# Patient Record
Sex: MALE | Race: WHITE | NOT HISPANIC OR LATINO | Employment: OTHER | URBAN - METROPOLITAN AREA
[De-identification: names, ages, dates, MRNs, and addresses within clinical notes are randomized per-mention and may not be internally consistent; named-entity substitution may affect disease eponyms.]

---

## 2017-01-03 ENCOUNTER — ALLSCRIPTS OFFICE VISIT (OUTPATIENT)
Dept: OTHER | Facility: OTHER | Age: 67
End: 2017-01-03

## 2017-01-03 LAB — OCCULT BLD, FECAL IMMUNOLOGICAL (HISTORICAL): NEGATIVE

## 2017-01-26 ENCOUNTER — GENERIC CONVERSION - ENCOUNTER (OUTPATIENT)
Dept: OTHER | Facility: OTHER | Age: 67
End: 2017-01-26

## 2017-01-27 ENCOUNTER — HOSPITAL ENCOUNTER (OUTPATIENT)
Dept: RADIOLOGY | Facility: HOSPITAL | Age: 67
Discharge: HOME | End: 2017-01-27
Attending: UROLOGY
Payer: COMMERCIAL

## 2017-01-27 ENCOUNTER — TRANSCRIBE ORDERS (OUTPATIENT)
Dept: ADMINISTRATIVE | Facility: HOSPITAL | Age: 67
End: 2017-01-27

## 2017-01-27 DIAGNOSIS — Z87.442 PERSONAL HISTORY OF URINARY CALCULI: Primary | ICD-10-CM

## 2017-01-27 DIAGNOSIS — Z87.442 PERSONAL HISTORY OF URINARY CALCULI: ICD-10-CM

## 2017-01-27 PROCEDURE — 74000 HB X-RAY EXAM OF ABDOMEN (SINGLE ANTEROPOSTERIOR VIEW): CPT

## 2017-06-23 ENCOUNTER — GENERIC CONVERSION - ENCOUNTER (OUTPATIENT)
Dept: OTHER | Facility: OTHER | Age: 67
End: 2017-06-23

## 2017-06-23 LAB
CHOLEST SERPL-MCNC: 177 MG/DL (ref 100–199)
CHOLEST/HDLC SERPL: 3.8 RATIO UNITS (ref 0–5)
HDLC SERPL-MCNC: 47 MG/DL
INTERPRETATION (HISTORICAL): NORMAL
LDLC SERPL CALC-MCNC: 119 MG/DL (ref 0–99)
TRIGL SERPL-MCNC: 55 MG/DL (ref 0–149)
VLDLC SERPL CALC-MCNC: 11 MG/DL (ref 5–40)

## 2017-06-26 ENCOUNTER — GENERIC CONVERSION - ENCOUNTER (OUTPATIENT)
Dept: OTHER | Facility: OTHER | Age: 67
End: 2017-06-26

## 2017-06-30 ENCOUNTER — ALLSCRIPTS OFFICE VISIT (OUTPATIENT)
Dept: OTHER | Facility: OTHER | Age: 67
End: 2017-06-30

## 2017-10-02 ENCOUNTER — GENERIC CONVERSION - ENCOUNTER (OUTPATIENT)
Dept: OTHER | Facility: OTHER | Age: 67
End: 2017-10-02

## 2017-10-02 LAB
A/G RATIO (HISTORICAL): 1.5 (ref 1.2–2.2)
ALBUMIN SERPL BCP-MCNC: 3.8 G/DL (ref 3.6–4.8)
ALP SERPL-CCNC: 78 IU/L (ref 39–117)
ALT SERPL W P-5'-P-CCNC: 10 IU/L (ref 0–44)
AST SERPL W P-5'-P-CCNC: 18 IU/L (ref 0–40)
BILIRUB SERPL-MCNC: 0.5 MG/DL (ref 0–1.2)
BUN SERPL-MCNC: 22 MG/DL (ref 8–27)
BUN/CREA RATIO (HISTORICAL): 20 (ref 10–24)
CALCIUM SERPL-MCNC: 9.6 MG/DL (ref 8.6–10.2)
CHLORIDE SERPL-SCNC: 105 MMOL/L (ref 96–106)
CHOLEST SERPL-MCNC: 127 MG/DL (ref 100–199)
CHOLEST/HDLC SERPL: 2.9 RATIO UNITS (ref 0–5)
CO2 SERPL-SCNC: 25 MMOL/L (ref 18–29)
CREAT SERPL-MCNC: 1.08 MG/DL (ref 0.76–1.27)
EGFR AFRICAN AMERICAN (HISTORICAL): 82 ML/MIN/1.73
EGFR-AMERICAN CALC (HISTORICAL): 71 ML/MIN/1.73
GLUCOSE SERPL-MCNC: 93 MG/DL (ref 65–99)
HDLC SERPL-MCNC: 44 MG/DL
INTERPRETATION (HISTORICAL): NORMAL
LDLC SERPL CALC-MCNC: 70 MG/DL (ref 0–99)
POTASSIUM SERPL-SCNC: 4.3 MMOL/L (ref 3.5–5.2)
SODIUM SERPL-SCNC: 143 MMOL/L (ref 134–144)
TOT. GLOBULIN, SERUM (HISTORICAL): 2.5 G/DL (ref 1.5–4.5)
TOTAL PROTEIN (HISTORICAL): 6.3 G/DL (ref 6–8.5)
TRIGL SERPL-MCNC: 65 MG/DL (ref 0–149)
VLDLC SERPL CALC-MCNC: 13 MG/DL (ref 5–40)

## 2017-10-03 ENCOUNTER — GENERIC CONVERSION - ENCOUNTER (OUTPATIENT)
Dept: OTHER | Facility: OTHER | Age: 67
End: 2017-10-03

## 2017-10-16 ENCOUNTER — GENERIC CONVERSION - ENCOUNTER (OUTPATIENT)
Dept: OTHER | Facility: OTHER | Age: 67
End: 2017-10-16

## 2017-12-06 ENCOUNTER — GENERIC CONVERSION - ENCOUNTER (OUTPATIENT)
Dept: OTHER | Facility: OTHER | Age: 67
End: 2017-12-06

## 2018-01-02 ENCOUNTER — GENERIC CONVERSION - ENCOUNTER (OUTPATIENT)
Dept: OTHER | Facility: OTHER | Age: 68
End: 2018-01-02

## 2018-01-02 LAB
A/G RATIO (HISTORICAL): 1.3 (ref 1.2–2.2)
ALBUMIN SERPL BCP-MCNC: 3.9 G/DL (ref 3.6–4.8)
ALP SERPL-CCNC: 82 IU/L (ref 39–117)
ALT SERPL W P-5'-P-CCNC: 13 IU/L (ref 0–44)
AST SERPL W P-5'-P-CCNC: 16 IU/L (ref 0–40)
BASOPHILS # BLD AUTO: 0 %
BASOPHILS # BLD AUTO: 0 X10E3/UL (ref 0–0.2)
BILIRUB SERPL-MCNC: 0.4 MG/DL (ref 0–1.2)
BUN SERPL-MCNC: 23 MG/DL (ref 8–27)
BUN/CREA RATIO (HISTORICAL): 21 (ref 10–24)
CALCIUM SERPL-MCNC: 9.7 MG/DL (ref 8.6–10.2)
CHLORIDE SERPL-SCNC: 106 MMOL/L (ref 96–106)
CHOLEST SERPL-MCNC: 156 MG/DL (ref 100–199)
CHOLEST/HDLC SERPL: 3.2 RATIO UNITS (ref 0–5)
CO2 SERPL-SCNC: 26 MMOL/L (ref 18–29)
CREAT SERPL-MCNC: 1.09 MG/DL (ref 0.76–1.27)
DEPRECATED RDW RBC AUTO: 14.8 % (ref 12.3–15.4)
EGFR AFRICAN AMERICAN (HISTORICAL): 81 ML/MIN/1.73
EGFR-AMERICAN CALC (HISTORICAL): 70 ML/MIN/1.73
EOSINOPHIL # BLD AUTO: 0.2 X10E3/UL (ref 0–0.4)
EOSINOPHIL # BLD AUTO: 3 %
GLUCOSE SERPL-MCNC: 105 MG/DL (ref 65–99)
HCT VFR BLD AUTO: 40.5 % (ref 37.5–51)
HDLC SERPL-MCNC: 49 MG/DL
HGB BLD-MCNC: 13.2 G/DL (ref 13–17.7)
IMM.GRANULOCYTES (CD4/8) (HISTORICAL): 0 %
IMM.GRANULOCYTES (CD4/8) (HISTORICAL): 0 X10E3/UL (ref 0–0.1)
INTERPRETATION (HISTORICAL): NORMAL
LDLC SERPL CALC-MCNC: 89 MG/DL (ref 0–99)
LYMPHOCYTES # BLD AUTO: 1.5 X10E3/UL (ref 0.7–3.1)
LYMPHOCYTES # BLD AUTO: 25 %
MCH RBC QN AUTO: 29.1 PG (ref 26.6–33)
MCHC RBC AUTO-ENTMCNC: 32.6 G/DL (ref 31.5–35.7)
MCV RBC AUTO: 89 FL (ref 79–97)
MONOCYTES # BLD AUTO: 0.5 X10E3/UL (ref 0.1–0.9)
MONOCYTES (HISTORICAL): 8 %
NEUTROPHILS # BLD AUTO: 3.7 X10E3/UL (ref 1.4–7)
NEUTROPHILS # BLD AUTO: 64 %
PLATELET # BLD AUTO: 215 X10E3/UL (ref 150–379)
POTASSIUM SERPL-SCNC: 4.7 MMOL/L (ref 3.5–5.2)
PROSTATE SPECIFIC ANTIGEN (HISTORICAL): 3.8 NG/ML (ref 0–4)
RBC (HISTORICAL): 4.54 X10E6/UL (ref 4.14–5.8)
SODIUM SERPL-SCNC: 147 MMOL/L (ref 134–144)
TOT. GLOBULIN, SERUM (HISTORICAL): 2.9 G/DL (ref 1.5–4.5)
TOTAL PROTEIN (HISTORICAL): 6.8 G/DL (ref 6–8.5)
TRIGL SERPL-MCNC: 91 MG/DL (ref 0–149)
VLDLC SERPL CALC-MCNC: 18 MG/DL (ref 5–40)
WBC # BLD AUTO: 5.9 X10E3/UL (ref 3.4–10.8)

## 2018-01-03 ENCOUNTER — GENERIC CONVERSION - ENCOUNTER (OUTPATIENT)
Dept: OTHER | Facility: OTHER | Age: 68
End: 2018-01-03

## 2018-01-08 ENCOUNTER — ALLSCRIPTS OFFICE VISIT (OUTPATIENT)
Dept: OTHER | Facility: OTHER | Age: 68
End: 2018-01-08

## 2018-01-09 NOTE — PROGRESS NOTES
Assessment    1  Encounter for preventive health examination (V70 0) (Z00 00)   2  Status post bariatric surgery (V45 86) (Z98 84)   3  Mixed hyperlipidemia (272 2) (E78 2)   4  BMI 35 0-35 9,adult (V85 35) (Z68 35)   5  Obesity, Class II, BMI 35-39 9, with comorbidity (278 00) (E66 9)   6  Benign essential hypertension (401 1) (I10)   7  Need for vaccination (V05 9) (Z23)   8  Benign prostatic hypertrophy without urinary obstruction (600 00) (N40 0)    Plan  Benign essential hypertension    · Lisinopril 10 MG Oral Tablet; 1 every day  Benign prostatic hypertrophy without urinary obstruction    · 2 - Phong Juarez MD , Ignacio Rizvi  (Urology) Physician Referral  Consult Only: the expectation is  that the referring provider will communicate back to the patient on treatment options  Evaluation and Treatment: the expectation is that the referred to provider will  communicate back to the patient on treatment options  Status: Hold For - Scheduling   Requested for: [id number]  Care Summary provided  : Yes  Health Maintenance    · DIGITAL RECTAL EXAM; Status:Complete;   Done: 89LUT3543 11:38AM   · Hemoccult Screening - POC; Status:Complete;   Done: 64JGN4363 11:37AM  Mixed hyperlipidemia    · Call [phone] if: You have muscle cramps ; Status:Complete;   Done: 45XLD9632  11:29AM   · Call [phone] if: You have pain in the stomach area ; Status:Complete;   Done:  67TPF1112 11:29AM   · Call [phone] if: You start vomiting ; Status:Complete;   Done: 44KKO1507 11:29AM   · Call 911 if: You have any symptoms of a stroke ; Status:Complete;   Done: 67ZKC2756  11:29AM   · Continue with our present treatment plan ; Status:Complete;   Done: 52UYC5294 11:29AM   · Eat no more than 30 grams of fat per day ; Status:Complete;   Done: 13TLM7727 11:29AM   · You need to quit smoking ; Status:Complete;   Done: 45KBT6419 11:29AM   · (1) LIPID PANEL, FASTING; Status:Active;  Requested OSK:58TPP7775;   Need for vaccination    · Follow-up visit in 6 months Evaluation and Treatment  Follow-up  Status: Hold For -  Scheduling  Requested for: 98TCC1465   · Fluzone High-Dose 0 5 ML Intramuscular Suspension Prefilled Syringe;  INJECT 0 5  ML Intramuscular; To Be Done: 96LBM0078   · Pneumo (Pneumovax); INJECT 0 5  ML Intramuscular; To Be Done:  47ZCB6441  Obesity, Class II, BMI 35-39 9, with comorbidity    · Avoid alcoholic beverages ; Status:Complete;   Done: 24JHX5731 11:16AM   · Begin a limited exercise program ; Status:Complete;   Done: 64NWQ2774 11:16AM   · Begin or continue regular aerobic exercise  Gradually work up to at least 3 sessions of 30  minutes of exercise a week ; Status:Complete;   Done: 31DUH2392 11:16AM   · Eat a low fat and low cholesterol diet ; Status:Complete;   Done: 61EJM2904 11:16AM   · Keep a diary of when and what you eat ; Status:Complete;   Done: 60ANA8954 11:16AM   · Shared Decision Making Aid given; Status:Complete;   Done: 07UGB9934 11:16AM   · Some eating tips that can help you lose weight ; Status:Complete;   Done: 92AFO5232  11:16AM   · Stretch and warm up your muscles during the first 10 minutes , then cool down your  muscles for the last 10 minutes of exercise ; Status:Complete;   Done: 40TPJ8945  11:16AM   · There are many exercise options for seniors ; Status:Complete;   Done: 65EQA0638  11:16AM   · We encourage all of our patients to exercise regularly  30 minutes of exercise or physical  activity five or more days a week is recommended for children and adults ;  Status:Complete;   Done: 95ZOC4542 11:16AM   · We recommend that you bring your body mass index down to 26 ; Status:Complete;    Done: 21SVT6374 11:16AM   · We recommend that you change your eating habits slowly ; Status:Complete;   Done:  49TMD4719 11:16AM   · We recommend you modify your diet to achieve and maintain a healthy weight    Being  overweight may increase your risk for developing health problems such as diabetes,  heart disease, and cancer  Avoid high fat foods and eat a balanced diet rich  in fruits and vegetables  The combination of a reduced-calorie diet and increased  physical activity is recommended  Please let us know if you would like to  learn more about your nutrition and calorie needs, and additional options including  weight loss programs that can help you achieve your goals ; Status:Complete;   Done:  45QWQ0613 11:16AM   · We want you to follow the Therapeutic Lifestyle Changes (TLC) diet ; Status:Complete;    Done: 74VIQ6444 11:16AM   · Call [phone] if: You are considering suicide ; Status:Complete;   Done:  90INA0580 11:16AM   · Call [phone] if: You are having difficulty sleeping (insomnia) ; Status:Complete;    Done: 74MKQ8146 11:16AM   · Call [phone] if: You are urinating too frequently ; Status:Complete;   Done:  35TEC9465 11:16AM   · Call [phone] if: You feel thirsty most of the time ; Status:Complete;   Done:  61HSN2978 11:16AM   · Call [phone] if: You feel your heart is beating very fast or skipping beats ;  Status:Complete;   Done: 95HGF4561 11:16AM   · Call [phone] if: You have feelings of extreme sadness and feelings of  hopelessness ; Status:Complete;   Done: 69ASM6437 11:16AM   · Call [phone] if: You have pain in your abdomen ; Status:Complete;   Done:  88ZBG9955 11:16AM   · Call [phone] if: You have symptoms of sleep apnea ; Status:Complete;   Done:  77PFO1769 11:16AM   · Call [phone] if: You have sudden or severe chest pain with shortness of breath, rapid  breathing, or cough ; Status:Complete;   Done: 47ZQE7381 11:16AM   · Seek Immediate Medical Attention if: You experience a new kind of chest pain (angina)  or pressure ; Status:Complete;   Done: 07ZLD1987 11:16AM    Discussion/Summary    Pt did not get to see urology last year - has no urinary symptoms, his prostate is 2 pluss which is what i found last year and his PSA was stable from last year to this year   I would like him to see urology and see if this is an issue or he just has a large prostate    will engage in low fat diet pt will have labs in 6 months if still elevated will start statin  Chief Complaint  cpe      History of Present Illness  HM, Adult Male: The patient is being seen for a health maintenance evaluation  General Health:   Screening:   HPI: pt is here for a full physical  had my labs and bariatrics labs which he would like to go over    no freq, no urinary stream abnormalities, no nocturia         Review of Systems    Constitutional: No fever or chills, feels well, no tiredness, no recent weight gain or weight loss  Eyes: No complaints of eye pain, no red eyes, no discharge from eyes, no itchy eyes  ENT: no complaints of earache, no hearing loss, no nosebleeds, no nasal discharge, no sore throat, no hoarseness  Cardiovascular: No complaints of slow heart rate, no fast heart rate, no chest pain, no palpitations, no leg claudication, no lower extremity  Respiratory: No complaints of shortness of breath, no wheezing, no cough, no SOB on exertion, no orthopnea or PND  Gastrointestinal: No complaints of abdominal pain, no constipation, no nausea or vomiting, no diarrhea or bloody stools  Genitourinary: No complaints of dysuria, no incontinence, no hesitancy, no nocturia, no genital lesion, no testicular pain  Musculoskeletal: No complaints of arthralgia, no myalgias, no joint swelling or stiffness, no limb pain or swelling  Integumentary: No complaints of skin rash or skin lesions, no itching, no skin wound, no dry skin  Neurological: No compliants of headache, no confusion, no convulsions, no numbness or tingling, no dizziness or fainting, no limb weakness, no difficulty walking  Psychiatric: Is not suicidal, no sleep disturbances, no anxiety or depression, no change in personality, no emotional problems     Endocrine: No complaints of proptosis, no hot flashes, no muscle weakness, no erectile dysfunction, no deepening of the voice, no feelings of weakness  Hematologic/Lymphatic: No complaints of swollen glands, no swollen glands in the neck, does not bleed easily, no easy bruising  Active Problems    1  Abnormal blood sugar (790 29) (R73 09)   2  AC (acromioclavicular) joint bone spurs (726 91) (M75 80)   3  Achilles tendinitis, unspecified laterality (726 71) (M76 60)   4  Administrative Evaluation Services (V70 3)   5  Aftercare following surgery of the musculoskeletal system (V58 78) (Z47 89)   6  Benign essential hypertension (401 1) (I10)   7  Benign prostatic hypertrophy without urinary obstruction (600 00) (N40 0)   8  Colon cancer screening (V76 51) (Z12 11)   9  Deep venous thrombosis of distal lower extremity (453 42) (I82 4Z9)   10  Encounter for screening for malignant neoplasm of prostate (V76 44) (Z12 5)   11  GERD without esophagitis (530 81) (K21 9)   12  Heart burn (787 1) (R12)   13  Hernia (553 9) (K46 9)   14  Hip pain, unspecified laterality   15  History of colonic polyps (V12 72) (Z86 010)   16  Internal Hemorrhoids (455 0)   17  Mixed hyperlipidemia (272 2) (E78 2)   18  Need for vaccination (V05 9) (Z23)   19  Obstructive sleep apnea (327 23) (G47 33)   20  Osteoarthritis (715 90) (M19 90)   21  Popliteal fullness (729 89) (R29 898)   22  Postgastrectomy malabsorption (579 3) (K91 2,Z90 3)   23  Screening for cardiovascular condition (V81 2) (Z13 6)   24  Screening for diabetes mellitus (DM) (V77 1) (Z13 1)   25  Screening for hypothyroidism (V77 0) (Z13 29)   26  Status post bariatric surgery (V45 86) (Z98 84)   27  Status post gastric surgery (V45 89) (Z98 890)   28  Tear of rotator cuff, left   29  Visit for pre-operative examination (V72 84) (Z01 818)   30   Well adult on routine health check (V70 0) (Z00 00)    Past Medical History    · Aftercare following surgery of the musculoskeletal system (V58 78) (Z47 89)   · History of Benign prostatic hypertrophy without urinary obstruction (600 00) (N40 0)   · History of Blood in urine (599 70) (R31 9)   · History of Deep venous thrombosis of distal lower extremity (453 42) (I82 4Z9)   · History of anemia (V12 3) (Z86 2)   · History of hemorrhoids (V13 89) (Z87 19)   · History of hypertension (V12 59) (Z86 79)   · History of mixed hyperlipidemia (V12 29) (Z86 39)   · History of stomach ulcers (V12 79) (Z87 19)   · History of Kidney stones (592 0) (N20 0)   · History of Nephrolithiasis (V13 01)   · History of Right inguinal hernia (550 90) (K40 90)   · Screening for genitourinary condition (V81 6) (Z13 89)   · History of Status post bariatric surgery (V45 86) (Z98 84)    Surgical History    · Administrative Evaluation Services (V70 3)   · History of Foot Surgery   · History of Gastric Surgery For Morbid Obesity Laparoscopic Longitudinal Gastrectomy   · History of Hip Surgery   · History of Inguinal Hernia Repair   · History of Knee Surgery   · History of Tonsillectomy    Family History  Mother    · Denied: Family history of Crohn's disease without complication, unspecified  gastrointestinal tract location   · Denied: Family history of liver disease   · Denied: Family history of Malignant neoplasm of colon, unspecified part of colon  Father    · Family history of Arthritis   · Denied: Family history of Crohn's disease without complication, unspecified  gastrointestinal tract location   · Family history of Diabetes Mellitus (V18 0)   · Family history of diabetes mellitus (V18 0) (Z83 3)   · Family history of hypertension (V17 49) (Z82 49)   · Denied: Family history of liver disease   · Family history of Hyperlipidemia   · Denied: Family history of Malignant neoplasm of colon, unspecified part of colon  Family History    · Family history of Diabetes Mellitus (V18 0)   · Family history of Emphysema   · Family history of Heart Disease (V17 49)   · Family history of Hypertension (V17 49)    Social History    · Denied: History of Alcohol Use (History)   · Denied: History of Drug Use   · Exercise: Walking   · Once a day   · Former smoker (J59 27) (L39 657)   ·    · Occasional alcohol use    Current Meds   1  Calcium 600+D 600-400 MG-UNIT Oral Tablet; Take as directed Recorded   2  Lisinopril 10 MG Oral Tablet; 1 every day; Therapy: 20AJC9067 to (Last Rx:10Oct2016)  Requested for: 75QFX9468 Ordered   3  Multivitamin CHEW;   Therapy: (Recorded:11Jan2016) to Recorded   4  Omeprazole 20 MG Oral Capsule Delayed Release; take 1 capsule daily; Therapy: 19VHD5065 to (Evaluate:41Vqy4232)  Requested for: 85HNL6644; Last   Rx:27Lwj0422 Ordered   5  Vitamin B-12 5000 MCG Sublingual Tablet Sublingual Recorded   6  Vitamin D3 2000 UNIT Oral Tablet Recorded    Allergies    1  Percocet TABS    Vitals   Recorded: 70TLY4252 11:06AM   Temperature 95 7 F   Heart Rate 84   Respiration 16   Systolic 347   Diastolic 70   Height 5 ft 7 5 in   Weight 232 lb 8 0 oz   BMI Calculated 35 88   BSA Calculated 2 17     Physical Exam    Constitutional   General appearance: Abnormal   obese  Head and Face   Head and face: Normal     Eyes   Conjunctiva and lids: No erythema, swelling or discharge  Pupils and irises: Equal, round, reactive to light  Ears, Nose, Mouth, and Throat   External inspection of ears and nose: Normal     Otoscopic examination: Tympanic membranes translucent with normal light reflex  Canals patent without erythema  Hearing: Normal     Nasal mucosa, septum, and turbinates: Normal without edema or erythema  Lips, teeth, and gums: Normal, good dentition  Oropharynx: Normal with no erythema, edema, exudate or lesions  Neck   Neck: Supple, symmetric, trachea midline, no masses  Thyroid: Normal, no thyromegaly  Pulmonary   Respiratory effort: No increased work of breathing or signs of respiratory distress  Auscultation of lungs: Clear to auscultation      Cardiovascular   Palpation of heart: Normal PMI, no thrills  Auscultation of heart: Normal rate and rhythm, normal S1 and S2, no murmurs  Carotid pulses: 2+ bilaterally  Abdominal aorta: Normal     Femoral pulses: 2+ bilaterally  Abdomen   Abdomen: Non-tender, no masses  Liver and spleen: No hepatomegaly or splenomegaly  Anus, perineum, and rectum: Normal sphincter tone, no masses, no prolapse  Stool sample for occult blood: Negative  Genitourinary   Scrotal contents: Normal testes, no masses  Penis: Normal, no lesions  Digital rectal exam of prostate: Abnormal   2 plus prostate, not tender no nodules  Lymphatic   Palpation of lymph nodes in neck: No lymphadenopathy  Palpation of lymph nodes in axillae: No lymphadenopathy      Musculoskeletal   Gait and station: Normal        Results/Data  (1) CBC/PLT/DIFF 65SVG5570 08:27AM [employer]     Test Name Result Flag Reference   WBC 5 5 x10E3/uL  3 4-10 8   RBC 4 79 x10E6/uL  4 14-5 80   Hemoglobin 13 7 g/dL  12 6-17 7   Hematocrit 40 8 %  37 5-51 0   MCV 85 fL  79-97   MCH 28 6 pg  26 6-33 0   MCHC 33 6 g/dL  31 5-35 7   RDW 14 8 %  12 3-15 4   Platelets 321 M95C5/KQ  150-379   Neutrophils 55 %     Lymphs 34 %     Monocytes 7 %     Eos 3 %     Basos 1 %     Neutrophils (Absolute) 3 1 x10E3/uL  1 4-7 0   Lymphs (Absolute) 1 9 x10E3/uL  0 7-3 1   Monocytes(Absolute) 0 4 x10E3/uL  0 1-0 9   Eos (Absolute) 0 2 x10E3/uL  0 0-0 4   Baso (Absolute) 0 0 x10E3/uL  0 0-0 2   Immature Granulocytes 0 %     Immature Grans (Abs) 0 0 x10E3/uL  0 0-0 1     (1) COMPREHENSIVE METABOLIC PANEL 45ZNB4063 38:22PG [employer]     Test Name Result Flag Reference   Glucose, Serum 94 mg/dL  65-99   BUN 20 mg/dL  8-27   Creatinine, Serum 1 01 mg/dL  0 76-1 27   eGFR If NonAfricn Am 77 mL/min/1 73  >59   eGFR If Africn Am 89 mL/min/1 73  >59   BUN/Creatinine Ratio 20  10-22   Sodium, Serum 144 mmol/L  134-144   Potassium, Serum 4 3 mmol/L  3 5-5 2   Chloride, Serum 105 mmol/L     Carbon Dioxide, Total 25 mmol/L  18-29   Calcium, Serum 9 4 mg/dL  8 6-10 2   Protein, Total, Serum 6 6 g/dL  6 0-8 5   Albumin, Serum 4 0 g/dL  3 6-4 8   Globulin, Total 2 6 g/dL  1 5-4 5   A/G Ratio 1 5  1 1-2 5   Bilirubin, Total 0 4 mg/dL  0 0-1 2   Alkaline Phosphatase, S 73 IU/L     AST (SGOT) 15 IU/L  0-40   ALT (SGPT) 10 IU/L  0-44     (1) LIPID PANEL, FASTING 91Prx1817 08:27AM [facility] [facility]     Test Name Result Flag Reference   Cholesterol, Total 203 mg/dL H 100-199   Triglycerides 87 mg/dL  0-149   HDL Cholesterol 42 mg/dL  >39   VLDL Cholesterol Jonas 17 mg/dL  5-40   LDL Cholesterol Calc 144 mg/dL H 0-99   T  Chol/HDL Ratio 4 8 ratio units  0 0-5 0   T  Chol/HDL Ratio                                                             Men  Women                                               1/2 Avg  Risk  3 4    3 3                                                   Avg Risk  5 0    4 4                                                2X Avg  Risk  9 6    7 1                                                3X Avg  Risk 23 4   11 0     (1) PSA (SCREEN) (Dx V76 44 Screen for Prostate Cancer) 32MYJ9079 08:27AM [employer] [facility]     Test Name Result Flag Reference   Prostate Specific Ag, Serum 3 6 ng/mL  0 0-4 0   Roche ECLIA methodology  According to the American Urological Association, Serum PSA should  decrease and remain at undetectable levels after radical  prostatectomy  The AUA defines biochemical recurrence as an initial  PSA value 0 2 ng/mL or greater followed by a subsequent confirmatory  PSA value 0 2 ng/mL or greater  Values obtained with different assay methods or kits cannot be used  interchangeably  Results cannot be interpreted as absolute evidence  of the presence or absence of malignant disease       (1) TSH 95BGF5151 08:27AM [employer] [facility]     Test Name Result Flag Reference   TSH 3 060 uIU/mL  0 450-4 500     [facility]) Cardiovascular Risk Assessment 65Mbl1867 08:27AM [employer] [facility]     Test Name Result Flag Reference Interpretation Note     Supplement report is available  PDF Image   (1) CBC/ PLT (NO DIFF) 87TYJ3453 08:20AM [facility]     Test Name Result Flag Reference   WBC 5 2 x10E3/uL  3 4-10 8   RBC 4 74 x10E6/uL  4 14-5 80   Hemoglobin 13 6 g/dL  12 6-17 7   Hematocrit 41 4 %  37 5-51 0   MCV 87 fL  79-97   MCH 28 7 pg  26 6-33 0   MCHC 32 9 g/dL  31 5-35 7   RDW 14 5 %  12 3-15 4   Platelets 589 M41C9/OK  150-379     (1) COMPREHENSIVE METABOLIC PANEL 84RJK1370 36:11VS [facility]     Test Name Result Flag Reference   Glucose, Serum 92 mg/dL  65-99   BUN 20 mg/dL  8-27   Creatinine, Serum 1 03 mg/dL  0 76-1 27   eGFR If NonAfricn Am 75 mL/min/1 73  >59   eGFR If Africn Am 87 mL/min/1 73  >59   BUN/Creatinine Ratio 19  10-22   Sodium, Serum 145 mmol/L H 134-144   Potassium, Serum 4 3 mmol/L  3 5-5 2   Chloride, Serum 106 mmol/L     Carbon Dioxide, Total 24 mmol/L  18-29   Calcium, Serum 9 2 mg/dL  8 6-10 2   Protein, Total, Serum 6 6 g/dL  6 0-8 5   Albumin, Serum 4 1 g/dL  3 6-4 8   Globulin, Total 2 5 g/dL  1 5-4 5   A/G Ratio 1 6  1 1-2 5   Bilirubin, Total 0 4 mg/dL  0 0-1 2   Alkaline Phosphatase, S 72 IU/L     AST (SGOT) 14 IU/L  0-40   ALT (SGPT) 9 IU/L  0-44     (1) FERRITIN 67Vbr8698 08:20AM [employer]     Test Name Result Flag Reference   Ferritin, Serum 307 ng/mL       (1) FOLATE 92Vgf3687 08:20AM [employer] [facility] Mince     Test Name Result Flag Reference   Folate (Folic Acid), Serum >08 8 ng/mL  >3 0   A serum folate concentration of less than 3 1 ng/mL is  considered to represent clinical deficiency       (1) VITAMIN B12 80Wkq0663 08:20AM [facility]     Test Name Result Flag Reference   Vitamin B12 1214 pg/mL H 211-946     (1) VITAMIN D 25-HYDROXY [id number] 08:20AM Isela Jackson     Test Name Result Flag Reference   Vitamin D, 25-Hydroxy 55 4 ng/mL  30 0-100 0   Vitamin D deficiency has been defined by the [place] of  Medicine and an [place] 12 practice guideline as a  level of serum 25-OH vitamin D less than 20 ng/mL (1,2)  The Endocrine Society went on to further define vitamin D  insufficiency as a level between 21 and 29 ng/mL (2)  1  IOM ([place] of [employer])  2010  Dietary reference     intakes for calcium and D  [facility]: The     [employer]  2  Steve MF, Tj LOPEZ, Herb OCHOA, et al      Evaluation, treatment, and prevention of vitamin D     deficiency: an Endocrine Society clinical practice     guideline  JCEM  2011 Jul; 96(7):1911-30  Procedure    Procedure: Indication: routine screening  Results: 20/20 in both eyes without corrective device, 20/20 in the right eye without corrective device, 20/20 in the left eye without corrective device   Color vision was and the results were normal       Health Management  History of colonic polyps   COLONOSCOPY; every 3 years; Last 70MKG3267; Next Due: 17WEH9591;  Active    Future Appointments    Date/Time Provider Specialty Site   12/06/2017 09:00 AM Consuelo Jackson [facility] [place] [facility] [place] [address] [facility]     Signatures   Electronically signed by : Marcel Roca DO; [place]  3 2017 11:38AM EST                       (Author)

## 2018-01-10 NOTE — RESULT NOTES
Discussion/Summary   will discuss labs at follow up appt     Verified Results  (1) LIPID PANEL, FASTING 23Jun2017 07:14AM Cherie Slider     Test Name Result Flag Reference   Cholesterol, Total 177 mg/dL  100-199   Triglycerides 55 mg/dL  0-149   HDL Cholesterol 47 mg/dL  >39   VLDL Cholesterol Jonas 11 mg/dL  5-40   LDL Cholesterol Calc 119 mg/dL H 0-99   T  Chol/HDL Ratio 3 8 ratio units  0 0-5 0   T  Chol/HDL Ratio                                                             Men  Women                                               1/2 Avg  Risk  3 4    3 3                                                   Avg Risk  5 0    4 4                                                2X Avg  Risk  9 6    7 1                                                3X Avg  Risk 23 4   11 0     [facility]) Cardiovascular Risk Assessment 23Jun2017 07:14AM Cherie Slider     Test Name Result Flag Reference   Interpretation Note     Supplement report is available  PDF Image

## 2018-01-10 NOTE — RESULT NOTES
Dear Natasha Saleem,   Your test results have returned and are listed below:      Discussion/Summary  Your results have some minor abnormalities, but nothing that is concerning  Your total cholesterol and LDL cholesterol are elevated  I have enclosed further information regarding your results  Our cholesterol levels are markers of cardiovascular health  I have included heart healthy diet information as well  Your vitamin B12 level was elevated  This is not concerning  This may mean that you are taking more than is needed  Please check all your supplements to see if they contain vitamin B12  The recommended dose is 350-500mcg/day  Please keep your regularly scheduled follow-up appointment  If you do not have a follow-up scheduled, please call the office to schedule a follow-up visit  If you have any questions, please don't hesitate to call the office  Sincerely,      Signatures   Electronically signed by : JAGDISH Longoria; Jan 26 2017  1:49PM EST                       (Author)    Electronically signed by :  PAM Zuleta ; Jan 30 2017 10:00AM EST

## 2018-01-10 NOTE — RESULT NOTES
Message      Rectal polyp removed came back as hyperplastic  1    Repeat colonoscopy in 3   years    PT AWARE OF RESULTS/REMINDER SET  THANKS CF 1         1 Amended By: Deric Marin; Jan 20 2016 12:04 PM EST    Verified Results  (1) TISSUE EXAM [id number] 12:38PM Scarlet Days     Test Name Result Flag Reference   LAB AP CASE REPORT (Report)     Surgical Pathology Report             Case: [id number]                   Authorizing Provider: Sia Friedman MD     Collected:      01/15/2016 1405        Ordering Location:   [facility]  Received:      01/18/2016 [facility]                                     Pathologist:      Easton York DO                               Specimen:  Polyp, Colorectal, bx rectal polyp   LAB AP FINAL DIAGNOSIS      A  Rectum, biopsy:  - Hyperplastic polyp  LAB AP SURGICAL ADDITIONAL INFORMATION (Report)     These tests were developed and their performance characteristics   determined by [address] [place] [address] [facility] or [address] [place] [address]  They may not be cleared or approved by the [place]  Food and   Drug Administration  The FDA has determined that such clearance or   approval is not necessary  These tests are used for clinical purposes  They should not be regarded as investigational or for research  This   laboratory has been approved by [place] [address], designated as a high-complexity   laboratory and is qualified to perform these tests  LAB AP GROSS DESCRIPTION (Report)     A  The specimen is received in formalin, labeled with the patient's name   and hospital number, and is designated biopsy rectal polyp  The   specimen consists of a single, rubbery, tan-brown tissue fragment   measuring up to 0 2 cm in greatest dimension  Entirely submitted  One   cassette  Note: The estimated total formalin fixation time based upon information   provided by the submitting clinician and the standard processing schedule   is over 72 hours      [facility]

## 2018-01-13 VITALS
WEIGHT: 228 LBS | TEMPERATURE: 98.1 F | HEIGHT: 68 IN | RESPIRATION RATE: 18 BRPM | DIASTOLIC BLOOD PRESSURE: 80 MMHG | SYSTOLIC BLOOD PRESSURE: 118 MMHG | HEART RATE: 82 BPM | BODY MASS INDEX: 34.56 KG/M2

## 2018-01-13 VITALS
WEIGHT: 232.5 LBS | DIASTOLIC BLOOD PRESSURE: 70 MMHG | HEIGHT: 68 IN | RESPIRATION RATE: 16 BRPM | TEMPERATURE: 95.7 F | SYSTOLIC BLOOD PRESSURE: 126 MMHG | BODY MASS INDEX: 35.24 KG/M2 | HEART RATE: 84 BPM

## 2018-01-15 NOTE — RESULT NOTES
Message   looks like pt has a physical sched with DR GEORGE on the third he usually we should call pt and see if he is aware of this or it was a mistake as he usually sees me     Verified Results  (1) CBC/PLT/DIFF [id number] 08:27AM [facility]     Test Name Result Flag Reference   WBC 5 5 x10E3/uL  3 4-10 8   RBC 4 79 x10E6/uL  4 14-5 80   Hemoglobin 13 7 g/dL  12 6-17 7   Hematocrit 40 8 %  37 5-51 0   MCV 85 fL  79-97   MCH 28 6 pg  26 6-33 0   MCHC 33 6 g/dL  31 5-35 7   RDW 14 8 %  12 3-15 4   Platelets 068 T07F3/NB  150-379   Neutrophils 55 %     Lymphs 34 %     Monocytes 7 %     Eos 3 %     Basos 1 %     Neutrophils (Absolute) 3 1 x10E3/uL  1 4-7 0   Lymphs (Absolute) 1 9 x10E3/uL  0 7-3 1   Monocytes(Absolute) 0 4 x10E3/uL  0 1-0 9   Eos (Absolute) 0 2 x10E3/uL  0 0-0 4   Baso (Absolute) 0 0 x10E3/uL  0 0-0 2   Immature Granulocytes 0 %     Immature Grans (Abs) 0 0 x10E3/uL  0 0-0 1     (1) COMPREHENSIVE METABOLIC PANEL [id number] 97:95UO [facility]     Test Name Result Flag Reference   Glucose, Serum 94 mg/dL  65-99   BUN 20 mg/dL  8-27   Creatinine, Serum 1 01 mg/dL  0 76-1 27   eGFR If NonAfricn Am 77 mL/min/1 73  >59   eGFR If Africn Am 89 mL/min/1 73  >59   BUN/Creatinine Ratio 20  10-22   Sodium, Serum 144 mmol/L  134-144   Potassium, Serum 4 3 mmol/L  3 5-5 2   Chloride, Serum 105 mmol/L     Carbon Dioxide, Total 25 mmol/L  18-29   Calcium, Serum 9 4 mg/dL  8 6-10 2   Protein, Total, Serum 6 6 g/dL  6 0-8 5   Albumin, Serum 4 0 g/dL  3 6-4 8   Globulin, Total 2 6 g/dL  1 5-4 5   A/G Ratio 1 5  1 1-2 5   Bilirubin, Total 0 4 mg/dL  0 0-1 2   Alkaline Phosphatase, S 73 IU/L     AST (SGOT) 15 IU/L  0-40   ALT (SGPT) 10 IU/L  0-44     (1) LIPID PANEL, FASTING [id number] 08:27AM [facility]     Test Name Result Flag Reference   Cholesterol, Total 203 mg/dL H 100-199   Triglycerides 87 mg/dL  0-149   HDL Cholesterol 42 mg/dL  >39   VLDL Cholesterol Jonas 17 mg/dL  5-40   LDL Cholesterol Calc 144 mg/dL H 0-99   T  Chol/HDL Ratio 4 8 ratio units  0 0-5 0   T  Chol/HDL Ratio                                                             Men  Women                                               1/2 Avg  Risk  3 4    3 3                                                   Avg Risk  5 0    4 4                                                2X Avg  Risk  9 6    7 1                                                3X Avg  Risk 23 4   11 0     (1) PSA (SCREEN) (Dx V76 44 Screen for Prostate Cancer) [id number] 08:27AM [facility]     Test Name Result Flag Reference   Prostate Specific Ag, Serum 3 6 ng/mL  0 0-4 0   Roche ECLIA methodology  According to the American Urological Association, Serum PSA should  decrease and remain at undetectable levels after radical  prostatectomy  The AUA defines biochemical recurrence as an initial  PSA value 0 2 ng/mL or greater followed by a subsequent confirmatory  PSA value 0 2 ng/mL or greater  Values obtained with different assay methods or kits cannot be used  interchangeably  Results cannot be interpreted as absolute evidence  of the presence or absence of malignant disease  (1) TSH [id number] 08:27AM [facility]     Test Name Result Flag Reference   TSH 3 060 uIU/mL  0 450-4 500     [facility]) Cardiovascular Risk Assessment [id number] 08:27AM Jose Marten     Test Name Result Flag Reference   Interpretation Note     Supplement report is available  PDF Image

## 2018-01-16 NOTE — RESULT NOTES
Discussion/Summary   will discuss labs at follow up appt     Verified Results  (1) LIPID PANEL, FASTING [id number] 07:59AM [facility]     Test Name Result Flag Reference   Cholesterol, Total 127 mg/dL  100-199   Triglycerides 65 mg/dL  0-149   HDL Cholesterol 44 mg/dL  >39   VLDL Cholesterol Jonas 13 mg/dL  5-40   LDL Cholesterol Calc 70 mg/dL  0-99   T  Chol/HDL Ratio 2 9 ratio units  0 0-5 0   T  Chol/HDL Ratio                                                             Men  Women                                               1/2 Avg  Risk  3 4    3 3                                                   Avg Risk  5 0    4 4                                                2X Avg  Risk  9 6    7 1                                                3X Avg  Risk 23 4   11 0     (1) COMPREHENSIVE METABOLIC PANEL [id number] 18:80EO [facility]jia     Test Name Result Flag Reference   Glucose, Serum 93 mg/dL  65-99   BUN 22 mg/dL  8-27   Creatinine, Serum 1 08 mg/dL  0 76-1 27   BUN/Creatinine Ratio 20  10-24   Sodium, Serum 143 mmol/L  134-144   Potassium, Serum 4 3 mmol/L  3 5-5 2   Chloride, Serum 105 mmol/L     Carbon Dioxide, Total 25 mmol/L  18-29   Calcium, Serum 9 6 mg/dL  8 6-10 2   Protein, Total, Serum 6 3 g/dL  6 0-8 5   Albumin, Serum 3 8 g/dL  3 6-4 8   Globulin, Total 2 5 g/dL  1 5-4 5   A/G Ratio 1 5  1 2-2 2   Bilirubin, Total 0 5 mg/dL  0 0-1 2   Alkaline Phosphatase, S 78 IU/L     AST (SGOT) 18 IU/L  0-40   ALT (SGPT) 10 IU/L  0-44   eGFR If NonAfricn Am 71 mL/min/1 73  >59   eGFR If Africn Am 82 mL/min/1 73  >59     [facility]) Cardiovascular Risk Assessment 02Oct2017 07:59AM [facility] Mejia     Test Name Result Flag Reference   Interpretation Note     Supplement report is available  PDF Image

## 2018-01-16 NOTE — RESULT NOTES
Verified Results  [facility]) CBC+Platelet+Hem Review [id number] 07:27AM Stefan Citizen     Test Name Result Flag Reference   WBC 4 7 x10E3/uL  3 4-10 8   RBC 4 59 x10E6/uL  4 14-5 80   Hemoglobin 12 8 g/dL  12 6-17 7   Hematocrit 40 1 %  37 5-51 0   MCV 87 fL  79-97   MCH 27 9 pg  26 6-33 0   MCHC 31 9 g/dL  31 5-35 7   RDW 15 7 % H 12 3-15 4   Platelets 716 F50P9/XE  150-379   Neutrophils 47 %     Lymphs 46 %     Monocytes 5 %     Eos 2 %     Basos 0 %     Neutrophils Absolute 2 2 X10E3/uL  1 4-7 0   Lymphs (Absolute) 2 2 X10E3/uL  0 7-3 1   Monocytes(Absolute) 0 2 X10E3/uL  0 1-0 9   Eos (Absolute Value) 0 1 X10E3/uL  0 0-0 4   Baso(Absolute) 0 0 X10E3/uL  0 0-0 2   RBC Comment RBC's appear normal   Normal   Platelet Comment Adequate  Adequate       Discussion/Summary   lab acceptable

## 2018-01-16 NOTE — PROCEDURES
Procedures by Rayne San MD at 1/15/2016   2:08 PM      Author:  Rayne San MD Service:  (none) Author Type:  Physician     Filed:  1/15/2016  2:10 PM Date of Service:  1/15/2016  2:08 PM Status:  Signed     :  Rayne San MD (Physician)            COLONOSCOPY    PROCEDURE: Colonoscopy    INDICATIONS: History Adenomatous Polyp    POST-OP DIAGNOSIS: See the impression below    SEDATION: Monitored anesthesia care, check anesthesia records    PHYSICAL EXAM:    Visit Vitals      BP (!) 136/75    Pulse (!) 56    Temp 97 9 °F (36 6 °C) (Tympanic)    Resp 18    Ht 5' 9 5 (1 765 m)    Wt 99 8 kg (220 lb)    SpO2 95%    BMI 32 02 kg/m2     Body mass index is 32 02 kg/(m^2)  General: NAD  Heart: S1 & S2 normal, RRR  Lungs: CTA, No rales or rhonchi  Abdomen: Soft, nontender, nondistended, good bowel sounds    CONSENT:  Informed consent was obtained for the procedure, including sedation after explaining the risks and benefits of the procedure  Risks including but not limited to bleeding, perforation, infection, aspiration were discussed in detail  Also explained about  less than 100%$ sensitivity with the exam and other alternatives  PREPARATION:   EKG tracing, pulse oximetry, blood pressure were monitored throughout the procedure  Patient was identified by myself both verbally and by visual inspection of ID band  DESCRIPTION:   Patient was placed in the left lateral decubitus position and was sedated with the above medication  Digital rectal examination was performed  The colonoscope was introduced in  to the anal canal and advanced up to cecum, which was identified by the appendiceal orifice and IC valve  A careful inspection was made as the colonoscope was withdrawn, including a retroflexed view of the rectum; findings and interventions are described  below  Appropriate photodocumentation was obtained  The quality of the colonic preparation was adequate  FINDINGS:    #1    Cecum and ileocecal valvenormal mucosa    #2  Diverticuli seen in the sigmoid colon    #3  In the distal rectum there is a diminutive polyp that was removed with the biopsy forceps    #4  Small internal hemorrhoids         IMPRESSIONS:      #1  Distal rectal polyp appears to be benign hyperplastic status post cold biopsy removal    #2  Sigmoid diverticulosis    #3  Small internal hemorrhoids    RECOMMENDATIONS:    #1  Check the pathology and decide about next colonoscopy    COMPLICATIONS:  None; patient tolerated the procedure well  DISPOSITION: PACU           CONDITION: Stable                 Received Brian OLEA    Ulises 15 2016  2:10PM [facility] Standard Time

## 2018-01-16 NOTE — RESULT NOTES
Verified Results  [facility]) CMP14+eGFR [id number] 07:27AM Cosmo Hunter     Test Name Result Flag Reference   Glucose, Serum 93 mg/dL  65-99   BUN 22 mg/dL  8-27   Creatinine, Serum 0 98 mg/dL  0 76-1 27   eGFR If NonAfricn Am 80 mL/min/1 73  >59   eGFR If Africn Am 92 mL/min/1 73  >59   BUN/Creatinine Ratio 22  10-22   Sodium, Serum 142 mmol/L  134-144   Potassium, Serum 4 2 mmol/L  3 5-5 2   Chloride, Serum 104 mmol/L     Carbon Dioxide, Total 24 mmol/L  18-29   Calcium, Serum 9 1 mg/dL  8 6-10 2   Protein, Total, Serum 6 5 g/dL  6 0-8 5   Albumin, Serum 3 8 g/dL  3 6-4 8   Globulin, Total 2 7 g/dL  1 5-4 5   A/G Ratio 1 4  1 1-2 5   Bilirubin, Total 0 4 mg/dL  0 0-1 2   Alkaline Phosphatase, S 75 IU/L     AST (SGOT) 18 IU/L  0-40   ALT (SGPT) 16 IU/L  0-44       Discussion/Summary   labs acceptable

## 2018-01-16 NOTE — MISCELLANEOUS
To Whom it May Concern: The above patient has a mobility-related disability which prohibits him from waiting in a standard queue line and necessitates use of a scooter for ambulation  Sincerely,          PAM Jeronimo  Electronically signed Oralia OLEA    Sep 22 2016  2:24PM EST Author

## 2018-01-18 NOTE — MISCELLANEOUS
Message   Recorded as Task   Date: 10/13/2017 10:07 AM, Created By: Wendy Christine   Task Name: Follow Up   Assigned To: Melinda Gardner   Regarding Patient: Della Risk, Status: In Progress   Comment:    Mena Grajeda - 13 Oct 2017 10:07 AM     TASK CREATED  Please call patient to schedule 2 year f/u appointment with office  Thank you  Chuckie Sood - 13 Oct 2017 1:22 PM     TASK IN PROGRESS   Chuckie Sood - 13 Oct 2017 1:22 PM     TASK EDITED  Pal Fonseca - 16 Oct 2017 3:32 PM     TASK EDITED  pts wife took practice phone number and stated that Keysha Humphreys will call back   Per assigned task for patient Jessica Juarez unable to reach patient  LM with Wife  Active Problems    1  Abnormal blood sugar (790 29) (R73 09)   2  AC (acromioclavicular) joint bone spurs (726 91) (M75 80)   3  Achilles tendinitis, unspecified laterality (726 71) (M76 60)   4  Administrative Evaluation Services (V70 3)   5  Aftercare following surgery of the musculoskeletal system (V58 78) (Z47 89)   6  Benign essential hypertension (401 1) (I10)   7  Benign prostatic hypertrophy without urinary obstruction (600 00) (N40 0)   8  BMI 35 0-35 9,adult (V85 35) (Z68 35)   9  Colon cancer screening (V76 51) (Z12 11)   10  Deep venous thrombosis of distal lower extremity (453 42) (I82 4Z9)   11  Encounter for screening for malignant neoplasm of prostate (V76 44) (Z12 5)   12  Encounter for special screening examination for genitourinary disorder (V81 6) (Z13 89)   13  GERD without esophagitis (530 81) (K21 9)   14  Heart burn (787 1) (R12)   15  Hernia (553 9) (K46 9)   16  Hip pain, unspecified laterality   17  History of colonic polyps (V12 72) (Z86 010)   18  Internal Hemorrhoids (455 0)   19  Mixed hyperlipidemia (272 2) (E78 2)   20  Need for vaccination (V05 9) (Z23)   21  Obesity, Class II, BMI 35-39 9, with comorbidity (278 00) (E66 9)   22  Obstructive sleep apnea (327 23) (G47 33)   23  Osteoarthritis (715 90) (M19 90)   24  Popliteal fullness (729 89) (R29 898)   25  Postgastrectomy malabsorption (579 3) (K91 2,Z90 3)   26  Screening for cardiovascular condition (V81 2) (Z13 6)   27  Screening for diabetes mellitus (DM) (V77 1) (Z13 1)   28  Screening for hypothyroidism (V77 0) (Z13 29)   29  Status post bariatric surgery (V45 86) (Z98 84)   30  Status post gastric surgery (V45 89) (Z98 890)   31  Tear of rotator cuff, left   32  Visit for pre-operative examination (V72 84) (Z01 818)   33  Well adult on routine health check (V70 0) (Z00 00)    Current Meds   1  Atorvastatin Calcium 10 MG Oral Tablet; 1 Every Day At Bedtime; Therapy: 41YNL1068 to (Last Rx:30Jun2017)  Requested for: 30Jun2017 Ordered   2  Calcium 600+D 600-400 MG-UNIT Oral Tablet; Take as directed Recorded   3  Lisinopril 10 MG Oral Tablet; 1 every day; Therapy: 86PUT3177 to (Last WO:48LAO4223)  Requested for: 30Jun2017 Ordered   4  Multivitamin CHEW;   Therapy: (Recorded:11Jan2016) to Recorded   5  Omeprazole 20 MG Oral Capsule Delayed Release; take 1 capsule daily; Therapy: 77PVI5163 to (Evaluate:65Fci5719)  Requested for: 12QKQ8318; Last   Rx:00Ajj8776 Ordered   6  Vitamin B-12 5000 MCG Sublingual Tablet Sublingual Recorded   7  Vitamin D3 2000 UNIT Oral Tablet Recorded    Allergies    1   Percocet TABS    Signatures   Electronically signed by : Dallas Flores, ; Oct 16 2017  3:33PM EST                       (Author)

## 2018-01-23 VITALS
BODY MASS INDEX: 36.28 KG/M2 | DIASTOLIC BLOOD PRESSURE: 68 MMHG | RESPIRATION RATE: 18 BRPM | HEART RATE: 76 BPM | WEIGHT: 239.38 LBS | TEMPERATURE: 97.6 F | HEIGHT: 68 IN | SYSTOLIC BLOOD PRESSURE: 112 MMHG

## 2018-01-23 NOTE — RESULT NOTES
Discussion/Summary   will discuss labs at follow up appt     Verified Results  (1) CBC/PLT/DIFF [id number] 08:42AM [facility]     Test Name Result Flag Reference   WBC 5 9 x10E3/uL  3 4-10 8   RBC 4 54 x10E6/uL  4 14-5 80   Hemoglobin 13 2 g/dL  13 0-17 7   Hematocrit 40 5 %  37 5-51 0   MCV 89 fL  79-97   MCH 29 1 pg  26 6-33 0   MCHC 32 6 g/dL  31 5-35 7   RDW 14 8 %  12 3-15 4   Platelets 695 A08J2/KK  150-379   Neutrophils 64 %  Not Estab  Lymphs 25 %  Not Estab  Monocytes 8 %  Not Estab  Eos 3 %  Not Estab  Basos 0 %  Not Estab  Neutrophils (Absolute) 3 7 x10E3/uL  1 4-7 0   Lymphs (Absolute) 1 5 x10E3/uL  0 7-3 1   Monocytes(Absolute) 0 5 x10E3/uL  0 1-0 9   Eos (Absolute) 0 2 x10E3/uL  0 0-0 4   Baso (Absolute) 0 0 x10E3/uL  0 0-0 2   Immature Granulocytes 0 %  Not Estab  Immature Grans (Abs) 0 0 x10E3/uL  0 0-0 1     (1) COMPREHENSIVE METABOLIC PANEL [id number] 75:80RL [facility]     Test Name Result Flag Reference   Glucose, Serum 105 mg/dL H 65-99   BUN 23 mg/dL  8-27   Creatinine, Serum 1 09 mg/dL  0 76-1 27   BUN/Creatinine Ratio 21  10-24   Sodium, Serum 147 mmol/L H 134-144   Potassium, Serum 4 7 mmol/L  3 5-5 2   Chloride, Serum 106 mmol/L     Carbon Dioxide, Total 26 mmol/L  18-29   Calcium, Serum 9 7 mg/dL  8 6-10 2   Protein, Total, Serum 6 8 g/dL  6 0-8 5   Albumin, Serum 3 9 g/dL  3 6-4 8   Globulin, Total 2 9 g/dL  1 5-4 5   A/G Ratio 1 3  1 2-2 2   Bilirubin, Total 0 4 mg/dL  0 0-1 2   Alkaline Phosphatase, S 82 IU/L     AST (SGOT) 16 IU/L  0-40   ALT (SGPT) 13 IU/L  0-44   eGFR If NonAfricn Am 70 mL/min/1 73  >59   eGFR If Africn Am 81 mL/min/1 73  >59     (1) LIPID PANEL, FASTING [id number] 08:42AM Carl Bernard     Test Name Result Flag Reference   Cholesterol, Total 156 mg/dL  100-199   Triglycerides 91 mg/dL  0-149   HDL Cholesterol 49 mg/dL  >39   VLDL Cholesterol Jonas 18 mg/dL  5-40   LDL Cholesterol Calc 89 mg/dL  0-99   T   Chol/HDL Ratio 3 2 ratio units  0 0-5 0   T  Chol/HDL Ratio                                                             Men  Women                                               1/2 Avg  Risk  3 4    3 3                                                   Avg Risk  5 0    4 4                                                2X Avg  Risk  9 6    7 1                                                3X Avg  Risk 23 4   11 0     [facility]) Cardiovascular Risk Assessment 02Jan2018 08:42AM Barbara Beckham     Test Name Result Flag Reference   Interpretation Note     Supplemental report is available  PDF Image   (1) PSA (SCREEN) (Dx V76 44 Screen for Prostate Cancer) [id number] 08:42AM Krunal Taylor courtesy copy of this report has been sent to  Margoth Agosto MD      Test Name Result Flag Reference   Prostate Specific Ag, Serum 3 8 ng/mL  0 0-4 0   Roche ECLIA methodology  According to the American Urological Association, Serum PSA should  decrease and remain at undetectable levels after radical  prostatectomy  The AUA defines biochemical recurrence as an initial  PSA value 0 2 ng/mL or greater followed by a subsequent confirmatory  PSA value 0 2 ng/mL or greater  Values obtained with different assay methods or kits cannot be used  interchangeably  Results cannot be interpreted as absolute evidence  of the presence or absence of malignant disease

## 2018-01-23 NOTE — PROGRESS NOTES
Assessment    1  Encounter for preventive health examination (V70 0) (Z00 00)   2  Benign essential hypertension (401 1) (I10)   3  GERD without esophagitis (530 81) (K21 9)   4  Mixed hyperlipidemia (272 2) (E78 2)   5  Need for vaccination (V05 9) (Z23)   6  Postgastrectomy malabsorption (579 3) (K91 2,Z90 3)   7  BMI 36 0-36 9,adult (V85 36) (Z68 36)   8  Obesity, Class II, BMI 35-39 9, with comorbidity (278 00) (E66 9)   9  Enlarged prostate without lower urinary tract symptoms (luts) (600 00) (N40 0)   10  Abnormal blood sugar (790 29) (R73 09)   11  Status post bariatric surgery (V45 86) (Z98 84)    Plan  Abnormal blood sugar    · Begin a limited exercise program ; Status:Complete;   Done: 45JOA4324   · Begin or continue regular aerobic exercise   Gradually work up to at least 3 sessions of 30  minutes of exercise a week ; Status:Complete;   Done: 00XXH2450   · Brush your teeth {freq1} and floss at least once a day ; Status:Complete;   Done:  71QBD6122   · Continue with our present treatment plan ; Status:Complete;   Done: 43UNC3094   · Cut your nails straight across ; Status:Complete;   Done: 64OCM2231   · Have your eyes examined by an eye doctor every year ; Status:Complete;   Done:  53ITK9820   · If you have symptoms of being hypoglycemic or your blood sugar is less than 60, you  need to eat or drink a source of sugar ; Status:Complete;   Done: 90ANQ6026   · Inspect your feet and legs daily if you have vascular disease ; Status:Complete;   Done:  76JPW4095   · Inspect your feet daily ; Status:Complete;   Done: 78TFB4474   · It is important to take good care of your feet if you have diabetes ; Status:Complete;    Done: 59BYU0417   · It is important to take good care of your feet ; Status:Complete;   Done: 88QCE8415   · Some eating tips that can help you lose weight ; Status:Complete;   Done: 83TBU9759   · There are many exercise options for seniors ; Status:Complete;   Done: 65PIG5341   · We recommend that you bring your body mass index down to 26 ; Status:Complete;    Done: 45QEE8432   · We want you to follow the Therapeutic Lifestyle Changes (TLC) diet ; Status:Complete;    Done: 31NBS1800   · Wear shoes that give your toes plenty of room ; Status:Complete;   Done: 55IGP3287   · Call [phone] if: Ketones are present in the urine ; Status:Complete;   Done:  08YRU3284   · Call [phone] if: The ketones in the urine persist despite treatment ;  Status:Complete;   Done: 34MER1288   · Call [phone] if: You start vomiting ; Status:Complete;   Done: 67TIU6632   · Call [phone] if: Your blood sugar is steadily becoming higher ; Status:Complete;    Done: 12HMS1717   · Call 911 if: There are symptoms of ketoacidosis  ; Status:Complete;   Done: 89VNJ5342   · Call 911 if: You have a seizure ; Status:Complete;   Done: 49ALL9768   · Call 911 if: You have any symptoms of a stroke ; Status:Complete;   Done: 71OCX5214   · Call 911 if: You have fainted or passed out ; Status:Complete;   Done: 34DFV7754   · Seek Immediate Medical Attention if: There are signs that the blood sugar is too high  (hyperglycemia) ; Status:Complete;   Done: 47HHT3199   · Seek Immediate Medical Attention if: There are signs that the blood sugar is too low  (hypoglycemia) ; Status:Complete;   Done: 49OOE8677   · Seek Immediate Medical Attention if: You become dehydrated ; Status:Complete;   Done:  52WQI5114   · Seek Immediate Medical Attention if: You experience a new kind of chest pain (angina)  or pressure ; Status:Complete;   Done: 28WQR0876   · Seek Immediate Medical Attention if: You notice that breathing is rapid, more than 40  times a minute ; Status:Complete;   Done: 70MZB6283   · Seek Immediate Medical Attention if: Your blood sugar is higher than 400 ;  Status:Complete;   Done: 33IUD9280   · Seek Immediate Medical Attention if: Your eyesight becomes blurry or you have difficulty  seeing ; Status:Complete;   Done: 15GPF3818  Benign essential hypertension    · Lisinopril 10 MG Oral Tablet; 1 every day  Benign essential hypertension, Mixed hyperlipidemia    · (1) COMPREHENSIVE METABOLIC PANEL; Status:Active; Requested for:96Itp3484;    · (1) LIPID PANEL, FASTING; Status:Active; Requested for:45Csj5755;   GERD without esophagitis    · Omeprazole 20 MG Oral Capsule Delayed Release; take 1 capsule daily  Mixed hyperlipidemia    · Atorvastatin Calcium 10 MG Oral Tablet; 1 Every Day At Bedtime  Need for vaccination    · Fluzone High-Dose 0 5 ML Intramuscular Suspension Prefilled Syringe;  INJECT 0 5  ML Intramuscular; To Be Done: 45YOQ9150    Chief Complaint  pt present for CPE, no forms      History of Present Illness  HM, Adult Male: The patient is being seen for a health maintenance evaluation  General Health:   Screening:   HPI: pt is here for a full physical  pt feels well    pt sees dr Adam Fritz for his prostate - states he is ok will be seeing him this year  Review of Systems    Constitutional: No fever or chills, feels well, no tiredness, no recent weight gain or weight loss  Eyes: No complaints of eye pain, no red eyes, no discharge from eyes, no itchy eyes  ENT: no complaints of earache, no hearing loss, no nosebleeds, no nasal discharge, no sore throat, no hoarseness  Cardiovascular: No complaints of slow heart rate, no fast heart rate, no chest pain, no palpitations, no leg claudication, no lower extremity  Respiratory: No complaints of shortness of breath, no wheezing, no cough, no SOB on exertion, no orthopnea or PND  Gastrointestinal: No complaints of abdominal pain, no constipation, no nausea or vomiting, no diarrhea or bloody stools  Genitourinary: No complaints of dysuria, no incontinence, no hesitancy, no nocturia, no genital lesion, no testicular pain  Musculoskeletal: No complaints of arthralgia, no myalgias, no joint swelling or stiffness, no limb pain or swelling     Integumentary: No complaints of skin rash or skin lesions, no itching, no skin wound, no dry skin  Neurological: No compliants of headache, no confusion, no convulsions, no numbness or tingling, no dizziness or fainting, no limb weakness, no difficulty walking  Psychiatric: Is not suicidal, no sleep disturbances, no anxiety or depression, no change in personality, no emotional problems  Endocrine: No complaints of proptosis, no hot flashes, no muscle weakness, no erectile dysfunction, no deepening of the voice, no feelings of weakness  Active Problems    1  Abnormal blood sugar (790 29) (R73 09)   2  History of Aftercare following surgery of the musculoskeletal system (V58 78) (Z47 89)   3  Benign essential hypertension (401 1) (I10)   4  Encounter for screening for malignant neoplasm of prostate (V76 44) (Z12 5)   5  Encounter for special screening examination for genitourinary disorder (V81 6) (Z13 89)   6  Enlarged prostate without lower urinary tract symptoms (luts) (600 00) (N40 0)   7  GERD without esophagitis (530 81) (K21 9)   8  History of colonic polyps (V12 72) (Z86 010)   9  Internal Hemorrhoids (455 0)   10  Mixed hyperlipidemia (272 2) (E78 2)   11  Need for vaccination (V05 9) (Z23)   12  Obesity, Class II, BMI 35-39 9, with comorbidity (278 00) (E66 9)   13  Obstructive sleep apnea (327 23) (G47 33)   14  Osteoarthritis (715 90) (M19 90)   15  Popliteal fullness (729 89) (R29 898)   16  Postgastrectomy malabsorption (579 3) (K91 2,Z90 3)   17  Screening for cardiovascular condition (V81 2) (Z13 6)   18  Screening for diabetes mellitus (DM) (V77 1) (Z13 1)   19  Screening for hypothyroidism (V77 0) (Z13 29)   20  Status post bariatric surgery (V45 86) (Z98 84)   21  Status post gastric surgery (V45 89) (Z98 890)   22  Visit for pre-operative examination (V72 84) (Z01 818)   23   Well adult on routine health check (V70 0) (Z00 00)    Past Medical History    · History of AC (acromioclavicular) joint bone spurs (726 91) (M75 80)   · History of Achilles tendinitis, unspecified laterality (726 71) (M76 60)   · History of Administrative Evaluation Services (V70 3)   · History of Aftercare following surgery of the musculoskeletal system (V58 78) (Z47 89)   · History of Benign prostatic hypertrophy without urinary obstruction (600 00) (N40 0)   · History of Blood in urine (599 70) (R31 9)   · History of BMI 35 0-35 9,adult (V85 35) (Z68 35)   · History of Colon cancer screening (V76 51) (Z12 11)   · History of Deep venous thrombosis of distal lower extremity (453 42) (I82 4Z9)   · History of Deep venous thrombosis of distal lower extremity (453 42) (I82 4Z9)   · History of Hernia (553 9) (K46 9)   · History of Hip pain, unspecified laterality   · History of anemia (V12 3) (Z86 2)   · History of heartburn (V12 79) (J10 496)   · History of hemorrhoids (V13 89) (Z87 19)   · History of hypertension (V12 59) (Z86 79)   · History of mixed hyperlipidemia (V12 29) (Z86 39)   · History of stomach ulcers (V12 79) (Z87 19)   · History of Kidney stones (592 0) (N20 0)   · History of Nephrolithiasis (V13 01)   · History of Right inguinal hernia (550 90) (K40 90)   · Screening for genitourinary condition (V81 6) (Z13 89)   · History of Status post bariatric surgery (V45 86) (Z98 84)   · History of Tear of rotator cuff, left    Surgical History    · History of Foot Surgery   · History of Gastric Surgery For Morbid Obesity Laparoscopic Longitudinal Gastrectomy   · History of Hip Surgery   · History of Inguinal Hernia Repair   · History of Knee Surgery   · History of Tonsillectomy    Family History  Mother    · Denied: Family history of Crohn's disease without complication, unspecified  gastrointestinal tract location   · Denied: Family history of liver disease   · Denied: Family history of Malignant neoplasm of colon, unspecified part of colon  Father    · Family history of Arthritis   · Denied: Family history of Crohn's disease without complication, unspecified  gastrointestinal tract location   · Family history of Diabetes Mellitus (V18 0)   · Family history of diabetes mellitus (V18 0) (Z83 3)   · Family history of hypertension (V17 49) (Z82 49)   · Denied: Family history of liver disease   · Family history of Hyperlipidemia   · Denied: Family history of Malignant neoplasm of colon, unspecified part of colon  Family History    · Family history of Diabetes Mellitus (V18 0)   · Family history of Emphysema   · Family history of Heart Disease (V17 49)   · Family history of Hypertension (V17 49)    Social History    · Denied: History of Alcohol Use (History)   · Denied: History of Drug Use   · Exercise: Walking   · Once a day   · Former smoker (V15 82) (P44 300)   ·    · Occasional alcohol use    Current Meds   1  Atorvastatin Calcium 10 MG Oral Tablet; 1 Every Day At Bedtime; Therapy: 72VUL1882 to (Last [employer])  Requested for: 56Nsu9261 Ordered   2  Calcium 600+D 600-400 MG-UNIT Oral Tablet; Take as directed Recorded   3  Lisinopril 10 MG Oral Tablet; 1 every day; Therapy: 79VOJ2084 to (Last [employer])  Requested for: 84Ifm7035 Ordered   4  Multivitamin CHEW;   Therapy: (Recorded:03Sij1476) to Recorded   5  Omeprazole 20 MG Oral Capsule Delayed Release; take 1 capsule daily; Therapy: 94UQV1234 to (Evaluate:80Vdh0254)  Requested for: 26XBM9289; Last   Rx:71Vtv5998 Ordered   6  Vitamin B-12 5000 MCG Sublingual Tablet Sublingual Recorded   7  Vitamin D3 2000 UNIT Oral Tablet Recorded    Allergies    1  Percocet TABS    Vitals   Recorded: 34FLH8967 09:11AM   Temperature 97 6 F   Heart Rate 76   Respiration 18   Systolic 427   Diastolic 68   Height 5 ft 7 5 in   Weight 239 lb 6 oz   BMI Calculated 36 94   BSA Calculated 2 19     Physical Exam    Constitutional   General appearance: No acute distress, well appearing and well nourished      Head and Face   Head and face: Normal     Palpation of the face and sinuses: No sinus tenderness  Eyes   Conjunctiva and lids: No erythema, swelling or discharge  Pupils and irises: Equal, round, reactive to light  Ears, Nose, Mouth, and Throat   External inspection of ears and nose: Normal     Otoscopic examination: Tympanic membranes translucent with normal light reflex  Canals patent without erythema  Hearing: Normal     Nasal mucosa, septum, and turbinates: Normal without edema or erythema  Lips, teeth, and gums: Normal, good dentition  Neck   Neck: Supple, symmetric, trachea midline, no masses  Thyroid: Normal, no thyromegaly  Pulmonary   Respiratory effort: No increased work of breathing or signs of respiratory distress  Auscultation of lungs: Clear to auscultation  Cardiovascular   Auscultation of heart: Normal rate and rhythm, normal S1 and S2, no murmurs  Peripheral vascular exam: Normal     Chest   Breasts: Normal, no dimpling or skin changes appreciated  Palpation of breasts and axillae: Normal, no masses palpated  Abdomen   Abdomen: Non-tender, no masses  Liver and spleen: No hepatomegaly or splenomegaly  Lymphatic   Palpation of lymph nodes in neck: No lymphadenopathy  Palpation of lymph nodes in axillae: No lymphadenopathy  Musculoskeletal   Gait and station: Normal     Inspection/palpation of digits and nails: Normal without clubbing or cyanosis  Skin   Skin and subcutaneous tissue: Normal without rashes or lesions  Palpation of skin and subcutaneous tissue: Normal turgor  Neurologic   Cranial nerves: Cranial nerves 2-12 intact  Results/Data  (1) CBC/PLT/DIFF 28WIP7285 08:42AM Brandin Corpus     Test Name Result Flag Reference   WBC 5 9 x10E3/uL  3 4-10 8   RBC 4 54 x10E6/uL  4 14-5 80   Hemoglobin 13 2 g/dL  13 0-17 7   Hematocrit 40 5 %  37 5-51 0   MCV 89 fL  79-97   MCH 29 1 pg  26 6-33 0   MCHC 32 6 g/dL  31 5-35 7   RDW 14 8 %  12 3-15 4   Platelets 225 D66S3/WE  150-379   Neutrophils 64 %  Not Estab     Lymphs 25 %  Not Estab  Monocytes 8 %  Not Estab  Eos 3 %  Not Estab  Basos 0 %  Not Estab  Neutrophils (Absolute) 3 7 x10E3/uL  1 4-7 0   Lymphs (Absolute) 1 5 x10E3/uL  0 7-3 1   Monocytes(Absolute) 0 5 x10E3/uL  0 1-0 9   Eos (Absolute) 0 2 x10E3/uL  0 0-0 4   Baso (Absolute) 0 0 x10E3/uL  0 0-0 2   Immature Granulocytes 0 %  Not Estab  Immature Grans (Abs) 0 0 x10E3/uL  0 0-0 1     (1) COMPREHENSIVE METABOLIC PANEL 89EDY0572 72:62XD [employer]     Test Name Result Flag Reference   Glucose, Serum 105 mg/dL H 65-99   BUN 23 mg/dL  8-27   Creatinine, Serum 1 09 mg/dL  0 76-1 27   BUN/Creatinine Ratio 21  10-24   Sodium, Serum 147 mmol/L H 134-144   Potassium, Serum 4 7 mmol/L  3 5-5 2   Chloride, Serum 106 mmol/L     Carbon Dioxide, Total 26 mmol/L  18-29   Calcium, Serum 9 7 mg/dL  8 6-10 2   Protein, Total, Serum 6 8 g/dL  6 0-8 5   Albumin, Serum 3 9 g/dL  3 6-4 8   Globulin, Total 2 9 g/dL  1 5-4 5   A/G Ratio 1 3  1 2-2 2   Bilirubin, Total 0 4 mg/dL  0 0-1 2   Alkaline Phosphatase, S 82 IU/L     AST (SGOT) 16 IU/L  0-40   ALT (SGPT) 13 IU/L  0-44   eGFR If NonAfricn Am 70 mL/min/1 73  >59   eGFR If Africn Am 81 mL/min/1 73  >59     (1) LIPID PANEL, FASTING 07RVT3018 08:42AM [employer]     Test Name Result Flag Reference   Cholesterol, Total 156 mg/dL  100-199   Triglycerides 91 mg/dL  0-149   HDL Cholesterol 49 mg/dL  >39   VLDL Cholesterol Jonas 18 mg/dL  5-40   LDL Cholesterol Calc 89 mg/dL  0-99   T  Chol/HDL Ratio 3 2 ratio units  0 0-5 0   T  Chol/HDL Ratio                                                             Men  Women                                               1/2 Avg  Risk  3 4    3 3                                                   Avg Risk  5 0    4 4                                                2X Avg  Risk  9 6    7 1                                                3X Avg  Risk 23 4   11 0     (1) PSA (SCREEN) (Dx V76 44 Screen for Prostate Cancer) 25APC0174 08:42AM Denise Fournier courtesy copy of this report has been sent to  Martita Wheeler MD      Test Name Result Flag Reference   Prostate Specific Ag, Serum 3 8 ng/mL  0 0-4 0   Roche ECLIA methodology  According to the American Urological Association, Serum PSA should  decrease and remain at undetectable levels after radical  prostatectomy  The AUA defines biochemical recurrence as an initial  PSA value 0 2 ng/mL or greater followed by a subsequent confirmatory  PSA value 0 2 ng/mL or greater  Values obtained with different assay methods or kits cannot be used  interchangeably  Results cannot be interpreted as absolute evidence  of the presence or absence of malignant disease  Procedure    Procedure: Visual Acuity Test    Inforrmation supplied by a Snellen chart  Results: 20/20 in both eyes without corrective device, 20/20 in the right eye without corrective device, 20/20 in the left eye without corrective device      Health Management  History of colonic polyps   COLONOSCOPY; every 3 years; Last 98WRV9618; Next Due: 51AGA4929;  Active    Signatures   Electronically signed by : Daniel Carmona DO; Jan 8 2018  9:34AM EST                       (Author)

## 2018-01-23 NOTE — MISCELLANEOUS
Provider Comments  Provider Comments:     Dear Rob France had a scheduled appointment at our office for 12/06/2017 that you called to cancel but did not reschedule for another day  It is very important that you follow up with us so that we can assess your physical and nutritional safety after your surgery  Please call our office at [phone] to reschedule your appointment       Sincerely,     [facility]            Signatures   Electronically signed by : Abi Moore, ; Dec  6 2017  9:34AM EST                       (Author)

## 2018-07-09 LAB
ALBUMIN SERPL-MCNC: 4.1 G/DL (ref 3.6–4.8)
ALBUMIN/GLOB SERPL: 1.5 {RATIO} (ref 1.2–2.2)
ALP SERPL-CCNC: 84 IU/L (ref 39–117)
ALT SERPL-CCNC: 15 IU/L (ref 0–44)
AST SERPL-CCNC: 18 IU/L (ref 0–40)
BILIRUB SERPL-MCNC: 0.5 MG/DL (ref 0–1.2)
BUN SERPL-MCNC: 20 MG/DL (ref 8–27)
BUN/CREAT SERPL: 17 (ref 10–24)
CALCIUM SERPL-MCNC: 9.6 MG/DL (ref 8.6–10.2)
CHLORIDE SERPL-SCNC: 104 MMOL/L (ref 96–106)
CHOLEST SERPL-MCNC: 156 MG/DL (ref 100–199)
CHOLEST/HDLC SERPL: 3.3 RATIO (ref 0–5)
CO2 SERPL-SCNC: 27 MMOL/L (ref 20–29)
CREAT SERPL-MCNC: 1.19 MG/DL (ref 0.76–1.27)
GLOBULIN SER-MCNC: 2.7 G/DL (ref 1.5–4.5)
GLUCOSE SERPL-MCNC: 103 MG/DL (ref 65–99)
HDLC SERPL-MCNC: 48 MG/DL
LDLC SERPL CALC-MCNC: 86 MG/DL (ref 0–99)
MICRODELETION SYND BLD/T FISH: NORMAL
POTASSIUM SERPL-SCNC: 4.1 MMOL/L (ref 3.5–5.2)
PROT SERPL-MCNC: 6.8 G/DL (ref 6–8.5)
SL AMB EGFR AFRICAN AMERICAN: 72 ML/MIN/1.73
SL AMB EGFR NON AFRICAN AMERICAN: 62 ML/MIN/1.73
SL AMB VLDL CHOLESTEROL CALC: 22 MG/DL (ref 5–40)
SODIUM SERPL-SCNC: 143 MMOL/L (ref 134–144)
TRIGL SERPL-MCNC: 111 MG/DL (ref 0–149)

## 2018-07-16 DIAGNOSIS — I10 BENIGN ESSENTIAL HYPERTENSION: Primary | ICD-10-CM

## 2018-07-16 DIAGNOSIS — E78.2 MIXED HYPERLIPIDEMIA: ICD-10-CM

## 2018-07-16 RX ORDER — LISINOPRIL 10 MG/1
10 TABLET ORAL DAILY
Qty: 90 TABLET | Refills: 1 | Status: SHIPPED | OUTPATIENT
Start: 2018-07-16 | End: 2018-07-17 | Stop reason: SDUPTHER

## 2018-07-16 RX ORDER — ATORVASTATIN CALCIUM 10 MG/1
10 TABLET, FILM COATED ORAL DAILY
Qty: 90 TABLET | Refills: 1 | Status: SHIPPED | OUTPATIENT
Start: 2018-07-16 | End: 2018-07-17 | Stop reason: SDUPTHER

## 2018-07-16 RX ORDER — ATORVASTATIN CALCIUM 10 MG/1
TABLET, FILM COATED ORAL
COMMUNITY
Start: 2017-06-30 | End: 2018-07-16 | Stop reason: SDUPTHER

## 2018-07-17 ENCOUNTER — OFFICE VISIT (OUTPATIENT)
Dept: FAMILY MEDICINE CLINIC | Facility: CLINIC | Age: 68
End: 2018-07-17
Payer: COMMERCIAL

## 2018-07-17 VITALS
BODY MASS INDEX: 36.22 KG/M2 | SYSTOLIC BLOOD PRESSURE: 142 MMHG | HEART RATE: 60 BPM | DIASTOLIC BLOOD PRESSURE: 90 MMHG | RESPIRATION RATE: 16 BRPM | HEIGHT: 68 IN | TEMPERATURE: 97.6 F | WEIGHT: 239 LBS

## 2018-07-17 DIAGNOSIS — E66.01 CLASS 2 SEVERE OBESITY DUE TO EXCESS CALORIES WITH SERIOUS COMORBIDITY AND BODY MASS INDEX (BMI) OF 36.0 TO 36.9 IN ADULT (HCC): ICD-10-CM

## 2018-07-17 DIAGNOSIS — Z11.59 NEED FOR HEPATITIS C SCREENING TEST: ICD-10-CM

## 2018-07-17 DIAGNOSIS — E78.2 MIXED HYPERLIPIDEMIA: ICD-10-CM

## 2018-07-17 DIAGNOSIS — Z12.5 SCREENING FOR PROSTATE CANCER: ICD-10-CM

## 2018-07-17 DIAGNOSIS — R73.09 ABNORMAL BLOOD SUGAR: ICD-10-CM

## 2018-07-17 DIAGNOSIS — Z90.3 POSTGASTRECTOMY MALABSORPTION: ICD-10-CM

## 2018-07-17 DIAGNOSIS — K21.9 GERD WITHOUT ESOPHAGITIS: ICD-10-CM

## 2018-07-17 DIAGNOSIS — I10 BENIGN ESSENTIAL HYPERTENSION: Primary | ICD-10-CM

## 2018-07-17 DIAGNOSIS — K91.2 POSTGASTRECTOMY MALABSORPTION: ICD-10-CM

## 2018-07-17 DIAGNOSIS — Z13.6 SCREENING FOR CARDIOVASCULAR CONDITION: ICD-10-CM

## 2018-07-17 PROCEDURE — 99214 OFFICE O/P EST MOD 30 MIN: CPT | Performed by: FAMILY MEDICINE

## 2018-07-17 PROCEDURE — 3008F BODY MASS INDEX DOCD: CPT | Performed by: FAMILY MEDICINE

## 2018-07-17 PROCEDURE — 3725F SCREEN DEPRESSION PERFORMED: CPT | Performed by: FAMILY MEDICINE

## 2018-07-17 PROCEDURE — 1101F PT FALLS ASSESS-DOCD LE1/YR: CPT | Performed by: FAMILY MEDICINE

## 2018-07-17 PROCEDURE — 1160F RVW MEDS BY RX/DR IN RCRD: CPT | Performed by: FAMILY MEDICINE

## 2018-07-17 PROCEDURE — 4040F PNEUMOC VAC/ADMIN/RCVD: CPT | Performed by: FAMILY MEDICINE

## 2018-07-17 PROCEDURE — 1036F TOBACCO NON-USER: CPT | Performed by: FAMILY MEDICINE

## 2018-07-17 RX ORDER — LISINOPRIL 10 MG/1
10 TABLET ORAL DAILY
Qty: 90 TABLET | Refills: 1 | Status: SHIPPED | OUTPATIENT
Start: 2018-07-17 | End: 2019-01-08 | Stop reason: SDUPTHER

## 2018-07-17 RX ORDER — CHOLECALCIFEROL (VITAMIN D3) 125 MCG
CAPSULE ORAL DAILY
COMMUNITY
End: 2021-07-07 | Stop reason: HOSPADM

## 2018-07-17 RX ORDER — ATORVASTATIN CALCIUM 10 MG/1
10 TABLET, FILM COATED ORAL DAILY
Qty: 90 TABLET | Refills: 1 | Status: SHIPPED | OUTPATIENT
Start: 2018-07-17 | End: 2019-01-08 | Stop reason: SDUPTHER

## 2018-07-17 RX ORDER — BIOTIN 10 MG
TABLET ORAL
COMMUNITY
End: 2021-07-07 | Stop reason: HOSPADM

## 2018-07-17 NOTE — PROGRESS NOTES
Assessment/Plan:    Problem List Items Addressed This Visit     Benign essential hypertension - Primary     BP med not on board this am         Mixed hyperlipidemia    Abnormal blood sugar    GERD without esophagitis     stable         Postgastrectomy malabsorption      Other Visit Diagnoses     Need for hepatitis C screening test        Class 2 severe obesity due to excess calories with serious comorbidity and body mass index (BMI) of 36 0 to 36 9 in adult [facility])        BMI 36 0-36 9,adult        Screening for prostate cancer        Screening for cardiovascular condition              There are no Patient Instructions on file for this visit  Return in about 6 months (around 1/17/2019) for Annual physical     Subjective:      Patient ID: Jarrett Ervin is a 76 y o  male  Chief Complaint   Patient presents with    Follow-up     6 month follow up alo       Pt is here for a 6 month follow up  Pt denies cp, no sob no polyuiria no polydipsia    Pt ran out iof his meds yesterday        The following portions of the patient's history were reviewed and updated as appropriate: allergies, current medications, past family history, past medical history, past social history, past surgical history and problem list     Review of Systems   Constitutional: Negative for activity change, appetite change, chills, diaphoresis, fatigue, fever and unexpected weight change  HENT: Negative for congestion, dental problem, ear pain, mouth sores, sinus pain, sinus pressure, sore throat and trouble swallowing  Eyes: Negative for photophobia, discharge and itching  Respiratory: Negative for apnea, chest tightness and shortness of breath  Cardiovascular: Negative for chest pain, palpitations and leg swelling  Gastrointestinal: Negative for abdominal distention, abdominal pain, blood in stool, nausea and vomiting  Endocrine: Negative for cold intolerance, heat intolerance, polydipsia, polyphagia and polyuria     Genitourinary: Negative for difficulty urinating  Musculoskeletal: Negative for arthralgias  Skin: Negative for color change and wound  Neurological: Negative for dizziness, syncope, speech difficulty and headaches  Hematological: Negative for adenopathy  Psychiatric/Behavioral: Negative for agitation and behavioral problems  Current Outpatient Prescriptions   Medication Sig Dispense Refill    atorvastatin (LIPITOR) 10 mg tablet Take 1 tablet (10 mg total) by mouth daily 90 tablet 1    Calcium Carbonate (CALCIUM 600) 1500 (600 Ca) MG TABS Take by mouth      Cholecalciferol (VITAMIN D3) 2000 units TABS Take by mouth      Cyanocobalamin (VITAMIN B-12) 5000 MCG LOZG Place under the tongue      lisinopril (ZESTRIL) 10 mg tablet Take 1 tablet (10 mg total) by mouth daily 90 tablet 1    Multiple Vitamins-Minerals (MULTIVITAMIN ADULT EXTRA C) CHEW Chew       No current facility-administered medications for this visit  Objective:    /90   Pulse 60   Temp 97 6 °F (36 4 °C)   Resp 16   Ht 5' 7 5" (1 715 m)   Wt 108 kg (239 lb)   BMI 36 88 kg/m²        Physical Exam   Constitutional: He appears well-developed and well-nourished  No distress  HENT:   Head: Normocephalic and atraumatic  Right Ear: External ear normal    Left Ear: External ear normal    Nose: Nose normal    Mouth/Throat: Oropharynx is clear and moist  No oropharyngeal exudate  Eyes: EOM are normal  Pupils are equal, round, and reactive to light  Right eye exhibits no discharge  Left eye exhibits no discharge  No scleral icterus  Neck: No thyromegaly present  Cardiovascular: Normal rate and normal heart sounds  No murmur heard  Pulmonary/Chest: Effort normal and breath sounds normal  No respiratory distress  He has no wheezes  Abdominal: Soft  Bowel sounds are normal  He exhibits no distension and no mass  There is no tenderness  There is no rebound and no guarding  Musculoskeletal: Normal range of motion  Neurological: He is alert  He displays normal reflexes  Coordination normal    Skin: Skin is warm and dry  No rash noted  He is not diaphoretic  No erythema  Psychiatric: He has a normal mood and affect  His behavior is normal    Nursing note and vitals reviewed  Recent Results (from the past 504 hour(s))   Comprehensive metabolic panel    Collection Time: 07/09/18  7:07 AM   Result Value Ref Range    SL AMB GLUCOSE 103 (H) 65 - 99 mg/dL    BUN 20 8 - 27 mg/dL    Creatinine, Serum 1 19 0 76 - 1 27 mg/dL    eGFR Non  62 >59 mL/min/1 73    SL AMB EGFR AFRICAN AMERICAN 72 >59 mL/min/1 73    SL AMB BUN/CREATININE RATIO 17 10 - 24    SL AMB SODIUM 143 134 - 144 mmol/L    SL AMB POTASSIUM 4 1 3 5 - 5 2 mmol/L    SL AMB CHLORIDE 104 96 - 106 mmol/L    SL AMB CARBON DIOXIDE 27 20 - 29 mmol/L    CALCIUM 9 6 8 6 - 10 2 mg/dL    SL AMB PROTEIN, TOTAL 6 8 6 0 - 8 5 g/dL    Serum Albumin 4 1 3 6 - 4 8 g/dL    Globulin, Total 2 7 1 5 - 4 5 g/dL    SL AMB ALBUMIN/GLOBULIN RATIO 1 5 1 2 - 2 2    SL AMB BILIRUBIN, TOTAL 0 5 0 0 - 1 2 mg/dL    Alk Phos Isoenzymes 84 39 - 117 IU/L    SL AMB AST 18 0 - 40 IU/L    SL AMB ALT 15 0 - 44 IU/L   LIPID PANEL WITH CHOL/HDL RATIO    Collection Time: 07/09/18  7:07 AM   Result Value Ref Range    Cholesterol, Total 156 100 - 199 mg/dL    Triglycerides 111 0 - 149 mg/dL    HDL 48 >39 mg/dL    SL AMB VLDL CHOLESTEROL CALC 22 5 - 40 mg/dL    LDL Direct 86 0 - 99 mg/dL    T   Chol/HDL Ratio 3 3 0 0 - 5 0 ratio   Cardiovascular Report    Collection Time: 07/09/18  7:07 AM   Result Value Ref Range    SL AMB INTERPRETATION Note      Pt has gained some weight back since his surgery we discussed weight and loss     Marcus Huang DO

## 2018-09-26 ENCOUNTER — TELEPHONE (OUTPATIENT)
Dept: FAMILY MEDICINE CLINIC | Facility: CLINIC | Age: 68
End: 2018-09-26

## 2018-09-26 ENCOUNTER — DOCUMENTATION (OUTPATIENT)
Dept: FAMILY MEDICINE CLINIC | Facility: CLINIC | Age: 68
End: 2018-09-26

## 2018-09-26 NOTE — TELEPHONE ENCOUNTER
Thanks  Letter created  Wife aware  Will leave up front     No further actions required   Nuris Lopez LPN

## 2018-09-26 NOTE — TELEPHONE ENCOUNTER
Ok to give him the letter that he "uses a scooter and cannot  line at [facility]

## 2019-01-08 DIAGNOSIS — I10 BENIGN ESSENTIAL HYPERTENSION: ICD-10-CM

## 2019-01-08 DIAGNOSIS — E78.2 MIXED HYPERLIPIDEMIA: ICD-10-CM

## 2019-01-08 RX ORDER — LISINOPRIL 10 MG/1
10 TABLET ORAL DAILY
Qty: 90 TABLET | Refills: 1 | Status: SHIPPED | OUTPATIENT
Start: 2019-01-08 | End: 2019-07-12 | Stop reason: SDUPTHER

## 2019-01-08 RX ORDER — ATORVASTATIN CALCIUM 10 MG/1
10 TABLET, FILM COATED ORAL DAILY
Qty: 90 TABLET | Refills: 1 | Status: SHIPPED | OUTPATIENT
Start: 2019-01-08 | End: 2019-07-12 | Stop reason: SDUPTHER

## 2019-01-17 ENCOUNTER — TELEPHONE (OUTPATIENT)
Dept: GASTROENTEROLOGY | Facility: CLINIC | Age: 69
End: 2019-01-17

## 2019-01-17 DIAGNOSIS — Z86.010 HISTORY OF COLON POLYPS: Primary | ICD-10-CM

## 2019-01-17 PROBLEM — Z86.0100 HISTORY OF COLON POLYPS: Status: ACTIVE | Noted: 2019-01-17

## 2019-01-17 LAB
ALBUMIN SERPL-MCNC: 4.2 G/DL (ref 3.6–4.8)
ALBUMIN/GLOB SERPL: 1.5 {RATIO} (ref 1.2–2.2)
ALP SERPL-CCNC: 80 IU/L (ref 39–117)
ALT SERPL-CCNC: 16 IU/L (ref 0–44)
AST SERPL-CCNC: 21 IU/L (ref 0–40)
BASOPHILS # BLD AUTO: 0 X10E3/UL (ref 0–0.2)
BASOPHILS NFR BLD AUTO: 1 %
BILIRUB SERPL-MCNC: 0.5 MG/DL (ref 0–1.2)
BUN SERPL-MCNC: 20 MG/DL (ref 8–27)
BUN/CREAT SERPL: 17 (ref 10–24)
CALCIUM SERPL-MCNC: 9.9 MG/DL (ref 8.6–10.2)
CHLORIDE SERPL-SCNC: 106 MMOL/L (ref 96–106)
CHOLEST SERPL-MCNC: 137 MG/DL (ref 100–199)
CO2 SERPL-SCNC: 26 MMOL/L (ref 20–29)
CREAT SERPL-MCNC: 1.17 MG/DL (ref 0.76–1.27)
EOSINOPHIL # BLD AUTO: 0.2 X10E3/UL (ref 0–0.4)
EOSINOPHIL NFR BLD AUTO: 3 %
ERYTHROCYTE [DISTWIDTH] IN BLOOD BY AUTOMATED COUNT: 15.2 % (ref 12.3–15.4)
GLOBULIN SER-MCNC: 2.8 G/DL (ref 1.5–4.5)
GLUCOSE SERPL-MCNC: 103 MG/DL (ref 65–99)
HCT VFR BLD AUTO: 39 % (ref 37.5–51)
HCV AB S/CO SERPL IA: <0.1 S/CO RATIO (ref 0–0.9)
HDLC SERPL-MCNC: 41 MG/DL
HGB BLD-MCNC: 13.1 G/DL (ref 13–17.7)
IMM GRANULOCYTES # BLD: 0 X10E3/UL (ref 0–0.1)
IMM GRANULOCYTES NFR BLD: 0 %
LABCORP COMMENT: NORMAL
LDLC SERPL CALC-MCNC: 81 MG/DL (ref 0–99)
LYMPHOCYTES # BLD AUTO: 1.4 X10E3/UL (ref 0.7–3.1)
LYMPHOCYTES NFR BLD AUTO: 27 %
MCH RBC QN AUTO: 28.7 PG (ref 26.6–33)
MCHC RBC AUTO-ENTMCNC: 33.6 G/DL (ref 31.5–35.7)
MCV RBC AUTO: 86 FL (ref 79–97)
MICRODELETION SYND BLD/T FISH: NORMAL
MONOCYTES # BLD AUTO: 0.5 X10E3/UL (ref 0.1–0.9)
MONOCYTES NFR BLD AUTO: 9 %
NEUTROPHILS # BLD AUTO: 3.1 X10E3/UL (ref 1.4–7)
NEUTROPHILS NFR BLD AUTO: 60 %
PLATELET # BLD AUTO: 225 X10E3/UL (ref 150–379)
POTASSIUM SERPL-SCNC: 4.6 MMOL/L (ref 3.5–5.2)
PROT SERPL-MCNC: 7 G/DL (ref 6–8.5)
RBC # BLD AUTO: 4.56 X10E6/UL (ref 4.14–5.8)
SL AMB EGFR AFRICAN AMERICAN: 73 ML/MIN/1.73
SL AMB EGFR NON AFRICAN AMERICAN: 63 ML/MIN/1.73
SODIUM SERPL-SCNC: 147 MMOL/L (ref 134–144)
TRIGL SERPL-MCNC: 74 MG/DL (ref 0–149)
WBC # BLD AUTO: 5.2 X10E3/UL (ref 3.4–10.8)

## 2019-01-17 NOTE — TELEPHONE ENCOUNTER
Pt called in to schedule recall  Pt has hx of polyps is scheduled with Karen Darden 2/11/2019 [place]      Please Suprep to pharmacy on file

## 2019-01-22 ENCOUNTER — TRANSCRIBE ORDERS (OUTPATIENT)
Dept: ADMINISTRATIVE | Facility: HOSPITAL | Age: 69
End: 2019-01-22

## 2019-01-22 ENCOUNTER — HOSPITAL ENCOUNTER (OUTPATIENT)
Dept: RADIOLOGY | Facility: HOSPITAL | Age: 69
Discharge: HOME | End: 2019-01-22
Attending: UROLOGY
Payer: COMMERCIAL

## 2019-01-22 DIAGNOSIS — Z87.442 PERSONAL HISTORY OF URINARY CALCULI: Primary | ICD-10-CM

## 2019-01-22 DIAGNOSIS — Z87.442 PERSONAL HISTORY OF URINARY CALCULI: ICD-10-CM

## 2019-01-22 PROCEDURE — 74018 RADEX ABDOMEN 1 VIEW: CPT

## 2019-01-25 ENCOUNTER — OFFICE VISIT (OUTPATIENT)
Dept: FAMILY MEDICINE CLINIC | Facility: CLINIC | Age: 69
End: 2019-01-25
Payer: COMMERCIAL

## 2019-01-25 VITALS
TEMPERATURE: 96.9 F | HEIGHT: 68 IN | SYSTOLIC BLOOD PRESSURE: 132 MMHG | DIASTOLIC BLOOD PRESSURE: 74 MMHG | RESPIRATION RATE: 18 BRPM | BODY MASS INDEX: 35.37 KG/M2 | WEIGHT: 233.4 LBS

## 2019-01-25 DIAGNOSIS — R73.09 ABNORMAL BLOOD SUGAR: ICD-10-CM

## 2019-01-25 DIAGNOSIS — Z23 NEED FOR VACCINATION: ICD-10-CM

## 2019-01-25 DIAGNOSIS — E66.01 SEVERE OBESITY (BMI 35.0-39.9) WITH COMORBIDITY (HCC): ICD-10-CM

## 2019-01-25 DIAGNOSIS — N40.0 ENLARGED PROSTATE WITHOUT LOWER URINARY TRACT SYMPTOMS (LUTS): ICD-10-CM

## 2019-01-25 DIAGNOSIS — I10 BENIGN ESSENTIAL HYPERTENSION: ICD-10-CM

## 2019-01-25 DIAGNOSIS — E78.2 MIXED HYPERLIPIDEMIA: ICD-10-CM

## 2019-01-25 DIAGNOSIS — Z98.84 S/P BARIATRIC SURGERY: ICD-10-CM

## 2019-01-25 DIAGNOSIS — Z00.00 WELL ADULT EXAM: Primary | ICD-10-CM

## 2019-01-25 PROCEDURE — 1160F RVW MEDS BY RX/DR IN RCRD: CPT | Performed by: FAMILY MEDICINE

## 2019-01-25 PROCEDURE — 90662 IIV NO PRSV INCREASED AG IM: CPT

## 2019-01-25 PROCEDURE — 90471 IMMUNIZATION ADMIN: CPT

## 2019-01-25 PROCEDURE — 99397 PER PM REEVAL EST PAT 65+ YR: CPT | Performed by: FAMILY MEDICINE

## 2019-01-25 PROCEDURE — 3725F SCREEN DEPRESSION PERFORMED: CPT

## 2019-01-25 NOTE — PROGRESS NOTES
FAMILY PRACTICE HEALTH MAINTENANCE OFFICE VISIT  [facility]    NAME: Rhys Scott  AGE: 71 y o  SEX: male  : 1950     DATE: 2019    Assessment and Plan     Problem List Items Addressed This Visit     Benign essential hypertension    Relevant Orders    CBC    Mixed hyperlipidemia    Relevant Orders    Comprehensive metabolic panel    Lipid Panel with Direct LDL reflex    CBC    Abnormal blood sugar    Relevant Orders    CBC    Enlarged prostate without lower urinary tract symptoms (luts)    Relevant Orders    CBC    S/P bariatric surgery    Relevant Orders    CBC    Severe obesity (BMI 35 0-39  9) with comorbidity ([address] )    Relevant Orders    CBC    BMI 36 0-36 9,adult    Relevant Orders    CBC      Other Visit Diagnoses     Well adult exam    -  Primary    Relevant Orders    CBC    Need for vaccination        Relevant Medications    Zoster Vac Recomb [facility]) 50 MCG/0 5ML SUSR    Other Relevant Orders    influenza vaccine, 5939-0005, high-dose, PF 0 5 mL, for patients 65 yr+ (FLUZONE HIGH-DOSE) (Completed)    CBC            · Patient Counseling:   · Nutrition: Stressed importance of a well balanced diet, moderation of sodium/saturated fat, caloric balance and sufficient intake of fiber  · Exercise: Stressed the importance of regular exercise with a goal of 150 minutes per week  · Dental Health: Discussed daily flossing and brushing and regular dental visits   · Alcohol Use:  Recommended moderation of alcohol intake    · Immunizations reviewed yes  · Discussed benefits of screening yes  BMI Counseling: Body mass index is 36 02 kg/m²  Discussed with patient's BMI with him  The BMI is above average  BMI counseling and education was provided to the patient  Nutrition recommendations include reducing portion sizes  No Follow-up on file          Chief Complaint     Chief Complaint   Patient presents with    Physical Exam     [facility]       History of Present Illness     Pt is here for a full physical  Has his follow up colon sched  Pt sees urology, has kub done, he ios doing rectal exams and following prostate    Pt is trying to lose chaitanya he is down 6 lbs        Well Adult Physical   Patient here for a comprehensive physical exam       Diet and Physical Activity  Diet: well balanced diet  Weight concerns: Patient has class 2 obesity (BMI 35 0-39  9)  Exercise: daily      Depression Screen  PHQ-9 Depression Screening    PHQ-9:    Frequency of the following problems over the past two weeks:       Little interest or pleasure in doing things:  0 - not at all  Feeling down, depressed, or hopeless:  0 - not at all  PHQ-2 Score:  0          General Health  Hearing: Normal:  bilateral  Vision: no vision problems  Dental: regular dental visits    Reproductive Health        The following portions of the patient's history were reviewed and updated as appropriate: allergies, current medications, past family history, past medical history, past social history, past surgical history and problem list     Review of Systems     Review of Systems   Constitutional: Negative for activity change, appetite change, chills, diaphoresis, fatigue, fever and unexpected weight change  HENT: Negative for congestion, dental problem, ear pain, mouth sores, sinus pain, sinus pressure, sore throat and trouble swallowing  Eyes: Negative for photophobia, discharge and itching  Respiratory: Negative for apnea, chest tightness and shortness of breath  Cardiovascular: Negative for chest pain, palpitations and leg swelling  Gastrointestinal: Negative for abdominal distention, abdominal pain, blood in stool, nausea and vomiting  Endocrine: Negative for cold intolerance, heat intolerance, polydipsia, polyphagia and polyuria  Genitourinary: Negative for difficulty urinating  Musculoskeletal: Negative for arthralgias  Skin: Negative for color change and wound     Neurological: Negative for dizziness, syncope, speech difficulty and headaches  Hematological: Negative for adenopathy  Psychiatric/Behavioral: Negative for agitation and behavioral problems  Past Medical History     Past Medical History:   Diagnosis Date    AC (acromioclavicular) joint bone spurs     Last assessed - 2/19/15    Achilles tendinitis, unspecified leg 06/04/2010    Resolved - 1/8/18    Anemia 08/12/2010    Resolved - 1/11/16    Blood in urine     BPH without urinary obstruction     Hypertrophy    Deep venous thrombosis of distal lower extremity (HCC)     Deep venous thrombosis of distal lower extremity (HCC)     Last assessed - 6/4/15    Heartburn     Last assessed - 12/12/14    Hemorrhoids     Hernia, inguinal 06/30/2009    Resolved - 1/8/18    History of stomach ulcers     Hypertension     Kidney stones     Mixed hyperlipidemia     Nephrolithiasis     Right inguinal hernia     Last assessed - 4/4/16    Sleep apnea     Tear of left rotator cuff     Last assessed - 2/19/15       Past Surgical History     Past Surgical History:   Procedure Laterality Date    BARIATRIC SURGERY  06/15/2015    Laparoscopic Longitudinal Gastrectomy for morbid obesity, Managed by - Blank Bob     EXPLORATORY LAPAROTOMY W/ BOWEL RESECTION Right 3/3/2016    Procedure: Incarcerated possibly strangulated right inguinal hernia; Surgeon: Ever Coello MD;  Location: [address] [place] [address];  [facility]    [address] [facility]      2011 - Rt Hip Replacement, 2012 - Lt Hip Replacement, 2013 - Lt Hip Revision    INGUINAL HERNIA REPAIR      Last assessed - 4/4/16    JOINT REPLACEMENT      KNEE SURGERY  2004    Double     NM COLONOSCOPY FLX DX W/COLLJ SPEC WHEN PFRMD N/A 1/15/2016    Procedure: COLONOSCOPY;  Surgeon: Nura Grullon MD;  Location: [place] [address] GI LAB;   Service: Gastroenterology    TONSILLECTOMY         Social History     Social History     Social History    Marital status: /Civil Union Spouse name: N/A    Number of children: N/A    Years of education: N/A     Social History Main Topics    Smoking status: Former Smoker    Smokeless tobacco: Former User     Quit date: 1/15/1994    Alcohol use No      Comment: Occ alcohol use per [employer]     Drug use: No    Sexual activity: Not Asked     Other Topics Concern    None     Social History Narrative    Exercise - Walking once a day           Family History     Family History   Problem Relation Age of Onset    Arthritis Father     Diabetes Father     Hypertension Father     Hyperlipidemia Father     Diabetes Family     Emphysema Family     Heart disease Family     Hypertension Family     Mental illness Neg Hx        Current Medications       Current Outpatient Prescriptions:     atorvastatin (LIPITOR) 10 mg tablet, Take 1 tablet (10 mg total) by mouth daily, Disp: 90 tablet, Rfl: 1    Calcium Carbonate (CALCIUM 600) 1500 (600 Ca) MG TABS, Take by mouth, Disp: , Rfl:     Cholecalciferol (VITAMIN D3) 2000 units TABS, Take by mouth, Disp: , Rfl:     Cyanocobalamin (VITAMIN B-12) 5000 MCG LOZG, Place under the tongue, Disp: , Rfl:     lisinopril (ZESTRIL) 10 mg tablet, Take 1 tablet (10 mg total) by mouth daily, Disp: 90 tablet, Rfl: 1    Multiple Vitamins-Minerals (MULTIVITAMIN ADULT EXTRA C) CHEW, Chew, Disp: , Rfl:     Na Sulfate-K Sulfate-Mg Sulf 17 5-3 13-1 6 GM/177ML SOLN, Take 1 kit by mouth once for 1 dose, Disp: 2 Bottle, Rfl: 0    Zoster Vac Recomb Adjuvanted (SHINGRIX) 50 MCG/0 5ML SUSR, 1 vaccine series, Disp: 1 each, Rfl: 0     Allergies     Allergies   Allergen Reactions    Percolone [Oxycodone] Hallucinations       Objective     /74   Temp (!) 96 9 °F (36 1 °C)   Resp 18   Ht 5' 7 5" (1 715 m)   Wt 106 kg (233 lb 6 4 oz)   BMI 36 02 kg/m²      Physical Exam   Constitutional: He appears well-developed and well-nourished  No distress  HENT:   Head: Normocephalic and atraumatic     Right Ear: External ear normal    Left Ear: External ear normal    Nose: Nose normal    Mouth/Throat: Oropharynx is clear and moist  No oropharyngeal exudate  Eyes: Pupils are equal, round, and reactive to light  EOM are normal  Right eye exhibits no discharge  Left eye exhibits no discharge  No scleral icterus  Neck: No thyromegaly present  Cardiovascular: Normal rate and normal heart sounds  No murmur heard  Pulmonary/Chest: Effort normal and breath sounds normal  No respiratory distress  He has no wheezes  Abdominal: Soft  Bowel sounds are normal  He exhibits no distension and no mass  There is no tenderness  There is no rebound and no guarding  Musculoskeletal: Normal range of motion  Neurological: He is alert  He displays normal reflexes  Coordination normal    Skin: Skin is warm and dry  No rash noted  He is not diaphoretic  No erythema  Psychiatric: He has a normal mood and affect  His behavior is normal    Nursing note and vitals reviewed  No exam data present    Health Maintenance     Health Maintenance   Topic Date Due    INFLUENZA VACCINE  07/01/2018    CRC Screening: Colonoscopy  01/15/2019    Depression Screening PHQ  07/17/2019    Fall Risk  01/25/2020    DTaP,Tdap,and Td Vaccines (2 - Td) 05/13/2020    Hepatitis C Screening  Completed    Pneumococcal PPSV23/PCV13 65+ Years / Low and Medium Risk  Completed     Immunization History   Administered Date(s) Administered    Influenza Quadrivalent Preservative Free 3 years and older IM 11/03/2014    Influenza Split High Dose Preservative Free IM 10/02/2015, 01/03/2017, 01/08/2018    Influenza TIV (IM) 01/15/2013    Influenza, high dose seasonal 0 5 mL 01/25/2019    Pneumococcal Conjugate 13-Valent 06/30/2015    Pneumococcal Polysaccharide PPV23 01/03/2017    Tdap 05/13/2010       [facility]    BMI Counseling: Body mass index is 36 02 kg/m²  Discussed the patient's BMI with him  The BMI is above average   BMI counseling and education was provided to the patient  Nutrition recommendations include reducing portion sizes       Recent Results (from the past 672 hour(s))   CBC and differential    Collection Time: 01/17/19  8:00 AM   Result Value Ref Range    White Blood Cell Count 5 2 3 4 - 10 8 x10E3/uL    Red Blood Cell Count 4 56 4 14 - 5 80 x10E6/uL    Hemoglobin 13 1 13 0 - 17 7 g/dL    HCT 39 0 37 5 - 51 0 %    MCV 86 79 - 97 fL    MCH 28 7 26 6 - 33 0 pg    MCHC 33 6 31 5 - 35 7 g/dL    RDW 15 2 12 3 - 15 4 %    Platelet Count 095 967 - 379 x10E3/uL    Neutrophils 60 Not Estab  %    Lymphocytes 27 Not Estab  %    Monocytes 9 Not Estab  %    Eosinophils 3 Not Estab  %    Basophils PCT 1 Not Estab  %    Neutrophils (Absolute) 3 1 1 4 - 7 0 x10E3/uL    Lymphocytes (Absolute) 1 4 0 7 - 3 1 x10E3/uL    Monocytes (Absolute) 0 5 0 1 - 0 9 x10E3/uL    Eosinophils (Absolute) 0 2 0 0 - 0 4 x10E3/uL    Basophils ABS 0 0 0 0 - 0 2 x10E3/uL    Immature Granulocytes 0 Not Estab  %    Immature Granulocytes (Absolute) 0 0 0 0 - 0 1 x10E3/uL   Comprehensive metabolic panel    Collection Time: 01/17/19  8:00 AM   Result Value Ref Range    Glucose, Random 103 (H) 65 - 99 mg/dL    BUN 20 8 - 27 mg/dL    Creatinine 1 17 0 76 - 1 27 mg/dL    eGFR Non African American 63 >59 mL/min/1 73    SL AMB EGFR AFRICAN AMERICAN 73 >59 mL/min/1 73    SL AMB BUN/CREATININE RATIO 17 10 - 24    Sodium 147 (H) 134 - 144 mmol/L    Potassium 4 6 3 5 - 5 2 mmol/L    Chloride 106 96 - 106 mmol/L    CO2 26 20 - 29 mmol/L    CALCIUM 9 9 8 6 - 10 2 mg/dL    SL AMB PROTEIN, TOTAL 7 0 6 0 - 8 5 g/dL    Albumin 4 2 3 6 - 4 8 g/dL    Globulin, Total 2 8 1 5 - 4 5 g/dL    Albumin/Globulin Ratio 1 5 1 2 - 2 2    TOTAL BILIRUBIN 0 5 0 0 - 1 2 mg/dL    Alk Phos Isoenzymes 80 39 - 117 IU/L    SL AMB AST 21 0 - 40 IU/L    SL AMB ALT 16 0 - 44 IU/L   Lipid panel    Collection Time: 01/17/19  8:00 AM   Result Value Ref Range    Cholesterol, Total 137 100 - 199 mg/dL Triglycerides 74 0 - 149 mg/dL    HDL 41 >39 mg/dL    LDL Direct 81 0 - 99 mg/dL   Hepatitis C antibody    Collection Time: 01/17/19  8:00 AM   Result Value Ref Range    HEP C AB <0 1 0 0 - 0 9 s/co ratio   Cardiovascular Report    Collection Time: 01/17/19  8:00 AM   Result Value Ref Range    Interpretation Note    Specimen Status Report    Collection Time: 01/17/19  8:00 AM   Result Value Ref Range    Comment Comment

## 2019-01-25 NOTE — PATIENT INSTRUCTIONS
Obesity   AMBULATORY CARE:   Obesity  is when your body mass index (BMI) is greater than 30  Your healthcare provider will use your height and weight to measure your BMI  The risks of obesity include  many health problems, such as injuries or physical disability  You may need tests to check for the following:  · Diabetes     · High blood pressure or high cholesterol     · Heart disease     · Gallbladder or liver disease     · Cancer of the colon, breast, prostate, liver, or kidney     · Sleep apnea     · Arthritis or gout  Seek care immediately if:   · You have a severe headache, confusion, or difficulty speaking  · You have weakness on one side of your body  · You have chest pain, sweating, or shortness of breath  Contact your healthcare provider if:   · You have symptoms of gallbladder or liver disease, such as pain in your upper abdomen  · You have knee or hip pain and discomfort while walking  · You have symptoms of diabetes, such as intense hunger and thirst, and frequent urination  · You have symptoms of sleep apnea, such as snoring or daytime sleepiness  · You have questions or concerns about your condition or care  Treatment for obesity  focuses on helping you lose weight to improve your health  Even a small decrease in BMI can reduce the risk for many health problems  Your healthcare provider will help you set a weight-loss goal   · Lifestyle changes  are the first step in treating obesity  These include making healthy food choices and getting regular physical activity  Your healthcare provider may suggest a weight-loss program that involves coaching, education, and therapy  · Medicine  may help you lose weight when it is used with a healthy diet and physical activity  · Surgery  can help you lose weight if you are very obese and have other health problems  There are several types of weight-loss surgery  Ask your healthcare provider for more information    Be successful losing weight:   · Set small, realistic goals  An example of a small goal is to walk for 20 minutes 5 days a week  Anther goal is to lose 5% of your body weight  · Tell friends, family members, and coworkers about your goals  and ask for their support  Ask a friend to lose weight with you, or join a weight-loss support group  · Identify foods or triggers that may cause you to overeat , and find ways to avoid them  Remove tempting high-calorie foods from your home and workplace  Place a bowl of fresh fruit on your kitchen counter  If stress causes you to eat, then find other ways to cope with stress  · Keep a diary to track what you eat and drink  Also write down how many minutes of physical activity you do each day  Weigh yourself once a week and record it in your diary  Eating changes: You will need to eat 500 to 1,000 fewer calories each day than you currently eat to lose 1 to 2 pounds a week  The following changes will help you cut calories:  · Eat smaller portions  Use small plates, no larger than 9 inches in diameter  Fill your plate half full of fruits and vegetables  Measure your food using measuring cups until you know what a serving size looks like  · Eat 3 meals and 1 or 2 snacks each day  Plan your meals in advance  Teagan Crowell and eat at home most of the time  Eat slowly  · Eat fruits and vegetables at every meal   They are low in calories and high in fiber, which makes you feel full  Do not add butter, margarine, or cream sauce to vegetables  Use herbs to season steamed vegetables  · Eat less fat and fewer fried foods  Eat more baked or grilled chicken and fish  These protein sources are lower in calories and fat than red meat  Limit fast food  Dress your salads with olive oil and vinegar instead of bottled dressing  · Limit the amount of sugar you eat  Do not drink sugary beverages  Limit alcohol  Activity changes:  Physical activity is good for your body in many ways   It helps you burn calories and build strong muscles  It decreases stress and depression, and improves your mood  It can also help you sleep better  Talk to your healthcare provider before you begin an exercise program   · Exercise for at least 30 minutes 5 days a week  Start slowly  Set aside time each day for physical activity that you enjoy and that is convenient for you  It is best to do both weight training and an activity that increases your heart rate, such as walking, bicycling, or swimming  · Find ways to be more active  Do yard work and housecleaning  Walk up the stairs instead of using elevators  Spend your leisure time going to events that require walking, such as outdoor festivals or fairs  This extra physical activity can help you lose weight and keep it off  Follow up with your healthcare provider as directed: You may need to meet with a dietitian  Write down your questions so you remember to ask them during your visits  © 2017 [facility] Information is for End User's use only and may not be sold, redistributed or otherwise used for commercial purposes  All illustrations and images included in CareNotes® are the copyrighted property of [employer]  or Tobin Caz[employer]  The above information is an  only  It is not intended as medical advice for individual conditions or treatments  Talk to your doctor, nurse or pharmacist before following any medical regimen to see if it is safe and effective for you  Weight Management   AMBULATORY CARE:   Why it is important to manage your weight:  Being overweight increases your risk of health conditions such as heart disease, high blood pressure, type 2 diabetes, and certain types of cancer  It can also increase your risk for osteoarthritis, sleep apnea, and other respiratory problems  Aim for a slow, steady weight loss  Even a small amount of weight loss can lower your risk of health problems    How to lose weight safely:  A safe and healthy way to lose weight is to eat fewer calories and get regular exercise  You can lose up about 1 pound a week by decreasing the number of calories you eat by 500 calories each day  You can decrease calories by eating smaller portion sizes or by cutting out high-calorie foods  Read labels to find out how many calories are in the foods you eat  You can also burn calories with exercise such as walking, swimming, or biking  You will be more likely to keep weight off if you make these changes part of your lifestyle  Healthy meal plan for weight management:  A healthy meal plan includes a variety of foods, contains fewer calories, and helps you stay healthy  A healthy meal plan includes the following:  · Eat whole-grain foods more often  A healthy meal plan should contain fiber  Fiber is the part of grains, fruits, and vegetables that is not broken down by your body  Whole-grain foods are healthy and provide extra fiber in your diet  Some examples of whole-grain foods are whole-wheat breads and pastas, oatmeal, brown rice, and bulgur  · Eat a variety of vegetables every day  Include dark, leafy greens such as spinach, kale, emily greens, and mustard greens  Eat yellow and orange vegetables such as carrots, sweet potatoes, and winter squash  · Eat a variety of fruits every day  Choose fresh or canned fruit (canned in its own juice or light syrup) instead of juice  Fruit juice has very little or no fiber  · Eat low-fat dairy foods  Drink fat-free (skim) milk or 1% milk  Eat fat-free yogurt and low-fat cottage cheese  Try low-fat cheeses such as mozzarella and other reduced-fat cheeses  · Choose meat and other protein foods that are low in fat  Choose beans or other legumes such as split peas or lentils  Choose fish, skinless poultry (chicken or turkey), or lean cuts of red meat (beef or pork)  Before you cook meat or poultry, cut off any visible fat  · Use less fat and oil  Try baking foods instead of frying them  Add less fat, such as margarine, sour cream, regular salad dressing and mayonnaise to foods  Eat fewer high-fat foods  Some examples of high-fat foods include french fries, doughnuts, ice cream, and cakes  · Eat fewer sweets  Limit foods and drinks that are high in sugar  This includes candy, cookies, regular soda, and sweetened drinks  Ways to decrease calories:   · Eat smaller portions  ¨ Use a small plate with smaller servings  ¨ Do not eat second helpings  ¨ When you eat at a restaurant, ask for a box and place half of your meal in the box before you eat  ¨ Share an entrée with someone else  · Replace high-calorie snacks with healthy, low-calorie snacks  ¨ Choose fresh fruit, vegetables, fat-free rice cakes, or air-popped popcorn instead of potato chips, nuts, or chocolate  ¨ Choose water or calorie-free drinks instead of soda or sweetened drinks  · Eat regular meals  Skipping meals can lead to overeating later in the day  Eat a healthy snack in place of a meal if you do not have time to eat a regular meal      · Do not shop for groceries when you are hungry  You may be more likely to make unhealthy food choices  Take a grocery list of healthy foods and shop after you have eaten  Exercise:  Exercise at least 30 minutes per day on most days of the week  Some examples of exercise include walking, biking, dancing, and swimming  You can also fit in more physical activity by taking the stairs instead of the elevator or parking farther away from stores  Ask your healthcare provider about the best exercise plan for you  Other things to consider as you try to lose weight:   · Be aware of situations that may give you the urge to overeat, such as eating while watching television  Find ways to avoid these situations  For example, read a book, go for a walk, or do crafts      · Meet with a weight loss support group or friends who are also trying to lose weight  This may help you stay motivated to continue working on your weight loss goals  © 2017 [facility] Information is for End User's use only and may not be sold, redistributed or otherwise used for commercial purposes  All illustrations and images included in CareNotes® are the copyrighted property of [employer]  or Tobin Cazares  The above information is an  only  It is not intended as medical advice for individual conditions or treatments  Talk to your doctor, nurse or pharmacist before following any medical regimen to see if it is safe and effective for you

## 2019-02-25 ENCOUNTER — ANESTHESIA EVENT (OUTPATIENT)
Dept: GASTROENTEROLOGY | Facility: AMBULARY SURGERY CENTER | Age: 69
End: 2019-02-25
Payer: COMMERCIAL

## 2019-02-25 NOTE — ANESTHESIA PREPROCEDURE EVALUATION
Review of Systems/Medical History  Patient summary reviewed  Chart reviewed  No history of anesthetic complications     Cardiovascular  Hyperlipidemia, Hypertension , DVT   Pulmonary  Smoker ex-smoker  , Sleep apnea ,        GI/Hepatic    No PUD (h/o ulcer), GERD ,   Comment: S/p gastrectomy (bariatric)     Kidney stones (h/o stones), Prostatic disorder, benign prostatic hyperplasia       Endo/Other     GYN       Hematology  Anemia ,     Musculoskeletal    Comment: S/p right hip replacement, s/p left total hip; s/p left and right total knee      Neurology   Psychology           Physical Exam    Airway    Mallampati score: II  TM Distance: >3 FB  Neck ROM: full     Dental   upper dentures and lower dentures,     Cardiovascular  Rhythm: regular, Rate: normal,     Pulmonary  Breath sounds clear to auscultation,     Other Findings        Anesthesia Plan  ASA Score- 2     Anesthesia Type- IV sedation with anesthesia with ASA Monitors  Additional Monitors:   Airway Plan:         Plan Factors-    Induction- intravenous  Postoperative Plan-     Informed Consent- Anesthetic plan and risks discussed with patient

## 2019-02-26 ENCOUNTER — ANESTHESIA (OUTPATIENT)
Dept: GASTROENTEROLOGY | Facility: AMBULARY SURGERY CENTER | Age: 69
End: 2019-02-26
Payer: COMMERCIAL

## 2019-02-26 ENCOUNTER — HOSPITAL ENCOUNTER (OUTPATIENT)
Facility: AMBULARY SURGERY CENTER | Age: 69
Setting detail: OUTPATIENT SURGERY
Discharge: HOME | End: 2019-02-26
Attending: INTERNAL MEDICINE | Admitting: INTERNAL MEDICINE
Payer: COMMERCIAL

## 2019-02-26 VITALS
WEIGHT: 233 LBS | DIASTOLIC BLOOD PRESSURE: 60 MMHG | HEIGHT: 68 IN | SYSTOLIC BLOOD PRESSURE: 120 MMHG | HEART RATE: 84 BPM | OXYGEN SATURATION: 96 % | RESPIRATION RATE: 18 BRPM | BODY MASS INDEX: 35.31 KG/M2 | TEMPERATURE: 97 F

## 2019-02-26 DIAGNOSIS — Z86.010 HISTORY OF COLON POLYPS: ICD-10-CM

## 2019-02-26 PROCEDURE — 88305 TISSUE EXAM BY PATHOLOGIST: CPT | Performed by: PATHOLOGY

## 2019-02-26 PROCEDURE — 45380 COLONOSCOPY AND BIOPSY: CPT | Performed by: INTERNAL MEDICINE

## 2019-02-26 RX ORDER — SODIUM CHLORIDE 9 MG/ML
75 INJECTION, SOLUTION INTRAVENOUS CONTINUOUS
Status: DISCONTINUED | OUTPATIENT
Start: 2019-02-26 | End: 2019-02-26 | Stop reason: HOSPADM

## 2019-02-26 RX ORDER — PROPOFOL 10 MG/ML
INJECTION, EMULSION INTRAVENOUS AS NEEDED
Status: DISCONTINUED | OUTPATIENT
Start: 2019-02-26 | End: 2019-02-26 | Stop reason: SURG

## 2019-02-26 RX ADMIN — PROPOFOL 30 MG: 10 INJECTION, EMULSION INTRAVENOUS at 13:02

## 2019-02-26 RX ADMIN — PROPOFOL 50 MG: 10 INJECTION, EMULSION INTRAVENOUS at 12:58

## 2019-02-26 RX ADMIN — SODIUM CHLORIDE 75 ML/HR: 0.9 INJECTION, SOLUTION INTRAVENOUS at 12:22

## 2019-02-26 RX ADMIN — PROPOFOL 100 MG: 10 INJECTION, EMULSION INTRAVENOUS at 12:54

## 2019-02-26 RX ADMIN — SODIUM CHLORIDE: 0.9 INJECTION, SOLUTION INTRAVENOUS at 12:53

## 2019-02-26 NOTE — OP NOTE
COLONOSCOPY    PROCEDURE: Colonoscopy/ Polypectomny (Cold Biopsy)    INDICATIONS: History of Colon Polyps    POST-OP DIAGNOSIS: See the impression below    SEDATION: Monitored anesthesia care, check anesthesia records    PRIOR COLONOSCOPY: 3 years ago  CONSENT:  Informed consent was obtained for the procedure, including sedation after explaining the risks and benefits of the procedure  Risks including but not limited to bleeding, perforation, infection, aspiration were discussed in detail  Also explained about less than 100%$ sensitivity with the exam and other alternatives  PREPARATION:   EKG tracing, pulse oximetry, blood pressure were monitored throughout the procedure  Patient was identified by myself both verbally and by visual inspection of ID band  DESCRIPTION:   Patient was placed in the left lateral decubitus position and was sedated with the above medication  Digital rectal examination was performed  The colonoscope was introduced in to the anal canal and advanced up to cecum, which was identified by the appendiceal orifice and IC valve  A careful inspection was made as the colonoscope was withdrawn, including a retroflexed view of the rectum; findings and interventions are described below  Appropriate photodocumentation was obtained  The quality of the colonic preparation was adequate  FINDINGS:    1  Cecum and ileocecal valve-normal mucosa    2  Couple of diminutive polyps were removed with cold biopsy forceps from hepatic flexure and splenic flexure    3  Few diverticuli in the sigmoid colon         IMPRESSIONS:      As above    RECOMMENDATIONS:    Check pathology    Repeat colonoscopy in 5 years if polyps are adenomas  COMPLICATIONS:  None; patient tolerated the procedure well      DISPOSITION: PACU           CONDITION: Stable

## 2019-02-26 NOTE — ANESTHESIA POSTPROCEDURE EVALUATION
Post-Op Assessment Note    CV Status:  Stable    Pain management: adequate     Mental Status:  Awake   Hydration Status:  Stable   PONV Controlled:  None   Airway Patency:  Patent   Post Op Vitals Reviewed: Yes      Staff: Anesthesiologist           BP      Temp     Pulse     Resp      SpO2

## 2019-02-26 NOTE — H&P
History and Physical -  [facility]  Randall Villeda 71 y o  male MRN: [id number]        HPI:  40-year-old male with history of colon polyps was referred for colonoscopy  Patient has regular bowel movements and denies any blood or mucus in the stool  Appetite is good and denies any recent weight loss  Denies any abdominal pain, nausea, or vomiting  Has no heartburn or acid reflux  Denies any difficulty swallowing  Historical Information   Past Medical History:   Diagnosis Date    AC (acromioclavicular) joint bone spurs     Last assessed - 2/19/15    Achilles tendinitis, unspecified leg 06/04/2010    Resolved - 1/8/18    Anemia 08/12/2010    Resolved - 1/11/16    Blood in urine     BPH without urinary obstruction     Hypertrophy    Deep venous thrombosis of distal lower extremity (HCC)     Deep venous thrombosis of distal lower extremity (HCC)     Last assessed - 6/4/15    Heartburn     Last assessed - 12/12/14    Hemorrhoids     Hernia, inguinal 06/30/2009    Resolved - 1/8/18    History of stomach ulcers     Hypertension     Kidney stones     Mixed hyperlipidemia     Nephrolithiasis     Right inguinal hernia     Last assessed - 4/4/16    Sleep apnea     Tear of left rotator cuff     Last assessed - 2/19/15     Past Surgical History:   Procedure Laterality Date    BARIATRIC SURGERY  06/15/2015    Laparoscopic Longitudinal Gastrectomy for morbid obesity, Managed by - Blank Epps     EXPLORATORY LAPAROTOMY W/ BOWEL RESECTION Right 3/3/2016    Procedure: Incarcerated possibly strangulated right inguinal hernia;   Surgeon: Nga Luna MD;  Location: [address] [place] [address];  Service:    [employer] SURGERY      2011 - Rt Hip Replacement, 2012 - Lt Hip Replacement, 2013 - Lt Hip Revision    INGUINAL HERNIA REPAIR      Last assessed - 4/4/16    JOINT REPLACEMENT      KNEE SURGERY  2004    Double     [place] COLONOSCOPY FLX DX W/COLLJ SPEC WHEN PFRMD N/A 1/15/2016 Procedure: COLONOSCOPY;  Surgeon: Mattie Sexton MD;  Location: [place] [address] [facility]; Service: Gastroenterology    TONSILLECTOMY       Social History   Social History     Substance and Sexual Activity   Alcohol Use No    Comment: Occ alcohol use per [employer]      Social History     Substance and Sexual Activity   Drug Use No     Social History     Tobacco Use   Smoking Status Former Smoker   Smokeless Tobacco Former User    Quit date: 1/15/1994     Family History   Problem Relation Age of Onset    Arthritis Father     Diabetes Father     Hypertension Father     Hyperlipidemia Father     Diabetes Family     Emphysema Family     Heart disease Family     Hypertension Family     Mental illness Neg Hx        Meds/Allergies     Medications Prior to Admission   Medication    atorvastatin (LIPITOR) 10 mg tablet    Calcium Carbonate (CALCIUM 600) 1500 (600 Ca) MG TABS    Cholecalciferol (VITAMIN D3) 2000 units TABS    Cyanocobalamin (VITAMIN B-12) 5000 MCG LOZG    lisinopril (ZESTRIL) 10 mg tablet    Multiple Vitamins-Minerals (MULTIVITAMIN ADULT EXTRA C) CHEW    Na Sulfate-K Sulfate-Mg Sulf 17 5-3 13-1 6 GM/177ML SOLN    Zoster Vac Recomb Adjuvanted (SHINGRIX) 50 MCG/0 5ML SUSR       Allergies   Allergen Reactions    Percolone [Oxycodone] Hallucinations       Objective     Blood pressure 125/69, pulse 80, temperature (!) 97 °F (36 1 °C), temperature source Tympanic, resp  rate 18, height 5' 7 5" (1 715 m), weight 106 kg (233 lb), SpO2 96 %      PHYSICAL EXAM:    Gen: NAD  CV: S1 & S2 normal, RRR  CHEST: Clear to auscultate  ABD: soft, NT/ND, good bowel sounds  EXT: no edema    ASSESSMENT:     History of colon polyps    PLAN:    Colonoscopy

## 2019-02-28 ENCOUNTER — TELEPHONE (OUTPATIENT)
Dept: GASTROENTEROLOGY | Facility: AMBULARY SURGERY CENTER | Age: 69
End: 2019-02-28

## 2019-02-28 NOTE — TELEPHONE ENCOUNTER
Left message with whom answered the phone asking pt to call office back when he is available  Recall and hm set

## 2019-02-28 NOTE — TELEPHONE ENCOUNTER
----- Message from Kristi Mantilla MD sent at 2/27/2019  4:50 PM EST -----  Colon polyps removed came back as benign   Repeat colonoscopy in 5 years

## 2019-07-12 DIAGNOSIS — E78.2 MIXED HYPERLIPIDEMIA: ICD-10-CM

## 2019-07-12 DIAGNOSIS — I10 BENIGN ESSENTIAL HYPERTENSION: ICD-10-CM

## 2019-07-12 RX ORDER — LISINOPRIL 10 MG/1
TABLET ORAL
Qty: 90 TABLET | Refills: 1 | Status: SHIPPED | OUTPATIENT
Start: 2019-07-12 | End: 2020-01-07

## 2019-07-12 RX ORDER — ATORVASTATIN CALCIUM 10 MG/1
TABLET, FILM COATED ORAL
Qty: 90 TABLET | Refills: 1 | Status: SHIPPED | OUTPATIENT
Start: 2019-07-12 | End: 2020-01-07

## 2019-07-23 ENCOUNTER — TELEPHONE (OUTPATIENT)
Dept: FAMILY MEDICINE CLINIC | Facility: CLINIC | Age: 69
End: 2019-07-23

## 2019-07-23 NOTE — TELEPHONE ENCOUNTER
Application for a handicap placard was dropped off and is at the nurse's desk  Please call when complete

## 2019-08-13 LAB
ALBUMIN SERPL-MCNC: 3.9 G/DL (ref 3.6–4.8)
ALBUMIN/GLOB SERPL: 1.5 {RATIO} (ref 1.2–2.2)
ALP SERPL-CCNC: 78 IU/L (ref 39–117)
ALT SERPL-CCNC: 16 IU/L (ref 0–44)
AST SERPL-CCNC: 19 IU/L (ref 0–40)
BASOPHILS # BLD AUTO: 0 X10E3/UL (ref 0–0.2)
BASOPHILS NFR BLD AUTO: 1 %
BILIRUB SERPL-MCNC: 0.3 MG/DL (ref 0–1.2)
BUN SERPL-MCNC: 18 MG/DL (ref 8–27)
BUN/CREAT SERPL: 15 (ref 10–24)
CALCIUM SERPL-MCNC: 9.3 MG/DL (ref 8.6–10.2)
CHLORIDE SERPL-SCNC: 105 MMOL/L (ref 96–106)
CHOLEST SERPL-MCNC: 144 MG/DL (ref 100–199)
CO2 SERPL-SCNC: 26 MMOL/L (ref 20–29)
CREAT SERPL-MCNC: 1.19 MG/DL (ref 0.76–1.27)
EOSINOPHIL # BLD AUTO: 0.1 X10E3/UL (ref 0–0.4)
EOSINOPHIL NFR BLD AUTO: 2 %
ERYTHROCYTE [DISTWIDTH] IN BLOOD BY AUTOMATED COUNT: 14.9 % (ref 12.3–15.4)
GLOBULIN SER-MCNC: 2.6 G/DL (ref 1.5–4.5)
GLUCOSE SERPL-MCNC: 101 MG/DL (ref 65–99)
HCT VFR BLD AUTO: 37.5 % (ref 37.5–51)
HDLC SERPL-MCNC: 46 MG/DL
HGB BLD-MCNC: 12.2 G/DL (ref 13–17.7)
IMM GRANULOCYTES # BLD: 0 X10E3/UL (ref 0–0.1)
IMM GRANULOCYTES NFR BLD: 0 %
LDLC SERPL CALC-MCNC: 86 MG/DL (ref 0–99)
LYMPHOCYTES # BLD AUTO: 1.5 X10E3/UL (ref 0.7–3.1)
LYMPHOCYTES NFR BLD AUTO: 27 %
MCH RBC QN AUTO: 28.2 PG (ref 26.6–33)
MCHC RBC AUTO-ENTMCNC: 32.5 G/DL (ref 31.5–35.7)
MCV RBC AUTO: 87 FL (ref 79–97)
MICRODELETION SYND BLD/T FISH: NORMAL
MONOCYTES # BLD AUTO: 0.5 X10E3/UL (ref 0.1–0.9)
MONOCYTES NFR BLD AUTO: 8 %
MORPHOLOGY BLD-IMP: ABNORMAL
NEUTROPHILS # BLD AUTO: 3.6 X10E3/UL (ref 1.4–7)
NEUTROPHILS NFR BLD AUTO: 62 %
PLATELET # BLD AUTO: 211 X10E3/UL (ref 150–450)
POTASSIUM SERPL-SCNC: 4.2 MMOL/L (ref 3.5–5.2)
PROT SERPL-MCNC: 6.5 G/DL (ref 6–8.5)
RBC # BLD AUTO: 4.32 X10E6/UL (ref 4.14–5.8)
SL AMB EGFR AFRICAN AMERICAN: 72 ML/MIN/1.73
SL AMB EGFR NON AFRICAN AMERICAN: 62 ML/MIN/1.73
SODIUM SERPL-SCNC: 143 MMOL/L (ref 134–144)
TRIGL SERPL-MCNC: 58 MG/DL (ref 0–149)
WBC # BLD AUTO: 5.7 X10E3/UL (ref 3.4–10.8)

## 2019-08-15 ENCOUNTER — OFFICE VISIT (OUTPATIENT)
Dept: FAMILY MEDICINE CLINIC | Facility: CLINIC | Age: 69
End: 2019-08-15
Payer: COMMERCIAL

## 2019-08-15 VITALS
TEMPERATURE: 97.5 F | RESPIRATION RATE: 16 BRPM | HEART RATE: 68 BPM | SYSTOLIC BLOOD PRESSURE: 110 MMHG | BODY MASS INDEX: 36.53 KG/M2 | WEIGHT: 241 LBS | DIASTOLIC BLOOD PRESSURE: 60 MMHG | HEIGHT: 68 IN

## 2019-08-15 DIAGNOSIS — I10 BENIGN ESSENTIAL HYPERTENSION: Primary | ICD-10-CM

## 2019-08-15 DIAGNOSIS — K91.2 POSTGASTRECTOMY MALABSORPTION: ICD-10-CM

## 2019-08-15 DIAGNOSIS — E66.01 CLASS 2 SEVERE OBESITY DUE TO EXCESS CALORIES WITH SERIOUS COMORBIDITY AND BODY MASS INDEX (BMI) OF 37.0 TO 37.9 IN ADULT (HCC): ICD-10-CM

## 2019-08-15 DIAGNOSIS — E78.2 MIXED HYPERLIPIDEMIA: ICD-10-CM

## 2019-08-15 DIAGNOSIS — Z98.84 S/P BARIATRIC SURGERY: ICD-10-CM

## 2019-08-15 DIAGNOSIS — Z90.3 POSTGASTRECTOMY MALABSORPTION: ICD-10-CM

## 2019-08-15 DIAGNOSIS — R73.09 ABNORMAL BLOOD SUGAR: ICD-10-CM

## 2019-08-15 DIAGNOSIS — N40.0 ENLARGED PROSTATE WITHOUT LOWER URINARY TRACT SYMPTOMS (LUTS): ICD-10-CM

## 2019-08-15 PROBLEM — E66.812 CLASS 2 SEVERE OBESITY DUE TO EXCESS CALORIES WITH SERIOUS COMORBIDITY AND BODY MASS INDEX (BMI) OF 37.0 TO 37.9 IN ADULT (HCC): Status: ACTIVE | Noted: 2019-08-15

## 2019-08-15 PROCEDURE — 1036F TOBACCO NON-USER: CPT | Performed by: FAMILY MEDICINE

## 2019-08-15 PROCEDURE — 3074F SYST BP LT 130 MM HG: CPT | Performed by: FAMILY MEDICINE

## 2019-08-15 PROCEDURE — 1160F RVW MEDS BY RX/DR IN RCRD: CPT | Performed by: FAMILY MEDICINE

## 2019-08-15 PROCEDURE — 1101F PT FALLS ASSESS-DOCD LE1/YR: CPT | Performed by: FAMILY MEDICINE

## 2019-08-15 PROCEDURE — 3725F SCREEN DEPRESSION PERFORMED: CPT | Performed by: FAMILY MEDICINE

## 2019-08-15 PROCEDURE — 3008F BODY MASS INDEX DOCD: CPT | Performed by: FAMILY MEDICINE

## 2019-08-15 PROCEDURE — 99214 OFFICE O/P EST MOD 30 MIN: CPT | Performed by: FAMILY MEDICINE

## 2019-08-15 NOTE — PROGRESS NOTES
Assessment/Plan:    Problem List Items Addressed This Visit        Digestive    Postgastrectomy malabsorption       Cardiovascular and Mediastinum    Benign essential hypertension - Primary       Other    Mixed hyperlipidemia    Relevant Orders    Comprehensive metabolic panel    Lipid Panel with Direct LDL reflex    Abnormal blood sugar     Only slightly elevated  Advised limiting carbs         Relevant Orders    Hemoglobin A1C    Enlarged prostate without lower urinary tract symptoms (luts)    S/P bariatric surgery    BMI 37 0-37 9, adult    Class 2 severe obesity due to excess calories with serious comorbidity and body mass index (BMI) of 37 0 to 37 9 in adult ([facility] )          BMI Counseling: Body mass index is 37 19 kg/m²  Discussed the patient's BMI with him  The BMI is above average  BMI counseling and education was provided to the patient  Nutrition recommendations include reducing portion sizes  There are no Patient Instructions on file for this visit  Return in about 6 months (around 2/15/2020) for Annual physical     Subjective:      Patient ID: Sis Michelle is a 71 y o  male  Chief Complaint   Patient presents with    Follow-up     wmcma       Pt is here for a 6 month follow up  Pt just got back for a cruise  Had labs  No t haveiong Chest pain or sob    Pt sees Urology - Nawaf Rosario - was seen January things are going well      The following portions of the patient's history were reviewed and updated as appropriate: allergies, current medications, past family history, past medical history, past social history, past surgical history and problem list     Review of Systems   Constitutional: Negative for activity change, appetite change, chills, diaphoresis, fatigue, fever and unexpected weight change  HENT: Negative for congestion, dental problem, ear pain, mouth sores, sinus pressure, sinus pain, sore throat and trouble swallowing  Eyes: Negative for photophobia, discharge and itching  Respiratory: Negative for apnea, chest tightness and shortness of breath  Cardiovascular: Negative for chest pain, palpitations and leg swelling  Gastrointestinal: Negative for abdominal distention, abdominal pain, blood in stool, nausea and vomiting  Endocrine: Negative for cold intolerance, heat intolerance, polydipsia, polyphagia and polyuria  Genitourinary: Negative for difficulty urinating  Musculoskeletal: Negative for arthralgias  Skin: Negative for color change and wound  Neurological: Negative for dizziness, syncope, speech difficulty and headaches  Hematological: Negative for adenopathy  Psychiatric/Behavioral: Negative for agitation and behavioral problems  Current Outpatient Medications   Medication Sig Dispense Refill    atorvastatin (LIPITOR) 10 mg tablet TAKE ONE TABLET BY MOUTH EVERY DAY 90 tablet 1    Calcium Carbonate (CALCIUM 600) 1500 (600 Ca) MG TABS Take by mouth      Cholecalciferol (VITAMIN D3) 2000 units TABS Take by mouth      Cyanocobalamin (VITAMIN B-12) 5000 MCG LOZG Place under the tongue      lisinopril (ZESTRIL) 10 mg tablet TAKE ONE TABLET BY MOUTH EVERY DAY 90 tablet 1    Multiple Vitamins-Minerals (MULTIVITAMIN ADULT EXTRA C) CHEW Chew       No current facility-administered medications for this visit  Objective:    /60   Pulse 68   Temp 97 5 °F (36 4 °C)   Resp 16   Ht 5' 7 5" (1 715 m)   Wt 109 kg (241 lb)   BMI 37 19 kg/m²        Physical Exam   Constitutional: He appears well-developed and well-nourished  No distress  HENT:   Head: Normocephalic and atraumatic  Right Ear: External ear normal    Left Ear: External ear normal    Nose: Nose normal    Mouth/Throat: Oropharynx is clear and moist  No oropharyngeal exudate  Eyes: Pupils are equal, round, and reactive to light  EOM are normal  Right eye exhibits no discharge  Left eye exhibits no discharge  No scleral icterus  Neck: No thyromegaly present     Cardiovascular: Normal rate and normal heart sounds  No murmur heard  Pulmonary/Chest: Effort normal and breath sounds normal  No respiratory distress  He has no wheezes  Abdominal: Soft  Bowel sounds are normal  He exhibits no distension and no mass  There is no tenderness  There is no rebound and no guarding  Musculoskeletal: Normal range of motion  Neurological: He is alert  He displays normal reflexes  Coordination normal    Skin: Skin is warm and dry  No rash noted  He is not diaphoretic  No erythema  Psychiatric: He has a normal mood and affect  His behavior is normal    Nursing note and vitals reviewed             Recent Results (from the past 672 hour(s))   Rian Miranda Default    Collection Time: 08/12/19  7:43 AM   Result Value Ref Range    White Blood Cell Count 5 7 3 4 - 10 8 x10E3/uL    Red Blood Cell Count 4 32 4 14 - 5 80 x10E6/uL    Hemoglobin 12 2 (L) 13 0 - 17 7 g/dL    HCT 37 5 37 5 - 51 0 %    MCV 87 79 - 97 fL    MCH 28 2 26 6 - 33 0 pg    MCHC 32 5 31 5 - 35 7 g/dL    RDW 14 9 12 3 - 15 4 %    Platelet Count 534 200 - 450 x10E3/uL    Neutrophils 62 Not Estab  %    Lymphocytes 27 Not Estab  %    Monocytes 8 Not Estab  %    Eosinophils 2 Not Estab  %    Basophils PCT 1 Not Estab  %    Neutrophils (Absolute) 3 6 1 4 - 7 0 x10E3/uL    Lymphocytes (Absolute) 1 5 0 7 - 3 1 x10E3/uL    Monocytes (Absolute) 0 5 0 1 - 0 9 x10E3/uL    Eosinophils (Absolute) 0 1 0 0 - 0 4 x10E3/uL    Basophils ABS 0 0 0 0 - 0 2 x10E3/uL    Immature Granulocytes 0 Not Estab  %    Immature Granulocytes (Absolute) 0 0 0 0 - 0 1 x10E3/uL    Hematology Comments Note:    Comprehensive metabolic panel    Collection Time: 08/12/19  7:43 AM   Result Value Ref Range    Glucose, Random 101 (H) 65 - 99 mg/dL    BUN 18 8 - 27 mg/dL    Creatinine 1 19 0 76 - 1 27 mg/dL    eGFR Non  62 >59 mL/min/1 73    eGFR  72 >59 mL/min/1 73    SL AMB BUN/CREATININE RATIO 15 10 - 24    Sodium 143 134 - 144 mmol/L Potassium 4 2 3 5 - 5 2 mmol/L    Chloride 105 96 - 106 mmol/L    CO2 26 20 - 29 mmol/L    CALCIUM 9 3 8 6 - 10 2 mg/dL    Protein, Total 6 5 6 0 - 8 5 g/dL    Albumin 3 9 3 6 - 4 8 g/dL    Globulin, Total 2 6 1 5 - 4 5 g/dL    Albumin/Globulin Ratio 1 5 1 2 - 2 2    TOTAL BILIRUBIN 0 3 0 0 - 1 2 mg/dL    Alk Phos Isoenzymes 78 39 - 117 IU/L    AST 19 0 - 40 IU/L    ALT 16 0 - 44 IU/L   Lipid panel    Collection Time: 08/12/19  7:43 AM   Result Value Ref Range    Cholesterol, Total 144 100 - 199 mg/dL    Triglycerides 58 0 - 149 mg/dL    HDL 46 >39 mg/dL    LDL Direct 86 0 - 99 mg/dL   Cardiovascular Report    Collection Time: 08/12/19  7:43 AM   Result Value Ref Range    Interpretation Note          Stanley Landa DO

## 2019-08-31 ENCOUNTER — HOSPITAL ENCOUNTER (EMERGENCY)
Facility: HOSPITAL | Age: 69
Discharge: HOME | End: 2019-08-31
Attending: EMERGENCY MEDICINE
Payer: COMMERCIAL

## 2019-08-31 ENCOUNTER — APPOINTMENT (EMERGENCY)
Dept: RADIOLOGY | Facility: HOSPITAL | Age: 69
End: 2019-08-31
Attending: EMERGENCY MEDICINE
Payer: COMMERCIAL

## 2019-08-31 VITALS
DIASTOLIC BLOOD PRESSURE: 81 MMHG | BODY MASS INDEX: 34.86 KG/M2 | TEMPERATURE: 97.4 F | SYSTOLIC BLOOD PRESSURE: 134 MMHG | HEART RATE: 80 BPM | WEIGHT: 230 LBS | HEIGHT: 68 IN | OXYGEN SATURATION: 94 % | RESPIRATION RATE: 18 BRPM

## 2019-08-31 DIAGNOSIS — S62.92XA CLOSED FRACTURE OF LEFT HAND, INITIAL ENCOUNTER: Primary | ICD-10-CM

## 2019-08-31 PROCEDURE — 73130 X-RAY EXAM OF HAND: CPT

## 2019-08-31 PROCEDURE — 99283 EMERGENCY DEPT VISIT LOW MDM: CPT

## 2019-08-31 RX ORDER — HYDROCODONE BITARTRATE AND ACETAMINOPHEN 5; 325 MG/1; MG/1
1 TABLET ORAL EVERY 6 HOURS PRN
Qty: 8 TABLET | Refills: 0 | Status: SHIPPED | OUTPATIENT
Start: 2019-08-31 | End: 2020-08-20

## 2019-08-31 RX ORDER — HYDROCODONE BITARTRATE AND ACETAMINOPHEN 5; 325 MG/1; MG/1
2 TABLET ORAL ONCE
Status: COMPLETED | OUTPATIENT
Start: 2019-08-31 | End: 2019-08-31

## 2019-08-31 RX ADMIN — HYDROCODONE BITARTRATE AND ACETAMINOPHEN 2 TABLET: 5; 325 TABLET ORAL at 20:27

## 2019-09-05 ENCOUNTER — OFFICE VISIT (OUTPATIENT)
Dept: OBGYN CLINIC | Facility: CLINIC | Age: 69
End: 2019-09-05
Payer: COMMERCIAL

## 2019-09-05 VITALS
DIASTOLIC BLOOD PRESSURE: 72 MMHG | SYSTOLIC BLOOD PRESSURE: 130 MMHG | HEIGHT: 68 IN | HEART RATE: 73 BPM | WEIGHT: 239.6 LBS | BODY MASS INDEX: 36.31 KG/M2

## 2019-09-05 DIAGNOSIS — M79.645 PAIN OF LEFT THUMB: ICD-10-CM

## 2019-09-05 DIAGNOSIS — M18.12 ARTHRITIS OF CARPOMETACARPAL (CMC) JOINT OF LEFT THUMB: Primary | ICD-10-CM

## 2019-09-05 PROCEDURE — 99203 OFFICE O/P NEW LOW 30 MIN: CPT | Performed by: ORTHOPAEDIC SURGERY

## 2019-09-05 NOTE — LETTER
September 5, 2019     Patient: Thiago Guerra   YOB: 1950   Date of Visit: 9/5/2019       To Whom it May Concern:    Wanglala García is under my professional care  He was seen in my office on 9/5/2019  He will remain out of work until cleared by physician  If you have any questions or concerns, please don't hesitate to call           Sincerely,          Jennifer Diehl DO        CC: No Recipients
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
## 2019-09-20 ENCOUNTER — APPOINTMENT (OUTPATIENT)
Dept: RADIOLOGY | Facility: CLINIC | Age: 69
End: 2019-09-20
Payer: COMMERCIAL

## 2019-09-20 ENCOUNTER — OFFICE VISIT (OUTPATIENT)
Dept: OBGYN CLINIC | Facility: CLINIC | Age: 69
End: 2019-09-20
Payer: COMMERCIAL

## 2019-09-20 VITALS
SYSTOLIC BLOOD PRESSURE: 145 MMHG | HEART RATE: 52 BPM | DIASTOLIC BLOOD PRESSURE: 85 MMHG | WEIGHT: 240.6 LBS | HEIGHT: 68 IN | BODY MASS INDEX: 36.46 KG/M2

## 2019-09-20 DIAGNOSIS — M18.12 ARTHRITIS OF CARPOMETACARPAL (CMC) JOINT OF LEFT THUMB: Primary | ICD-10-CM

## 2019-09-20 DIAGNOSIS — M18.12 ARTHRITIS OF CARPOMETACARPAL (CMC) JOINT OF LEFT THUMB: ICD-10-CM

## 2019-09-20 PROCEDURE — 99213 OFFICE O/P EST LOW 20 MIN: CPT | Performed by: ORTHOPAEDIC SURGERY

## 2019-09-20 PROCEDURE — 73130 X-RAY EXAM OF HAND: CPT

## 2019-09-20 RX ORDER — PREDNISONE 50 MG/1
50 TABLET ORAL DAILY
Qty: 5 TABLET | Refills: 0 | Status: SHIPPED | OUTPATIENT
Start: 2019-09-20 | End: 2020-08-20
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
## 2020-01-07 DIAGNOSIS — E78.2 MIXED HYPERLIPIDEMIA: ICD-10-CM

## 2020-01-07 DIAGNOSIS — I10 BENIGN ESSENTIAL HYPERTENSION: ICD-10-CM

## 2020-01-07 RX ORDER — LISINOPRIL 10 MG/1
TABLET ORAL
Qty: 90 TABLET | Refills: 0 | Status: SHIPPED | OUTPATIENT
Start: 2020-01-07 | End: 2020-04-09

## 2020-01-07 RX ORDER — ATORVASTATIN CALCIUM 10 MG/1
TABLET, FILM COATED ORAL
Qty: 90 TABLET | Refills: 0 | Status: SHIPPED | OUTPATIENT
Start: 2020-01-07 | End: 2020-02-17 | Stop reason: SDUPTHER

## 2020-02-12 LAB
ALBUMIN SERPL-MCNC: 4.3 G/DL (ref 3.8–4.8)
ALBUMIN/GLOB SERPL: 1.5 {RATIO} (ref 1.2–2.2)
ALP SERPL-CCNC: 84 IU/L (ref 39–117)
ALT SERPL-CCNC: 18 IU/L (ref 0–44)
AST SERPL-CCNC: 21 IU/L (ref 0–40)
BILIRUB SERPL-MCNC: 0.3 MG/DL (ref 0–1.2)
BUN SERPL-MCNC: 16 MG/DL (ref 8–27)
BUN/CREAT SERPL: 13 (ref 10–24)
CALCIUM SERPL-MCNC: 9.8 MG/DL (ref 8.6–10.2)
CHLORIDE SERPL-SCNC: 101 MMOL/L (ref 96–106)
CHOLEST SERPL-MCNC: 152 MG/DL (ref 100–199)
CO2 SERPL-SCNC: 27 MMOL/L (ref 20–29)
CREAT SERPL-MCNC: 1.25 MG/DL (ref 0.76–1.27)
GLOBULIN SER-MCNC: 2.8 G/DL (ref 1.5–4.5)
GLUCOSE SERPL-MCNC: 98 MG/DL (ref 65–99)
HBA1C MFR BLD: 6 % (ref 4.8–5.6)
HDLC SERPL-MCNC: 45 MG/DL
LDLC SERPL CALC-MCNC: 91 MG/DL (ref 0–99)
MICRODELETION SYND BLD/T FISH: NORMAL
MICRODELETION SYND BLD/T FISH: NORMAL
POTASSIUM SERPL-SCNC: 4.6 MMOL/L (ref 3.5–5.2)
PROT SERPL-MCNC: 7.1 G/DL (ref 6–8.5)
SL AMB EGFR AFRICAN AMERICAN: 67 ML/MIN/1.73
SL AMB EGFR NON AFRICAN AMERICAN: 58 ML/MIN/1.73
SL AMB PDF IMAGE: NORMAL
SODIUM SERPL-SCNC: 142 MMOL/L (ref 134–144)
TRIGL SERPL-MCNC: 79 MG/DL (ref 0–149)

## 2020-02-17 ENCOUNTER — TRANSCRIBE ORDERS (OUTPATIENT)
Dept: ADMINISTRATIVE | Facility: HOSPITAL | Age: 70
End: 2020-02-17

## 2020-02-17 ENCOUNTER — OFFICE VISIT (OUTPATIENT)
Dept: FAMILY MEDICINE CLINIC | Facility: CLINIC | Age: 70
End: 2020-02-17
Payer: COMMERCIAL

## 2020-02-17 ENCOUNTER — HOSPITAL ENCOUNTER (OUTPATIENT)
Dept: RADIOLOGY | Facility: HOSPITAL | Age: 70
Discharge: HOME | End: 2020-02-17
Attending: UROLOGY
Payer: COMMERCIAL

## 2020-02-17 VITALS
BODY MASS INDEX: 37.28 KG/M2 | DIASTOLIC BLOOD PRESSURE: 68 MMHG | SYSTOLIC BLOOD PRESSURE: 128 MMHG | HEIGHT: 68 IN | TEMPERATURE: 99.2 F | HEART RATE: 102 BPM | WEIGHT: 246 LBS | OXYGEN SATURATION: 95 % | RESPIRATION RATE: 16 BRPM

## 2020-02-17 DIAGNOSIS — R73.09 ABNORMAL BLOOD SUGAR: ICD-10-CM

## 2020-02-17 DIAGNOSIS — N40.0 ENLARGED PROSTATE WITHOUT LOWER URINARY TRACT SYMPTOMS (LUTS): ICD-10-CM

## 2020-02-17 DIAGNOSIS — E66.01 SEVERE OBESITY (BMI 35.0-39.9) WITH COMORBIDITY (HCC): ICD-10-CM

## 2020-02-17 DIAGNOSIS — Z23 NEED FOR VACCINATION: ICD-10-CM

## 2020-02-17 DIAGNOSIS — K91.2 POSTGASTRECTOMY MALABSORPTION: ICD-10-CM

## 2020-02-17 DIAGNOSIS — Z87.442 PERSONAL HISTORY OF URINARY CALCULI: Primary | ICD-10-CM

## 2020-02-17 DIAGNOSIS — E78.2 MIXED HYPERLIPIDEMIA: ICD-10-CM

## 2020-02-17 DIAGNOSIS — Z90.3 POSTGASTRECTOMY MALABSORPTION: ICD-10-CM

## 2020-02-17 DIAGNOSIS — Z87.442 PERSONAL HISTORY OF URINARY CALCULI: ICD-10-CM

## 2020-02-17 DIAGNOSIS — Z98.84 S/P BARIATRIC SURGERY: ICD-10-CM

## 2020-02-17 DIAGNOSIS — Z00.00 WELL ADULT EXAM: Primary | ICD-10-CM

## 2020-02-17 DIAGNOSIS — I10 BENIGN ESSENTIAL HYPERTENSION: ICD-10-CM

## 2020-02-17 PROCEDURE — 90662 IIV NO PRSV INCREASED AG IM: CPT

## 2020-02-17 PROCEDURE — 99397 PER PM REEVAL EST PAT 65+ YR: CPT | Performed by: FAMILY MEDICINE

## 2020-02-17 PROCEDURE — 90471 IMMUNIZATION ADMIN: CPT

## 2020-02-17 PROCEDURE — 74018 RADEX ABDOMEN 1 VIEW: CPT

## 2020-02-17 RX ORDER — ATORVASTATIN CALCIUM 20 MG/1
20 TABLET, FILM COATED ORAL DAILY
Qty: 90 TABLET | Refills: 1 | Status: SHIPPED | OUTPATIENT
Start: 2020-02-17 | End: 2020-08-20 | Stop reason: SDUPTHER

## 2020-02-17 NOTE — PATIENT INSTRUCTIONS
Obesity   AMBULATORY CARE:   Obesity  is when your body mass index (BMI) is greater than 30  Your healthcare provider will use your height and weight to measure your BMI  The risks of obesity include  many health problems, such as injuries or physical disability  You may need tests to check for the following:  · Diabetes     · High blood pressure or high cholesterol     · Heart disease     · Gallbladder or liver disease     · Cancer of the colon, breast, prostate, liver, or kidney     · Sleep apnea     · Arthritis or gout  Seek care immediately if:   · You have a severe headache, confusion, or difficulty speaking  · You have weakness on one side of your body  · You have chest pain, sweating, or shortness of breath  Contact your healthcare provider if:   · You have symptoms of gallbladder or liver disease, such as pain in your upper abdomen  · You have knee or hip pain and discomfort while walking  · You have symptoms of diabetes, such as intense hunger and thirst, and frequent urination  · You have symptoms of sleep apnea, such as snoring or daytime sleepiness  · You have questions or concerns about your condition or care  Treatment for obesity  focuses on helping you lose weight to improve your health  Even a small decrease in BMI can reduce the risk for many health problems  Your healthcare provider will help you set a weight-loss goal   · Lifestyle changes  are the first step in treating obesity  These include making healthy food choices and getting regular physical activity  Your healthcare provider may suggest a weight-loss program that involves coaching, education, and therapy  · Medicine  may help you lose weight when it is used with a healthy diet and physical activity  · Surgery  can help you lose weight if you are very obese and have other health problems  There are several types of weight-loss surgery  Ask your healthcare provider for more information    Be successful losing weight:   · Set small, realistic goals  An example of a small goal is to walk for 20 minutes 5 days a week  Anther goal is to lose 5% of your body weight  · Tell friends, family members, and coworkers about your goals  and ask for their support  Ask a friend to lose weight with you, or join a weight-loss support group  · Identify foods or triggers that may cause you to overeat , and find ways to avoid them  Remove tempting high-calorie foods from your home and workplace  Place a bowl of fresh fruit on your kitchen counter  If stress causes you to eat, then find other ways to cope with stress  · Keep a diary to track what you eat and drink  Also write down how many minutes of physical activity you do each day  Weigh yourself once a week and record it in your diary  Eating changes: You will need to eat 500 to 1,000 fewer calories each day than you currently eat to lose 1 to 2 pounds a week  The following changes will help you cut calories:  · Eat smaller portions  Use small plates, no larger than 9 inches in diameter  Fill your plate half full of fruits and vegetables  Measure your food using measuring cups until you know what a serving size looks like  · Eat 3 meals and 1 or 2 snacks each day  Plan your meals in advance  Lavetta Fuss and eat at home most of the time  Eat slowly  · Eat fruits and vegetables at every meal   They are low in calories and high in fiber, which makes you feel full  Do not add butter, margarine, or cream sauce to vegetables  Use herbs to season steamed vegetables  · Eat less fat and fewer fried foods  Eat more baked or grilled chicken and fish  These protein sources are lower in calories and fat than red meat  Limit fast food  Dress your salads with olive oil and vinegar instead of bottled dressing  · Limit the amount of sugar you eat  Do not drink sugary beverages  Limit alcohol  Activity changes:  Physical activity is good for your body in many ways   It helps you burn calories and build strong muscles  It decreases stress and depression, and improves your mood  It can also help you sleep better  Talk to your healthcare provider before you begin an exercise program   · Exercise for at least 30 minutes 5 days a week  Start slowly  Set aside time each day for physical activity that you enjoy and that is convenient for you  It is best to do both weight training and an activity that increases your heart rate, such as walking, bicycling, or swimming  · Find ways to be more active  Do yard work and housecleaning  Walk up the stairs instead of using elevators  Spend your leisure time going to events that require walking, such as outdoor festivals or fairs  This extra physical activity can help you lose weight and keep it off  Follow up with your healthcare provider as directed: You may need to meet with a dietitian  Write down your questions so you remember to ask them during your visits  © 2017 [address] [employer] Information is for End User's use only and may not be sold, redistributed or otherwise used for commercial purposes  All illustrations and images included in CareNotes® are the copyrighted property of [employer]  or [employer]  The above information is an  only  It is not intended as medical advice for individual conditions or treatments  Talk to your doctor, nurse or pharmacist before following any medical regimen to see if it is safe and effective for you

## 2020-02-17 NOTE — PROGRESS NOTES
FAMILY PRACTICE HEALTH MAINTENANCE OFFICE VISIT  [facility]    NAME: Felicia Fritz  AGE: 79 y o  SEX: male  : 1950     DATE: 2020    Assessment and Plan     1  Well adult exam    2  Need for vaccination  -     influenza vaccine, [id number], high-dose, PF 0 5 mL (FLUZONE HIGH-DOSE)    3  Severe obesity (BMI 35 0-39  9) with comorbidity ([address] )    4  BMI 37 0-37 9, adult    5  Benign essential hypertension    6  Mixed hyperlipidemia  -     atorvastatin (LIPITOR) 20 mg tablet; Take 1 tablet (20 mg total) by mouth daily  -     Comprehensive metabolic panel; Future; Expected date: 2020  -     Lipid Panel with Direct LDL reflex; Future; Expected date: 2020    7  Enlarged prostate without lower urinary tract symptoms (luts)    8  Abnormal blood sugar    9  S/P bariatric surgery    10  Postgastrectomy malabsorption        · Patient Counseling:   · Nutrition: Stressed importance of a well balanced diet, moderation of sodium/saturated fat, caloric balance and sufficient intake of fiber  · Exercise: Stressed the importance of regular exercise with a goal of 150 minutes per week  · Dental Health: Discussed daily flossing and brushing and regular dental visits     · Immunizations reviewed: See Orders  · Discussed benefits of:  Colon Cancer Screening, Prostate Cancer Screening  and Screening labs   BMI Counseling: Body mass index is 37 68 kg/m²  Discussed with patient's BMI with him  The BMI is above normal  Nutrition recommendations include reducing portion sizes  Return in about 6 months (around 2020) for Recheck          Chief Complaint     Chief Complaint   Patient presents with    Physical Exam     [id number]        History of Present Illness     Pt is here for a full physical      Well Adult Physical   Patient here for a comprehensive physical exam       Diet and Physical Activity  Diet: well balanced diet  Exercise: infrequently      Depression Screen  PHQ-9 Depression Screening    PHQ-9:    Frequency of the following problems over the past two weeks:       Little interest or pleasure in doing things:  0 - not at all  Feeling down, depressed, or hopeless:  0 - not at all  PHQ-2 Score:  0          General Health  Hearing: Normal:  bilateral  Vision: no vision problems  Dental: no dental visits for >1 year    Reproductive Health  No issues       The following portions of the patient's history were reviewed and updated as appropriate: allergies, current medications, past family history, past medical history, past social history, past surgical history and problem list     Review of Systems     Review of Systems   Constitutional: Negative for activity change, appetite change, chills, diaphoresis, fatigue, fever and unexpected weight change  HENT: Negative for congestion, dental problem, ear pain, mouth sores, sinus pressure, sinus pain, sore throat and trouble swallowing  Eyes: Negative for photophobia, discharge and itching  Respiratory: Negative for apnea, chest tightness and shortness of breath  Cardiovascular: Negative for chest pain, palpitations and leg swelling  Gastrointestinal: Negative for abdominal distention, abdominal pain, blood in stool, nausea and vomiting  Endocrine: Negative for cold intolerance, heat intolerance, polydipsia, polyphagia and polyuria  Genitourinary: Negative for difficulty urinating  Musculoskeletal: Positive for arthralgias  Skin: Negative for color change and wound  Neurological: Negative for dizziness, syncope, speech difficulty and headaches  Hematological: Negative for adenopathy  Psychiatric/Behavioral: Negative for agitation and behavioral problems         Past Medical History     Past Medical History:   Diagnosis Date    AC (acromioclavicular) joint bone spurs     Last assessed - 2/19/15    Achilles tendinitis, unspecified leg 06/04/2010    Resolved - 1/8/18    Anemia 08/12/2010    Resolved - 1/11/16    Blood in urine     BPH without urinary obstruction     Hypertrophy    Deep venous thrombosis of distal lower extremity (HCC)     Deep venous thrombosis of distal lower extremity (HCC)     Last assessed - 6/4/15    Heartburn     Last assessed - 12/12/14    Hemorrhoids     Hernia, inguinal 06/30/2009    Resolved - 1/8/18    History of stomach ulcers     Hypertension     Kidney stones     Mixed hyperlipidemia     Nephrolithiasis     Right inguinal hernia     Last assessed - 4/4/16    Sleep apnea     Tear of left rotator cuff     Last assessed - 2/19/15       Past Surgical History     Past Surgical History:   Procedure Laterality Date    BARIATRIC SURGERY  06/15/2015    Laparoscopic Longitudinal Gastrectomy for morbid obesity, Managed by - Sunday Blank Oliver     EXPLORATORY LAPAROTOMY W/ BOWEL RESECTION Right 3/3/2016    Procedure: Incarcerated possibly strangulated right inguinal hernia; Surgeon: Regis Navarrete MD;  Location: [address] [place] [address];  Service:    [address] [facility]      2011 - Rt Hip Replacement, 2012 - Lt Hip Replacement, 2013 - Lt Hip Revision    INGUINAL HERNIA REPAIR      Last assessed - 4/4/16    JOINT REPLACEMENT      KNEE SURGERY  2004    Double     [place] COLONOSCOPY FLX DX W/COLLJ SPEC WHEN PFRMD N/A 1/15/2016    Procedure: COLONOSCOPY;  Surgeon: Paula Wong MD;  Location: [facility]; Service: Gastroenterology    [place] COLONOSCOPY FLX DX W/COLLJ SPEC WHEN PFRMD N/A 2/26/2019    Procedure: COLONOSCOPY;  Surgeon: Paula Wong MD;  Location: [facility];   Service: Gastroenterology    TONSILLECTOMY         Social History     Social History     Socioeconomic History    Marital status: /Civil Union     Spouse name: None    Number of children: None    Years of education: None    Highest education level: None   Occupational History    None   Social Needs    Financial resource strain: None    Food insecurity:     Worry: None     Inability: None    Transportation needs:     Medical: None     Non-medical: None   Tobacco Use    Smoking status: Former Smoker    Smokeless tobacco: Never Used   Substance and Sexual Activity    Alcohol use: Yes     Comment:  Occ     Drug use: No    Sexual activity: None   Lifestyle    Physical activity:     Days per week: None     Minutes per session: None    Stress: None   Relationships    Social connections:     Talks on phone: None     Gets together: None     Attends Yazdanism service: None     Active member of club or organization: None     Attends meetings of clubs or organizations: None     Relationship status: None    Intimate partner violence:     Fear of current or ex partner: None     Emotionally abused: None     Physically abused: None     Forced sexual activity: None   Other Topics Concern    None   Social History Narrative    Exercise - Walking once a day       Family History     Family History   Problem Relation Age of Onset    Arthritis Father     Diabetes Father     Hypertension Father     Hyperlipidemia Father     Diabetes Family     Emphysema Family     Heart disease Family     Hypertension Family     Mental illness Neg Hx        Current Medications       Current Outpatient Medications:     atorvastatin (LIPITOR) 20 mg tablet, Take 1 tablet (20 mg total) by mouth daily, Disp: 90 tablet, Rfl: 1    Calcium Carbonate (CALCIUM 600) 1500 (600 Ca) MG TABS, Take by mouth, Disp: , Rfl:     Cholecalciferol (VITAMIN D3) 2000 units TABS, Take by mouth, Disp: , Rfl:     Cyanocobalamin (VITAMIN B-12) 5000 MCG LOZG, Place under the tongue, Disp: , Rfl:     lisinopril (ZESTRIL) 10 mg tablet, TAKE ONE TABLET BY MOUTH EVERY DAY, Disp: 90 tablet, Rfl: 0    Multiple Vitamins-Minerals (MULTIVITAMIN ADULT EXTRA C) CHEW, Chew, Disp: , Rfl:     HYDROcodone-acetaminophen (NORCO) 5-325 mg per tablet, Take 1 tablet by mouth every 6 (six) hours as needed for painMax Daily Amount: 4 tablets (Patient not taking: Reported on 2/17/2020), Disp: 8 tablet, Rfl: 0    predniSONE 50 mg tablet, Take 1 tablet (50 mg total) by mouth daily (Patient not taking: Reported on 2/17/2020), Disp: 5 tablet, Rfl: 0     Allergies     Allergies   Allergen Reactions    Percolone [Oxycodone] Hallucinations       Objective     /68   Pulse 102   Temp 99 2 °F (37 3 °C)   Resp 16   Ht 5' 7 75" (1 721 m)   Wt 112 kg (246 lb)   SpO2 95%   BMI 37 68 kg/m²      Physical Exam   Constitutional: He appears well-developed and well-nourished  No distress  HENT:   Head: Normocephalic and atraumatic  Right Ear: External ear normal    Left Ear: External ear normal    Nose: Nose normal    Mouth/Throat: Oropharynx is clear and moist  No oropharyngeal exudate  Eyes: Pupils are equal, round, and reactive to light  EOM are normal  Right eye exhibits no discharge  Left eye exhibits no discharge  No scleral icterus  Neck: No thyromegaly present  Cardiovascular: Normal rate and normal heart sounds  No murmur heard  Pulmonary/Chest: Effort normal and breath sounds normal  No respiratory distress  He has no wheezes  Abdominal: Soft  Bowel sounds are normal  He exhibits no distension and no mass  There is no tenderness  There is no rebound and no guarding  Musculoskeletal: Normal range of motion  Neurological: He is alert  He displays normal reflexes  Coordination normal    Skin: Skin is warm and dry  No rash noted  He is not diaphoretic  No erythema  Psychiatric: He has a normal mood and affect  His behavior is normal    Nursing note and vitals reviewed           Visual Acuity Screening    Right eye Left eye Both eyes   Without correction: 20/15 20/20 20/15   With correction:          Recent Results (from the past 672 hour(s))   Comprehensive metabolic panel    Collection Time: 02/11/20  7:03 AM   Result Value Ref Range    Glucose, Random 98 65 - 99 mg/dL    BUN 16 8 - 27 mg/dL    Creatinine 1 25 0 76 - 1 27 mg/dL    eGFR Non  American 58 (L) >59 mL/min/1 73    eGFR  67 >59 mL/min/1 73    SL AMB BUN/CREATININE RATIO 13 10 - 24    Sodium 142 134 - 144 mmol/L    Potassium 4 6 3 5 - 5 2 mmol/L    Chloride 101 96 - 106 mmol/L    CO2 27 20 - 29 mmol/L    CALCIUM 9 8 8 6 - 10 2 mg/dL    Protein, Total 7 1 6 0 - 8 5 g/dL    Albumin 4 3 3 8 - 4 8 g/dL    Globulin, Total 2 8 1 5 - 4 5 g/dL    Albumin/Globulin Ratio 1 5 1 2 - 2 2    TOTAL BILIRUBIN 0 3 0 0 - 1 2 mg/dL    Alk Phos Isoenzymes 84 39 - 117 IU/L    AST 21 0 - 40 IU/L    ALT 18 0 - 44 IU/L   Lipid panel    Collection Time: 02/11/20  7:03 AM   Result Value Ref Range    Cholesterol, Total 152 100 - 199 mg/dL    Triglycerides 79 0 - 149 mg/dL    HDL 45 >39 mg/dL    LDL Direct 91 0 - 99 mg/dL   Cardiovascular Report    Collection Time: 02/11/20  7:03 AM   Result Value Ref Range    Interpretation Note     PDF Image Not applicable    [employer] Kidney Stone Panel    Collection Time: 02/11/20  7:03 AM   Result Value Ref Range    Interpretation Note    Hemoglobin A1c (w/out EAG)    Collection Time: 02/11/20  7:03 AM   Result Value Ref Range    Hemoglobin A1C 6 0 (H) 4 8 - 5 6 %     The 10-year ASCVD risk score (Walter Davenport et al , 2013) is: 19 1%    Values used to calculate the score:      Age: 79 years      Sex: Male      Is Non- : No      Diabetic: No      Tobacco smoker: No      Systolic Blood Pressure: 891 mmHg      Is BP treated: Yes      HDL Cholesterol: 45 mg/dL      Total Cholesterol: 152 mg/dL    [facility]  BMI Counseling: Body mass index is 37 68 kg/m²  The BMI is above normal  Nutrition recommendations include reducing portion sizes

## 2020-03-19 ENCOUNTER — OFFICE VISIT (OUTPATIENT)
Dept: URGENT CARE | Facility: CLINIC | Age: 70
End: 2020-03-19
Payer: COMMERCIAL

## 2020-03-19 ENCOUNTER — APPOINTMENT (OUTPATIENT)
Dept: RADIOLOGY | Facility: CLINIC | Age: 70
End: 2020-03-19
Payer: COMMERCIAL

## 2020-03-19 VITALS
HEART RATE: 70 BPM | WEIGHT: 248 LBS | OXYGEN SATURATION: 96 % | SYSTOLIC BLOOD PRESSURE: 144 MMHG | TEMPERATURE: 97.7 F | HEIGHT: 68 IN | BODY MASS INDEX: 37.59 KG/M2 | RESPIRATION RATE: 18 BRPM | DIASTOLIC BLOOD PRESSURE: 71 MMHG

## 2020-03-19 DIAGNOSIS — M79.642 LEFT HAND PAIN: Primary | ICD-10-CM

## 2020-03-19 DIAGNOSIS — M79.642 LEFT HAND PAIN: ICD-10-CM

## 2020-03-19 PROCEDURE — 3008F BODY MASS INDEX DOCD: CPT | Performed by: FAMILY MEDICINE

## 2020-03-19 PROCEDURE — 4040F PNEUMOC VAC/ADMIN/RCVD: CPT | Performed by: FAMILY MEDICINE

## 2020-03-19 PROCEDURE — 99213 OFFICE O/P EST LOW 20 MIN: CPT | Performed by: FAMILY MEDICINE

## 2020-03-19 PROCEDURE — 1036F TOBACCO NON-USER: CPT | Performed by: FAMILY MEDICINE

## 2020-03-19 PROCEDURE — 1160F RVW MEDS BY RX/DR IN RCRD: CPT | Performed by: FAMILY MEDICINE

## 2020-03-19 PROCEDURE — 73130 X-RAY EXAM OF HAND: CPT

## 2020-03-19 PROCEDURE — 3078F DIAST BP <80 MM HG: CPT | Performed by: FAMILY MEDICINE

## 2020-03-19 PROCEDURE — 3077F SYST BP >= 140 MM HG: CPT | Performed by: FAMILY MEDICINE

## 2020-03-19 NOTE — PROGRESS NOTES
[facility]        NAME: Moris Domínguez is a 79 y o  male  : 1950    MRN: [id number]  DATE: 2020  TIME: 7:35 PM    Assessment and Plan   Left hand pain [M79 [id number]]  1  Left hand pain  XR hand 3+ vw right     Hand x-ray appeared unremarkable for fractures; official read pending  Left hand pain likely secondary to soft tissue injury/bone contusion  Advised on icing at least 3-4 times daily with 72-39 minutes applications and also to keep the right upper extremity elevated  Expected to resolve in a few days  Patient Instructions     Follow up with PCP in 3-5 days  Proceed to  ER if symptoms worsen  Chief Complaint     Chief Complaint   Patient presents with    Hand Injury     banged R hand on banister on Tuesday  swollen, tender  History of Present Illness       79year-old left hand dominant male presents today due to right hand pain and swelling which has progressively worsened over the past 2 days  Was walking up the stairs from his basement when he hit the banister  Initially there was mild pain and he did not think much of it  However he noticed progressive swelling and pain over the next few days requiring Aleve  Review of Systems   Review of Systems   Constitutional: Negative for chills and fever  Respiratory: Negative for shortness of breath  Cardiovascular: Negative for chest pain  Musculoskeletal: Positive for arthralgias and joint swelling           Current Medications       Current Outpatient Medications:     atorvastatin (LIPITOR) 20 mg tablet, Take 1 tablet (20 mg total) by mouth daily, Disp: 90 tablet, Rfl: 1    Calcium Carbonate (CALCIUM 600) 1500 (600 Ca) MG TABS, Take by mouth, Disp: , Rfl:     Cholecalciferol (VITAMIN D3) 2000 units TABS, Take by mouth, Disp: , Rfl:     Cyanocobalamin (VITAMIN B-12) 5000 MCG LOZG, Place under the tongue, Disp: , Rfl:     lisinopril (ZESTRIL) 10 mg tablet, TAKE ONE TABLET BY MOUTH EVERY DAY, Disp: 90 tablet, Rfl: 0    Multiple Vitamins-Minerals (MULTIVITAMIN ADULT EXTRA C) CHEW, Chew, Disp: , Rfl:     HYDROcodone-acetaminophen (NORCO) 5-325 mg per tablet, Take 1 tablet by mouth every 6 (six) hours as needed for painMax Daily Amount: 4 tablets (Patient not taking: Reported on 2/17/2020), Disp: 8 tablet, Rfl: 0    predniSONE 50 mg tablet, Take 1 tablet (50 mg total) by mouth daily (Patient not taking: Reported on 2/17/2020), Disp: 5 tablet, Rfl: 0    Current Allergies     Allergies as of 03/19/2020 - Reviewed 03/19/2020   Allergen Reaction Noted    Percolone [oxycodone] Hallucinations 07/17/2018            The following portions of the patient's history were reviewed and updated as appropriate: allergies, current medications, past family history, past medical history, past social history, past surgical history and problem list      Past Medical History:   Diagnosis Date    AC (acromioclavicular) joint bone spurs     Last assessed - 2/19/15    Achilles tendinitis, unspecified leg 06/04/2010    Resolved - 1/8/18    Anemia 08/12/2010    Resolved - 1/11/16    Blood in urine     BPH without urinary obstruction     Hypertrophy    Deep venous thrombosis of distal lower extremity ([address] )     Deep venous thrombosis of distal lower extremity ([address] )     Last assessed - 6/4/15    Heartburn     Last assessed - 12/12/14    Hemorrhoids     Hernia, inguinal 06/30/2009    Resolved - 1/8/18    History of stomach ulcers     Hypertension     Kidney stones     Mixed hyperlipidemia     Nephrolithiasis     Right inguinal hernia     Last assessed - 4/4/16    Sleep apnea     Tear of left rotator cuff     Last assessed - 2/19/15       Past Surgical History:   Procedure Laterality Date    BARIATRIC SURGERY  06/15/2015    Laparoscopic Longitudinal Gastrectomy for morbid obesity, Managed by - Blank Rees     EXPLORATORY LAPAROTOMY W/ BOWEL RESECTION Right 3/3/2016    Procedure:  Incarcerated possibly strangulated right inguinal hernia; Surgeon: Kathie Lacy MD;  Location: [address] [place] [address];  [facility]    [address] [facility]      2011 - Rt Hip Replacement, 2012 - Lt Hip Replacement, 2013 - Lt Hip Revision    INGUINAL HERNIA REPAIR      Last assessed - 4/4/16    JOINT REPLACEMENT      KNEE SURGERY  2004    Double     IN COLONOSCOPY FLX DX W/COLLJ SPEC WHEN PFRMD N/A 1/15/2016    Procedure: COLONOSCOPY;  Surgeon: Arturo Feliciano MD;  Location: [place] [address] GI LAB; Service: Gastroenterology    IN COLONOSCOPY FLX DX W/COLLJ SPEC WHEN PFRMD N/A 2/26/2019    Procedure: COLONOSCOPY;  Surgeon: Arturo Feliciano MD;  Location: [place] [address] GI LAB; Service: Gastroenterology    TONSILLECTOMY         Family History   Problem Relation Age of Onset    Arthritis Father     Diabetes Father     Hypertension Father     Hyperlipidemia Father     Diabetes Family     Emphysema Family     Heart disease Family     Hypertension Family     Mental illness Neg Hx          Medications have been verified  Objective   /71   Pulse 70   Temp 97 7 °F (36 5 °C)   Resp 18   Ht 5' 7 5" (1 715 m)   Wt 112 kg (248 lb)   SpO2 96%   BMI 38 27 kg/m²        Physical Exam     Physical Exam   Constitutional: He is oriented to person, place, and time  He appears well-developed and well-nourished  No distress  HENT:   Head: Normocephalic and atraumatic  Eyes: Conjunctivae are normal  Right eye exhibits no discharge  Left eye exhibits no discharge  Pulmonary/Chest: Effort normal    Musculoskeletal: He exhibits edema and tenderness  Tenderness and swelling over the proximal 2nd metacarpal, right hand  No overlying erythema or ecchymosis  5/5  strength  Neurological: He is alert and oriented to person, place, and time  Skin: Skin is warm  He is not diaphoretic  No erythema  Psychiatric: He has a normal mood and affect  His behavior is normal  Judgment and thought content normal    Nursing note and vitals reviewed

## 2020-04-08 DIAGNOSIS — I10 BENIGN ESSENTIAL HYPERTENSION: ICD-10-CM

## 2020-04-09 RX ORDER — LISINOPRIL 10 MG/1
TABLET ORAL
Qty: 90 TABLET | Refills: 0 | Status: SHIPPED | OUTPATIENT
Start: 2020-04-09 | End: 2020-07-14

## 2020-04-24 ENCOUNTER — TELEMEDICINE (OUTPATIENT)
Dept: FAMILY MEDICINE CLINIC | Facility: CLINIC | Age: 70
End: 2020-04-24
Payer: COMMERCIAL

## 2020-04-24 VITALS — HEIGHT: 68 IN | BODY MASS INDEX: 36.37 KG/M2 | WEIGHT: 240 LBS

## 2020-04-24 DIAGNOSIS — Z20.828 EXPOSURE TO SARS-ASSOCIATED CORONAVIRUS: Primary | ICD-10-CM

## 2020-04-24 DIAGNOSIS — Z20.828 EXPOSURE TO SARS-ASSOCIATED CORONAVIRUS: ICD-10-CM

## 2020-04-24 PROCEDURE — 99213 OFFICE O/P EST LOW 20 MIN: CPT | Performed by: FAMILY MEDICINE

## 2020-04-24 PROCEDURE — 87635 SARS-COV-2 COVID-19 AMP PRB: CPT

## 2020-04-24 PROCEDURE — 1160F RVW MEDS BY RX/DR IN RCRD: CPT | Performed by: FAMILY MEDICINE

## 2020-04-25 LAB — SARS-COV-2 RNA SPEC QL NAA+PROBE: NOT DETECTED

## 2020-04-27 ENCOUNTER — TELEPHONE (OUTPATIENT)
Dept: FAMILY MEDICINE CLINIC | Facility: CLINIC | Age: 70
End: 2020-04-27

## 2020-04-28 LAB
ALBUMIN SERPL-MCNC: 4.1 G/DL (ref 3.8–4.8)
ALBUMIN/GLOB SERPL: 1.5 {RATIO} (ref 1.2–2.2)
ALP SERPL-CCNC: 91 IU/L (ref 39–117)
ALT SERPL-CCNC: 16 IU/L (ref 0–44)
AST SERPL-CCNC: 18 IU/L (ref 0–40)
BILIRUB SERPL-MCNC: 0.4 MG/DL (ref 0–1.2)
BUN SERPL-MCNC: 19 MG/DL (ref 8–27)
BUN/CREAT SERPL: 17 (ref 10–24)
CALCIUM SERPL-MCNC: 9.9 MG/DL (ref 8.6–10.2)
CHLORIDE SERPL-SCNC: 104 MMOL/L (ref 96–106)
CHOLEST SERPL-MCNC: 136 MG/DL (ref 100–199)
CO2 SERPL-SCNC: 23 MMOL/L (ref 20–29)
CREAT SERPL-MCNC: 1.12 MG/DL (ref 0.76–1.27)
GLOBULIN SER-MCNC: 2.7 G/DL (ref 1.5–4.5)
GLUCOSE SERPL-MCNC: 104 MG/DL (ref 65–99)
HDLC SERPL-MCNC: 44 MG/DL
LDLC SERPL CALC-MCNC: 78 MG/DL (ref 0–99)
MICRODELETION SYND BLD/T FISH: NORMAL
POTASSIUM SERPL-SCNC: 4.3 MMOL/L (ref 3.5–5.2)
PROT SERPL-MCNC: 6.8 G/DL (ref 6–8.5)
SL AMB EGFR AFRICAN AMERICAN: 77 ML/MIN/1.73
SL AMB EGFR NON AFRICAN AMERICAN: 66 ML/MIN/1.73
SODIUM SERPL-SCNC: 144 MMOL/L (ref 134–144)
TRIGL SERPL-MCNC: 72 MG/DL (ref 0–149)

## 2020-07-14 DIAGNOSIS — I10 BENIGN ESSENTIAL HYPERTENSION: ICD-10-CM

## 2020-07-14 RX ORDER — LISINOPRIL 10 MG/1
TABLET ORAL
Qty: 90 TABLET | Refills: 0 | Status: SHIPPED | OUTPATIENT
Start: 2020-07-14 | End: 2020-08-20 | Stop reason: SDUPTHER

## 2020-08-20 ENCOUNTER — OFFICE VISIT (OUTPATIENT)
Dept: FAMILY MEDICINE CLINIC | Facility: CLINIC | Age: 70
End: 2020-08-20
Payer: COMMERCIAL

## 2020-08-20 VITALS
DIASTOLIC BLOOD PRESSURE: 74 MMHG | RESPIRATION RATE: 16 BRPM | SYSTOLIC BLOOD PRESSURE: 132 MMHG | HEART RATE: 74 BPM | WEIGHT: 248 LBS | BODY MASS INDEX: 37.59 KG/M2 | TEMPERATURE: 96.7 F | HEIGHT: 68 IN

## 2020-08-20 DIAGNOSIS — R73.09 ABNORMAL BLOOD SUGAR: ICD-10-CM

## 2020-08-20 DIAGNOSIS — E66.01 SEVERE OBESITY (BMI 35.0-39.9) WITH COMORBIDITY (HCC): ICD-10-CM

## 2020-08-20 DIAGNOSIS — Z98.84 S/P BARIATRIC SURGERY: ICD-10-CM

## 2020-08-20 DIAGNOSIS — E78.2 MIXED HYPERLIPIDEMIA: ICD-10-CM

## 2020-08-20 DIAGNOSIS — I10 BENIGN ESSENTIAL HYPERTENSION: Primary | ICD-10-CM

## 2020-08-20 PROCEDURE — 99214 OFFICE O/P EST MOD 30 MIN: CPT | Performed by: FAMILY MEDICINE

## 2020-08-20 PROCEDURE — 3288F FALL RISK ASSESSMENT DOCD: CPT | Performed by: FAMILY MEDICINE

## 2020-08-20 PROCEDURE — 1036F TOBACCO NON-USER: CPT | Performed by: FAMILY MEDICINE

## 2020-08-20 PROCEDURE — 3078F DIAST BP <80 MM HG: CPT | Performed by: FAMILY MEDICINE

## 2020-08-20 PROCEDURE — 3008F BODY MASS INDEX DOCD: CPT | Performed by: FAMILY MEDICINE

## 2020-08-20 PROCEDURE — 3725F SCREEN DEPRESSION PERFORMED: CPT | Performed by: FAMILY MEDICINE

## 2020-08-20 PROCEDURE — 1160F RVW MEDS BY RX/DR IN RCRD: CPT | Performed by: FAMILY MEDICINE

## 2020-08-20 PROCEDURE — 3075F SYST BP GE 130 - 139MM HG: CPT | Performed by: FAMILY MEDICINE

## 2020-08-20 PROCEDURE — 4040F PNEUMOC VAC/ADMIN/RCVD: CPT | Performed by: FAMILY MEDICINE

## 2020-08-20 PROCEDURE — 1101F PT FALLS ASSESS-DOCD LE1/YR: CPT | Performed by: FAMILY MEDICINE

## 2020-08-20 RX ORDER — LISINOPRIL 10 MG/1
10 TABLET ORAL DAILY
Qty: 90 TABLET | Refills: 1 | Status: SHIPPED | OUTPATIENT
Start: 2020-08-20 | End: 2021-03-25

## 2020-08-20 RX ORDER — ATORVASTATIN CALCIUM 20 MG/1
20 TABLET, FILM COATED ORAL DAILY
Qty: 90 TABLET | Refills: 1 | Status: SHIPPED | OUTPATIENT
Start: 2020-08-20 | End: 2021-03-25

## 2020-08-20 NOTE — PROGRESS NOTES
Assessment/Plan:    1  Benign essential hypertension  Assessment & Plan:  Excellent cont same meds    Orders:  -     Comprehensive metabolic panel; Future; Expected date: 02/01/2021  -     lisinopril (ZESTRIL) 10 mg tablet; Take 1 tablet (10 mg total) by mouth daily    2  Mixed hyperlipidemia  Assessment & Plan:  Numbers are stable    Orders:  -     Lipid Panel with Direct LDL reflex; Future; Expected date: 02/01/2021  -     atorvastatin (LIPITOR) 20 mg tablet; Take 1 tablet (20 mg total) by mouth daily    3  Severe obesity (BMI 35 0-39  9) with comorbidity ([facility] [address] )    4  BMI 38 0-38 9,adult    5  Abnormal blood sugar  -     Hemoglobin A1C; Future; Expected date: 02/01/2021    6  S/P bariatric surgery          Patient Instructions     Obesity   AMBULATORY CARE:   Obesity  is when your body mass index (BMI) is greater than 30  Your healthcare provider will use your height and weight to measure your BMI  The risks of obesity include  many health problems, such as injuries or physical disability  You may need tests to check for the following:  · Diabetes     · High blood pressure or high cholesterol     · Heart disease     · Gallbladder or liver disease     · Cancer of the colon, breast, prostate, liver, or kidney     · Sleep apnea     · Arthritis or gout  Seek care immediately if:   · You have a severe headache, confusion, or difficulty speaking  · You have weakness on one side of your body  · You have chest pain, sweating, or shortness of breath  Contact your healthcare provider if:   · You have symptoms of gallbladder or liver disease, such as pain in your upper abdomen  · You have knee or hip pain and discomfort while walking  · You have symptoms of diabetes, such as intense hunger and thirst, and frequent urination  · You have symptoms of sleep apnea, such as snoring or daytime sleepiness  · You have questions or concerns about your condition or care    Treatment for obesity  focuses on helping you lose weight to improve your health  Even a small decrease in BMI can reduce the risk for many health problems  Your healthcare provider will help you set a weight-loss goal   · Lifestyle changes  are the first step in treating obesity  These include making healthy food choices and getting regular physical activity  Your healthcare provider may suggest a weight-loss program that involves coaching, education, and therapy  · Medicine  may help you lose weight when it is used with a healthy diet and physical activity  · Surgery  can help you lose weight if you are very obese and have other health problems  There are several types of weight-loss surgery  Ask your healthcare provider for more information  Be successful losing weight:   · Set small, realistic goals  An example of a small goal is to walk for 20 minutes 5 days a week  Anther goal is to lose 5% of your body weight  · Tell friends, family members, and coworkers about your goals  and ask for their support  Ask a friend to lose weight with you, or join a weight-loss support group  · Identify foods or triggers that may cause you to overeat , and find ways to avoid them  Remove tempting high-calorie foods from your home and workplace  Place a bowl of fresh fruit on your kitchen counter  If stress causes you to eat, then find other ways to cope with stress  · Keep a diary to track what you eat and drink  Also write down how many minutes of physical activity you do each day  Weigh yourself once a week and record it in your diary  Eating changes: You will need to eat 500 to 1,000 fewer calories each day than you currently eat to lose 1 to 2 pounds a week  The following changes will help you cut calories:  · Eat smaller portions  Use small plates, no larger than 9 inches in diameter  Fill your plate half full of fruits and vegetables  Measure your food using measuring cups until you know what a serving size looks like       · Eat 3 meals and 1 or 2 snacks each day  Plan your meals in advance  Kelly Corindus and eat at home most of the time  Eat slowly  · Eat fruits and vegetables at every meal   They are low in calories and high in fiber, which makes you feel full  Do not add butter, margarine, or cream sauce to vegetables  Use herbs to season steamed vegetables  · Eat less fat and fewer fried foods  Eat more baked or grilled chicken and fish  These protein sources are lower in calories and fat than red meat  Limit fast food  Dress your salads with olive oil and vinegar instead of bottled dressing  · Limit the amount of sugar you eat  Do not drink sugary beverages  Limit alcohol  Activity changes:  Physical activity is good for your body in many ways  It helps you burn calories and build strong muscles  It decreases stress and depression, and improves your mood  It can also help you sleep better  Talk to your healthcare provider before you begin an exercise program   · Exercise for at least 30 minutes 5 days a week  Start slowly  Set aside time each day for physical activity that you enjoy and that is convenient for you  It is best to do both weight training and an activity that increases your heart rate, such as walking, bicycling, or swimming  · Find ways to be more active  Do yard work and housecleaning  Walk up the stairs instead of using elevators  Spend your leisure time going to events that require walking, such as outdoor festivals or fairs  This extra physical activity can help you lose weight and keep it off  Follow up with your healthcare provider as directed: You may need to meet with a dietitian  Write down your questions so you remember to ask them during your visits  © 2017 [address]  Information is for End User's use only and may not be sold, redistributed or otherwise used for commercial purposes   All illustrations and images included in CareNotes® are the copyrighted property of A D A M , Inc  or [employer]  The above information is an  only  It is not intended as medical advice for individual conditions or treatments  Talk to your doctor, nurse or pharmacist before following any medical regimen to see if it is safe and effective for you  Return in about 6 months (around 2/20/2021)  Subjective:      Patient ID: Lyndsay Reyes is a 79 y o  male  Chief Complaint   Patient presents with    [facility]pertension     [facility]       Pt is here for a 6 month follow up  Pt denies CP, no SOB  No polyuria no polydipsia    Pt sees Dr Cat Riddle for urology, is doing well  The following portions of the patient's history were reviewed and updated as appropriate: allergies, current medications, past family history, past medical history, past social history, past surgical history and problem list     Review of Systems   Constitutional: Negative for activity change, appetite change, chills, diaphoresis, fatigue, fever and unexpected weight change  HENT: Negative for congestion, dental problem, ear pain, mouth sores, sinus pressure, sinus pain, sore throat and trouble swallowing  Eyes: Negative for photophobia, discharge and itching  Respiratory: Negative for apnea, chest tightness and shortness of breath  Cardiovascular: Negative for chest pain, palpitations and leg swelling  Gastrointestinal: Negative for abdominal distention, abdominal pain, blood in stool, nausea and vomiting  Endocrine: Negative for cold intolerance, heat intolerance, polydipsia, polyphagia and polyuria  Genitourinary: Negative for difficulty urinating  Musculoskeletal: Negative for arthralgias  Skin: Negative for color change and wound  Neurological: Negative for dizziness, syncope, speech difficulty and headaches  Hematological: Negative for adenopathy  Psychiatric/Behavioral: Negative for agitation and behavioral problems           Current Outpatient Medications   Medication Sig Dispense Refill    atorvastatin (LIPITOR) 20 mg tablet Take 1 tablet (20 mg total) by mouth daily 90 tablet 1    Calcium Carbonate (CALCIUM 600) 1500 (600 Ca) MG TABS Take by mouth      Cholecalciferol (VITAMIN D3) 2000 units TABS Take by mouth      Cyanocobalamin (VITAMIN B-12) 5000 MCG LOZG Place under the tongue      lisinopril (ZESTRIL) 10 mg tablet Take 1 tablet (10 mg total) by mouth daily 90 tablet 1    Multiple Vitamins-Minerals (MULTIVITAMIN ADULT EXTRA C) CHEW Chew       No current facility-administered medications for this visit  Objective:    /74   Pulse 74   Temp (!) 96 7 °F (35 9 °C)   Resp 16   Ht 5' 7 5" (1 715 m)   Wt 112 kg (248 lb)   BMI 38 27 kg/m²        Physical Exam  Vitals signs and nursing note reviewed  Constitutional:       General: He is not in acute distress  Appearance: He is well-developed  He is not diaphoretic  HENT:      Head: Normocephalic and atraumatic  Right Ear: External ear normal       Left Ear: External ear normal       Nose: Nose normal       Mouth/Throat:      Pharynx: No oropharyngeal exudate  Eyes:      General: No scleral icterus  Right eye: No discharge  Left eye: No discharge  Pupils: Pupils are equal, round, and reactive to light  Neck:      Thyroid: No thyromegaly  Cardiovascular:      Rate and Rhythm: Normal rate  Heart sounds: Normal heart sounds  No murmur  Pulmonary:      Effort: Pulmonary effort is normal  No respiratory distress  Breath sounds: Normal breath sounds  No wheezing  Abdominal:      General: Bowel sounds are normal  There is no distension  Palpations: Abdomen is soft  There is no mass  Tenderness: There is no abdominal tenderness  There is no guarding or rebound  Musculoskeletal: Normal range of motion  Skin:     General: Skin is warm and dry  Findings: No erythema or rash  Neurological:      Mental Status: He is alert        Coordination: Coordination normal  Deep Tendon Reflexes: Reflexes normal    Psychiatric:         Behavior: Behavior normal               Recent Results (from the past 4032 hour(s))   Novel Coronavirus (COVID-19), PCR [facility]    Collection Time: 04/24/20 12:49 PM    Specimen: Throat   Result Value Ref Range    SARS-CoV-2  Not Detected Not Detected   Comprehensive metabolic panel    Collection Time: 04/28/20  7:12 AM   Result Value Ref Range    Glucose, Random 104 (H) 65 - 99 mg/dL    BUN 19 8 - 27 mg/dL    Creatinine 1 12 0 76 - 1 27 mg/dL    eGFR Non African American 66 >59 mL/min/1 73    eGFR  77 >59 mL/min/1 73    SL AMB BUN/CREATININE RATIO 17 10 - 24    Sodium 144 134 - 144 mmol/L    Potassium 4 3 3 5 - 5 2 mmol/L    Chloride 104 96 - 106 mmol/L    CO2 23 20 - 29 mmol/L    CALCIUM 9 9 8 6 - 10 2 mg/dL    Protein, Total 6 8 6 0 - 8 5 g/dL    Albumin 4 1 3 8 - 4 8 g/dL    Globulin, Total 2 7 1 5 - 4 5 g/dL    Albumin/Globulin Ratio 1 5 1 2 - 2 2    TOTAL BILIRUBIN 0 4 0 0 - 1 2 mg/dL    Alk Phos Isoenzymes 91 39 - 117 IU/L    AST 18 0 - 40 IU/L    ALT 16 0 - 44 IU/L   Lipid panel    Collection Time: 04/28/20  7:12 AM   Result Value Ref Range    Cholesterol, Total 136 100 - 199 mg/dL    Triglycerides 72 0 - 149 mg/dL    HDL 44 >39 mg/dL    LDL Calculated 78 0 - 99 mg/dL   Cardiovascular Report    Collection Time: 04/28/20  7:12 AM   Result Value Ref Range    Interpretation Note          Romel Noel DO  BMI Counseling: Body mass index is 38 27 kg/m²  The BMI is above normal  Nutrition recommendations include reducing portion sizes

## 2020-08-20 NOTE — PATIENT INSTRUCTIONS
Obesity   AMBULATORY CARE:   Obesity  is when your body mass index (BMI) is greater than 30  Your healthcare provider will use your height and weight to measure your BMI  The risks of obesity include  many health problems, such as injuries or physical disability  You may need tests to check for the following:  · Diabetes     · High blood pressure or high cholesterol     · Heart disease     · Gallbladder or liver disease     · Cancer of the colon, breast, prostate, liver, or kidney     · Sleep apnea     · Arthritis or gout  Seek care immediately if:   · You have a severe headache, confusion, or difficulty speaking  · You have weakness on one side of your body  · You have chest pain, sweating, or shortness of breath  Contact your healthcare provider if:   · You have symptoms of gallbladder or liver disease, such as pain in your upper abdomen  · You have knee or hip pain and discomfort while walking  · You have symptoms of diabetes, such as intense hunger and thirst, and frequent urination  · You have symptoms of sleep apnea, such as snoring or daytime sleepiness  · You have questions or concerns about your condition or care  Treatment for obesity  focuses on helping you lose weight to improve your health  Even a small decrease in BMI can reduce the risk for many health problems  Your healthcare provider will help you set a weight-loss goal   · Lifestyle changes  are the first step in treating obesity  These include making healthy food choices and getting regular physical activity  Your healthcare provider may suggest a weight-loss program that involves coaching, education, and therapy  · Medicine  may help you lose weight when it is used with a healthy diet and physical activity  · Surgery  can help you lose weight if you are very obese and have other health problems  There are several types of weight-loss surgery  Ask your healthcare provider for more information    Be successful losing weight:   · Set small, realistic goals  An example of a small goal is to walk for 20 minutes 5 days a week  Anther goal is to lose 5% of your body weight  · Tell friends, family members, and coworkers about your goals  and ask for their support  Ask a friend to lose weight with you, or join a weight-loss support group  · Identify foods or triggers that may cause you to overeat , and find ways to avoid them  Remove tempting high-calorie foods from your home and workplace  Place a bowl of fresh fruit on your kitchen counter  If stress causes you to eat, then find other ways to cope with stress  · Keep a diary to track what you eat and drink  Also write down how many minutes of physical activity you do each day  Weigh yourself once a week and record it in your diary  Eating changes: You will need to eat 500 to 1,000 fewer calories each day than you currently eat to lose 1 to 2 pounds a week  The following changes will help you cut calories:  · Eat smaller portions  Use small plates, no larger than 9 inches in diameter  Fill your plate half full of fruits and vegetables  Measure your food using measuring cups until you know what a serving size looks like  · Eat 3 meals and 1 or 2 snacks each day  Plan your meals in advance  Constanza Yin and eat at home most of the time  Eat slowly  · Eat fruits and vegetables at every meal   They are low in calories and high in fiber, which makes you feel full  Do not add butter, margarine, or cream sauce to vegetables  Use herbs to season steamed vegetables  · Eat less fat and fewer fried foods  Eat more baked or grilled chicken and fish  These protein sources are lower in calories and fat than red meat  Limit fast food  Dress your salads with olive oil and vinegar instead of bottled dressing  · Limit the amount of sugar you eat  Do not drink sugary beverages  Limit alcohol  Activity changes:  Physical activity is good for your body in many ways   It helps you burn calories and build strong muscles  It decreases stress and depression, and improves your mood  It can also help you sleep better  Talk to your healthcare provider before you begin an exercise program   · Exercise for at least 30 minutes 5 days a week  Start slowly  Set aside time each day for physical activity that you enjoy and that is convenient for you  It is best to do both weight training and an activity that increases your heart rate, such as walking, bicycling, or swimming  · Find ways to be more active  Do yard work and housecleaning  Walk up the stairs instead of using elevators  Spend your leisure time going to events that require walking, such as outdoor festivals or fairs  This extra physical activity can help you lose weight and keep it off  Follow up with your healthcare provider as directed: You may need to meet with a dietitian  Write down your questions so you remember to ask them during your visits  © 2017 [address] Information is for End User's use only and may not be sold, redistributed or otherwise used for commercial purposes  All illustrations and images included in CareNotes® are the copyrighted property of [employer] M [employer] Inc  or [employer]  The above information is an  only  It is not intended as medical advice for individual conditions or treatments  Talk to your doctor, nurse or pharmacist before following any medical regimen to see if it is safe and effective for you

## 2020-11-07 ENCOUNTER — APPOINTMENT (EMERGENCY)
Dept: RADIOLOGY | Facility: HOSPITAL | Age: 70
End: 2020-11-07
Payer: COMMERCIAL

## 2020-11-07 ENCOUNTER — HOSPITAL ENCOUNTER (EMERGENCY)
Facility: HOSPITAL | Age: 70
Discharge: HOME | End: 2020-11-07
Attending: EMERGENCY MEDICINE | Admitting: EMERGENCY MEDICINE
Payer: COMMERCIAL

## 2020-11-07 VITALS
HEIGHT: 67 IN | BODY MASS INDEX: 38.92 KG/M2 | WEIGHT: 248 LBS | OXYGEN SATURATION: 97 % | RESPIRATION RATE: 18 BRPM | HEART RATE: 62 BPM | SYSTOLIC BLOOD PRESSURE: 120 MMHG | DIASTOLIC BLOOD PRESSURE: 64 MMHG | TEMPERATURE: 97.8 F
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
## 2020-11-07 DIAGNOSIS — S43.491A OTHER SPRAIN OF RIGHT SHOULDER JOINT, INITIAL ENCOUNTER: ICD-10-CM

## 2020-11-07 LAB
ALBUMIN SERPL BCP-MCNC: 3.5 G/DL (ref 3.5–5)
ALP SERPL-CCNC: 94 U/L (ref 46–116)
ALT SERPL W P-5'-P-CCNC: 23 U/L (ref 12–78)
ANION GAP SERPL CALCULATED.3IONS-SCNC: 9 MMOL/L (ref 4–13)
AST SERPL W P-5'-P-CCNC: 20 U/L (ref 5–45)
BASOPHILS # BLD AUTO: 0.06 THOUSANDS/ΜL (ref 0–0.1)
BASOPHILS NFR BLD AUTO: 1 % (ref 0–1)
BILIRUB SERPL-MCNC: 0.4 MG/DL (ref 0.2–1)
BUN SERPL-MCNC: 25 MG/DL (ref 5–25)
CALCIUM SERPL-MCNC: 9.3 MG/DL (ref 8.3–10.1)
CHLORIDE SERPL-SCNC: 103 MMOL/L (ref 100–108)
CO2 SERPL-SCNC: 27 MMOL/L (ref 21–32)
CREAT SERPL-MCNC: 1.56 MG/DL (ref 0.6–1.3)
EOSINOPHIL # BLD AUTO: 0.17 THOUSAND/ΜL (ref 0–0.61)
EOSINOPHIL NFR BLD AUTO: 2 % (ref 0–6)
ERYTHROCYTE [DISTWIDTH] IN BLOOD BY AUTOMATED COUNT: 14 % (ref 11.6–15.1)
GFR SERPL CREATININE-BSD FRML MDRD: 44 ML/MIN/1.73SQ M
GLUCOSE SERPL-MCNC: 127 MG/DL (ref 65–140)
HCT VFR BLD AUTO: 43.5 % (ref 36.5–49.3)
HGB BLD-MCNC: 13.1 G/DL (ref 12–17)
IMM GRANULOCYTES # BLD AUTO: 0.05 THOUSAND/UL (ref 0–0.2)
IMM GRANULOCYTES NFR BLD AUTO: 1 % (ref 0–2)
LYMPHOCYTES # BLD AUTO: 2.4 THOUSANDS/ΜL (ref 0.6–4.47)
LYMPHOCYTES NFR BLD AUTO: 29 % (ref 14–44)
MCH RBC QN AUTO: 27.8 PG (ref 26.8–34.3)
MCHC RBC AUTO-ENTMCNC: 30.1 G/DL (ref 31.4–37.4)
MCV RBC AUTO: 92 FL (ref 82–98)
MONOCYTES # BLD AUTO: 0.57 THOUSAND/ΜL (ref 0.17–1.22)
MONOCYTES NFR BLD AUTO: 7 % (ref 4–12)
NEUTROPHILS # BLD AUTO: 5 THOUSANDS/ΜL (ref 1.85–7.62)
NEUTS SEG NFR BLD AUTO: 60 % (ref 43–75)
NRBC BLD AUTO-RTO: 0 /100 WBCS
PLATELET # BLD AUTO: 272 THOUSANDS/UL (ref 149–390)
PMV BLD AUTO: 10.4 FL (ref 8.9–12.7)
POTASSIUM SERPL-SCNC: 4.3 MMOL/L (ref 3.5–5.3)
PROT SERPL-MCNC: 7.6 G/DL (ref 6.4–8.2)
RBC # BLD AUTO: 4.71 MILLION/UL (ref 3.88–5.62)
SODIUM SERPL-SCNC: 139 MMOL/L (ref 136–145)
TROPONIN I SERPL-MCNC: <0.02 NG/ML
WBC # BLD AUTO: 8.25 THOUSAND/UL (ref 4.31–10.16)

## 2020-11-07 PROCEDURE — 73030 X-RAY EXAM OF SHOULDER: CPT

## 2020-11-07 PROCEDURE — 80053 COMPREHEN METABOLIC PANEL: CPT | Performed by: EMERGENCY MEDICINE

## 2020-11-07 PROCEDURE — 96361 HYDRATE IV INFUSION ADD-ON: CPT

## 2020-11-07 PROCEDURE — 96374 THER/PROPH/DIAG INJ IV PUSH: CPT

## 2020-11-07 PROCEDURE — 99284 EMERGENCY DEPT VISIT MOD MDM: CPT

## 2020-11-07 PROCEDURE — G1004 CDSM NDSC: HCPCS

## 2020-11-07 PROCEDURE — 36415 COLL VENOUS BLD VENIPUNCTURE: CPT | Performed by: EMERGENCY MEDICINE

## 2020-11-07 PROCEDURE — 70450 CT HEAD/BRAIN W/O DYE: CPT

## 2020-11-07 PROCEDURE — 99285 EMERGENCY DEPT VISIT HI MDM: CPT | Performed by: EMERGENCY MEDICINE

## 2020-11-07 PROCEDURE — 85025 COMPLETE CBC W/AUTO DIFF WBC: CPT | Performed by: EMERGENCY MEDICINE

## 2020-11-07 PROCEDURE — 84484 ASSAY OF TROPONIN QUANT: CPT | Performed by: EMERGENCY MEDICINE

## 2020-11-07 PROCEDURE — 93005 ELECTROCARDIOGRAM TRACING: CPT

## 2020-11-07 PROCEDURE — 73060 X-RAY EXAM OF HUMERUS: CPT

## 2020-11-07 PROCEDURE — 72125 CT NECK SPINE W/O DYE: CPT

## 2020-11-07 RX ORDER — SODIUM CHLORIDE 9 MG/ML
1000 INJECTION, SOLUTION INTRAVENOUS ONCE
Status: COMPLETED | OUTPATIENT
Start: 2020-11-07 | End: 2020-11-07

## 2020-11-07 RX ORDER — FENTANYL CITRATE 50 UG/ML
100 INJECTION, SOLUTION INTRAMUSCULAR; INTRAVENOUS ONCE
Status: COMPLETED | OUTPATIENT
Start: 2020-11-07 | End: 2020-11-07

## 2020-11-07 RX ORDER — MORPHINE SULFATE 15 MG/1
15 TABLET ORAL EVERY 6 HOURS PRN
Qty: 12 TABLET | Refills: 0 | Status: SHIPPED | OUTPATIENT
Start: 2020-11-07 | End: 2020-11-10

## 2020-11-07 RX ADMIN — SODIUM CHLORIDE 1000 ML/HR: 0.9 INJECTION, SOLUTION INTRAVENOUS at 13:27

## 2020-11-07 RX ADMIN — FENTANYL CITRATE 100 MCG: 50 INJECTION INTRAMUSCULAR; INTRAVENOUS at 13:43

## 2020-11-09 ENCOUNTER — HOSPITAL ENCOUNTER (OUTPATIENT)
Dept: RADIOLOGY | Facility: HOSPITAL | Age: 70
Discharge: HOME | End: 2020-11-09
Payer: COMMERCIAL

## 2020-11-09 ENCOUNTER — OFFICE VISIT (OUTPATIENT)
Dept: OBGYN CLINIC | Facility: HOSPITAL | Age: 70
End: 2020-11-09
Payer: COMMERCIAL

## 2020-11-09 VITALS
HEIGHT: 67 IN | HEART RATE: 71 BPM | DIASTOLIC BLOOD PRESSURE: 79 MMHG | WEIGHT: 248 LBS | BODY MASS INDEX: 38.92 KG/M2 | SYSTOLIC BLOOD PRESSURE: 127 MMHG

## 2020-11-09 DIAGNOSIS — S43.001A SUBLUXATION OF RIGHT SHOULDER JOINT, INITIAL ENCOUNTER: ICD-10-CM

## 2020-11-09 DIAGNOSIS — M25.431 SWELLING OF RIGHT WRIST: ICD-10-CM

## 2020-11-09 DIAGNOSIS — M79.641 PAIN IN RIGHT HAND: Primary | ICD-10-CM

## 2020-11-09 DIAGNOSIS — M79.641 PAIN IN RIGHT HAND: ICD-10-CM

## 2020-11-09 PROCEDURE — 73130 X-RAY EXAM OF HAND: CPT

## 2020-11-09 PROCEDURE — 99214 OFFICE O/P EST MOD 30 MIN: CPT | Performed by: PHYSICIAN ASSISTANT

## 2020-11-11 ENCOUNTER — TELEPHONE (OUTPATIENT)
Dept: OBGYN CLINIC | Facility: HOSPITAL | Age: 70
End: 2020-11-11

## 2020-11-12 ENCOUNTER — OFFICE VISIT (OUTPATIENT)
Dept: OBGYN CLINIC | Facility: CLINIC | Age: 70
End: 2020-11-12
Payer: COMMERCIAL

## 2020-11-12 VITALS — WEIGHT: 248 LBS | BODY MASS INDEX: 38.92 KG/M2 | HEIGHT: 67 IN

## 2020-11-12 DIAGNOSIS — M25.531 PAIN IN RIGHT WRIST: Primary | ICD-10-CM

## 2020-11-12 DIAGNOSIS — M25.511 RIGHT SHOULDER PAIN, UNSPECIFIED CHRONICITY: ICD-10-CM

## 2020-11-12 PROCEDURE — 99213 OFFICE O/P EST LOW 20 MIN: CPT | Performed by: ORTHOPAEDIC SURGERY

## 2020-11-12 PROCEDURE — 29130 APPL FINGER SPLINT STATIC: CPT | Performed by: ORTHOPAEDIC SURGERY

## 2020-11-15 LAB
ATRIAL RATE: 58 BPM
P AXIS: 56 DEGREES
PR INTERVAL: 208 MS
QRS AXIS: 24 DEGREES
QRSD INTERVAL: 124 MS
QT INTERVAL: 428 MS
QTC INTERVAL: 420 MS
T WAVE AXIS: 63 DEGREES
VENTRICULAR RATE: 58 BPM

## 2020-11-15 PROCEDURE — 93010 ELECTROCARDIOGRAM REPORT: CPT | Performed by: INTERNAL MEDICINE

## 2020-11-17 ENCOUNTER — HOSPITAL ENCOUNTER (OUTPATIENT)
Dept: RADIOLOGY | Facility: HOSPITAL | Age: 70
Discharge: HOME | End: 2020-11-17
Payer: COMMERCIAL

## 2020-11-17 ENCOUNTER — TRANSCRIBE ORDERS (OUTPATIENT)
Dept: ADMINISTRATIVE | Facility: HOSPITAL | Age: 70
End: 2020-11-17

## 2020-11-17 ENCOUNTER — TELEPHONE (OUTPATIENT)
Dept: OBGYN CLINIC | Facility: MEDICAL CENTER | Age: 70
End: 2020-11-17

## 2020-11-17 DIAGNOSIS — M79.641 PAIN IN RIGHT HAND: ICD-10-CM

## 2020-11-17 DIAGNOSIS — M25.431 SWELLING OF RIGHT WRIST: ICD-10-CM

## 2020-11-17 PROCEDURE — 73200 CT UPPER EXTREMITY W/O DYE: CPT

## 2020-11-18 ENCOUNTER — OFFICE VISIT (OUTPATIENT)
Dept: OBGYN CLINIC | Facility: CLINIC | Age: 70
End: 2020-11-18
Payer: COMMERCIAL

## 2020-11-18 VITALS — WEIGHT: 248 LBS | HEIGHT: 67 IN | BODY MASS INDEX: 38.92 KG/M2

## 2020-11-18 DIAGNOSIS — M25.511 RIGHT SHOULDER PAIN, UNSPECIFIED CHRONICITY: ICD-10-CM

## 2020-11-18 DIAGNOSIS — M24.811 INTERNAL DERANGEMENT OF RIGHT SHOULDER: Primary | ICD-10-CM

## 2020-11-18 PROCEDURE — 99214 OFFICE O/P EST MOD 30 MIN: CPT | Performed by: ORTHOPAEDIC SURGERY

## 2020-11-20 ENCOUNTER — OFFICE VISIT (OUTPATIENT)
Dept: OBGYN CLINIC | Facility: CLINIC | Age: 70
End: 2020-11-20
Payer: COMMERCIAL

## 2020-11-20 VITALS
SYSTOLIC BLOOD PRESSURE: 138 MMHG | HEIGHT: 67 IN | HEART RATE: 86 BPM | WEIGHT: 248 LBS | BODY MASS INDEX: 38.92 KG/M2 | DIASTOLIC BLOOD PRESSURE: 88 MMHG

## 2020-11-20 DIAGNOSIS — M18.11 ARTHRITIS OF CARPOMETACARPAL (CMC) JOINT OF RIGHT THUMB: Primary | ICD-10-CM

## 2020-11-20 PROCEDURE — 99213 OFFICE O/P EST LOW 20 MIN: CPT | Performed by: ORTHOPAEDIC SURGERY

## 2020-11-20 PROCEDURE — 1036F TOBACCO NON-USER: CPT | Performed by: ORTHOPAEDIC SURGERY

## 2020-11-20 PROCEDURE — 3079F DIAST BP 80-89 MM HG: CPT | Performed by: ORTHOPAEDIC SURGERY

## 2020-11-20 PROCEDURE — 3008F BODY MASS INDEX DOCD: CPT | Performed by: ORTHOPAEDIC SURGERY

## 2020-11-20 PROCEDURE — 1160F RVW MEDS BY RX/DR IN RCRD: CPT | Performed by: ORTHOPAEDIC SURGERY

## 2020-11-20 PROCEDURE — 3075F SYST BP GE 130 - 139MM HG: CPT | Performed by: ORTHOPAEDIC SURGERY

## 2020-11-23 ENCOUNTER — TELEPHONE (OUTPATIENT)
Dept: OBGYN CLINIC | Facility: HOSPITAL | Age: 70
End: 2020-11-23

## 2020-11-23 DIAGNOSIS — M18.11 ARTHRITIS OF CARPOMETACARPAL (CMC) JOINT OF RIGHT THUMB: Primary | ICD-10-CM

## 2020-11-23 RX ORDER — PREDNISONE 50 MG/1
50 TABLET ORAL DAILY
Qty: 5 TABLET | Refills: 0 | Status: SHIPPED | OUTPATIENT
Start: 2020-11-23 | End: 2021-01-04 | Stop reason: ALTCHOICE

## 2020-11-24 ENCOUNTER — TELEPHONE (OUTPATIENT)
Dept: OBGYN CLINIC | Facility: MEDICAL CENTER | Age: 70
End: 2020-11-24

## 2020-11-24 ENCOUNTER — TELEPHONE (OUTPATIENT)
Dept: OBGYN CLINIC | Facility: CLINIC | Age: 70
End: 2020-11-24

## 2020-11-25 ENCOUNTER — HOSPITAL ENCOUNTER (EMERGENCY)
Facility: HOSPITAL | Age: 70
Discharge: HOME | End: 2020-11-25
Attending: EMERGENCY MEDICINE | Admitting: EMERGENCY MEDICINE
Payer: COMMERCIAL

## 2020-11-25 VITALS
BODY MASS INDEX: 39.37 KG/M2 | OXYGEN SATURATION: 95 % | TEMPERATURE: 98.4 F | HEART RATE: 71 BPM | RESPIRATION RATE: 20 BRPM | WEIGHT: 251.4 LBS | SYSTOLIC BLOOD PRESSURE: 151 MMHG | DIASTOLIC BLOOD PRESSURE: 107 MMHG

## 2020-11-25 DIAGNOSIS — M79.89 SWELLING OF RIGHT HAND: Primary | ICD-10-CM

## 2020-11-25 PROCEDURE — 99283 EMERGENCY DEPT VISIT LOW MDM: CPT

## 2020-11-25 PROCEDURE — 99282 EMERGENCY DEPT VISIT SF MDM: CPT | Performed by: EMERGENCY MEDICINE

## 2020-11-25 PROCEDURE — 87637 SARSCOV2&INF A&B&RSV AMP PRB: CPT | Performed by: EMERGENCY MEDICINE

## 2020-11-30 LAB
FLUAV RNA NPH QL NAA+PROBE: NOT DETECTED
FLUBV RNA NPH QL NAA+PROBE: NOT DETECTED
RSV RNA NPH QL NAA+PROBE: NOT DETECTED
SARS-COV-2 RNA NPH QL NAA+PROBE: NOT DETECTED

## 2020-12-08 ENCOUNTER — HOSPITAL ENCOUNTER (OUTPATIENT)
Dept: RADIOLOGY | Facility: HOSPITAL | Age: 70
Discharge: HOME | End: 2020-12-08
Attending: ORTHOPAEDIC SURGERY
Payer: COMMERCIAL

## 2020-12-08 DIAGNOSIS — M24.811 INTERNAL DERANGEMENT OF RIGHT SHOULDER: ICD-10-CM

## 2020-12-08 PROCEDURE — 73221 MRI JOINT UPR EXTREM W/O DYE: CPT

## 2020-12-08 PROCEDURE — G1004 CDSM NDSC: HCPCS

## 2020-12-11 ENCOUNTER — OFFICE VISIT (OUTPATIENT)
Dept: OBGYN CLINIC | Facility: CLINIC | Age: 70
End: 2020-12-11
Payer: COMMERCIAL

## 2020-12-11 VITALS
HEIGHT: 67 IN | SYSTOLIC BLOOD PRESSURE: 133 MMHG | BODY MASS INDEX: 39.87 KG/M2 | DIASTOLIC BLOOD PRESSURE: 74 MMHG | WEIGHT: 254 LBS | HEART RATE: 80 BPM

## 2020-12-11 DIAGNOSIS — M75.101 ROTATOR CUFF TEAR ARTHROPATHY OF RIGHT SHOULDER: Primary | ICD-10-CM

## 2020-12-11 DIAGNOSIS — M12.811 ROTATOR CUFF TEAR ARTHROPATHY OF RIGHT SHOULDER: Primary | ICD-10-CM

## 2020-12-11 DIAGNOSIS — S46.111D RUPTURE OF RIGHT LONG HEAD BICEPS TENDON, SUBSEQUENT ENCOUNTER: ICD-10-CM

## 2020-12-11 DIAGNOSIS — Z11.59 SCREENING FOR VIRAL DISEASE: ICD-10-CM

## 2020-12-11 PROCEDURE — 3075F SYST BP GE 130 - 139MM HG: CPT | Performed by: ORTHOPAEDIC SURGERY

## 2020-12-11 PROCEDURE — 3078F DIAST BP <80 MM HG: CPT | Performed by: ORTHOPAEDIC SURGERY

## 2020-12-11 PROCEDURE — 1160F RVW MEDS BY RX/DR IN RCRD: CPT | Performed by: ORTHOPAEDIC SURGERY

## 2020-12-11 PROCEDURE — 1036F TOBACCO NON-USER: CPT | Performed by: ORTHOPAEDIC SURGERY

## 2020-12-11 PROCEDURE — 3008F BODY MASS INDEX DOCD: CPT | Performed by: ORTHOPAEDIC SURGERY

## 2020-12-11 PROCEDURE — 99215 OFFICE O/P EST HI 40 MIN: CPT | Performed by: ORTHOPAEDIC SURGERY

## 2020-12-14 ENCOUNTER — TELEPHONE (OUTPATIENT)
Dept: OBGYN CLINIC | Facility: CLINIC | Age: 70
End: 2020-12-14

## 2020-12-21 ENCOUNTER — LAB (OUTPATIENT)
Dept: LAB | Facility: HOSPITAL | Age: 70
End: 2020-12-21
Attending: ORTHOPAEDIC SURGERY
Payer: COMMERCIAL

## 2020-12-21 ENCOUNTER — TRANSCRIBE ORDERS (OUTPATIENT)
Dept: ADMINISTRATIVE | Facility: HOSPITAL | Age: 70
End: 2020-12-21

## 2020-12-21 ENCOUNTER — TELEPHONE (OUTPATIENT)
Dept: OBGYN CLINIC | Facility: CLINIC | Age: 70
End: 2020-12-21

## 2020-12-21 DIAGNOSIS — S46.111D RUPTURE OF RIGHT LONG HEAD BICEPS TENDON, SUBSEQUENT ENCOUNTER: ICD-10-CM

## 2020-12-21 DIAGNOSIS — M75.101 ROTATOR CUFF TEAR ARTHROPATHY OF RIGHT SHOULDER: ICD-10-CM

## 2020-12-21 DIAGNOSIS — M12.811 ROTATOR CUFF TEAR ARTHROPATHY OF RIGHT SHOULDER: ICD-10-CM

## 2020-12-21 DIAGNOSIS — Z01.812 ENCOUNTER FOR PREPROCEDURAL LABORATORY EXAMINATION: ICD-10-CM

## 2020-12-21 LAB
ABO GROUP BLD: NORMAL
ANION GAP SERPL CALCULATED.3IONS-SCNC: 7 MMOL/L (ref 4–13)
APTT PPP: 27 SECONDS (ref 23–37)
BASOPHILS # BLD AUTO: 0.04 THOUSANDS/ΜL (ref 0–0.1)
BASOPHILS NFR BLD AUTO: 1 % (ref 0–1)
BLD GP AB SCN SERPL QL: NEGATIVE
BUN SERPL-MCNC: 34 MG/DL (ref 5–25)
CALCIUM SERPL-MCNC: 9.1 MG/DL (ref 8.3–10.1)
CHLORIDE SERPL-SCNC: 110 MMOL/L (ref 100–108)
CO2 SERPL-SCNC: 30 MMOL/L (ref 21–32)
CREAT SERPL-MCNC: 1.27 MG/DL (ref 0.6–1.3)
EOSINOPHIL # BLD AUTO: 0.18 THOUSAND/ΜL (ref 0–0.61)
EOSINOPHIL NFR BLD AUTO: 4 % (ref 0–6)
ERYTHROCYTE [DISTWIDTH] IN BLOOD BY AUTOMATED COUNT: 14.6 % (ref 11.6–15.1)
GFR SERPL CREATININE-BSD FRML MDRD: 57 ML/MIN/1.73SQ M
GLUCOSE P FAST SERPL-MCNC: 96 MG/DL (ref 65–99)
HCT VFR BLD AUTO: 41.7 % (ref 36.5–49.3)
HGB BLD-MCNC: 12.4 G/DL (ref 12–17)
IMM GRANULOCYTES # BLD AUTO: 0.01 THOUSAND/UL (ref 0–0.2)
IMM GRANULOCYTES NFR BLD AUTO: 0 % (ref 0–2)
INR PPP: 0.98 (ref 0.84–1.19)
LYMPHOCYTES # BLD AUTO: 1.53 THOUSANDS/ΜL (ref 0.6–4.47)
LYMPHOCYTES NFR BLD AUTO: 33 % (ref 14–44)
MCH RBC QN AUTO: 27.6 PG (ref 26.8–34.3)
MCHC RBC AUTO-ENTMCNC: 29.7 G/DL (ref 31.4–37.4)
MCV RBC AUTO: 93 FL (ref 82–98)
MONOCYTES # BLD AUTO: 0.43 THOUSAND/ΜL (ref 0.17–1.22)
MONOCYTES NFR BLD AUTO: 9 % (ref 4–12)
NEUTROPHILS # BLD AUTO: 2.48 THOUSANDS/ΜL (ref 1.85–7.62)
NEUTS SEG NFR BLD AUTO: 53 % (ref 43–75)
NRBC BLD AUTO-RTO: 0 /100 WBCS
PLATELET # BLD AUTO: 214 THOUSANDS/UL (ref 149–390)
PMV BLD AUTO: 10.8 FL (ref 8.9–12.7)
POTASSIUM SERPL-SCNC: 4 MMOL/L (ref 3.5–5.3)
PROTHROMBIN TIME: 12.9 SECONDS (ref 11.6–14.5)
RBC # BLD AUTO: 4.5 MILLION/UL (ref 3.88–5.62)
RH BLD: POSITIVE
SODIUM SERPL-SCNC: 147 MMOL/L (ref 136–145)
SPECIMEN EXPIRATION DATE: NORMAL
WBC # BLD AUTO: 4.67 THOUSAND/UL (ref 4.31–10.16)

## 2020-12-21 PROCEDURE — 86900 BLOOD TYPING SEROLOGIC ABO: CPT

## 2020-12-21 PROCEDURE — 85025 COMPLETE CBC W/AUTO DIFF WBC: CPT

## 2020-12-21 PROCEDURE — 80048 BASIC METABOLIC PNL TOTAL CA: CPT

## 2020-12-21 PROCEDURE — 86901 BLOOD TYPING SEROLOGIC RH(D): CPT

## 2020-12-21 PROCEDURE — 85610 PROTHROMBIN TIME: CPT

## 2020-12-21 PROCEDURE — 85730 THROMBOPLASTIN TIME PARTIAL: CPT

## 2020-12-21 PROCEDURE — 86850 RBC ANTIBODY SCREEN: CPT

## 2020-12-21 PROCEDURE — 36415 COLL VENOUS BLD VENIPUNCTURE: CPT

## 2021-01-04 ENCOUNTER — OFFICE VISIT (OUTPATIENT)
Dept: FAMILY MEDICINE CLINIC | Facility: CLINIC | Age: 71
End: 2021-01-04
Payer: COMMERCIAL

## 2021-01-04 VITALS
WEIGHT: 259 LBS | HEIGHT: 67 IN | TEMPERATURE: 97.1 F | BODY MASS INDEX: 40.65 KG/M2 | DIASTOLIC BLOOD PRESSURE: 80 MMHG | RESPIRATION RATE: 16 BRPM | HEART RATE: 84 BPM | SYSTOLIC BLOOD PRESSURE: 136 MMHG

## 2021-01-04 DIAGNOSIS — Z01.818 PRE-OP EXAMINATION: Primary | ICD-10-CM

## 2021-01-04 DIAGNOSIS — E78.2 MIXED HYPERLIPIDEMIA: ICD-10-CM

## 2021-01-04 DIAGNOSIS — K91.2 POSTGASTRECTOMY MALABSORPTION: ICD-10-CM

## 2021-01-04 DIAGNOSIS — Z90.3 POSTGASTRECTOMY MALABSORPTION: ICD-10-CM

## 2021-01-04 DIAGNOSIS — I10 BENIGN ESSENTIAL HYPERTENSION: ICD-10-CM

## 2021-01-04 DIAGNOSIS — M75.101 ROTATOR CUFF TEAR ARTHROPATHY OF RIGHT SHOULDER: ICD-10-CM

## 2021-01-04 DIAGNOSIS — M75.101 ROTATOR CUFF TEAR ARTHROPATHY, RIGHT: ICD-10-CM

## 2021-01-04 DIAGNOSIS — Z23 NEED FOR VACCINATION: ICD-10-CM

## 2021-01-04 DIAGNOSIS — M12.811 ROTATOR CUFF TEAR ARTHROPATHY OF RIGHT SHOULDER: ICD-10-CM

## 2021-01-04 DIAGNOSIS — S46.111D RUPTURE OF RIGHT LONG HEAD BICEPS TENDON, SUBSEQUENT ENCOUNTER: ICD-10-CM

## 2021-01-04 DIAGNOSIS — Z01.812 ENCOUNTER FOR PREPROCEDURAL LABORATORY EXAMINATION: ICD-10-CM

## 2021-01-04 DIAGNOSIS — M12.811 ROTATOR CUFF TEAR ARTHROPATHY, RIGHT: ICD-10-CM

## 2021-01-04 PROCEDURE — 1100F PTFALLS ASSESS-DOCD GE2>/YR: CPT | Performed by: FAMILY MEDICINE

## 2021-01-04 PROCEDURE — 3288F FALL RISK ASSESSMENT DOCD: CPT | Performed by: FAMILY MEDICINE

## 2021-01-04 PROCEDURE — 3725F SCREEN DEPRESSION PERFORMED: CPT | Performed by: FAMILY MEDICINE

## 2021-01-04 PROCEDURE — 90662 IIV NO PRSV INCREASED AG IM: CPT | Performed by: FAMILY MEDICINE

## 2021-01-04 PROCEDURE — 3075F SYST BP GE 130 - 139MM HG: CPT | Performed by: FAMILY MEDICINE

## 2021-01-04 PROCEDURE — 3079F DIAST BP 80-89 MM HG: CPT | Performed by: FAMILY MEDICINE

## 2021-01-04 PROCEDURE — 3008F BODY MASS INDEX DOCD: CPT | Performed by: FAMILY MEDICINE

## 2021-01-04 PROCEDURE — 99244 OFF/OP CNSLTJ NEW/EST MOD 40: CPT | Performed by: FAMILY MEDICINE

## 2021-01-04 PROCEDURE — 1160F RVW MEDS BY RX/DR IN RCRD: CPT | Performed by: FAMILY MEDICINE

## 2021-01-04 PROCEDURE — 1036F TOBACCO NON-USER: CPT | Performed by: FAMILY MEDICINE

## 2021-01-04 PROCEDURE — 90471 IMMUNIZATION ADMIN: CPT | Performed by: FAMILY MEDICINE

## 2021-01-27 ENCOUNTER — TELEPHONE (OUTPATIENT)
Dept: OBGYN CLINIC | Facility: CLINIC | Age: 71
End: 2021-01-27

## 2021-01-27 ENCOUNTER — TELEPHONE (OUTPATIENT)
Dept: OBGYN CLINIC | Facility: HOSPITAL | Age: 71
End: 2021-01-27

## 2021-01-27 NOTE — TELEPHONE ENCOUNTER
Patients wife called into the office and states he has not been getting paid for at least a month now  Can we get this one done asap ? Thank You!

## 2021-01-27 NOTE — TELEPHONE ENCOUNTER
[place] and I sat down and took a look into this, the form ID must be  as we were not able to get logged into the patients disability account online  I called the patient and spoke with him in detail about this  I stressed the importance of as soon as he get that form ID number to get us that paper right away   I did advise that if he goes through the state via phone and does not receive that form he may call us right away with the ID # but in order for us to properly take care of his disability I advised bringing that M03 form into us  He will be calling the state first thing tomorrow

## 2021-01-27 NOTE — TELEPHONE ENCOUNTER
Patient stated he filed for an extention back in December does not have the form for Online  Form ID: [id number] Date started: 20 and  50  I advised I will try to see if it will work for an extension if not then I will call him back and he will have to discuss with Disability again  I will work on this at some point today

## 2021-01-27 NOTE — TELEPHONE ENCOUNTER
Patient is calling to speak with someone in reference to disability paperwork     Transferred to [phone]

## 2021-01-27 NOTE — TELEPHONE ENCOUNTER
Loreta left a voice message asking for help with Trace's disability  I forwarded the call the ortho clinical line and stating patient needed help with disability

## 2021-01-28 NOTE — PRE-PROCEDURE INSTRUCTIONS
My Surgical Experience    The following information was developed to assist you to prepare for your operation  What do I need to do before coming to the hospital?   Arrange for a responsible person to drive you to and from the hospital    Arrange care for your children at home  Children are not allowed in the recovery areas of the hospital   Plan to wear clothing that is easy to put on and take off  If you are having shoulder surgery, wear a shirt that buttons or zippers in the front  Bathing  o Shower the evening before and the morning of your surgery with an antibacterial soap  Please refer to the Pre Op Showering Instructions for Surgery Patients Sheet   o Remove nail polish and all body piercing jewelry  o Do not shave any body part for at least 24 hours before surgery-this includes face, arms, legs and upper body  Food  o Nothing to eat or drink after midnight the night before your surgery  This includes candy and chewing gum  o Exception: If your surgery is after 12:00pm (noon), you may have clear liquids such as 7-Up®, ginger ale, apple or cranberry juice, Jell-O®, water, or clear broth until 8:00 am  o Do not drink milk or juice with pulp on the morning before surgery  o Do not drink alcohol 24 hours before surgery  Medicine  o Follow instructions you received from your surgeon about which medicines you may take on the day of surgery  o If instructed to take medicine on the morning of surgery, take pills with just a small sip of water  Call your prescribing doctor for specific infroamtion on what to do if you take insulin    What should I bring to the hospital?    Bring:  Kenn Reynolds or a walker, if you have them, for foot or knee surgery   A list of the daily medicines, vitamins, minerals, herbals and nutritional supplements you take   Include the dosages of medicines and the time you take them each day   Glasses, dentures or hearing aids   Minimal clothing; you will be wearing hospital sleepwear   Photo ID; required to verify your identity   If you have a Living Will or Power of , bring a copy of the documents   If you have an ostomy, bring an extra pouch and any supplies you use    Do not bring   Medicines or inhalers   Money, valuables or jewelry    What other information should I know about the day of surgery?  Notify your surgeons if you develop a cold, sore throat, cough, fever, rash or any other illness   Report to the Ambulatory Surgical/Same Day Surgery Unit   You will be instructed to stop at Registration only if you have not been pre-registered   Inform your  fi they do not stay that they will be asked by the staff to leave a phone number where they can be reached   Be available to be reached before surgery  In the event the operating room schedule changes, you may be asked to come in earlier or later than expected    *It is important to tell your doctor and others involved in your health care if you are taking or have been taking any non-prescription drugs, vitamins, minerals, herbals or other nutritional supplements  Any of these may interact with some food or medicines and cause a reaction      Pre-Surgery Instructions:   Medication Instructions    atorvastatin (LIPITOR) 20 mg tablet Instructed patient per Anesthesia Guidelines   Calcium Polycarbophil (FIBER-CAPS PO) Instructed patient per Anesthesia Guidelines   Cyanocobalamin (VITAMIN B-12) 5000 MCG LOZG Instructed patient per Anesthesia Guidelines   lisinopril (ZESTRIL) 10 mg tablet Instructed patient per Anesthesia Guidelines   Multiple Vitamins-Minerals (MULTIVITAMIN ADULT EXTRA C) CHEW Instructed patient per Anesthesia Guidelines  To take lisinopril a m  of surgery and to bring shoulder sling

## 2021-01-29 DIAGNOSIS — S46.111D RUPTURE OF RIGHT LONG HEAD BICEPS TENDON, SUBSEQUENT ENCOUNTER: ICD-10-CM

## 2021-01-29 DIAGNOSIS — M75.101 ROTATOR CUFF TEAR ARTHROPATHY OF RIGHT SHOULDER: ICD-10-CM

## 2021-01-29 DIAGNOSIS — M12.811 ROTATOR CUFF TEAR ARTHROPATHY OF RIGHT SHOULDER: ICD-10-CM

## 2021-01-29 DIAGNOSIS — Z11.59 SCREENING FOR VIRAL DISEASE: ICD-10-CM

## 2021-01-29 PROCEDURE — U0005 INFEC AGEN DETEC AMPLI PROBE: HCPCS

## 2021-01-29 PROCEDURE — U0003 INFECTIOUS AGENT DETECTION BY NUCLEIC ACID (DNA OR RNA); SEVERE ACUTE RESPIRATORY SYNDROME CORONAVIRUS 2 (SARS-COV-2) (CORONAVIRUS DISEASE [COVID-19]), AMPLIFIED PROBE TECHNIQUE, MAKING USE OF HIGH THROUGHPUT TECHNOLOGIES AS DESCRIBED BY CMS-2020-01-R: HCPCS

## 2021-01-30 LAB — SARS-COV-2 RNA RESP QL NAA+PROBE: NEGATIVE

## 2021-02-02 ENCOUNTER — TELEPHONE (OUTPATIENT)
Dept: OBGYN CLINIC | Facility: CLINIC | Age: 71
End: 2021-02-02

## 2021-02-02 NOTE — TELEPHONE ENCOUNTER
Patient called in requesting to speak to [place]  He stated he dropped off disability paper work on Sunday and slid it through the doors  He wants to know if [place] or anyone in the office received it?        Please advise,

## 2021-02-03 ENCOUNTER — ANESTHESIA EVENT (INPATIENT)
Dept: PERIOP | Facility: HOSPITAL | Age: 71
DRG: 483 | End: 2021-02-03
Payer: COMMERCIAL

## 2021-02-03 NOTE — TELEPHONE ENCOUNTER
Disability completed, waiting for original signature from Dr Potter Sides  Will call patient once completed

## 2021-02-03 NOTE — ANESTHESIA PREPROCEDURE EVALUATION
Procedure:  ARTHROPLASTY SHOULDER REVERSE (RIGHT) (Right Shoulder)    Relevant Problems   CARDIO   (+) Benign essential hypertension   (+) Mixed hyperlipidemia      GI/HEPATIC   (+) GERD without esophagitis   (+) S/P bariatric surgery      MUSCULOSKELETAL   (+) Arthritis of carpometacarpal (CMC) joint of left thumb      NEURO/PSYCH   (+) History of colon polyps      PULMONARY   (+) Obstructive sleep apnea        Physical Exam    Airway    Mallampati score: II  TM Distance: >3 FB  Neck ROM: full     Dental   No notable dental hx upper dentures and lower dentures,     Cardiovascular  Rhythm: regular, Cardiovascular exam normal    Pulmonary  Pulmonary exam normal Breath sounds clear to auscultation, Decreased breath sounds,     Other Findings        Anesthesia Plan  ASA Score- 2     Anesthesia Type- regional and general with ASA Monitors           Additional Monitors:   Airway Plan:           Plan Factors-    Induction-     Postoperative Plan-     Informed Consent-

## 2021-02-03 NOTE — TELEPHONE ENCOUNTER
Patient called into the call center and wife dropped of papers over the weekend   These were received and will call once completed

## 2021-02-03 NOTE — TELEPHONE ENCOUNTER
Patient calling back to see if his disability papers were found, office confirmed yes and on [place]'s desk to follow up    Confirmed

## 2021-02-04 ENCOUNTER — ANESTHESIA (INPATIENT)
Dept: PERIOP | Facility: HOSPITAL | Age: 71
DRG: 483 | End: 2021-02-04
Payer: COMMERCIAL

## 2021-02-04 ENCOUNTER — HOSPITAL ENCOUNTER (INPATIENT)
Facility: HOSPITAL | Age: 71
LOS: 1 days | Discharge: HOME | DRG: 483 | End: 2021-02-05
Attending: ORTHOPAEDIC SURGERY | Admitting: ORTHOPAEDIC SURGERY
Payer: COMMERCIAL

## 2021-02-04 ENCOUNTER — APPOINTMENT (INPATIENT)
Dept: RADIOLOGY | Facility: HOSPITAL | Age: 71
DRG: 483 | End: 2021-02-04
Payer: COMMERCIAL

## 2021-02-04 VITALS — HEART RATE: 114 BPM

## 2021-02-04 DIAGNOSIS — M12.811 ROTATOR CUFF TEAR ARTHROPATHY OF RIGHT SHOULDER: Chronic | ICD-10-CM

## 2021-02-04 DIAGNOSIS — M75.101 ROTATOR CUFF TEAR ARTHROPATHY OF RIGHT SHOULDER: Chronic | ICD-10-CM

## 2021-02-04 DIAGNOSIS — I10 BENIGN ESSENTIAL HYPERTENSION: Primary | ICD-10-CM

## 2021-02-04 LAB
ABO GROUP BLD: NORMAL
BLD GP AB SCN SERPL QL: NEGATIVE
GLUCOSE SERPL-MCNC: 100 MG/DL (ref 65–140)
RH BLD: POSITIVE
SPECIMEN EXPIRATION DATE: NORMAL

## 2021-02-04 PROCEDURE — 86901 BLOOD TYPING SEROLOGIC RH(D): CPT | Performed by: ORTHOPAEDIC SURGERY

## 2021-02-04 PROCEDURE — 0RRJ00Z REPLACEMENT OF RIGHT SHOULDER JOINT WITH REVERSE BALL AND SOCKET SYNTHETIC SUBSTITUTE, OPEN APPROACH: ICD-10-PCS | Performed by: ORTHOPAEDIC SURGERY

## 2021-02-04 PROCEDURE — 23472 RECONSTRUCT SHOULDER JOINT: CPT | Performed by: PHYSICIAN ASSISTANT

## 2021-02-04 PROCEDURE — 86850 RBC ANTIBODY SCREEN: CPT | Performed by: ORTHOPAEDIC SURGERY

## 2021-02-04 PROCEDURE — C1776 JOINT DEVICE (IMPLANTABLE): HCPCS | Performed by: ORTHOPAEDIC SURGERY

## 2021-02-04 PROCEDURE — 82948 REAGENT STRIP/BLOOD GLUCOSE: CPT

## 2021-02-04 PROCEDURE — 23472 RECONSTRUCT SHOULDER JOINT: CPT | Performed by: ORTHOPAEDIC SURGERY

## 2021-02-04 PROCEDURE — C1713 ANCHOR/SCREW BN/BN,TIS/BN: HCPCS | Performed by: ORTHOPAEDIC SURGERY

## 2021-02-04 PROCEDURE — 0LS30ZZ REPOSITION RIGHT UPPER ARM TENDON, OPEN APPROACH: ICD-10-PCS | Performed by: ORTHOPAEDIC SURGERY

## 2021-02-04 PROCEDURE — C9290 INJ, BUPIVACAINE LIPOSOME: HCPCS | Performed by: ANESTHESIOLOGY

## 2021-02-04 PROCEDURE — 86900 BLOOD TYPING SEROLOGIC ABO: CPT | Performed by: ORTHOPAEDIC SURGERY

## 2021-02-04 PROCEDURE — 99024 POSTOP FOLLOW-UP VISIT: CPT | Performed by: PHYSICIAN ASSISTANT

## 2021-02-04 PROCEDURE — 73030 X-RAY EXAM OF SHOULDER: CPT

## 2021-02-04 DEVICE — DELTA XTEND CEMENTLESS METAGLENE HA
Type: IMPLANTABLE DEVICE | Site: SHOULDER | Status: FUNCTIONAL
Brand: DELTA XTEND

## 2021-02-04 DEVICE — DELTA XTEND LOCKING METAGLENE SCREW DIA 4.5 LG 24MM
Type: IMPLANTABLE DEVICE | Site: SHOULDER | Status: FUNCTIONAL
Brand: DELTA XTEND

## 2021-02-04 DEVICE — DELTA XTEND CEMENTLESS MODULAR ECCENTRIC EPIPHYSIS SZ2 / RIGHT HA
Type: IMPLANTABLE DEVICE | Site: SHOULDER | Status: FUNCTIONAL
Brand: DELTA XTEND

## 2021-02-04 DEVICE — DELTA XTEND LATERALIZED GLENOSPHERE +2MM STANDARD 042MM
Type: IMPLANTABLE DEVICE | Site: SHOULDER | Status: FUNCTIONAL
Brand: DELTA XTEND

## 2021-02-04 DEVICE — DELTA XTEND LOCKING METAGLENE SCREW DIA 4.5 LG 30MM
Type: IMPLANTABLE DEVICE | Site: SHOULDER | Status: FUNCTIONAL
Brand: DELTA XTEND

## 2021-02-04 DEVICE — GLOBAL UNITE POROCOAT STANDARD STEM SIZE 14 129MM
Type: IMPLANTABLE DEVICE | Site: SHOULDER | Status: FUNCTIONAL
Brand: GLOBAL UNITE

## 2021-02-04 DEVICE — DELTA XTEND STANDARD HUMERAL PE CUP DIA42 /+3MM STD
Type: IMPLANTABLE DEVICE | Site: SHOULDER | Status: FUNCTIONAL
Brand: DELTA XTEND

## 2021-02-04 RX ORDER — FENTANYL CITRATE 50 UG/ML
INJECTION, SOLUTION INTRAMUSCULAR; INTRAVENOUS
Status: COMPLETED | OUTPATIENT
Start: 2021-02-04 | End: 2021-02-04

## 2021-02-04 RX ORDER — ACETAMINOPHEN 325 MG/1
650 TABLET ORAL EVERY 6 HOURS PRN
Status: DISCONTINUED | OUTPATIENT
Start: 2021-02-04 | End: 2021-02-05 | Stop reason: HOSPADM

## 2021-02-04 RX ORDER — LIDOCAINE HYDROCHLORIDE 10 MG/ML
INJECTION, SOLUTION EPIDURAL; INFILTRATION; INTRACAUDAL; PERINEURAL AS NEEDED
Status: DISCONTINUED | OUTPATIENT
Start: 2021-02-04 | End: 2021-02-04

## 2021-02-04 RX ORDER — MIDAZOLAM HYDROCHLORIDE 2 MG/2ML
1 INJECTION, SOLUTION INTRAMUSCULAR; INTRAVENOUS ONCE
Status: COMPLETED | OUTPATIENT
Start: 2021-02-04 | End: 2021-02-04

## 2021-02-04 RX ORDER — ROCURONIUM BROMIDE 10 MG/ML
INJECTION, SOLUTION INTRAVENOUS AS NEEDED
Status: DISCONTINUED | OUTPATIENT
Start: 2021-02-04 | End: 2021-02-04

## 2021-02-04 RX ORDER — EPHEDRINE SULFATE 50 MG/ML
INJECTION INTRAVENOUS AS NEEDED
Status: DISCONTINUED | OUTPATIENT
Start: 2021-02-04 | End: 2021-02-04

## 2021-02-04 RX ORDER — SODIUM CHLORIDE 9 MG/ML
INJECTION, SOLUTION INTRAVENOUS CONTINUOUS PRN
Status: DISCONTINUED | OUTPATIENT
Start: 2021-02-04 | End: 2021-02-04

## 2021-02-04 RX ORDER — NEOSTIGMINE METHYLSULFATE 1 MG/ML
INJECTION INTRAVENOUS AS NEEDED
Status: DISCONTINUED | OUTPATIENT
Start: 2021-02-04 | End: 2021-02-04

## 2021-02-04 RX ORDER — HYDROMORPHONE HCL/PF 1 MG/ML
0.25 SYRINGE (ML) INJECTION
Status: DISCONTINUED | OUTPATIENT
Start: 2021-02-04 | End: 2021-02-04 | Stop reason: HOSPADM

## 2021-02-04 RX ORDER — SODIUM CHLORIDE, SODIUM LACTATE, POTASSIUM CHLORIDE, CALCIUM CHLORIDE 600; 310; 30; 20 MG/100ML; MG/100ML; MG/100ML; MG/100ML
75 INJECTION, SOLUTION INTRAVENOUS CONTINUOUS
Status: DISCONTINUED | OUTPATIENT
Start: 2021-02-04 | End: 2021-02-05 | Stop reason: HOSPADM

## 2021-02-04 RX ORDER — GLYCOPYRROLATE 0.2 MG/ML
INJECTION INTRAMUSCULAR; INTRAVENOUS AS NEEDED
Status: DISCONTINUED | OUTPATIENT
Start: 2021-02-04 | End: 2021-02-04

## 2021-02-04 RX ORDER — LISINOPRIL 10 MG/1
10 TABLET ORAL DAILY
Status: DISCONTINUED | OUTPATIENT
Start: 2021-02-05 | End: 2021-02-05 | Stop reason: HOSPADM

## 2021-02-04 RX ORDER — ONDANSETRON 2 MG/ML
4 INJECTION INTRAMUSCULAR; INTRAVENOUS ONCE AS NEEDED
Status: DISCONTINUED | OUTPATIENT
Start: 2021-02-04 | End: 2021-02-04 | Stop reason: HOSPADM

## 2021-02-04 RX ORDER — FENTANYL CITRATE 50 UG/ML
INJECTION, SOLUTION INTRAMUSCULAR; INTRAVENOUS AS NEEDED
Status: DISCONTINUED | OUTPATIENT
Start: 2021-02-04 | End: 2021-02-04

## 2021-02-04 RX ORDER — PROPOFOL 10 MG/ML
INJECTION, EMULSION INTRAVENOUS AS NEEDED
Status: DISCONTINUED | OUTPATIENT
Start: 2021-02-04 | End: 2021-02-04

## 2021-02-04 RX ORDER — DOCUSATE SODIUM 100 MG/1
100 CAPSULE, LIQUID FILLED ORAL 2 TIMES DAILY
Status: DISCONTINUED | OUTPATIENT
Start: 2021-02-04 | End: 2021-02-05 | Stop reason: HOSPADM

## 2021-02-04 RX ORDER — FENTANYL CITRATE/PF 50 MCG/ML
25 SYRINGE (ML) INJECTION
Status: DISCONTINUED | OUTPATIENT
Start: 2021-02-04 | End: 2021-02-04 | Stop reason: HOSPADM

## 2021-02-04 RX ORDER — ASPIRIN 325 MG
325 TABLET ORAL DAILY
Status: DISCONTINUED | OUTPATIENT
Start: 2021-02-05 | End: 2021-02-05 | Stop reason: HOSPADM

## 2021-02-04 RX ORDER — ALBUMIN, HUMAN INJ 5% 5 %
SOLUTION INTRAVENOUS CONTINUOUS PRN
Status: DISCONTINUED | OUTPATIENT
Start: 2021-02-04 | End: 2021-02-04

## 2021-02-04 RX ORDER — CEFAZOLIN SODIUM 2 G/50ML
2000 SOLUTION INTRAVENOUS ONCE
Status: COMPLETED | OUTPATIENT
Start: 2021-02-04 | End: 2021-02-04

## 2021-02-04 RX ORDER — HYDROMORPHONE HCL/PF 1 MG/ML
0.1 SYRINGE (ML) INJECTION
Status: DISCONTINUED | OUTPATIENT
Start: 2021-02-04 | End: 2021-02-04

## 2021-02-04 RX ORDER — SODIUM CHLORIDE, SODIUM LACTATE, POTASSIUM CHLORIDE, CALCIUM CHLORIDE 600; 310; 30; 20 MG/100ML; MG/100ML; MG/100ML; MG/100ML
INJECTION, SOLUTION INTRAVENOUS CONTINUOUS PRN
Status: DISCONTINUED | OUTPATIENT
Start: 2021-02-04 | End: 2021-02-04

## 2021-02-04 RX ORDER — CEFAZOLIN SODIUM 1 G/50ML
1000 SOLUTION INTRAVENOUS EVERY 8 HOURS
Status: COMPLETED | OUTPATIENT
Start: 2021-02-04 | End: 2021-02-05

## 2021-02-04 RX ORDER — MIDAZOLAM HYDROCHLORIDE 2 MG/2ML
1 INJECTION, SOLUTION INTRAMUSCULAR; INTRAVENOUS ONCE
Status: DISCONTINUED | OUTPATIENT
Start: 2021-02-04 | End: 2021-02-05 | Stop reason: HOSPADM

## 2021-02-04 RX ORDER — MAGNESIUM HYDROXIDE 1200 MG/15ML
LIQUID ORAL AS NEEDED
Status: DISCONTINUED | OUTPATIENT
Start: 2021-02-04 | End: 2021-02-04 | Stop reason: HOSPADM

## 2021-02-04 RX ORDER — MIDAZOLAM HYDROCHLORIDE 2 MG/2ML
1 INJECTION, SOLUTION INTRAMUSCULAR; INTRAVENOUS ONCE
Status: DISCONTINUED | OUTPATIENT
Start: 2021-02-04 | End: 2021-02-04 | Stop reason: HOSPADM

## 2021-02-04 RX ORDER — ONDANSETRON 2 MG/ML
INJECTION INTRAMUSCULAR; INTRAVENOUS AS NEEDED
Status: DISCONTINUED | OUTPATIENT
Start: 2021-02-04 | End: 2021-02-04

## 2021-02-04 RX ORDER — HYDROMORPHONE HCL/PF 1 MG/ML
0.2 SYRINGE (ML) INJECTION
Status: DISCONTINUED | OUTPATIENT
Start: 2021-02-04 | End: 2021-02-05 | Stop reason: HOSPADM

## 2021-02-04 RX ORDER — BUPIVACAINE HYDROCHLORIDE 2.5 MG/ML
INJECTION, SOLUTION EPIDURAL; INFILTRATION; INTRACAUDAL
Status: COMPLETED | OUTPATIENT
Start: 2021-02-04 | End: 2021-02-04

## 2021-02-04 RX ORDER — MIDAZOLAM HYDROCHLORIDE 2 MG/2ML
INJECTION, SOLUTION INTRAMUSCULAR; INTRAVENOUS
Status: COMPLETED | OUTPATIENT
Start: 2021-02-04 | End: 2021-02-04

## 2021-02-04 RX ORDER — ONDANSETRON 2 MG/ML
4 INJECTION INTRAMUSCULAR; INTRAVENOUS EVERY 6 HOURS PRN
Status: DISCONTINUED | OUTPATIENT
Start: 2021-02-04 | End: 2021-02-05 | Stop reason: HOSPADM

## 2021-02-04 RX ORDER — TRAMADOL HYDROCHLORIDE 50 MG/1
50 TABLET ORAL EVERY 6 HOURS PRN
Status: DISCONTINUED | OUTPATIENT
Start: 2021-02-04 | End: 2021-02-05 | Stop reason: HOSPADM

## 2021-02-04 RX ORDER — SODIUM CHLORIDE, SODIUM LACTATE, POTASSIUM CHLORIDE, CALCIUM CHLORIDE 600; 310; 30; 20 MG/100ML; MG/100ML; MG/100ML; MG/100ML
125 INJECTION, SOLUTION INTRAVENOUS CONTINUOUS
Status: DISCONTINUED | OUTPATIENT
Start: 2021-02-04 | End: 2021-02-04

## 2021-02-04 RX ORDER — ATORVASTATIN CALCIUM 20 MG/1
20 TABLET, FILM COATED ORAL
Status: DISCONTINUED | OUTPATIENT
Start: 2021-02-04 | End: 2021-02-05 | Stop reason: HOSPADM

## 2021-02-04 RX ADMIN — BUPIVACAINE HYDROCHLORIDE 10 ML: 2.5 INJECTION, SOLUTION EPIDURAL; INFILTRATION; INTRACAUDAL at 10:49

## 2021-02-04 RX ADMIN — ONDANSETRON 4 MG: 2 INJECTION INTRAMUSCULAR; INTRAVENOUS at 11:47

## 2021-02-04 RX ADMIN — SODIUM CHLORIDE, SODIUM LACTATE, POTASSIUM CHLORIDE, AND CALCIUM CHLORIDE 125 ML/HR: .6; .31; .03; .02 INJECTION, SOLUTION INTRAVENOUS at 09:28

## 2021-02-04 RX ADMIN — SODIUM CHLORIDE: 0.9 INJECTION, SOLUTION INTRAVENOUS at 12:03

## 2021-02-04 RX ADMIN — PHENYLEPHRINE HYDROCHLORIDE 100 MCG: 10 INJECTION INTRAVENOUS at 12:05

## 2021-02-04 RX ADMIN — CEFAZOLIN SODIUM 2000 MG: 2 SOLUTION INTRAVENOUS at 09:57

## 2021-02-04 RX ADMIN — DOCUSATE SODIUM 100 MG: 100 CAPSULE, LIQUID FILLED ORAL at 21:13

## 2021-02-04 RX ADMIN — ATORVASTATIN CALCIUM 20 MG: 20 TABLET, FILM COATED ORAL at 21:13

## 2021-02-04 RX ADMIN — HYDROMORPHONE HYDROCHLORIDE 0.2 MG: 1 INJECTION, SOLUTION INTRAMUSCULAR; INTRAVENOUS; SUBCUTANEOUS at 21:13

## 2021-02-04 RX ADMIN — SODIUM CHLORIDE: 0.9 INJECTION, SOLUTION INTRAVENOUS at 10:10

## 2021-02-04 RX ADMIN — GLYCOPYRROLATE 0.8 MG: 0.2 INJECTION, SOLUTION INTRAMUSCULAR; INTRAVENOUS at 12:10

## 2021-02-04 RX ADMIN — MIDAZOLAM 1 MG: 1 INJECTION INTRAMUSCULAR; INTRAVENOUS at 12:57

## 2021-02-04 RX ADMIN — PHENYLEPHRINE HYDROCHLORIDE 80 MCG/MIN: 10 INJECTION INTRAVENOUS at 10:15

## 2021-02-04 RX ADMIN — CEFAZOLIN SODIUM 1000 MG: 1 SOLUTION INTRAVENOUS at 17:14

## 2021-02-04 RX ADMIN — NEOSTIGMINE METHYLSULFATE 5 MG: 1 INJECTION INTRAVENOUS at 12:10

## 2021-02-04 RX ADMIN — PHENYLEPHRINE HYDROCHLORIDE 100 MCG: 10 INJECTION INTRAVENOUS at 10:15

## 2021-02-04 RX ADMIN — PHENYLEPHRINE HYDROCHLORIDE 100 MCG: 10 INJECTION INTRAVENOUS at 10:22

## 2021-02-04 RX ADMIN — PHENYLEPHRINE HYDROCHLORIDE 100 MCG: 10 INJECTION INTRAVENOUS at 10:10

## 2021-02-04 RX ADMIN — FENTANYL CITRATE 50 MCG: 50 INJECTION, SOLUTION INTRAMUSCULAR; INTRAVENOUS at 10:04

## 2021-02-04 RX ADMIN — ALBUMIN (HUMAN): 12.5 INJECTION, SOLUTION INTRAVENOUS at 10:11

## 2021-02-04 RX ADMIN — ROCURONIUM BROMIDE 50 MG: 10 INJECTION, SOLUTION INTRAVENOUS at 10:04

## 2021-02-04 RX ADMIN — FENTANYL CITRATE 25 MCG: 50 INJECTION, SOLUTION INTRAMUSCULAR; INTRAVENOUS at 12:30

## 2021-02-04 RX ADMIN — FENTANYL CITRATE 50 MCG: 50 INJECTION, SOLUTION INTRAMUSCULAR; INTRAVENOUS at 10:49

## 2021-02-04 RX ADMIN — MIDAZOLAM 2 MG: 1 INJECTION INTRAMUSCULAR; INTRAVENOUS at 10:49

## 2021-02-04 RX ADMIN — PHENYLEPHRINE HYDROCHLORIDE 100 MCG: 10 INJECTION INTRAVENOUS at 10:07

## 2021-02-04 RX ADMIN — FENTANYL CITRATE 25 MCG: 50 INJECTION, SOLUTION INTRAMUSCULAR; INTRAVENOUS at 12:23

## 2021-02-04 RX ADMIN — PHENYLEPHRINE HYDROCHLORIDE 100 MCG: 10 INJECTION INTRAVENOUS at 10:19

## 2021-02-04 RX ADMIN — PHENYLEPHRINE HYDROCHLORIDE 100 MCG: 10 INJECTION INTRAVENOUS at 10:05

## 2021-02-04 RX ADMIN — HYDROMORPHONE HYDROCHLORIDE 0.25 MG: 1 INJECTION, SOLUTION INTRAMUSCULAR; INTRAVENOUS; SUBCUTANEOUS at 14:26

## 2021-02-04 RX ADMIN — SODIUM CHLORIDE, SODIUM LACTATE, POTASSIUM CHLORIDE, AND CALCIUM CHLORIDE 125 ML/HR: .6; .31; .03; .02 INJECTION, SOLUTION INTRAVENOUS at 09:34

## 2021-02-04 RX ADMIN — SODIUM CHLORIDE, SODIUM LACTATE, POTASSIUM CHLORIDE, AND CALCIUM CHLORIDE 75 ML/HR: .6; .31; .03; .02 INJECTION, SOLUTION INTRAVENOUS at 17:15

## 2021-02-04 RX ADMIN — SODIUM CHLORIDE, SODIUM LACTATE, POTASSIUM CHLORIDE, AND CALCIUM CHLORIDE 125 ML/HR: .6; .31; .03; .02 INJECTION, SOLUTION INTRAVENOUS at 13:39

## 2021-02-04 RX ADMIN — HYDROMORPHONE HYDROCHLORIDE 0.1 MG: 1 INJECTION, SOLUTION INTRAMUSCULAR; INTRAVENOUS; SUBCUTANEOUS at 17:14

## 2021-02-04 RX ADMIN — FENTANYL CITRATE 50 MCG: 50 INJECTION, SOLUTION INTRAMUSCULAR; INTRAVENOUS at 12:27

## 2021-02-04 RX ADMIN — EPHEDRINE SULFATE 5 MG: 50 INJECTION, SOLUTION INTRAVENOUS at 12:07

## 2021-02-04 RX ADMIN — PROPOFOL 200 MG: 10 INJECTION, EMULSION INTRAVENOUS at 10:04

## 2021-02-04 RX ADMIN — TRAMADOL HYDROCHLORIDE 50 MG: 50 TABLET, FILM COATED ORAL at 23:54

## 2021-02-04 RX ADMIN — LIDOCAINE HYDROCHLORIDE 50 MG: 10 INJECTION, SOLUTION EPIDURAL; INFILTRATION; INTRACAUDAL; PERINEURAL at 10:04

## 2021-02-04 RX ADMIN — GLYCOPYRROLATE 0.2 MG: 0.2 INJECTION, SOLUTION INTRAMUSCULAR; INTRAVENOUS at 10:44

## 2021-02-04 RX ADMIN — EPHEDRINE SULFATE 5 MG: 50 INJECTION, SOLUTION INTRAVENOUS at 10:44

## 2021-02-04 NOTE — PLAN OF CARE
Problem: Potential for Falls  Goal: Patient will remain free of falls  Description: INTERVENTIONS:  - Assess patient frequently for physical needs  -  Identify cognitive and physical deficits and behaviors that affect risk of falls  -  [place] fall precautions as indicated by assessment   - Educate patient/family on patient safety including physical limitations  - Instruct patient to call for assistance with activity based on assessment  - Modify environment to reduce risk of injury  - Consider OT/PT consult to assist with strengthening/mobility  Outcome: Progressing     Problem: INFECTION - ADULT  Goal: Absence or prevention of progression during hospitalization  Description: INTERVENTIONS:  - Assess and monitor for signs and symptoms of infection  - Monitor lab/diagnostic results  - Monitor all insertion sites, i e  indwelling lines, tubes, and drains  - Monitor endotracheal if appropriate and nasal secretions for changes in amount and color  - [place] appropriate cooling/warming therapies per order  - Administer medications as ordered  - Instruct and encourage patient and family to use good hand hygiene technique  - Identify and instruct in appropriate isolation precautions for identified infection/condition  Outcome: Progressing  Goal: Absence of fever/infection during neutropenic period  Description: INTERVENTIONS:  - Monitor WBC    Outcome: Progressing     Problem: SAFETY ADULT  Goal: Patient will remain free of falls  Description: INTERVENTIONS:  - Assess patient frequently for physical needs  -  Identify cognitive and physical deficits and behaviors that affect risk of falls    -  [place] fall precautions as indicated by assessment   - Educate patient/family on patient safety including physical limitations  - Instruct patient to call for assistance with activity based on assessment  - Modify environment to reduce risk of injury  - Consider OT/PT consult to assist with strengthening/mobility  Outcome: Progressing  Goal: Maintain or return to baseline ADL function  Description: INTERVENTIONS:  -  Assess patient's ability to carry out ADLs; assess patient's baseline for ADL function and identify physical deficits which impact ability to perform ADLs (bathing, care of mouth/teeth, toileting, grooming, dressing, etc )  - Assess/evaluate cause of self-care deficits   - Assess range of motion  - Assess patient's mobility; develop plan if impaired  - Assess patient's need for assistive devices and provide as appropriate  - Encourage maximum independence but intervene and supervise when necessary  - Involve family in performance of ADLs  - Assess for home care needs following discharge   - Consider OT consult to assist with ADL evaluation and planning for discharge  - Provide patient education as appropriate  Outcome: Progressing  Goal: Maintain or return mobility status to optimal level  Description: INTERVENTIONS:  - Assess patient's baseline mobility status (ambulation, transfers, stairs, etc )    - Identify cognitive and physical deficits and behaviors that affect mobility  - Identify mobility aids required to assist with transfers and/or ambulation (gait belt, sit-to-stand, lift, walker, cane, etc )  - [place] fall precautions as indicated by assessment  - Record patient progress and toleration of activity level on Mobility SBAR; progress patient to next Phase/Stage  - Instruct patient to call for assistance with activity based on assessment  - Consider rehabilitation consult to assist with strengthening/weightbearing, etc   Outcome: Progressing     Problem: SKIN/TISSUE INTEGRITY - ADULT  Goal: Skin integrity remains intact  Description: INTERVENTIONS  - Identify patients at risk for skin breakdown  - Assess and monitor skin integrity  - Assess and monitor nutrition and hydration status  - Monitor labs (i e  albumin)  - Assess for incontinence   - Turn and reposition patient  - Assist with mobility/ambulation  - Relieve pressure over bony prominences  - Avoid friction and shearing  - Provide appropriate hygiene as needed including keeping skin clean and dry  - Evaluate need for skin moisturizer/barrier cream  - Collaborate with interdisciplinary team (i e  Nutrition, Rehabilitation, etc )   - Patient/family teaching  Outcome: Progressing  Goal: Incision(s), wounds(s) or drain site(s) healing without S/S of infection  Description: INTERVENTIONS  - Assess and document risk factors for skin impairment   - Assess and document dressing, incision, wound bed, drain sites and surrounding tissue  - Consider nutrition services referral as needed  - Oral mucous membranes remain intact  - Provide patient/ family education  Outcome: Progressing  Goal: Oral mucous membranes remain intact  Description: INTERVENTIONS  - Assess oral mucosa and hygiene practices  - Implement preventative oral hygiene regimen  - Implement oral medicated treatments as ordered  - Initiate Nutrition services referral as needed  Outcome: Progressing

## 2021-02-04 NOTE — H&P
Assessment/Plan:  The patient would like to proceed with right reverse total shoulder replacement  We discussed the procedure and risks at length, including but not limited to, infection, bleeding, wound issues, nerve injury, blood clot, stiffness, failure of procedure, and need for additional surgery  We will see the patient back at the time of surgery  Subjective:   Brian Hogue is a 70 y o  male with right shoulder rotator cuff arthropathy with notes ongoing pain and weakness about his shoulder despite conservative treatment thus far  Review of Systems   Constitutional: Negative  Negative for chills and fever  HENT: Negative  Negative for ear pain and sore throat  Eyes: Negative  Negative for pain and redness  Respiratory: Negative  Negative for shortness of breath and wheezing  Cardiovascular: Negative for chest pain and palpitations  Gastrointestinal: Negative  Negative for abdominal pain and blood in stool  Endocrine: Negative  Negative for polydipsia and polyuria  Genitourinary: Negative  Negative for difficulty urinating and dysuria  Musculoskeletal:        As noted in HPI   Skin: Negative  Negative for pallor and rash  Neurological: Negative  Negative for dizziness and numbness  Hematological: Negative  Negative for adenopathy  Does not bruise/bleed easily  Psychiatric/Behavioral: Negative  Negative for confusion and suicidal ideas           Past Medical History:   Diagnosis Date    AC (acromioclavicular) joint bone spurs     Last assessed - 2/19/15    Achilles tendinitis, unspecified leg 06/04/2010    Resolved - 1/8/18    Anemia 08/12/2010    Resolved - 1/11/16    Blood in urine     BPH without urinary obstruction     Hypertrophy    Deep venous thrombosis of distal lower extremity ([address] )     Deep venous thrombosis of distal lower extremity (HCC)     Last assessed - 6/4/15    Heartburn     Last assessed - 12/12/14    Hemorrhoids     Hernia, inguinal 2009    Resolved - 18    History of stomach ulcers     Hypertension     Kidney stones     Mixed hyperlipidemia     Nephrolithiasis     Right inguinal hernia     Last assessed - 16    Sleep apnea     unable to tolerate c-pap    Tear of left rotator cuff     Last assessed - 2/19/15       Past Surgical History:   Procedure Laterality Date    BARIATRIC SURGERY  06/15/2015    Laparoscopic Longitudinal Gastrectomy for morbid obesity, Managed by - Blank Simpson     EXPLORATORY LAPAROTOMY W/ BOWEL RESECTION Right 3/3/2016    Procedure: Incarcerated possibly strangulated right inguinal hernia; Surgeon: Sudha Fried MD;  Location: [address] [place] [address];  Service:    [address] SURGERY       - Rt Hip Replacement,  - Lt Hip Replacement, 2013 - Lt Hip Revision    INGUINAL HERNIA REPAIR      Last assessed - 16    JOINT REPLACEMENT      KNEE SURGERY      Double     CA COLONOSCOPY FLX DX W/COLLJ SPEC WHEN PFRMD N/A 1/15/2016    Procedure: COLONOSCOPY;  Surgeon: Dru Smith MD;  Location: [facility]; Service: Gastroenterology    CA COLONOSCOPY FLX DX W/COLLJ SPEC WHEN PFRMD N/A 2019    Procedure: COLONOSCOPY;  Surgeon: Dru Smith MD;  Location: [facility]; Service: Gastroenterology    TONSILLECTOMY         Family History   Problem Relation Age of Onset    Arthritis Father     Diabetes Father     Hypertension Father     Hyperlipidemia Father     Diabetes Family     Emphysema Family     Heart disease Family     Hypertension Family     Mental illness Neg Hx        Social History     Occupational History    Not on file   Tobacco Use    Smoking status: Former Smoker     Quit date: 1994     Years since quittin 8    Smokeless tobacco: Never Used   Substance and Sexual Activity    Alcohol use: Yes     Frequency: 2-3 times a week     Comment:  Occ     Drug use: No    Sexual activity: Not on file         Current Facility-Administered Medications:     lactated ringers infusion, 125 mL/hr, Intravenous, Continuous, Alan Rodriguez MD    Allergies   Allergen Reactions    Percocet [Oxycodone-Acetaminophen] Hallucinations    Percolone [Oxycodone] Hallucinations       Objective:  Vitals:    02/04/21 0907   BP: 117/72   Pulse: 98   Resp: 18   Temp: 97 8 °F (36 6 °C)   SpO2: 93%       Ortho Exam    Physical Exam  Constitutional:       General: He is not in acute distress  Appearance: He is well-developed  HENT:      Head: Normocephalic and atraumatic  Eyes:      General: No scleral icterus  Conjunctiva/sclera: Conjunctivae normal    Neck:      Vascular: No JVD  Cardiovascular:      Rate and Rhythm: Normal rate  Pulmonary:      Effort: Pulmonary effort is normal  No respiratory distress  Skin:     General: Skin is warm  Neurological:      Mental Status: He is alert and oriented to person, place, and time        Coordination: Coordination normal          Right shoulder: Positive drop arm test

## 2021-02-04 NOTE — ANESTHESIA PROCEDURE NOTES
Peripheral Block    Patient location during procedure: pre-op  Start time: 2/4/2021 9:45 AM  Reason for block: procedure for pain, at surgeon's request and post-op pain management  Staffing  Anesthesiologist: Maci Guerra MD  Performed: anesthesiologist   Preanesthetic Checklist  Completed: patient identified, site marked, surgical consent, pre-op evaluation, timeout performed, IV checked, risks and benefits discussed and monitors and equipment checked  Peripheral Block  Patient position: supine  Prep: ChloraPrep  Patient monitoring: heart rate, cardiac monitor, continuous pulse ox and frequent blood pressure checks  Block type: interscalene  Laterality: right  Injection technique: single-shot  Procedures: ultrasound guided, Ultrasound guidance required for the procedure to increase accuracy and safety of medication placement and decrease risk of complications   and nerve stimulator  Ultrasound permanent image savedbupivacaine (MARCAINE) 0 25 % perineural infiltration, 10 mL  midazolam (VERSED) 2 mg/2 mL IV, 2 mg  fentaNYL 50 mcg/mL IV, 50 mcg (exparel 20 ml)  Needle  Needle type: Stimuplex   Needle gauge: 22 G  Needle length: 5 cm  Needle localization: anatomical landmarks and ultrasound guidance  Needle insertion depth: 3 cm  Test dose: negative  Assessment  Injection assessment: incremental injection, local visualized surrounding nerve on ultrasound, negative aspiration for heme and no paresthesia on injection  Paresthesia pain: none  Heart rate change: no  Slow fractionated injection: yes  Post-procedure:  site cleaned  patient tolerated the procedure well with no immediate complications

## 2021-02-04 NOTE — OP NOTE
OPERATIVE REPORT  PATIENT NAME: Mellissa Winchester    :  1950  MRN: [id number]  Pt Location: [facility] OR ROOM 03    SURGERY DATE: 2021    Surgeon(s) and Role:     * Tian Garcia MD - Primary     * Tony Anand PA-C - Assisting necessary for the procedure for assistance with retraction of vital structures well as assistance and preparation of the glenoid and proximal humerus as well as assistance in placement of the implants  Lindsey VELASCO   [facility] 2nd assistant    Preop Diagnosis:  Rotator cuff tear arthropathy of right shoulder [M75 101, M12 811]  Rupture of right long head biceps tendon, subsequent encounter [S46 111D]    Post-Op Diagnosis Codes:     * Rotator cuff tear arthropathy of right shoulder [M75 101, M12 811]     * Rupture of right long head biceps tendon, subsequent encounter [S46 111D]    Procedure(s) (LRB):  ARTHROPLASTY SHOULDER REVERSE (RIGHT) (Right) utilizing Depuy Delta extend cement less metaglene standard with 3 locking metaglene screws and a lateralize glenosphere +2 mm x 42 mm standard and a Global unite [place] standard stem size 14 x 129 mm with a Delta extend modular eccentric epiphysis size 2 right hydroxyapatite coated cement less and a Delta extend humeral polyethylene cup standard size 42 by +3    Specimen(s):  * No specimens in log *    Estimated Blood Loss:   250 mL    Drains:  * No LDAs found *    Anesthesia Type:   General w/ Regional    Operative Indications:  Rotator cuff tear arthropathy of right shoulder [M75 101, M12 811]  Rupture of right long head biceps tendon, subsequent encounter [S46 111D]  Mac Lipfrank is a 78-year-old male who has been suffering long-term with right shoulder rotator cuff tear arthropathy with significant weakness in movement of the right shoulder due to massive and irreparable rotator cuff tears of supraspinatus and infraspinatus as well as significant atrophy of the rotator cuff musculature in total   He did have some arthritic changes well with a high-riding humeral head found on imaging  He understood the risks and benefits of right shoulder reverse total shoulder replacement and wished to go ahead  He had failed non operative measures such as physical therapy and anti-inflammatories and ice and activity modification and wished to go ahead with this procedure  The risks are inclusive of but not limited to infection, stiffness, nerve injury causing numbness pain and weakness, worsening of symptoms, failure to regain full strength and ability, blood clots, medical problems perioperatively, dislocation, fracture, and need for further surgery  Operative Findings:  Right shoulder exam under anesthesia demonstrated significant stiffness with forward flexion and abduction passively each to only 90° with external rotation to 50° and internal rotation to 40°  Intra-articular findings demonstrated significant arthritic change along the glenoid and the humeral head with significant thinning an absence of articular cartilage in many locations  There was no rotator cuff found along supraspinatus and infraspinatus as well as teres minor with only a thin subscapularis tendon still intact and long head of biceps tendon was torn and retracted down into the upper arm  I did perform a soft tissue tenodesis for the long head of biceps tendon at the end of the procedure  Excellent stability was noted at the end of the procedure with good bone stock as well  Range of motion was outstanding at the end of the operation achieving 160° of forward flexion 150° of abduction with external rotation to 90° internal rotation to 80° and excellent stability with good tension found along the deltoid as well as the conjoined tendon  Complications:   None    Procedure and Technique:  Gianluca De Souza was taken to the operating room and placed supine on the OR table    He was given preoperative IV antibiotics was preoperative regional anesthesia by attending anesthesiologist  General anesthesia was induced and he was brought comfortably and safely into the semi beach chair position with all parts well padded and the head neutral   The right shoulder was taken through exam under anesthesia as described above  The right upper extremity was then prepped and draped in usual sterile fashion  A surgical time-out was taken  We created a standard deltopectoral approach for the right shoulder utilizing a 15 blade  We did carefully dissect with good attention to hemostasis  We dissected down to the deltopectoral interval and took the cephalic vein laterally and protected that throughout the procedure  We then came down upon the conjoined tendon and did readily dissect that  We did release the upper border of pectoralis tendon  We then performed a release of coracoacromial ligament and then performed a tenotomy of the subscapularis tendon which was rather thin nature  We did tag this with Ethibond sutures for later repair to into the procedure  We then exposed a humeral head that did dislocate very readily as the supraspinatus and infraspinatus tendons were completely torn as well as some significant damage to the teres minor  We did begin with the canal finer and reamed up to a size 14 with appropriate chatter at the end  We then utilized the guide to create a 30 degree retroversion humeral head cut  We did take slightly more after this initial cut and were quite pleased with the cut overall  We then put a proximal humerus protector plate on the proximal humerus and exposed the glenoid  We did remove the labrum circumferentially about the glenoid  We did note that the long head of the biceps tendon was retracted down into the upper portion of the arm  We did later tenodesed this to adjacent soft tissue with Ethibond suture  We did remove the stump of the long head of biceps tendon intra-articularly and did expose very nicely the glenoid    We then utilized the centering pin guide for the glenoid and did place this slightly inferiorly along the glenoid make certain that we had appropriate placement without any signs of notching or impingement for the implant  We then utilized the starter Reamer followed by the eccentric Reamer by hand  We then found some sclerotic bone along the glenoid and did drill some very small holes in this glenoid and then drilled for the centering peg  We irrigated thoroughly and found nicely bleeding bone  We then placed the real metaglene  We then drilled for and placed 3 locking screws with 2 being 30 mm in length and 1 being 24 mm in length  The posterior screw hole was left empty as there was only a small amount of bone posteriorly which is common  We then irrigated and after locking all of the locking screws then turned our attention back to the proximal humerus  We did utilized the acetabular 42 mm Reamer and did ream the proximal humerus centrally  We were pleased with this and irrigated and then utilized that concavity to place the lateralized 42 mm by +2 glenosphere  We did engage that very nicely and then impacted it and engaged it once more with the screwdriver  We were quite pleased with the prosthesis at this point  We then placed the trial prosthesis for the humeral stem in 10° of retroversion  We then placed the +3 mm polyethylene and felt that this was appropriate for tension with good range of motion as listed above  We then removed the trial components and irrigated further along the humeral canal and then placed the real humeral stem as well as the modular eccentric epiphysis size 2 right  We did impact that very nicely  We then trialed 1 more time +3 mm spacer and found that of excellent tension and stability along the deltoid and the conjoined tendon  With we did have excellent range of motion as well  We removed the trial polyethylene spacer and then irrigated and impacted in place the real +3 mm polyethylene spacer    We reduced the shoulder nicely and ranged it once again and found excellent tension along the conjoined tendon as well as the deltoid  We then irrigated further and repaired the subscapularis tendon in interrupted fashion with Ethibond sutures and also tenodesed the long head of biceps tendon with Ethibond suture to adjacent soft tissue  We irrigated further and closed the deltopectoral interval with 0 Vicryl suture followed by 2-0 Vicryl and 4-0 Vicryl and skin glue for the skin  Dry, sterile dressings were applied with a sling  He tolerated the procedure well and transferred to recovery room stable condition  He will be admitted to the hospital and will be on the reverse total shoulder arthroplasty rehabilitation protocol  He will have IV antibiotics for 23 hours postoperatively  He will have a hospitalist consult for perioperative medical management     I was present for the entire procedure and A qualified resident physician was not available    Patient Disposition:  PACU     SIGNATURE: Paul Enrique MD  DATE: February 4, 2021  TIME: 12:01 PM

## 2021-02-05 ENCOUNTER — TRANSITIONAL CARE MANAGEMENT (OUTPATIENT)
Dept: FAMILY MEDICINE CLINIC | Facility: CLINIC | Age: 71
End: 2021-02-05

## 2021-02-05 VITALS
RESPIRATION RATE: 19 BRPM | BODY MASS INDEX: 37.67 KG/M2 | DIASTOLIC BLOOD PRESSURE: 67 MMHG | SYSTOLIC BLOOD PRESSURE: 123 MMHG | TEMPERATURE: 99 F | HEIGHT: 67 IN | OXYGEN SATURATION: 99 % | HEART RATE: 91 BPM | WEIGHT: 240 LBS

## 2021-02-05 LAB
ANION GAP SERPL CALCULATED.3IONS-SCNC: 9 MMOL/L (ref 4–13)
BUN SERPL-MCNC: 14 MG/DL (ref 5–25)
CALCIUM SERPL-MCNC: 8.4 MG/DL (ref 8.3–10.1)
CHLORIDE SERPL-SCNC: 101 MMOL/L (ref 100–108)
CO2 SERPL-SCNC: 25 MMOL/L (ref 21–32)
CREAT SERPL-MCNC: 1.16 MG/DL (ref 0.6–1.3)
ERYTHROCYTE [DISTWIDTH] IN BLOOD BY AUTOMATED COUNT: 14.4 % (ref 11.6–15.1)
GFR SERPL CREATININE-BSD FRML MDRD: 63 ML/MIN/1.73SQ M
GLUCOSE SERPL-MCNC: 125 MG/DL (ref 65–140)
HCT VFR BLD AUTO: 36.8 % (ref 36.5–49.3)
HGB BLD-MCNC: 11.2 G/DL (ref 12–17)
MCH RBC QN AUTO: 27.4 PG (ref 26.8–34.3)
MCHC RBC AUTO-ENTMCNC: 30.4 G/DL (ref 31.4–37.4)
MCV RBC AUTO: 90 FL (ref 82–98)
PLATELET # BLD AUTO: 170 THOUSANDS/UL (ref 149–390)
PMV BLD AUTO: 11.3 FL (ref 8.9–12.7)
POTASSIUM SERPL-SCNC: 3.9 MMOL/L (ref 3.5–5.3)
RBC # BLD AUTO: 4.09 MILLION/UL (ref 3.88–5.62)
SODIUM SERPL-SCNC: 135 MMOL/L (ref 136–145)
WBC # BLD AUTO: 9.16 THOUSAND/UL (ref 4.31–10.16)

## 2021-02-05 PROCEDURE — 97535 SELF CARE MNGMENT TRAINING: CPT

## 2021-02-05 PROCEDURE — 97167 OT EVAL HIGH COMPLEX 60 MIN: CPT

## 2021-02-05 PROCEDURE — 80048 BASIC METABOLIC PNL TOTAL CA: CPT | Performed by: PHYSICIAN ASSISTANT

## 2021-02-05 PROCEDURE — 97163 PT EVAL HIGH COMPLEX 45 MIN: CPT

## 2021-02-05 PROCEDURE — 99024 POSTOP FOLLOW-UP VISIT: CPT | Performed by: PHYSICIAN ASSISTANT

## 2021-02-05 PROCEDURE — 85027 COMPLETE CBC AUTOMATED: CPT | Performed by: PHYSICIAN ASSISTANT

## 2021-02-05 RX ORDER — TRAMADOL HYDROCHLORIDE 50 MG/1
50 TABLET ORAL EVERY 6 HOURS PRN
Qty: 20 TABLET | Refills: 0 | Status: SHIPPED | OUTPATIENT
Start: 2021-02-05 | End: 2021-05-07

## 2021-02-05 RX ADMIN — CEFAZOLIN SODIUM 1000 MG: 1 SOLUTION INTRAVENOUS at 02:35

## 2021-02-05 RX ADMIN — HYDROMORPHONE HYDROCHLORIDE 0.2 MG: 1 INJECTION, SOLUTION INTRAMUSCULAR; INTRAVENOUS; SUBCUTANEOUS at 02:35

## 2021-02-05 RX ADMIN — LISINOPRIL 10 MG: 10 TABLET ORAL at 10:37

## 2021-02-05 RX ADMIN — SODIUM CHLORIDE, SODIUM LACTATE, POTASSIUM CHLORIDE, AND CALCIUM CHLORIDE 75 ML/HR: .6; .31; .03; .02 INJECTION, SOLUTION INTRAVENOUS at 07:11

## 2021-02-05 RX ADMIN — HYDROMORPHONE HYDROCHLORIDE 0.2 MG: 1 INJECTION, SOLUTION INTRAMUSCULAR; INTRAVENOUS; SUBCUTANEOUS at 09:47

## 2021-02-05 RX ADMIN — DOCUSATE SODIUM 100 MG: 100 CAPSULE, LIQUID FILLED ORAL at 10:37

## 2021-02-05 RX ADMIN — ASPIRIN 325 MG: 325 TABLET, FILM COATED ORAL at 10:37

## 2021-02-05 NOTE — TELEPHONE ENCOUNTER
Called to advise disability forms are ready at front office in [place]  He wants it faxed in to state as well  I advised that I will attempt a lot of times it does not go through

## 2021-02-05 NOTE — PROGRESS NOTES
Progress Note - Orthopedics   George Rae 70 y o  male MRN: [id number]  Unit/Bed#: [address] [place] [address] Encounter: [id number]      Subjective: Status post right reverse total shoulder replacement performed yesterday  Patient notes severe pain yesterday and overnight but notes only 4/10 pain this morning  He did have pain medication about 4 hours prior  He notes good sensation of the right upper extremity  He has been in his sling at all times  Vitals: Blood pressure 123/67, pulse 91, temperature 99 °F (37 2 °C), resp  rate 19, height 5' 7" (1 702 m), weight 109 kg (240 lb), SpO2 99 %  ,Body mass index is 37 59 kg/m²  Intake/Output Summary (Last 24 hours) at 2/5/2021 0925  Last data filed at 2/5/2021 0711  Gross per 24 hour   Intake 4105 ml   Output 575 ml   Net 3530 ml       Invasive Devices     Peripheral Intravenous Line            Peripheral IV 02/04/21 Left Hand less than 1 day    Peripheral IV 02/04/21 Left Hand less than 1 day    Peripheral IV 02/04/21 Left Hand less than 1 day                Ortho Exam: Alert and oriented X 3  Dressing CDI  Mild swelling shoulder  Ecchymosis distal to dressing  Sensation intact axillay, median, ulnar, and radial nerve distributions on operative extremity  5/5 strength EPL, FPL, APB, and first dorsal interosseous of operative extremity  2+radial pulse  Lab, Imaging and other studies: I have personally reviewed pertinent lab results  Post-operative xrays showed hardware intact with no fracture    CBC:   Lab Results   Component Value Date    WBC 9 16 02/05/2021    HGB 11 2 (L) 02/05/2021    HCT 36 8 02/05/2021    MCV 90 02/05/2021     02/05/2021    ADJUSTEDWBC 5 90 03/03/2016    MCH 27 4 02/05/2021    MCHC 30 4 (L) 02/05/2021    RDW 14 4 02/05/2021    MPV 11 3 02/05/2021    NRBC 0 12/21/2020     CMP:   Lab Results   Component Value Date     (H) 01/02/2018     02/05/2021     04/28/2020    CO2 25 02/05/2021    CO2 23 04/28/2020    ANIONGAP 5 06/16/2015 BUN 14 02/05/2021    BUN 19 04/28/2020    CREATININE 1 16 02/05/2021    CREATININE 1 09 01/02/2018    GLUCOSE 105 (H) 01/02/2018    CALCIUM 8 4 02/05/2021    CALCIUM 9 7 01/02/2018    AST 20 11/07/2020    AST 18 04/28/2020    ALT 23 11/07/2020    ALT 16 04/28/2020    ALKPHOS 94 11/07/2020    ALKPHOS 82 01/02/2018    PROT 6 8 01/02/2018    BILITOT 0 4 01/02/2018    EGFR 63 02/05/2021     PT/INR:   Lab Results   Component Value Date    INR 0 98 12/21/2020    INR 1 07 07/30/2015                   Assessment:  Doing well-status post right reverse total shoulder replacement    Plan: We will see how patient's pain is throughout the morning  We do plan on discharge to home today  As patient can not tolerate Percocet we will prescribe him tramadol on discharge  He will remain in his sling and follow-up in 1 week outpatient

## 2021-02-05 NOTE — PLAN OF CARE
Problem: Potential for Falls  Goal: Patient will remain free of falls  Description: INTERVENTIONS:  - Assess patient frequently for physical needs  -  Identify cognitive and physical deficits and behaviors that affect risk of falls  -  [place] fall precautions as indicated by assessment   - Educate patient/family on patient safety including physical limitations  - Instruct patient to call for assistance with activity based on assessment  - Modify environment to reduce risk of injury  - Consider OT/PT consult to assist with strengthening/mobility  Outcome: Progressing     Problem: INFECTION - ADULT  Goal: Absence or prevention of progression during hospitalization  Description: INTERVENTIONS:  - Assess and monitor for signs and symptoms of infection  - Monitor lab/diagnostic results  - Monitor all insertion sites, i e  indwelling lines, tubes, and drains  - Monitor endotracheal if appropriate and nasal secretions for changes in amount and color  - [place] appropriate cooling/warming therapies per order  - Administer medications as ordered  - Instruct and encourage patient and family to use good hand hygiene technique  - Identify and instruct in appropriate isolation precautions for identified infection/condition  Outcome: Progressing  Goal: Absence of fever/infection during neutropenic period  Description: INTERVENTIONS:  - Monitor WBC    Outcome: Progressing     Problem: SAFETY ADULT  Goal: Patient will remain free of falls  Description: INTERVENTIONS:  - Assess patient frequently for physical needs  -  Identify cognitive and physical deficits and behaviors that affect risk of falls    -  [place] fall precautions as indicated by assessment   - Educate patient/family on patient safety including physical limitations  - Instruct patient to call for assistance with activity based on assessment  - Modify environment to reduce risk of injury  - Consider OT/PT consult to assist with strengthening/mobility  Outcome: Progressing  Goal: Maintain or return to baseline ADL function  Description: INTERVENTIONS:  -  Assess patient's ability to carry out ADLs; assess patient's baseline for ADL function and identify physical deficits which impact ability to perform ADLs (bathing, care of mouth/teeth, toileting, grooming, dressing, etc )  - Assess/evaluate cause of self-care deficits   - Assess range of motion  - Assess patient's mobility; develop plan if impaired  - Assess patient's need for assistive devices and provide as appropriate  - Encourage maximum independence but intervene and supervise when necessary  - Involve family in performance of ADLs  - Assess for home care needs following discharge   - Consider OT consult to assist with ADL evaluation and planning for discharge  - Provide patient education as appropriate  Outcome: Progressing  Goal: Maintain or return mobility status to optimal level  Description: INTERVENTIONS:  - Assess patient's baseline mobility status (ambulation, transfers, stairs, etc )    - Identify cognitive and physical deficits and behaviors that affect mobility  - Identify mobility aids required to assist with transfers and/or ambulation (gait belt, sit-to-stand, lift, walker, cane, etc )  - [place] fall precautions as indicated by assessment  - Record patient progress and toleration of activity level on Mobility SBAR; progress patient to next Phase/Stage  - Instruct patient to call for assistance with activity based on assessment  - Consider rehabilitation consult to assist with strengthening/weightbearing, etc   Outcome: Progressing     Problem: SKIN/TISSUE INTEGRITY - ADULT  Goal: Skin integrity remains intact  Description: INTERVENTIONS  - Identify patients at risk for skin breakdown  - Assess and monitor skin integrity  - Assess and monitor nutrition and hydration status  - Monitor labs (i e  albumin)  - Assess for incontinence   - Turn and reposition patient  - Assist with mobility/ambulation  - Relieve pressure over bony prominences  - Avoid friction and shearing  - Provide appropriate hygiene as needed including keeping skin clean and dry  - Evaluate need for skin moisturizer/barrier cream  - Collaborate with interdisciplinary team (i e  Nutrition, Rehabilitation, etc )   - Patient/family teaching  Outcome: Progressing  Goal: Incision(s), wounds(s) or drain site(s) healing without S/S of infection  Description: INTERVENTIONS  - Assess and document risk factors for skin impairment   - Assess and document dressing, incision, wound bed, drain sites and surrounding tissue  - Consider nutrition services referral as needed  - Oral mucous membranes remain intact  - Provide patient/ family education  Outcome: Progressing  Goal: Oral mucous membranes remain intact  Description: INTERVENTIONS  - Assess oral mucosa and hygiene practices  - Implement preventative oral hygiene regimen  - Implement oral medicated treatments as ordered  - Initiate Nutrition services referral as needed  Outcome: Progressing

## 2021-02-05 NOTE — OCCUPATIONAL THERAPY NOTE
Occupational Therapy Evaluation/Treatment       02/05/21 1028   Note Type   Note type Evaluation   Restrictions/Precautions   Braces or Orthoses Sling  (abductor brace RUE)   Other Precautions Chair Alarm; Bed Alarm; Fall Risk;Pain   Pain Assessment   Pain Assessment Tool 0-10   Pain Score 8   Pain Location/Orientation Orientation: Right;Location: Shoulder  (5/10 after OT session )   Home Living   Type of [address] One level  (3 KEVIN )   Bathroom Shower/Tub Tub/shower unit   Bathroom Toilet Raised   Bathroom Equipment Shower chair   Home Equipment Walker;Cane  (recliner lift chair )   Prior Function   Level of [place]   (independent mobility, assist for ADLS (socks))   Lives With Spouse   Receives Help From Family   ADL Assistance Needs assistance   IADLs Needs assistance   Comments pt s/p R reverse TSA 2/4/21   Lifestyle   Intrinsic Gratification his dog, Gigi   ADL   Eating Assistance 4  Minimal Assistance   Grooming Assistance 4  Minimal Assistance   UB Bathing Assistance 3  Moderate Assistance   LB Bathing Assistance 3  Moderate Assistance   UB Dressing Assistance 3  Moderate Assistance   LB Dressing Assistance 3  Moderate Assistance   Toileting Assistance  4  Minimal Assistance   Bed Mobility   Supine to Sit 4  Minimal assistance   Additional items Assist x 1  (trunk management )   Transfers   Sit to Stand 4  Minimal assistance   Additional items Assist x 1;Verbal cues   Stand to Sit 4  Minimal assistance   Additional items Assist x 1;Verbal cues   Stand pivot 4  Minimal assistance   Additional items Assist x 1;Verbal cues   Functional Mobility   Functional Mobility 5  Supervision   Additional Comments 15 feet   Balance   Static Sitting Good   Dynamic Sitting Fair +   Static Standing Fair   Dynamic Standing Fair   Activity Tolerance   Activity Tolerance Patient limited by pain   Medical Staff Made Aware , Brandie Deyanira home with wife, outpatient PT   Nurse Made Aware yes, Louise Shock Overall AROM   R Elbow Flexion AAROM WFL, limited by pain    R Wrist Flexion WFL   R Wrist Extension WFL   R Mass Grasp WFL   RUE Overall PROM   R Shoulder Flexion 30 degrees to tolerance, limited by pain R shoulder    LUE Assessment   LUE Assessment WFL   Hand Function   Gross Motor Coordination Functional   Fine Motor Coordination Functional   Sensation   Light Touch No apparent deficits   Cognition   Overall Cognitive Status WFL   Arousal/Participation Cooperative   Attention Within functional limits   Orientation Level Oriented X4   Following Commands Follows all commands and directions without difficulty   Assessment   Limitation Decreased ADL status; Decreased UE ROM; Decreased UE strength;Decreased Safe judgement during ADL;Decreased endurance;Decreased self-care trans;Decreased high-level ADLs  (decreased balance and mobility )   Prognosis Good   Assessment Patient evaluated by Occupational Therapy  Patient admitted with Rotator cuff tear arthropathy of right shoulder s/p reverse TSA R  The patients occupational profile, medical and therapy history includes a extensive additional review of physical, cognitive, or psychosocial history related to current functional performance  Comorbidities affecting functional mobility and ADLS include: DVT, falls and hypertension  Prior to admission, patient was independent with functional mobility without assistive device, requiring assist for ADLS and requiring assist for IADLS  The evaluation identifies the following performance deficits: weakness, decreased ROM, impaired balance, decreased endurance, increased fall risk, new onset of impairment of functional mobility, decreased ADLS, decreased IADLS, pain, decreased activity tolerance, decreased safety awareness, impaired judgement, ortheopedic restrictions and decreased strength, that result in activity limitations and/or participation restrictions   This evaluation requires clinical decision making of high complexity, because the patient presents with comorbidites that affect occupational performance and required significant modification of tasks or assistance with consideration of multiple treatment options  The Barthel Index was used as a functional outcome tool presenting with a score of 50, indicating marked limitations of functional mobility and ADLS  The patient's raw score on the -PAC Daily Activity inpatient short form is 14, standardized score is 33 39, less than 39 4  Patients at this level are likely to benefit from DC to post-acute rehabilitation services, however patient was requiring assist for ADLS prior to admission  Patient is able to mobilize with supervision  Patients wife is able to assist with ADLS at home and patient has a lift chair and a raised toilet  Please refer to the recommendation of the Occupational Therapist for safe DC planning  Patient will benefit from skilled Occupational Therapy services to address above deficits and facilitate a safe return to prior level of function  Goals   Patient Goals go home today    STG Time Frame   (1-7 days)   Short Term Goal  Goals established to promote patient goal of going home:  Patient will increase standing tolerance to 3 minutes during ADL task to decrease assistance level and decrease fall risk; Patient will increase bed mobility to supervision in preparation for ADLS and transfers; Patient will increase functional mobility to and from bathroom with no assistive device independently to increase performance with ADLS and to use a toilet;   Patient will increase dynamic standing balance to fair+ to improve postural stability and decrease fall risk during standing ADLS and transfers  Pt and caregiver to perform RUE HEP per Dr Christine Guzman reverse total shoulder protocol independently; Pt and caregiver to don/doff UE immoblizer/sling correctly with [place] and verbal cues;  Pt and caregiver able to don/doff Upper body clothing with [place] and cues using compensatory techniques  LTG Time Frame   (8-14 days)   Long Term Goal Goals established to promote patient goal of going home:  Patient will increase standing tolerance to 3 minutes during ADL task to decrease assistance level and decrease fall risk; Patient will increase bed mobility to supervision in preparation for ADLS and transfers; Patient will increase functional mobility to and from bathroom with no assistive device independently to increase performance with ADLS and to use a toilet;   Patient will increase dynamic standing balance to fair+ to improve postural stability and decrease fall risk during standing ADLS and transfers  Pt and caregiver to don/doff UE immoblizer/sling independently; Pt to don/doff Upper body clothing with independently using compensatory techniques  Functional Transfer Goals   Pt Will Perform All Functional Transfers   (STG supervision LTG Independent )   ADL Goals   Pt Will Perform Eating   (STG supervision LTG independent )   Pt Will Perform Grooming   (STG supervision LTG Independent )   Pt Will Perform Bathing   (STG min assist LTG supervision )   Pt Will Perform UE Dressing   (STG min assist LTG supervision )   Pt Will Perform LE Dressing   (STG min assist LTG supervision )   Pt Will Perform Toileting   (STG min assist LTG supervision )   Plan   Treatment Interventions ADL retraining;Functional transfer training;UE strengthening/ROM; Endurance training;Patient/family training;Equipment evaluation/education; Activityengagement; Compensatory technique education   Goal Expiration Date 02/19/21   OT Frequency 5x/wk   Additional Treatment Session   Start Time 0945   End Time 1067   Treatment Assessment Patient instructed on AROM R elbow, wrist and hand  Patient needed AAROM to R elbow x 5 limited by pain, AROM x5 R wrist and hand WFL supine  Patient instructed on sling use and donning and doffing, pt verbalized and demonstated understanding of ROM and sling use    Patient completed donning socks max assist, donning underwear and pants with mod assist, donning short button up with mod assist   Patient donned/doffed sling with max assist   Patient reports wife will assist at home with ADLS  Patient completed toilet transfer with supervision on commode as pt has a raised toilet at home  Functional mobility 15 feet supervision  Sit to stand from chair min assist   Patient is cooperative and pleasant  Patient reports wife will assist with ADLS at home  Patient has a lift chair recliner as pt has difficulty standing up due to L knee injuries       Recommendation   OT Discharge Recommendation Return to previous environment with social support  (outpatient PT)   AM-PAC Daily Activity Inpatient   Lower Body Dressing 2   Bathing 2   Toileting 2   Upper Body Dressing 2   Grooming 3   Eating 3   Daily Activity Raw Score 14   Daily Activity Standardized Score (Calc for Raw Score >=11) 33 39   AM-PAC Applied Cognition Inpatient   Following a Speech/Presentation 4   Understanding Ordinary Conversation 4   Taking Medications 4   Remembering Where Things Are Placed or Put Away 4   Remembering List of 4-5 Errands 4   Taking Care of Complicated Tasks 4   Applied Cognition Raw Score 24   Applied Cognition Standardized Score 62 21   Barthel Index   Feeding 5   Bathing 0   Grooming Score 0   Dressing Score 5   Bladder Score 10   Bowels Score 10   Toilet Use Score 5   Transfers (Bed/Chair) Score 10   Mobility (Level Surface) Score 0   Stairs Score 5   Barthel Index Score 50   Licensure   [place] License Number  Alisa Young Ines Figueroa 87 OTR/L [id number]

## 2021-02-05 NOTE — PHYSICAL THERAPY NOTE
PT EVALUATION     02/05/21 1045   Note Type   Note type Evaluation   Pain Assessment   Pain Assessment Tool Flores-Baker FACES   Flores-Baker FACES Pain Rating 6  (R shoulder area)   Home Living   Type of [address] [facility] One level;Stairs to enter with rails  (3 stairs to enter with rail on L side)   Home Equipment Walker;Cane   Additional Comments patient not using assistive device prior to admission   Prior Function   Level of [place] Independent with ADLs and functional mobility   Lives With Spouse   Receives Help From Family   Vocational Part time employment   Comments patient states independent prior to admission with assist for shoes at times by his wife, patient part time flower    Restrictions/Precautions   RUE Weight Bearing Per Order NWB   Braces or Orthoses Sling   Other Precautions Fall Risk;Pain   General   Additional Pertinent History chart reviewed,patient admitted for R shoulder surgery, now s/p R reverse TSA 2/4/21   Family/Caregiver Present No   Cognition   Overall Cognitive Status WFL   Arousal/Participation Cooperative   Attention Within functional limits   Orientation Level Oriented X4   Following Commands Follows all commands and directions without difficulty   RLE Assessment   RLE Assessment   (ROM WFL, strength 4-/5)   LLE Assessment   LLE Assessment   (ROM WFL, sterngth 4-/5)   Coordination   Movements are Fluid and Coordinated 1   Transfers   Sit to Stand 5  Supervision   Stand to Sit 5  Supervision   Ambulation/Elevation   Gait Assistance   (supervision to occasional min assist with dragging feet)   Additional items Verbal cues; Tactile cues   Assistive Device   (none)   Distance 150 feet with change in direction and verbal cuing to "lift feet higher off ground", dragging R foot at times  Education completed in use of cane as needed (has a cane at home)   Stair Management Assistance 4  Minimal assist   Additional items Assist x 1;Verbal cues; Tactile cues   Stair Management Technique One rail L;Step to pattern   Number of Stairs 4  (education for step to patterning, pt states same as PTA)   Balance   Static Sitting Good   Dynamic Sitting Fair +   Static Standing Fair +   Dynamic Standing Fair   Ambulatory Fair   Activity Tolerance   Activity Tolerance Patient tolerated treatment well   Nurse Made Aware yes   Assessment   Prognosis Good   Problem List Decreased strength;Decreased range of motion;Decreased endurance; Impaired balance;Decreased mobility; Decreased coordination;Pain;Orthopedic restrictions  (patient with limitations in RUE)   Assessment Patient seen for Physical Therapy evaluation  Patient admitted with Rotator cuff tear arthropathy of right shoulder  Comorbidities affecting patient's physical performance include: DEVON, GERD, arthritis CMC joint, hyperlipidemia, s/p bariatric surgery  Personal factors affecting patient at time of initial evaluation include: stairs to enter home, inability to perform dynamic tasks in community, inability to perform caregiver support/tasks, inability to perform physical activity and inability to perform IADLS   Prior to admission, patient was independent with functional mobility without assistive device, independent with IADLS, living in a multi-level home, ambulating household distance, ambulating community distances, works part time and home with family assist   Please find objective findings from Physical Therapy assessment regarding body systems outlined above with impairments and limitations including weakness, decreased ROM, impaired balance, decreased endurance, impaired coordination, pain and decreased functional mobility tolerance  The Barthel Index was used as a functional outcome tool presenting with a score of 60 today indicating marked limitations of functional mobility and ADLS    Patient's clinical presentation is currently unstable/unpredictable as seen in patient's presentation of vital sign response, changing level of pain, increased fall risk, new onset of impairment of functional mobility, decreased endurance and new onset of weakness  Pt would benefit from continued Physical Therapy treatment to address deficits as defined above and maximize level of functional mobility  As demonstrated by objective findings, the assigned level of complexity for this evaluation is high  The patient's [facility] Basic Mobility Inpatient Short Form Raw Score is 18, Standardized Score is 41 05  A standardized score less than 42 9 suggests the patient may benefit from discharge to post-acute rehabilitation services, but at this time, patient has assist at home and will demonstrate rapid improvement of functional mobility following surgery and will continue with out patient PT as allowed by ortho doctor  Goals   Patient Goals to go home and heal   STG Expiration Date 02/12/21   Short Term Goal #1 independent gait and transfers   Short Term Goal #2 gait endurance to functional community distances   LTG Expiration Date 02/19/21   Long Term Goal #1 return to independent function   Plan   Treatment/Interventions ADL retraining;Functional transfer training;LE strengthening/ROM; Elevations; Therapeutic exercise; Endurance training;Patient/family training;Equipment eval/education; Bed mobility;Gait training   PT Frequency 5x/wk   Recommendation   PT Discharge Recommendation Other (Comment)  (out patient PT as allowed by ortho)   [facility] Basic Mobility Inpatient   Turning in Bed Without Bedrails 3   Lying on Back to Sitting on Edge of Flat Bed 3   Moving Bed to Chair 3   Standing Up From Chair 3   Walk in Room 3   Climb 3-5 Stairs 3   Basic Mobility Inpatient Raw Score 18   Basic Mobility Standardized Score 41 05   Barthel Index   Feeding 5   Bathing 0   Grooming Score 0   Dressing Score 5   Bladder Score 10   Bowels Score 10   Toilet Use Score 5   Transfers (Bed/Chair) Score 10   Mobility (Level Surface) Score 10   Stairs Score 5   Barthel Index Score [place] Ashlee Number  [facility] [id number]

## 2021-02-05 NOTE — UTILIZATION REVIEW
Initial Clinical Review    Elective inpatient surgical procedure  Age/Sex: 70 y o  male  Surgery Date: 02-04-21  Procedure: ARTHROPLASTY SHOULDER REVERSE (RIGHT) (Right) utilizing Depuy Delta extend cement less metaglene standard with 3 locking metaglene screws and a lateralize glenosphere +2 mm x 42 mm standard and a Global unite [place] standard stem size 14 x 129 mm with a Delta extend modular eccentric epiphysis size 2 right hydroxyapatite coated cement less and a Delta extend humeral polyethylene cup standard size 42 by +3  Anesthesia: General w/ Regional  Operative Findings: Right shoulder exam under anesthesia demonstrated significant stiffness with forward flexion and abduction passively each to only 90° with external rotation to 50° and internal rotation to 40°  Intra-articular findings demonstrated significant arthritic change along the glenoid and the humeral head with significant thinning an absence of articular cartilage in many locations  There was no rotator cuff found along supraspinatus and infraspinatus as well as teres minor with only a thin subscapularis tendon still intact and long head of biceps tendon was torn and retracted down into the upper arm  I did perform a soft tissue tenodesis for the long head of biceps tendon at the end of the procedure  Excellent stability was noted at the end of the procedure with good bone stock as well  Range of motion was outstanding at the end of the operation achieving 160° of forward flexion 150° of abduction with external rotation to 90° internal rotation to 80° and excellent stability with good tension found along the deltoid as well as the conjoined tendon    POD#1 Progress Note:  OOB tolerating diet pain controlled   Plan d/c to home     Admission Orders: Date/Time/Statement:   Admission Orders (From admission, onward)     Ordered        02/04/21 0928  INPATIENT ADMISSION  Once                   Orders Placed This Encounter   Procedures    INPATIENT ADMISSION     Standing Status:   Standing     Number of Occurrences:   1     Order Specific Question:   Level of Care     Answer:   Med Surg [16]     Order Specific Question:   Estimated length of stay     Answer:   Not Applicable     Vital Signs: /67   Pulse 91   Temp 99 °F (37 2 °C)   Resp 19   Ht 5' 7" (1 702 m)   Wt 109 kg (240 lb)   SpO2 99%   BMI 37 59 kg/m²   Diet: regular  Mobility: OOB  DVT Prophylaxis: SCD  Medications/Pain Control:   Scheduled Medications:  aspirin, 325 mg, Oral, Daily  atorvastatin, 20 mg, Oral, HS  docusate sodium, 100 mg, Oral, BID  lisinopril, 10 mg, Oral, Daily  midazolam, 1 mg, Intravenous, Once      Continuous IV Infusions:  lactated ringers, 75 mL/hr, Intravenous, Continuous      PRN Meds:  acetaminophen, 650 mg, Oral, Q6H PRN  HYDROmorphone, 0 2 mg, Intravenous, Q3H PRN   x3  ondansetron, 4 mg, Intravenous, Q6H PRN  traMADol, 50 mg, Oral, Q6H PRN   x1        Network Utilization Review Department  ATTENTION: Please call with any questions or concerns to [phone] and carefully listen to the prompts so that you are directed to the right person  All voicemails are confidential   Chrystine Can all requests for admission clinical reviews, approved or denied determinations and any other requests to dedicated fax number below belonging to the campus where the patient is receiving treatment   List of dedicated fax numbers for the Facilities:  [address] DENIALS (Administrative/Medical Necessity) [phone]   [address] (Maternity/NICU/Pediatrics) [phone]   [address] [place] [address] 40 [address] [facility] Ninoska Gonzalezralph Manley [address] ([address]) [address] [facility] Flip [address] Jose Daniels 1481 [address] [place]) [facility] [phone]

## 2021-02-05 NOTE — PLAN OF CARE
Problem: Potential for Falls  Goal: Patient will remain free of falls  Description: INTERVENTIONS:  - Assess patient frequently for physical needs  -  Identify cognitive and physical deficits and behaviors that affect risk of falls  -  [place] fall precautions as indicated by assessment   - Educate patient/family on patient safety including physical limitations  - Instruct patient to call for assistance with activity based on assessment  - Modify environment to reduce risk of injury  - Consider OT/PT consult to assist with strengthening/mobility  2/5/2021 1415 by Markus Martins RN  Outcome: Completed  2/5/2021 1146 by Markus Martins RN  Outcome: Progressing     Problem: INFECTION - ADULT  Goal: Absence or prevention of progression during hospitalization  Description: INTERVENTIONS:  - Assess and monitor for signs and symptoms of infection  - Monitor lab/diagnostic results  - Monitor all insertion sites, i e  indwelling lines, tubes, and drains  - Monitor endotracheal if appropriate and nasal secretions for changes in amount and color  - [place] appropriate cooling/warming therapies per order  - Administer medications as ordered  - Instruct and encourage patient and family to use good hand hygiene technique  - Identify and instruct in appropriate isolation precautions for identified infection/condition  2/5/2021 1415 by Markus Martins RN  Outcome: Completed  2/5/2021 1146 by Markus Martins RN  Outcome: Progressing  Goal: Absence of fever/infection during neutropenic period  Description: INTERVENTIONS:  - Monitor WBC    2/5/2021 1415 by Markus Martins RN  Outcome: Completed  2/5/2021 1146 by Markus Martins RN  Outcome: Progressing     Problem: SAFETY ADULT  Goal: Patient will remain free of falls  Description: INTERVENTIONS:  - Assess patient frequently for physical needs  -  Identify cognitive and physical deficits and behaviors that affect risk of falls    -  [place] fall precautions as indicated by assessment   - Educate patient/family on patient safety including physical limitations  - Instruct patient to call for assistance with activity based on assessment  - Modify environment to reduce risk of injury  - Consider OT/PT consult to assist with strengthening/mobility  2/5/2021 1415 by Arnel Hart RN  Outcome: Completed  2/5/2021 1146 by Arnel Hart RN  Outcome: Progressing  Goal: Maintain or return to baseline ADL function  Description: INTERVENTIONS:  -  Assess patient's ability to carry out ADLs; assess patient's baseline for ADL function and identify physical deficits which impact ability to perform ADLs (bathing, care of mouth/teeth, toileting, grooming, dressing, etc )  - Assess/evaluate cause of self-care deficits   - Assess range of motion  - Assess patient's mobility; develop plan if impaired  - Assess patient's need for assistive devices and provide as appropriate  - Encourage maximum independence but intervene and supervise when necessary  - Involve family in performance of ADLs  - Assess for home care needs following discharge   - Consider OT consult to assist with ADL evaluation and planning for discharge  - Provide patient education as appropriate  2/5/2021 1415 by Arnel Hatr RN  Outcome: Completed  2/5/2021 1146 by Arnel Hart RN  Outcome: Progressing  Goal: Maintain or return mobility status to optimal level  Description: INTERVENTIONS:  - Assess patient's baseline mobility status (ambulation, transfers, stairs, etc )    - Identify cognitive and physical deficits and behaviors that affect mobility  - Identify mobility aids required to assist with transfers and/or ambulation (gait belt, sit-to-stand, lift, walker, cane, etc )  - [place] fall precautions as indicated by assessment  - Record patient progress and toleration of activity level on Mobility SBAR; progress patient to next Phase/Stage  - Instruct patient to call for assistance with activity based on assessment  - Consider rehabilitation consult to assist with strengthening/weightbearing, etc   2/5/2021 1415 by Linda Sharma RN  Outcome: Completed  2/5/2021 1146 by Linda Sharma RN  Outcome: Progressing     Problem: SKIN/TISSUE INTEGRITY - ADULT  Goal: Skin integrity remains intact  Description: INTERVENTIONS  - Identify patients at risk for skin breakdown  - Assess and monitor skin integrity  - Assess and monitor nutrition and hydration status  - Monitor labs (i e  albumin)  - Assess for incontinence   - Turn and reposition patient  - Assist with mobility/ambulation  - Relieve pressure over bony prominences  - Avoid friction and shearing  - Provide appropriate hygiene as needed including keeping skin clean and dry  - Evaluate need for skin moisturizer/barrier cream  - Collaborate with interdisciplinary team (i e  Nutrition, Rehabilitation, etc )   - Patient/family teaching  2/5/2021 1415 by Linda Sharma RN  Outcome: Completed  2/5/2021 1146 by Linda Sharma RN  Outcome: Progressing  Goal: Incision(s), wounds(s) or drain site(s) healing without S/S of infection  Description: INTERVENTIONS  - Assess and document risk factors for skin impairment   - Assess and document dressing, incision, wound bed, drain sites and surrounding tissue  - Consider nutrition services referral as needed  - Oral mucous membranes remain intact  - Provide patient/ family education  2/5/2021 1415 by Linda Sharma RN  Outcome: Completed  2/5/2021 1146 by Linda Sharma RN  Outcome: Progressing  Goal: Oral mucous membranes remain intact  Description: INTERVENTIONS  - Assess oral mucosa and hygiene practices  - Implement preventative oral hygiene regimen  - Implement oral medicated treatments as ordered  - Initiate Nutrition services referral as needed  2/5/2021 1415 by Linda Sharma RN  Outcome: Completed  2/5/2021 1146 by Linda Sharma RN  Outcome: Progressing

## 2021-02-05 NOTE — NURSING NOTE
Patient left two [address] stable with all belongings at side  Discharge instructions were given to patient, no further questions at this time

## 2021-02-05 NOTE — UTILIZATION REVIEW
Notification of Discharge  This is a Notification of Discharge from our facility [address]  Please be advised that this patient has been discharge from our facility  Below you will find the admission and discharge date and time including the patients disposition  PRESENTATION DATE: 2/4/2021  8:41 AM  OBS ADMISSION DATE:   IP ADMISSION DATE: 2/4/21 0928   DISCHARGE DATE: 2/5/2021  1:33 PM  DISPOSITION: Home/Self Care Home/Self Care   Admission Orders listed below:  Admission Orders (From admission, onward)     Ordered        02/04/21 0928  INPATIENT ADMISSION  Once                   Please contact the UR Department if additional information is required to close this patient's authorization/case  [facility]  Network Utilization Review Department  Main: [phone] x carefully listen to the prompts  All voicemails are confidential   [email or web address]  Send all requests for admission clinical reviews, approved or denied determinations and any other requests to dedicated fax number below belonging to the campus where the patient is receiving treatment   List of dedicated fax numbers:  [address] DENIALS (Administrative/Medical Necessity) [phone]   [address] (Maternity/NICU/Pediatrics) [phone]   [facility] [phone]   [facility] [phone]   [facility] [phone]   [address] [phone]   [facility]  [phone]   [address] [facility] [address] [facility]  [address] [place] [address] [facility] [phone]

## 2021-02-05 NOTE — DISCHARGE INSTRUCTIONS
Sling:   Wear your sling at all times after your surgery (this includes sleeping)  Additionally, you should not carry anything heavier than a pencil in your hand  Dressing:   Leave dressing in place  Showering: You may shower 3 days after surgery  Please use CAUTION!! Be careful not to slip and fall  The effects of anesthesia and/or medication may make you drowsy or light-headed  Do not soak in a bathtub, hot tub, or pool until the doctor tells you it is O K , to do so  Your dressing should be waterproof  If this gets soaked in the shower you may remove this  While in the shower you must keep the arm across the front of the body as if it were still in the sling  Sleeping:   You will most likely have difficulty sleeping in the first few weeks after surgery  Most people find it more comfortable to sleep in a reclining position  You can either sleep in a recliner chair or create this position with pillows  Ice:   You can ice the shoulder to reduce swelling and discomfort  Do not ice the shoulder more than 20 minutes at a time  Let the shoulder warm up before reapplication  Avoid getting you wound wet  If you have a Cryocuff you may keep this on continuously  Follow-up visit:   You need to see the doctor about one week following surgery for your first post-op visit  You will be given a prescription to begin physical therapy if you were not already given one  Common concerns:   Bruising and/or swelling of the shoulder, arm, or hand are common after surgery  To relieve this discomfort it is best to ice the shoulder  Please call if:   1  Any oozing or redness of the wound, fevers (>101 3°F), or chills  2  Any difficulty breathing or heaviness in the chest      REMEMBER - these are only guidelines for what to expect  If you have any questions or concerns, please do not hesitate to call the office  [phone]      Arthroscopic Acromioplasty   WHAT YOU NEED TO KNOW:   Arthroscopic acromioplasty is a procedure used to smooth out a part of your scapula (shoulder blade) called the acromion  Your healthcare provider will insert a scope to see inside your shoulder  A scope is a small, bendable tube with a camera on the end  DISCHARGE INSTRUCTIONS:   Call your doctor or surgeon if:   · Blood soaks through your bandage  · You have severe pain  · Your incision comes open  · You cannot move your arm or hand  · You have a fever  · You have worse pain in your shoulder, even after you take medicine  · Your wound is red, swollen, or draining pus  · Your arm or hand is numb or tingling  · You have questions or concerns about your condition or care  Medicines:   · Prescription pain medicine  may be given  Ask your healthcare provider how to take this medicine safely  Some prescription pain medicines contain acetaminophen  Do not take other medicines that contain acetaminophen without talking to your healthcare provider  Too much acetaminophen may cause liver damage  Prescription pain medicine may cause constipation  Ask your healthcare provider how to prevent or treat constipation  · Take your medicine as directed  Contact your healthcare provider if you think your medicine is not helping or if you have side effects  Tell him or her if you are allergic to any medicine  Keep a list of the medicines, vitamins, and herbs you take  Include the amounts, and when and why you take them  Bring the list or the pill bottles to follow-up visits  Carry your medicine list with you in case of an emergency  Wear your sling: The sling helps keep your arm from moving so your shoulder can heal  A small pillow attached to the sling will hold your arm slightly away from your body  This position decreases pressure and pain  Apply ice:  Apply ice on your shoulder for 15 to 20 minutes every hour or as directed  Use an ice pack, or put crushed ice in a plastic bag  Cover it with a towel  Ice helps prevent tissue damage and decreases swelling and pain  Wound care:  Care for your wound as directed  Carefully wash the wound with soap and water  If you have medical glue on your incisions, it will fall off on its own  Gently dry the area with a clean towel  If you have a bandage, dry the area and put on a new, clean bandage as directed  Change your bandage when it gets wet or dirty  Go to physical therapy, if directed:  A physical therapist teaches you exercises to help improve movement and strength, and to decrease pain  Ask about activity:  Ask your healthcare provider when you can return to work and your usual daily activities  Follow up with your healthcare provider or surgeon as directed: You may need to return to have your stitches removed  Write down your questions so you remember to ask them during your visits  © Copyright [address] Information is for End User's use only and may not be sold, redistributed or otherwise used for commercial purposes  All illustrations and images included in CareNotes® are the copyrighted property of [employer]  or [facility] [address] Jay[address]   The above information is an  only  It is not intended as medical advice for individual conditions or treatments  Talk to your doctor, nurse or pharmacist before following any medical regimen to see if it is safe and effective for you

## 2021-02-12 ENCOUNTER — OFFICE VISIT (OUTPATIENT)
Dept: OBGYN CLINIC | Facility: CLINIC | Age: 71
End: 2021-02-12

## 2021-02-12 ENCOUNTER — APPOINTMENT (OUTPATIENT)
Dept: RADIOLOGY | Facility: CLINIC | Age: 71
End: 2021-02-12
Payer: COMMERCIAL

## 2021-02-12 ENCOUNTER — TELEMEDICINE (OUTPATIENT)
Dept: FAMILY MEDICINE CLINIC | Facility: CLINIC | Age: 71
End: 2021-02-12
Payer: COMMERCIAL

## 2021-02-12 DIAGNOSIS — M12.811 ROTATOR CUFF TEAR ARTHROPATHY OF RIGHT SHOULDER: Primary | ICD-10-CM

## 2021-02-12 DIAGNOSIS — M12.811 ROTATOR CUFF TEAR ARTHROPATHY OF RIGHT SHOULDER: ICD-10-CM

## 2021-02-12 DIAGNOSIS — Z96.611 S/P REVERSE TOTAL SHOULDER ARTHROPLASTY, RIGHT: Primary | ICD-10-CM

## 2021-02-12 DIAGNOSIS — E78.2 MIXED HYPERLIPIDEMIA: ICD-10-CM

## 2021-02-12 DIAGNOSIS — M75.101 ROTATOR CUFF TEAR ARTHROPATHY OF RIGHT SHOULDER: ICD-10-CM

## 2021-02-12 DIAGNOSIS — I10 BENIGN ESSENTIAL HYPERTENSION: ICD-10-CM

## 2021-02-12 DIAGNOSIS — M75.101 ROTATOR CUFF TEAR ARTHROPATHY OF RIGHT SHOULDER: Primary | ICD-10-CM

## 2021-02-12 PROCEDURE — 99024 POSTOP FOLLOW-UP VISIT: CPT | Performed by: ORTHOPAEDIC SURGERY

## 2021-02-12 PROCEDURE — 99495 TRANSJ CARE MGMT MOD F2F 14D: CPT | Performed by: NURSE PRACTITIONER

## 2021-02-12 PROCEDURE — 73030 X-RAY EXAM OF SHOULDER: CPT

## 2021-02-12 PROCEDURE — 1111F DSCHRG MED/CURRENT MED MERGE: CPT | Performed by: NURSE PRACTITIONER

## 2021-02-12 NOTE — PROGRESS NOTES
Assessment/Plan:  1  Rotator cuff tear arthropathy of right shoulder  XR shoulder 2+ vw right       Scribe Attestation    I,:  Azul Sol MA am acting as a scribe while in the presence of the attending physician :       I,:  Candelario Habermann, MD personally performed the services described in this documentation    as scribed in my presence :             Janet Walter is doing well postoperatively  His incision is healing well  There is no erythema or signs of infection  He may shower and get the incision wet  X-rays were reviewed  Patient will start physical therapy next week on the reverse total shoulder protocol  He will continue with the sling for the next 3 weeks  He will wean out of the sling with therapy  He will follow up in 6 weeks for repeat evaluation  No new x-rays are needed  Subjective:   Cheryl Ricci is a 70 y o  male who presents to the office today for follow up evaluation of his right shoulder  Patient is now 1 week status post reverse right total shoulder  Patient states he is doing well overall  He has been compliant with the sling  He denies any numbness or tingling  He does note some swelling into his hand  He does have his first therapy appointment scheduled for next week  Review of Systems   Constitutional: Negative for chills and fever  HENT: Negative for drooling and sneezing  Eyes: Negative for redness  Respiratory: Negative for cough and wheezing  Gastrointestinal: Negative for nausea and vomiting  Musculoskeletal: Negative for arthralgias, joint swelling and myalgias  Neurological: Negative for weakness and numbness  Psychiatric/Behavioral: Negative for behavioral problems  The patient is not nervous/anxious            Past Medical History:   Diagnosis Date    AC (acromioclavicular) joint bone spurs     Last assessed - 2/19/15    Achilles tendinitis, unspecified leg 06/04/2010    Resolved - 1/8/18    Anemia 08/12/2010    Resolved - 1/11/16    Blood in urine  BPH without urinary obstruction     Hypertrophy    Deep venous thrombosis of distal lower extremity (HCC)     Deep venous thrombosis of distal lower extremity (HCC)     Last assessed - 6/4/15    Heartburn     Last assessed - 12/12/14    Hemorrhoids     Hernia, inguinal 06/30/2009    Resolved - 1/8/18    History of stomach ulcers     Hypertension     Kidney stones     Mixed hyperlipidemia     Nephrolithiasis     Right inguinal hernia     Last assessed - 4/4/16    Sleep apnea     unable to tolerate c-pap    Tear of left rotator cuff     Last assessed - 2/19/15       Past Surgical History:   Procedure Laterality Date    BARIATRIC SURGERY  06/15/2015    Laparoscopic Longitudinal Gastrectomy for morbid obesity, Managed by - Joanna Boast, Maher     EXPLORATORY LAPAROTOMY W/ BOWEL RESECTION Right 3/3/2016    Procedure: Incarcerated possibly strangulated right inguinal hernia; Surgeon: Irma Faulkner MD;  Location: [address] [place] [address];  Service:    [facility] SURGERY      2011 - Rt Hip Replacement, 2012 - Lt Hip Replacement, 2013 - Lt Hip Revision    INGUINAL HERNIA REPAIR      Last assessed - 4/4/16    JOINT REPLACEMENT      KNEE SURGERY  2004    Double     AR COLONOSCOPY FLX DX W/COLLJ SPEC WHEN PFRMD N/A 1/15/2016    Procedure: COLONOSCOPY;  Surgeon: Bryson Carpio MD;  Location: [place] [address] GI LAB; Service: Gastroenterology    AR COLONOSCOPY FLX DX W/COLLJ SPEC WHEN PFRMD N/A 2/26/2019    Procedure: COLONOSCOPY;  Surgeon: Bryson Carpio MD;  Location: [place] [address] GI LAB; Service: Gastroenterology    AR RECONSTR TOTAL SHOULDER IMPLANT Right 2/4/2021    Procedure: ARTHROPLASTY SHOULDER REVERSE (RIGHT);   Surgeon: Gogo Church MD;  Location: [place] [facility];  Service: Orthopedics    TONSILLECTOMY         Family History   Problem Relation Age of Onset    Arthritis Father     Diabetes Father     Hypertension Father     Hyperlipidemia Father     Diabetes Family     Emphysema Family     Heart disease Family     Hypertension Family     Mental illness Neg Hx        Social History     Occupational History    Not on file   Tobacco Use    Smoking status: Former Smoker     Quit date: 1994     Years since quittin 8    Smokeless tobacco: Never Used   Substance and Sexual Activity    Alcohol use: Yes     Frequency: 2-3 times a week     Drinks per session: 1 or 2     Binge frequency: Never     Comment: Occ     Drug use: No    Sexual activity: Yes     Partners: Female         Current Outpatient Medications:     atorvastatin (LIPITOR) 20 mg tablet, Take 1 tablet (20 mg total) by mouth daily (Patient taking differently: Take 20 mg by mouth daily at bedtime ), Disp: 90 tablet, Rfl: 1    Calcium Carbonate (CALCIUM 600) 1500 (600 Ca) MG TABS, Take by mouth daily , Disp: , Rfl:     Calcium Polycarbophil (FIBER-CAPS PO), Take by mouth, Disp: , Rfl:     Cholecalciferol (VITAMIN D3) 2000 units TABS, Take by mouth daily , Disp: , Rfl:     Cyanocobalamin (VITAMIN B-12) 5000 MCG LOZG, Place under the tongue, Disp: , Rfl:     lisinopril (ZESTRIL) 10 mg tablet, Take 1 tablet (10 mg total) by mouth daily, Disp: 90 tablet, Rfl: 1    Multiple Vitamins-Minerals (MULTIVITAMIN ADULT EXTRA C) CHEW, Chew, Disp: , Rfl:     NON FORMULARY, Liquid Multi vitamin, Disp: , Rfl:     traMADol (ULTRAM) 50 mg tablet, Take 1 tablet (50 mg total) by mouth every 6 (six) hours as needed for moderate pain for up to 20 doses, Disp: 20 tablet, Rfl: 0    Allergies   Allergen Reactions    Percocet [Oxycodone-Acetaminophen] Hallucinations    Percolone [Oxycodone] Hallucinations       Objective: There were no vitals filed for this visit      Right Shoulder Exam     Other   Erythema: absent  Sensation: normal  Pulse: present    Comments:  Incision healing well  No erythema or signs of infection   Able to actively move all digits  ROM and strengthening deferred due to surgery             Physical Exam  Constitutional: Appearance: He is well-developed  HENT:      Head: Normocephalic and atraumatic  Eyes:      General:         Right eye: No discharge  Left eye: No discharge  Conjunctiva/sclera: Conjunctivae normal    Neck:      Musculoskeletal: Normal range of motion and neck supple  Cardiovascular:      Rate and Rhythm: Normal rate  Pulmonary:      Effort: Pulmonary effort is normal  No respiratory distress  Musculoskeletal:      Comments: As noted in HPI   Skin:     General: Skin is warm and dry  Neurological:      Mental Status: He is alert and oriented to person, place, and time  Psychiatric:         Behavior: Behavior normal          Thought Content: Thought content normal          Judgment: Judgment normal          I have personally reviewed pertinent films in PACS and my interpretation is as follows:x-ray right shoulder performed in the office today demonstrates well-positioned well aligned reverse total shoulder arthroplasty  No signs of hardware failure

## 2021-02-12 NOTE — PROGRESS NOTES
Assessment/Plan:          Problem List Items Addressed This Visit        Cardiovascular and Mediastinum    Benign essential hypertension     Continue Lisinopril 10mg            Other    Mixed hyperlipidemia     Continue Atorvastatin 10mg           Other Visit Diagnoses     S/P reverse total shoulder arthroplasty, right    -  Primary    has f/u with Dr Sera Flores later today             Reason for visit is hospital follow up    Encounter provider MICHELLE Bangura       Provider located at [address]  [address] [address]      Recent Visits  No visits were found meeting these conditions  Showing recent visits within past 7 days and meeting all other requirements     Today's Visits  Date Type Provider Dept   02/12/21 Telemedicine Mj Bangura Jordan today's visits and meeting all other requirements     Future Appointments  No visits were found meeting these conditions  Showing future appointments within next 150 days and meeting all other requirements        After connecting through [employer], the patient was identified by name and date of birth  Leopold Caddy was informed that this is a telemedicine visit and that the visit is being conducted through [employer] and patient was informed that this is not a secure, HIPAA-compliant platform  He agrees to proceed     My office door was closed  No one else was in the room  He acknowledged consent and understanding of privacy and security of the video platform  The patient has agreed to participate and understands they can discontinue the visit at any time  Patient is aware this is a billable service  Subjective:     Patient ID: Leopold Caddy is a 70 y o  male  Following up today on recent hospitalization, s/p right shoulder arthoplasty with Dr Sera Flores  He has been recovering from his surgery well, but reports he is still experiencing significant pain  Was prescribed Tramadol, which does not seem to be helping  He took Tylenol PM last night and was able to sleep  Reports swelling of RUE, but no numbness or tingling  He is eating and drinking well  Moving his bowels normally  No other complaints or concerns  Review of Systems   Constitutional: Negative for chills, fatigue and fever  Respiratory: Negative for cough, shortness of breath and wheezing  Cardiovascular: Negative for chest pain, palpitations and leg swelling  Gastrointestinal: Negative for abdominal pain, diarrhea, nausea and vomiting  Musculoskeletal:        See HPI   Skin: Negative for rash  Neurological: Negative for dizziness and headaches  Objective: There were no vitals filed for this visit  Physical Exam  Constitutional:       General: He is not in acute distress  Appearance: He is well-developed  He is not ill-appearing or diaphoretic  Eyes:      Conjunctiva/sclera: Conjunctivae normal    Pulmonary:      Effort: Pulmonary effort is normal  No respiratory distress  Skin:     Coloration: Skin is not pale  Neurological:      Mental Status: He is alert  Psychiatric:         Mood and Affect: Mood normal          Speech: Speech normal          Behavior: Behavior normal              Transitional Care Management Review:  Park Buenrostro is a 70 y o  male here for TCM follow up  During the TCM phone call patient stated:    TCM Call (since 1/12/2021)     Date and time call was made  2/5/2021  2:38 PM    Hospital care reviewed  Records reviewed    Patient was hospitialized at  [address] [place] [address]        Date of Admission  02/04/21    Date of discharge  02/05/21    Diagnosis  Rotator cuff tear arthropathy of right shoulder    Disposition  Home    Were the patients medications reviewed and updated  Yes    Current Symptoms  None      TCM Call (since 1/12/2021)     Post hospital issues  None    Should patient be enrolled in anticoag monitoring?   No    Scheduled for follow up? Yes    Patients specialists  Other (comment)    Other specialists names  Dr Leyla Wallace    Did you obtain your prescribed medications  Yes    Do you need help managing your prescriptions or medications  No    Is transportation to your appointment needed  No    I have advised the patient to call PCP with any new or worsening symptoms  [facility] [address] [place] [address] or [employer] other    Support System  Family    The type of support provided  Physical; Emotional    Do you have social support  Yes, as much as I need    Are you recieving any outpatient services  Yes    What type of services  PT    Are you recieving home care services  No    Have you fallen in the last 12 months  Yes    How many times  Once with injury    Interperter language line needed  No    Counseling  Patient    Counseling topics  Activities of daily living; Importance of RX compliance; patient and family education; instructions for management; Risk factor reduction    Comments  I spoke with Mrs Hakeem Gaines who states that her  is currently sleeping  He is doing fine  His pain is controlled and he is afebrile  She knows to call Dr Leyla Wallace with any fevers, s/s infection of surgical site, severe, uncontrolled pain, etc Dilshad Garza          I spent 9 minutes with the patient during this visit      Jennifer Dobbins

## 2021-02-17 ENCOUNTER — EVALUATION (OUTPATIENT)
Dept: PHYSICAL THERAPY | Facility: CLINIC | Age: 71
End: 2021-02-17
Payer: COMMERCIAL

## 2021-02-17 DIAGNOSIS — Z96.611 S/P REVERSE TOTAL SHOULDER ARTHROPLASTY, RIGHT: Primary | ICD-10-CM

## 2021-02-17 DIAGNOSIS — M75.101 ROTATOR CUFF TEAR ARTHROPATHY OF RIGHT SHOULDER: ICD-10-CM

## 2021-02-17 DIAGNOSIS — M12.811 ROTATOR CUFF TEAR ARTHROPATHY OF RIGHT SHOULDER: ICD-10-CM

## 2021-02-17 DIAGNOSIS — S46.111D RUPTURE OF RIGHT LONG HEAD BICEPS TENDON, SUBSEQUENT ENCOUNTER: ICD-10-CM

## 2021-02-17 PROCEDURE — 97110 THERAPEUTIC EXERCISES: CPT | Performed by: PHYSICAL THERAPIST

## 2021-02-17 PROCEDURE — 97161 PT EVAL LOW COMPLEX 20 MIN: CPT | Performed by: PHYSICAL THERAPIST

## 2021-02-17 NOTE — PROGRESS NOTES
PT Evaluation     Today's date: 2021  Patient name: Krista Tomlin  : 1950  MRN: [id number]  Referring provider: Nallely Gonzalez MD  Dx:   Encounter Diagnosis     ICD-10-CM    1  S/P reverse total shoulder arthroplasty, right  Z96 611    2  Rotator cuff tear arthropathy of right shoulder  M75 101 Ambulatory referral to Physical Therapy    M12 811    3  Rupture of right long head biceps tendon, subsequent encounter  S46 111D Ambulatory referral to Physical Therapy                  Assessment  Assessment details: 2021: Krista Tomlin is a 70 y o  male who presents with pain, decreased strength, decreased ROM, decreased joint mobility, joint effusion and post-op precaution  Due to these impairments, patient has difficulty performing ADL's, recreational activities, lifting/carrying, transfers, reaching  Patient's clinical presentation is consistent with their referring diagnosis of Rotator cuff tear arthropathy of right shoulder, Rupture of right long head biceps tendon, subsequent encounter; Reverse TSA  Incision is healing well  Pt has poor tolerance to PROM today and cannot tolerate supine lying  He was cautioned on post-op precautions as he reports trying to actively move his arm in flex/abd/exten and elbow flex/ext already  Plan: Ambulatory referral to Physical Therapy  Patient has been educated in post-op contraindications / precautions, home exercise program and plan of care   Patient would benefit from skilled physical therapy services to address their aforementioned functional limitations and progress towards prior level of function and independence with home exercise program      Impairments: abnormal or restricted ROM, activity intolerance, impaired physical strength, lacks appropriate home exercise program, pain with function, scapular dyskinesis and poor posture   Functional limitations: cannot lie supine; cannot actively elevate arm; R wrist and hand AROM dorinda limitedUnderstanding of Dx/Px/POC: good   Prognosis: good    Goals  Short Term Goals to be met in 4 weeks (3/17/2021)  1  Pt to be independent w/ prelimary HEP  2  PROM R shoulder to be within 120 degrees  3  Pt to D/C sling  4  Improve AROM flexion/extension of R elbow to [facility]  Long Term Goals to be met in 12 weeks (2021)  1  AROM R shoulder to be at least 110 degrees to allow indep w/ reaching OH  2  AROM R shoulder abduction to 100 degrees  3  Undisturbed sleep  4  Improve R shoulder strength to 3+/5 or better, R bicep strength to 4+/5 to allow ease w to be indep w/ ADL's w/o difficulty  5  Improve FOTO to 60 or better  Plan  Plan details:       Patient would benefit from: PT eval and skilled physical therapy  Planned modality interventions: cryotherapy, thermotherapy: hydrocollator packs and unattended electrical stimulation  Planned therapy interventions: manual therapy, neuromuscular re-education, therapeutic exercise, therapeutic activities, home exercise program, stretching, patient education and postural training  Frequency: 2x week (2-3x/week)  Plan of Care beginning date: 2021  Plan of Care expiration date: 2021  Treatment plan discussed with: patient        Subjective Evaluation    History of Present Illness  Date of onset: 2020  Date of surgery: 2021  Mechanism of injury: 2021: Pt reports he injured his R arm when falling in 2020  He went to the ED, and they examined his hand only  He went to an ortho MD a week later  He was told he dislocated his shoulder and it reduced on his own  He saw Dr Jennifer Rodríguez as a 2nd opinion, which showed the RC and bicep were torn, and he needed reverse TSA  He followed up w/ Dr Arley Leyva for his hand due to continued edema and difficulty making a fist  He was told there is no fx in his hand  Oral Prednisone did not help much with his hand function  Pain  At best pain rating: 3  At worst pain ratin  Pain location: along anterior arm    Quality: dull ache and sharp  Relieving factors: rest and support  Exacerbated by: movement  Progression: no change    Social Support  Lives with: spouse    Hand dominance: ambidextrous (writes R handed)      Diagnostic Tests  MRI studies: abnormal  Treatments  Previous treatment: immobilization  Current treatment: physical therapy  Patient Goals  Patient goals for therapy: decreased pain, increased motion, increased strength and independence with ADLs/IADLs          Objective    Flowsheet Rows      Most Recent Value   PT/OT G-Codes   Current Score  32   Projected Score  67        Objective    Posture: wearing abduction sling      AROM: standing R  L     2/17/2021 2/17/2021  Shoulder flex        NT  150  Shoulder abd       NT  140  Functional ER  NT  85  Functional IR  NT  L1  Elbow exten  -30  0  Elbow flexion  130  140  Supination  40  70  Pronation  90  90  Wrist exten  35  70    PROM: reclined  R  L     2/17/2021 2/17/2021  Shoulder flex        50*  155  Shoulder abd       NT  145  ER @neutral  Not elvis  90  IR @neutral  NT  NT       MMT:    R  L     2/17/2021 2/17/2021  Shoulder flex  NT  4+/5  Shoulder abd  NT  4+/5  IR   NT  5/5  ER   NT  4/5      Tenderness/Palpation: tender along anterior humerus    Joint Mobility: guarded - poor       Precautions: protocol - attached to paper chart  SOC: 2/17/2021  DOS: 2/4/2021  FOTO: 2/17/2021  POC expiration: 5/12/2021  Date 2/17/2021       Manual        PROM R shoulder Reclined - brief - poor elvis                       There Exer         self PROM table slide flexion 2x5       AAROM elbow flex/ext stand 1x10       Backward rolls 1x10       Supported supination 1x10       Supported wrist ext 1x10                                               HEP Issued; reviewed precuations - no hand behind body; PROM only for now       [facility]                                                                                Modalities                                    Access Code: [email or web address]   URL: [employer]  com/   Date: 02/17/2021   Prepared by:  Alecia Tomlinson     Exercises  Standing Backward Shoulder Rolls - 10 reps - 2 sets - 2x daily - 7x weekly  Seated Elbow Flexion AAROM - 10 reps - 2 sets - 2x daily - 7x weekly  Seated Forearm Pronation Supination AROM - 10 reps - 2 sets - 2x daily - 7x weekly  Wrist Flexion Extension AROM - Palms Down - 10 reps - 2 sets - 2x daily - 7x weekly  Seated Bilateral Shoulder Flexion Towel Slide at Table Top - 5 reps - 3 sets - 2x daily - 7x weekly

## 2021-02-18 ENCOUNTER — APPOINTMENT (OUTPATIENT)
Dept: PHYSICAL THERAPY | Facility: CLINIC | Age: 71
End: 2021-02-18
Payer: COMMERCIAL

## 2021-02-22 ENCOUNTER — APPOINTMENT (OUTPATIENT)
Dept: PHYSICAL THERAPY | Facility: CLINIC | Age: 71
End: 2021-02-22
Payer: COMMERCIAL

## 2021-02-23 ENCOUNTER — OFFICE VISIT (OUTPATIENT)
Dept: PHYSICAL THERAPY | Facility: CLINIC | Age: 71
End: 2021-02-23
Payer: COMMERCIAL

## 2021-02-23 DIAGNOSIS — Z96.611 S/P REVERSE TOTAL SHOULDER ARTHROPLASTY, RIGHT: ICD-10-CM

## 2021-02-23 DIAGNOSIS — M12.811 ROTATOR CUFF TEAR ARTHROPATHY OF RIGHT SHOULDER: Primary | ICD-10-CM

## 2021-02-23 DIAGNOSIS — M75.101 ROTATOR CUFF TEAR ARTHROPATHY OF RIGHT SHOULDER: Primary | ICD-10-CM

## 2021-02-23 DIAGNOSIS — S46.111D RUPTURE OF RIGHT LONG HEAD BICEPS TENDON, SUBSEQUENT ENCOUNTER: ICD-10-CM

## 2021-02-23 PROCEDURE — 97110 THERAPEUTIC EXERCISES: CPT | Performed by: PHYSICAL THERAPIST

## 2021-02-23 PROCEDURE — 97140 MANUAL THERAPY 1/> REGIONS: CPT | Performed by: PHYSICAL THERAPIST

## 2021-02-23 NOTE — PROGRESS NOTES
Daily Note     Today's date: 2021  Patient name: Igor Holden  : 1950  MRN: [id number]  Referring provider: Maria Isbael Gonzalez MD  Dx:   Encounter Diagnosis     ICD-10-CM    1  Rotator cuff tear arthropathy of right shoulder  M75 101     M12 811    2  Rupture of right long head biceps tendon, subsequent encounter  S46 111D    3  S/P reverse total shoulder arthroplasty, right  Z96 611                   Subjective: Pt reports compliance w/ HEP and use of sling  He sleeps in his recliner since his shoulder hurts too much to lie flat  He does his table slides standing instead of sitting  Objective: See treatment diary below; updated HEP      Assessment: Tolerated treatment well  Patient able to tolerate manual PROM today which he could not at evaluation - ( but needed to be reclined vs supine)  Pt achieves at least 95 deg PROM flexion today  Plan: Progress treament per protocol  Precautions: protocol rev TSA and bicep precautions- attached to paper chart  SOC: 2021  DOS: 2021  FOTO: 2021  POC expiration: 2021  Date 2021      Manual        PROM R shoulder flexion/ER no>30 Reclined - brief - poor elvis 10' reclined      R wrist exten mobs  Gr 3 5'              There Exer  30'      Table slide self PROM 2x5 Stand 2x10       pendulums cw/ccw/ML/fwd bkwd  15x each      AAROM elbow flex/ext stand 1x10 AROM 2x10 thumb up      Backward rolls 1x10 1x10      Supported supination 1x10 2x10      Supported wrist ext 1x10 1# 2x10      grn digiflex gripping  2x10      Finger ext w/ gel web  Yellow 2x10      Wrist exten stretch  10"x5                      HEP Issued; reviewed precuations - no hand behind body; PROM only for now Updated HEP  5'      NMRed                                                                                Modalities                                  Access Code: [email or web address]   URL: [employer]  com/   Date: 2021   Prepared by:  Terence Erazo Kelly     Exercises  Standing Backward Shoulder Rolls - 10 reps - 2 sets - 2x daily - 7x weekly  Seated Forearm Pronation Supination AROM - 10 reps - 2 sets - 2x daily - 7x weekly  Wrist Flexion Extension AROM - Palms Down - 10 reps - 2 sets - 2x daily - 7x weekly  Seated Bilateral Shoulder Flexion Towel Slide at Table Top - 5 reps - 3 sets - 2x daily - 7x weekly  Standing Elbow Flexion Extension AROM - 10 reps - 2 sets - 2x daily - 7x weekly  Wrist Extension Stretch Pronated - 5 reps - 1 sets - 10 hold - 2x daily - 7x weekly  Seated Gripping Towel - 10 reps - 2 sets - 2x daily - 7x weekly  Circular Shoulder Pendulum with Table Support - 10 reps - 1 sets - 2x daily - 7x weekly

## 2021-02-25 ENCOUNTER — OFFICE VISIT (OUTPATIENT)
Dept: PHYSICAL THERAPY | Facility: CLINIC | Age: 71
End: 2021-02-25
Payer: COMMERCIAL

## 2021-02-25 DIAGNOSIS — S46.111D RUPTURE OF RIGHT LONG HEAD BICEPS TENDON, SUBSEQUENT ENCOUNTER: ICD-10-CM

## 2021-02-25 DIAGNOSIS — M12.811 ROTATOR CUFF TEAR ARTHROPATHY OF RIGHT SHOULDER: Primary | ICD-10-CM

## 2021-02-25 DIAGNOSIS — Z96.611 S/P REVERSE TOTAL SHOULDER ARTHROPLASTY, RIGHT: ICD-10-CM

## 2021-02-25 DIAGNOSIS — M75.101 ROTATOR CUFF TEAR ARTHROPATHY OF RIGHT SHOULDER: Primary | ICD-10-CM

## 2021-02-25 PROCEDURE — 97110 THERAPEUTIC EXERCISES: CPT | Performed by: PHYSICAL THERAPIST

## 2021-02-25 PROCEDURE — 97140 MANUAL THERAPY 1/> REGIONS: CPT | Performed by: PHYSICAL THERAPIST

## 2021-02-25 NOTE — PROGRESS NOTES
Daily Note     Today's date: 2021  Patient name: Arpan Knott  : 1950  MRN: [id number]  Referring provider: Stella Chambers MD  Dx:   Encounter Diagnosis     ICD-10-CM    1  Rotator cuff tear arthropathy of right shoulder  M75 101     M12 811    2  Rupture of right long head biceps tendon, subsequent encounter  S46 111D    3  S/P reverse total shoulder arthroplasty, right  Z96 611                   Subjective: Pt reports continued pain/weakness/limited ROM R wrist/thumb  His shoulder felt good after his last visit  Objective: See treatment diary below      Assessment: Tolerated treatment well  Patient still needs to stay reclined vs supine for manual PROM, but flexion PROM improved to approx 120 degrees  [place] elvis well  Plan: Progress treament per protocol        Precautions: protocol rev TSA and bicep precautions- attached to paper chart  [facility]: 2021  DOS: 2021  FOTO: 2021  POC expiration: 2021  Date 2021     Manual   15'     PROM R shoulder flexion/ER no>30 Reclined - brief - poor elvis 10' reclined 10' reclined     R wrist exten mobs  Gr 3 5' Gr 3 5'             There Exer  30' 30'     Table slide self PROM 2x5 Stand 2x10 stand 2x10      pendulums cw/ccw/ML/fwd bkwd  15x each 20x each     AAROM elbow flex/ext stand 1x10 AROM 2x10 thumb up AROM thumb up 20x     Backward rolls 1x10 1x10 2x10     Supported supination 1x10 2x10 2x10     Supported wrist ext 1x10 1# 2x10 1# 2x10     grn digiflex gripping  2x10 grn 2x10     Finger ext w/ gel web  Yellow 2x10 Held - thumb pain     Wrist exten stretch  10"x5 10"x5; 2x     kirk flex (palm down)/ext/abd w/ ball   5"x10 each             HEP Issued; reviewed precuations - no hand behind body; PROM only for now Updated HEP  5'      NMRed                                                                                Modalities                                  Access Code: [email or web address]   URL: [employer]  com/   Date: 02/23/2021   Prepared by:  Ct Arnett     Exercises  Standing Backward Shoulder Rolls - 10 reps - 2 sets - 2x daily - 7x weekly  Seated Forearm Pronation Supination AROM - 10 reps - 2 sets - 2x daily - 7x weekly  Wrist Flexion Extension AROM - Palms Down - 10 reps - 2 sets - 2x daily - 7x weekly  Seated Bilateral Shoulder Flexion Towel Slide at Table Top - 5 reps - 3 sets - 2x daily - 7x weekly  Standing Elbow Flexion Extension AROM - 10 reps - 2 sets - 2x daily - 7x weekly  Wrist Extension Stretch Pronated - 5 reps - 1 sets - 10 hold - 2x daily - 7x weekly  Seated Gripping Towel - 10 reps - 2 sets - 2x daily - 7x weekly  Circular Shoulder Pendulum with Table Support - 10 reps - 1 sets - 2x daily - 7x weekly

## 2021-03-01 ENCOUNTER — OFFICE VISIT (OUTPATIENT)
Dept: PHYSICAL THERAPY | Facility: CLINIC | Age: 71
End: 2021-03-01
Payer: COMMERCIAL

## 2021-03-01 DIAGNOSIS — M75.101 ROTATOR CUFF TEAR ARTHROPATHY OF RIGHT SHOULDER: Primary | ICD-10-CM

## 2021-03-01 DIAGNOSIS — S46.111D RUPTURE OF RIGHT LONG HEAD BICEPS TENDON, SUBSEQUENT ENCOUNTER: ICD-10-CM

## 2021-03-01 DIAGNOSIS — Z96.611 S/P REVERSE TOTAL SHOULDER ARTHROPLASTY, RIGHT: ICD-10-CM

## 2021-03-01 DIAGNOSIS — M12.811 ROTATOR CUFF TEAR ARTHROPATHY OF RIGHT SHOULDER: Primary | ICD-10-CM

## 2021-03-01 PROCEDURE — 97110 THERAPEUTIC EXERCISES: CPT | Performed by: PHYSICAL THERAPIST

## 2021-03-01 PROCEDURE — 97140 MANUAL THERAPY 1/> REGIONS: CPT | Performed by: PHYSICAL THERAPIST

## 2021-03-01 NOTE — PROGRESS NOTES
Daily Note     Today's date: 3/1/2021  Patient name: Jazmin Nguyen  : 1950  MRN: [id number]  Referring provider: Marina Montenegro MD  Dx:   Encounter Diagnosis     ICD-10-CM    1  Rotator cuff tear arthropathy of right shoulder  M75 101     M12 811    2  Rupture of right long head biceps tendon, subsequent encounter  S46 111D    3  S/P reverse total shoulder arthroplasty, right  Z96 611                   Subjective: Pt reports his R thumb/wrist is c/c of pain and remains swollen  He is still sleeping in recliner  Objective: See treatment diary below      Assessment: Tolerated treatment well  Patient achieves 120 flexion PROM, but ER first end feel is at 0 in scap plane w/o pain  PROM supination WNL  Pt cannot oppose thumb fully or tolerate blocking AROM of thumb IP  Plan: Continue per plan of care        Precautions: protocol rev TSA and bicep precautions- attached to paper chart  SOC: 2021  DOS: 2021  FOTO: 2021  POC expiration: 2021  Date 2021 2021 2021 3/1/2021    Visit #    4    Manual   15' 15'    PROM R shoulder flexion/ER no>30 Reclined - brief - poor elvis 10' reclined 10' reclined 15' shld flex/ER; forearm supin; elbow flex/ext    R wrist exten mobs  Gr 3 5' Gr 3 5' Gr 3 5'            There Exer  30' 30' 30'    Table slide self PROM 2x5 Stand 2x10 stand 2x10 Stand 2x10     pendulums cw/ccw/ML/fwd bkwd  15x each 20x each 20x each    AAROM elbow flex/ext stand 1x10 AROM 2x10 thumb up AROM thumb up 20x AROM thumb up 20x    Backward rolls 1x10 1x10 2x10     Supported supination 1x10 2x10 2x10 20x    Supported wrist ext 1x10 1# 2x10 1# 2x10 2# 2x10    grn digiflex gripping  2x10 grn 2x10 20x    Finger ext w/ gel web  Yellow 2x10 Held - thumb pain     Wrist exten stretch  10"x5 10"x5; 2x 10"x5; 2x    kirk flex (palm down)/ext/abd w/ ball   5"x10 each 5"x10 each    Thumb to finger opposition    2x5 each finger    HEP Issued; reviewed precuations - no hand behind body; PROM only for now Updated HEP  5'      NMRed                                                                                Modalities                                  Access Code: [email or web address]   URL: [employer]  com/   Date: 02/23/2021   Prepared by:  Adriana Heading     Exercises  Standing Backward Shoulder Rolls - 10 reps - 2 sets - 2x daily - 7x weekly  Seated Forearm Pronation Supination AROM - 10 reps - 2 sets - 2x daily - 7x weekly  Wrist Flexion Extension AROM - Palms Down - 10 reps - 2 sets - 2x daily - 7x weekly  Seated Bilateral Shoulder Flexion Towel Slide at Table Top - 5 reps - 3 sets - 2x daily - 7x weekly  Standing Elbow Flexion Extension AROM - 10 reps - 2 sets - 2x daily - 7x weekly  Wrist Extension Stretch Pronated - 5 reps - 1 sets - 10 hold - 2x daily - 7x weekly  Seated Gripping Towel - 10 reps - 2 sets - 2x daily - 7x weekly  Circular Shoulder Pendulum with Table Support - 10 reps - 1 sets - 2x daily - 7x weekly

## 2021-03-02 ENCOUNTER — OFFICE VISIT (OUTPATIENT)
Dept: PHYSICAL THERAPY | Facility: CLINIC | Age: 71
End: 2021-03-02
Payer: COMMERCIAL

## 2021-03-02 DIAGNOSIS — Z96.611 S/P REVERSE TOTAL SHOULDER ARTHROPLASTY, RIGHT: ICD-10-CM

## 2021-03-02 DIAGNOSIS — S46.111D RUPTURE OF RIGHT LONG HEAD BICEPS TENDON, SUBSEQUENT ENCOUNTER: Primary | ICD-10-CM

## 2021-03-02 PROCEDURE — 97110 THERAPEUTIC EXERCISES: CPT | Performed by: PHYSICAL THERAPIST

## 2021-03-02 PROCEDURE — 97140 MANUAL THERAPY 1/> REGIONS: CPT | Performed by: PHYSICAL THERAPIST

## 2021-03-02 NOTE — PROGRESS NOTES
Daily Note     Today's date: 3/2/2021  Patient name: Radha Richard  : 1950  MRN: [id number]  Referring provider: Jesenia Sorto MD  Dx:   Encounter Diagnosis     ICD-10-CM    1  Rupture of right long head biceps tendon, subsequent encounter  S46 111D    2  S/P reverse total shoulder arthroplasty, right  Z96 611                   Subjective: Pt's c/c remains his wrist/thumb  He continues w/ use of sling as per protocol  Objective: See treatment diary below      Assessment: Tolerated treatment well  Patient tolerates flexion ROM well, ER remains limited to neutral in scap plane  Plan: Progress treament per protocol        Precautions: protocol rev TSA and bicep precautions- attached to paper chart  [place]: 2021  DOS: 2021  FOTO: 3/2/2021  POC expiration: 2021  Date 2021 2021 2021 3/1/2021 3/2/2021  FOTO   Visit #    4 5   Manual   15' 13' 15'   PROM R shoulder flexion/ER no>30 Reclined - brief - poor elvis 10' reclined 10' reclined 15' shld flex/ER; forearm supin; elbow flex/ext 15' shld flex/ER; forearm supin; elbow flex/ext   R wrist exten mobs  Gr 3 5' Gr 3 5' Gr 3 5' G3 5'           There Exer  30' 30' 30' 30'   Table slide self PROM 2x5 Stand 2x10 stand 2x10 Stand 2x10 pball on table flexion 20x    pendulums cw/ccw/ML/fwd bkwd  15x each 20x each 20x each 20X each   AAROM elbow flex/ext stand 1x10 AROM 2x10 thumb up AROM thumb up 20x AROM thumb up 20x AROM thumb up 20x   Backward rolls 1x10 1x10 2x10     Supported supination 1x10 2x10 2x10 20x 20x   Supported wrist ext 1x10 1# 2x10 1# 2x10 2# 2x10 2# 2x10   grn digiflex gripping  2x10 grn 2x10 20x grn 2x10   Finger ext w/ gel web  Yellow 2x10 Held - thumb pain     Wrist exten stretch  10"x5 10"x5; 2x 10"x5; 2x 10"x2   kirk flex (palm down)/ext/abd w/ ball   5"x10 each 5"x10 each 5"x15 each   Thumb to finger opposition    2x5 each finger 10x each   HEP Issued; reviewed precuations - no hand behind body; PROM only for now Updated HEP  5'      NMRed                                                                                Modalities                                  Access Code: [email or web address]   URL: [employer]  com/   Date: 02/23/2021   Prepared by:  Arnaldo Latin     Exercises  Standing Backward Shoulder Rolls - 10 reps - 2 sets - 2x daily - 7x weekly  Seated Forearm Pronation Supination AROM - 10 reps - 2 sets - 2x daily - 7x weekly  Wrist Flexion Extension AROM - Palms Down - 10 reps - 2 sets - 2x daily - 7x weekly  Seated Bilateral Shoulder Flexion Towel Slide at Table Top - 5 reps - 3 sets - 2x daily - 7x weekly  Standing Elbow Flexion Extension AROM - 10 reps - 2 sets - 2x daily - 7x weekly  Wrist Extension Stretch Pronated - 5 reps - 1 sets - 10 hold - 2x daily - 7x weekly  Seated Gripping Towel - 10 reps - 2 sets - 2x daily - 7x weekly  Circular Shoulder Pendulum with Table Support - 10 reps - 1 sets - 2x daily - 7x weekly

## 2021-03-04 ENCOUNTER — OFFICE VISIT (OUTPATIENT)
Dept: PHYSICAL THERAPY | Facility: CLINIC | Age: 71
End: 2021-03-04
Payer: COMMERCIAL

## 2021-03-04 DIAGNOSIS — S46.111D RUPTURE OF RIGHT LONG HEAD BICEPS TENDON, SUBSEQUENT ENCOUNTER: Primary | ICD-10-CM

## 2021-03-04 DIAGNOSIS — M75.101 ROTATOR CUFF TEAR ARTHROPATHY OF RIGHT SHOULDER: ICD-10-CM

## 2021-03-04 DIAGNOSIS — Z96.611 S/P REVERSE TOTAL SHOULDER ARTHROPLASTY, RIGHT: ICD-10-CM

## 2021-03-04 DIAGNOSIS — M12.811 ROTATOR CUFF TEAR ARTHROPATHY OF RIGHT SHOULDER: ICD-10-CM

## 2021-03-04 PROCEDURE — 97110 THERAPEUTIC EXERCISES: CPT | Performed by: PHYSICAL THERAPIST

## 2021-03-04 PROCEDURE — 97140 MANUAL THERAPY 1/> REGIONS: CPT | Performed by: PHYSICAL THERAPIST

## 2021-03-04 NOTE — PROGRESS NOTES
Daily Note     Today's date: 3/4/2021  Patient name: Pamella Faulkner  : 1950  MRN: [id number]  Referring provider: Lorenza Mcnamara MD  Dx:   Encounter Diagnosis     ICD-10-CM    1  Rupture of right long head biceps tendon, subsequent encounter  S46 111D    2  S/P reverse total shoulder arthroplasty, right  Z96 611    3  Rotator cuff tear arthropathy of right shoulder  M75 101     M12 811                   Subjective: "I still can't touch my thumb to my pinky, I think I need a new hand"  Pt also reports his bicep is sore today  Objective: See treatment diary below; incision well approximated w/ glue - no redness or edema  Advised pt he can start weaning from sling in daytime per protocol - maybe relaxing his bicep will reduce soreness  Assessment: Tolerated treatment well and shoulder is progressing along protocol, but his hand remains painful, swollen and w/ limited strength/ROM  Felicitas Sequin Patient tolerated new AAROM flexion well  Plan: Continue per plan of care  Updated HEP today       Precautions: protocol rev TSA and bicep precautions- attached to paper chart  SOC: 2021  DOS: 2021  FOTO: 3/2/2021  POC expiration: 2021  Date 3/4/2021       Visit # 6       Manual        PROM R shld flex/ER no>30; elbow flex/ext 10'       R wrist exten mobs 5"x10               There Exer 35'        pendulums cw/ccw/ML/fwd bkwd 20x each       AROM elbow flex/ext stand Thumb up 20x       Backward rolls 1x10       AROM supination 2x10       Supported wrist ext 2# 2x10       grn digiflex gripping grn 2x10       grn digiflex indiv finger flex 1x10 each       Wrist exten stretch 10"x2       kirk flex (palm down)/ext/abd w/ ball 5"x15       Thumb to finger opposition 10x each       Reclined flexion self HHA 2x10       Supine ER w/ cane scap plane 5" 2x10       HEP Updated/reviewed       NMRed                                                                                Modalities Access Code: [email or web address]   URL: [employer]  com/   Date: 03/04/2021   Prepared by:  Jayashree Gonzalez     Exercises  Standing Backward Shoulder Rolls - 10 reps - 2 sets - 2x daily - 7x weekly  Wrist Flexion Extension AROM - Palms Down - 10 reps - 2 sets - 2x daily - 7x weekly  Seated Bilateral Shoulder Flexion Towel Slide at Table Top - 5 reps - 3 sets - 2x daily - 7x weekly  Standing Elbow Flexion Extension AROM - 10 reps - 2 sets - 2x daily - 7x weekly  Wrist Extension Stretch Pronated - 5 reps - 1 sets - 10 hold - 2x daily - 7x weekly  Seated Gripping Towel - 10 reps - 2 sets - 2x daily - 7x weekly  Standing Forearm Pronation and Supination AROM - 10 reps - 2 sets - 1x daily - 7x weekly  Circular Shoulder Pendulum with Table Support - 10 reps - 1 sets - 2x daily - 7x weekly  Supine Shoulder Flexion AAROM - 10 reps - 2 sets - 1x daily - 7x weekly  Supine Shoulder External Rotation in 45 Degrees Abduction AAROM with Dowel - 10 reps - 2 sets - 5 hold - 1x daily - 7x weekly

## 2021-03-06 LAB
ALBUMIN SERPL-MCNC: 4.3 G/DL (ref 3.7–4.7)
ALBUMIN/GLOB SERPL: 1.5 {RATIO} (ref 1.2–2.2)
ALP SERPL-CCNC: 98 IU/L (ref 39–117)
ALT SERPL-CCNC: 15 IU/L (ref 0–44)
AST SERPL-CCNC: 16 IU/L (ref 0–40)
BILIRUB SERPL-MCNC: 0.6 MG/DL (ref 0–1.2)
BUN SERPL-MCNC: 19 MG/DL (ref 8–27)
BUN/CREAT SERPL: 17 (ref 10–24)
CALCIUM SERPL-MCNC: 9.9 MG/DL (ref 8.6–10.2)
CHLORIDE SERPL-SCNC: 100 MMOL/L (ref 96–106)
CHOLEST SERPL-MCNC: 141 MG/DL (ref 100–199)
CO2 SERPL-SCNC: 24 MMOL/L (ref 20–29)
CREAT SERPL-MCNC: 1.1 MG/DL (ref 0.76–1.27)
GLOBULIN SER-MCNC: 2.9 G/DL (ref 1.5–4.5)
GLUCOSE SERPL-MCNC: 110 MG/DL (ref 65–99)
HBA1C MFR BLD: 5.6 % (ref 4.8–5.6)
HDLC SERPL-MCNC: 38 MG/DL
LDLC SERPL CALC-MCNC: 87 MG/DL (ref 0–99)
MICRODELETION SYND BLD/T FISH: NORMAL
POTASSIUM SERPL-SCNC: 4.4 MMOL/L (ref 3.5–5.2)
PROT SERPL-MCNC: 7.2 G/DL (ref 6–8.5)
SL AMB EGFR AFRICAN AMERICAN: 78 ML/MIN/1.73
SL AMB EGFR NON AFRICAN AMERICAN: 67 ML/MIN/1.73
SODIUM SERPL-SCNC: 139 MMOL/L (ref 134–144)
TRIGL SERPL-MCNC: 84 MG/DL (ref 0–149)

## 2021-03-08 ENCOUNTER — OFFICE VISIT (OUTPATIENT)
Dept: PHYSICAL THERAPY | Facility: CLINIC | Age: 71
End: 2021-03-08
Payer: COMMERCIAL

## 2021-03-08 DIAGNOSIS — M12.811 ROTATOR CUFF TEAR ARTHROPATHY OF RIGHT SHOULDER: ICD-10-CM

## 2021-03-08 DIAGNOSIS — M75.101 ROTATOR CUFF TEAR ARTHROPATHY OF RIGHT SHOULDER: ICD-10-CM

## 2021-03-08 DIAGNOSIS — Z96.611 S/P REVERSE TOTAL SHOULDER ARTHROPLASTY, RIGHT: ICD-10-CM

## 2021-03-08 DIAGNOSIS — S46.111D RUPTURE OF RIGHT LONG HEAD BICEPS TENDON, SUBSEQUENT ENCOUNTER: Primary | ICD-10-CM

## 2021-03-08 PROCEDURE — 97140 MANUAL THERAPY 1/> REGIONS: CPT

## 2021-03-08 PROCEDURE — 97110 THERAPEUTIC EXERCISES: CPT

## 2021-03-08 NOTE — PROGRESS NOTES
Daily Note     Today's date: 3/8/2021  Patient name: Dante Horton  : 1950   MRN: [id number]  Referring provider: Georgia Brown MD  Dx:   Encounter Diagnosis     ICD-10-CM    1  Rupture of right long head biceps tendon, subsequent encounter  S46 111D    2  Rotator cuff tear arthropathy of right shoulder  M75 101     M12 811    3  S/P reverse total shoulder arthroplasty, right  Z96 611                   Subjective: pt reports he thinks since he has been able to take his sling off more his hand is a little less swollen as he is able to make a fist without being able to put his opp hand finger in between  Finger opposition thumb to pinky remains hard  His thumb is still painful       Objective: See treatment diary below      Assessment: Tolerated treatment well  Patient would benefit from continued PT pt making gains in hand ROM, able to make a tighter fist and touch all fingers during thumb opposition today by end of session  Plan: Continue per plan of care        Precautions: protocol rev TSA and bicep precautions- attached to paper chart  Isaough: 2021  DOS: 2021  FOTO: 3/2/2021  POC expiration: 2021  Date 3/4/2021 3/8/2021      Visit # 6 7      Manual        PROM R shld flex/ER no>30; elbow flex/ext 10' 10'       R wrist exten mobs 5"x10               There Exer 35' 35'       pendulums cw/ccw/ML/fwd bkwd 20x each 20x each       AROM elbow flex/ext stand Thumb up 20x  thumb up 20x      Backward rolls 1x10 2x10       AROM supination 2x10 2x10       Supported wrist ext 2# 2x10 2# 2x10       grn digiflex gripping grn 2x10 grn 2x10       grn digiflex indiv finger flex 1x10 each 1x10 each       Wrist exten stretch 10"x2 5"x10       kirk flex (palm down)/ext/abd w/ ball 5"x15 5"x15       Thumb to finger opposition 10x each 10x each       Reclined flexion self HHA 2x10 2x10       Supine ER w/ cane scap plane 5" 2x10 5" 2x10       HEP Updated/reviewed       NMRed Modalities                                  Access Code: [email or web address]   URL: [employer]  com/   Date: 03/04/2021   Prepared by:  Arnaldo Latin     Exercises  Standing Backward Shoulder Rolls - 10 reps - 2 sets - 2x daily - 7x weekly  Wrist Flexion Extension AROM - Palms Down - 10 reps - 2 sets - 2x daily - 7x weekly  Seated Bilateral Shoulder Flexion Towel Slide at Table Top - 5 reps - 3 sets - 2x daily - 7x weekly  Standing Elbow Flexion Extension AROM - 10 reps - 2 sets - 2x daily - 7x weekly  Wrist Extension Stretch Pronated - 5 reps - 1 sets - 10 hold - 2x daily - 7x weekly  Seated Gripping Towel - 10 reps - 2 sets - 2x daily - 7x weekly  Standing Forearm Pronation and Supination AROM - 10 reps - 2 sets - 1x daily - 7x weekly  Circular Shoulder Pendulum with Table Support - 10 reps - 1 sets - 2x daily - 7x weekly  Supine Shoulder Flexion AAROM - 10 reps - 2 sets - 1x daily - 7x weekly  Supine Shoulder External Rotation in 45 Degrees Abduction AAROM with Dowel - 10 reps - 2 sets - 5 hold - 1x daily - 7x weekly

## 2021-03-09 ENCOUNTER — OFFICE VISIT (OUTPATIENT)
Dept: PHYSICAL THERAPY | Facility: CLINIC | Age: 71
End: 2021-03-09
Payer: COMMERCIAL

## 2021-03-09 DIAGNOSIS — S46.111D RUPTURE OF RIGHT LONG HEAD BICEPS TENDON, SUBSEQUENT ENCOUNTER: Primary | ICD-10-CM

## 2021-03-09 DIAGNOSIS — M75.101 ROTATOR CUFF TEAR ARTHROPATHY OF RIGHT SHOULDER: ICD-10-CM

## 2021-03-09 DIAGNOSIS — M12.811 ROTATOR CUFF TEAR ARTHROPATHY OF RIGHT SHOULDER: ICD-10-CM

## 2021-03-09 DIAGNOSIS — Z96.611 S/P REVERSE TOTAL SHOULDER ARTHROPLASTY, RIGHT: ICD-10-CM

## 2021-03-09 PROCEDURE — 97140 MANUAL THERAPY 1/> REGIONS: CPT | Performed by: PHYSICAL THERAPIST

## 2021-03-09 PROCEDURE — 97110 THERAPEUTIC EXERCISES: CPT | Performed by: PHYSICAL THERAPIST

## 2021-03-09 NOTE — PROGRESS NOTES
Daily Note     Today's date: 3/9/2021  Patient name: Park Buenrostro  : 1950  MRN: [id number]  Referring provider: Yudith Ledezma MD  Dx:   Encounter Diagnosis     ICD-10-CM    1  Rupture of right long head biceps tendon, subsequent encounter  S46 111D    2  Rotator cuff tear arthropathy of right shoulder  M75 101     M12 811    3  S/P reverse total shoulder arthroplasty, right  Z96 611                   Subjective: pt reports his thumb remains painful w/ limited ROM but his wrist is feeling better since he's been taking breaks from the sling during the daytime  Objective: See treatment diary below      Assessment: Tolerated treatment well and demonstrates improved AA/PROM flexion, but does c/o pain w/ eccentric lowering w/ AA flexion    Patient would benefit from continued PT  Pt is now near able to make full fist, but thumb flexion ROM remains quite limited  Plan: Progress treament per protocol        Precautions: protocol rev TSA and bicep precautions- attached to paper chart  [facility]: 2021  DOS: 2021  FOTO: 3/2/2021  POC expiration: 2021  Date 3/4/2021 3/8/2021 3/9/2021     Visit # 6 7 8     Manual        PROM R shld flex/ER no>30; elbow flex/ext 10' 10'  10'     R wrist exten mobs 5"x10  5" 2x10             There Exer 35' 35' 35'      pendulums cw/ccw/ML/fwd bkwd 20x each 20x each  20x each     AROM elbow flex/ext stand Thumb up 20x  thumb up 20x Thumb up 20x     Backward rolls 1x10 2x10       AROM supination 2x10 2x10  20x     Supported wrist ext 2# 2x10 2# 2x10  2# 2x10     grn digiflex gripping grn 2x10 grn 2x10  grn 2x10     grn digiflex indiv finger flex 1x10 each 1x10 each  1x10 each     Wrist exten stretch 10"x2 5"x10       kirk flex (palm down)/ext/abd w/ ball 5"x15 5"x15  5"x15 each     Thumb to finger opposition 10x each 10x each  10x each     Reclined flexion self HHA 2x10 2x10  2x10     Supine ER w/ cane scap plane 5" 2x10 5" 2x10  5" 2x10     sta        HEP Updated/reviewed       NMRed                                                                                Modalities                                  Access Code: [email or web address]   URL: [employer]  com/   Date: 03/04/2021   Prepared by:  Sarah Corea     Exercises  Standing Backward Shoulder Rolls - 10 reps - 2 sets - 2x daily - 7x weekly  Wrist Flexion Extension AROM - Palms Down - 10 reps - 2 sets - 2x daily - 7x weekly  Seated Bilateral Shoulder Flexion Towel Slide at Table Top - 5 reps - 3 sets - 2x daily - 7x weekly  Standing Elbow Flexion Extension AROM - 10 reps - 2 sets - 2x daily - 7x weekly  Wrist Extension Stretch Pronated - 5 reps - 1 sets - 10 hold - 2x daily - 7x weekly  Seated Gripping Towel - 10 reps - 2 sets - 2x daily - 7x weekly  Standing Forearm Pronation and Supination AROM - 10 reps - 2 sets - 1x daily - 7x weekly  Circular Shoulder Pendulum with Table Support - 10 reps - 1 sets - 2x daily - 7x weekly  Supine Shoulder Flexion AAROM - 10 reps - 2 sets - 1x daily - 7x weekly  Supine Shoulder External Rotation in 45 Degrees Abduction AAROM with Dowel - 10 reps - 2 sets - 5 hold - 1x daily - 7x weekly

## 2021-03-11 ENCOUNTER — OFFICE VISIT (OUTPATIENT)
Dept: PHYSICAL THERAPY | Facility: CLINIC | Age: 71
End: 2021-03-11
Payer: COMMERCIAL

## 2021-03-11 DIAGNOSIS — M75.101 ROTATOR CUFF TEAR ARTHROPATHY OF RIGHT SHOULDER: ICD-10-CM

## 2021-03-11 DIAGNOSIS — S46.111D RUPTURE OF RIGHT LONG HEAD BICEPS TENDON, SUBSEQUENT ENCOUNTER: Primary | ICD-10-CM

## 2021-03-11 DIAGNOSIS — M12.811 ROTATOR CUFF TEAR ARTHROPATHY OF RIGHT SHOULDER: ICD-10-CM

## 2021-03-11 DIAGNOSIS — Z96.611 S/P REVERSE TOTAL SHOULDER ARTHROPLASTY, RIGHT: ICD-10-CM

## 2021-03-11 PROCEDURE — 97140 MANUAL THERAPY 1/> REGIONS: CPT | Performed by: PHYSICAL THERAPIST

## 2021-03-11 PROCEDURE — 97110 THERAPEUTIC EXERCISES: CPT | Performed by: PHYSICAL THERAPIST

## 2021-03-11 NOTE — PROGRESS NOTES
Daily Note     Today's date: 3/11/2021  Patient name: Arpan Knott  : 1950  MRN: [id number]  Referring provider: Stella Chambers MD  Dx:   Encounter Diagnosis     ICD-10-CM    1  Rupture of right long head biceps tendon, subsequent encounter  S46 111D    2  Rotator cuff tear arthropathy of right shoulder  M75 101     M12 811    3  S/P reverse total shoulder arthroplasty, right  Z96 611                   Subjective: Pt reports he is doing well w/ weaning from sling in daytime, continues to wear at night  Objective: See treatment diary below      Assessment: Tolerated treatment well and reports improved comfort w/ doing ER and flexion w/ cane supine instead of reclined (decreases eccentric loading of bicep)  Patient is progressing as expected w/ flexion AAROM  Plan: Progress treament per protocol        Precautions: protocol rev TSA and bicep precautions- attached to paper chart  SOC: 2021  DOS: 2021  FOTO: 3/2/2021  POC expiration: 2021  Date 3/4/2021 3/8/2021 3/9/2021 3/11/2021    Visit # 6 7 8 9    Manual        PROM R shld flex/ER no>30; elbow flex/ext 10' 10'  10' 10'    R wrist exten mobs 5"x10  5" 2x10 5"x10            There Exer 35' 35' 35' 30'     pendulums cw/ccw/ML/fwd bkwd 20x each 20x each  20x each 20x each    AROM elbow flex/ext stand Thumb up 20x  thumb up 20x Thumb up 20x Thumb up 20x    Backward rolls 1x10 2x10       AROM supination 2x10 2x10  20x 20x    Supported wrist ext 2# 2x10 2# 2x10  2# 2x10 2# 2x10    grn digiflex gripping grn 2x10 grn 2x10  grn 2x10 grn 2x10    grn digiflex indiv finger flex 1x10 each 1x10 each  1x10 each 1x10 each    Wrist exten stretch 10"x2 5"x10       kirk flex (palm down)/ext/abd w/ ball 5"x15 5"x15  5"x15 each 5"x15 each    Thumb to finger opposition 10x each 10x each  10x each 10x each    Reclined flexion self HHA 2x10 2x10  2x10 Supine w/ cane 90 and beyond 5" 2x10    Supine ER w/ cane scap plane 5" 2x10 5" 2x10  5" 2x10 Supine w/ towel roll 5" 2x10    sta        HEP Updated/reviewed       NMRed                                                                                Modalities                                  Access Code: [email or web address]   URL: [employer]  com/   Date: 03/04/2021   Prepared by:  Zoraida Douglas     Exercises  Standing Backward Shoulder Rolls - 10 reps - 2 sets - 2x daily - 7x weekly  Wrist Flexion Extension AROM - Palms Down - 10 reps - 2 sets - 2x daily - 7x weekly  Seated Bilateral Shoulder Flexion Towel Slide at Table Top - 5 reps - 3 sets - 2x daily - 7x weekly  Standing Elbow Flexion Extension AROM - 10 reps - 2 sets - 2x daily - 7x weekly  Wrist Extension Stretch Pronated - 5 reps - 1 sets - 10 hold - 2x daily - 7x weekly  Seated Gripping Towel - 10 reps - 2 sets - 2x daily - 7x weekly  Standing Forearm Pronation and Supination AROM - 10 reps - 2 sets - 1x daily - 7x weekly  Circular Shoulder Pendulum with Table Support - 10 reps - 1 sets - 2x daily - 7x weekly  Supine Shoulder Flexion AAROM - 10 reps - 2 sets - 1x daily - 7x weekly  Supine Shoulder External Rotation in 45 Degrees Abduction AAROM with Dowel - 10 reps - 2 sets - 5 hold - 1x daily - 7x weekly

## 2021-03-12 ENCOUNTER — OFFICE VISIT (OUTPATIENT)
Dept: FAMILY MEDICINE CLINIC | Facility: CLINIC | Age: 71
End: 2021-03-12
Payer: COMMERCIAL

## 2021-03-12 VITALS
SYSTOLIC BLOOD PRESSURE: 110 MMHG | RESPIRATION RATE: 16 BRPM | BODY MASS INDEX: 36.41 KG/M2 | HEIGHT: 67 IN | DIASTOLIC BLOOD PRESSURE: 60 MMHG | TEMPERATURE: 99.2 F | HEART RATE: 106 BPM | OXYGEN SATURATION: 94 % | WEIGHT: 232 LBS

## 2021-03-12 DIAGNOSIS — Z90.3 POSTGASTRECTOMY MALABSORPTION: ICD-10-CM

## 2021-03-12 DIAGNOSIS — E66.01 SEVERE OBESITY (BMI 35.0-39.9) WITH COMORBIDITY (HCC): ICD-10-CM

## 2021-03-12 DIAGNOSIS — E78.2 MIXED HYPERLIPIDEMIA: ICD-10-CM

## 2021-03-12 DIAGNOSIS — I10 BENIGN ESSENTIAL HYPERTENSION: ICD-10-CM

## 2021-03-12 DIAGNOSIS — Z00.00 WELL ADULT EXAM: Primary | ICD-10-CM

## 2021-03-12 DIAGNOSIS — N40.0 ENLARGED PROSTATE WITHOUT LOWER URINARY TRACT SYMPTOMS (LUTS): ICD-10-CM

## 2021-03-12 DIAGNOSIS — R73.09 ABNORMAL BLOOD SUGAR: ICD-10-CM

## 2021-03-12 DIAGNOSIS — K91.2 POSTGASTRECTOMY MALABSORPTION: ICD-10-CM

## 2021-03-12 PROCEDURE — 3725F SCREEN DEPRESSION PERFORMED: CPT | Performed by: FAMILY MEDICINE

## 2021-03-12 PROCEDURE — 1160F RVW MEDS BY RX/DR IN RCRD: CPT | Performed by: FAMILY MEDICINE

## 2021-03-12 PROCEDURE — 1170F FXNL STATUS ASSESSED: CPT | Performed by: FAMILY MEDICINE

## 2021-03-12 PROCEDURE — 3078F DIAST BP <80 MM HG: CPT | Performed by: FAMILY MEDICINE

## 2021-03-12 PROCEDURE — 1101F PT FALLS ASSESS-DOCD LE1/YR: CPT | Performed by: FAMILY MEDICINE

## 2021-03-12 PROCEDURE — 3074F SYST BP LT 130 MM HG: CPT | Performed by: FAMILY MEDICINE

## 2021-03-12 PROCEDURE — 1125F AMNT PAIN NOTED PAIN PRSNT: CPT | Performed by: FAMILY MEDICINE

## 2021-03-12 PROCEDURE — 3288F FALL RISK ASSESSMENT DOCD: CPT | Performed by: FAMILY MEDICINE

## 2021-03-12 PROCEDURE — 3008F BODY MASS INDEX DOCD: CPT | Performed by: FAMILY MEDICINE

## 2021-03-12 PROCEDURE — 99397 PER PM REEVAL EST PAT 65+ YR: CPT | Performed by: FAMILY MEDICINE

## 2021-03-12 PROCEDURE — 1036F TOBACCO NON-USER: CPT | Performed by: FAMILY MEDICINE

## 2021-03-12 NOTE — PROGRESS NOTES
Assessment and Plan:     Problem List Items Addressed This Visit     None           Preventive health issues were discussed with patient, and age appropriate screening tests were ordered as noted in patient's After Visit Summary  Personalized health advice and appropriate referrals for health education or preventive services given if needed, as noted in patient's After Visit Summary       History of Present Illness:     Patient presents for Medicare Annual Wellness visit    Patient Care Team:  Betty Martin DO as PCP - General (Family Medicine)  Selinda Pierini, MD Griselda Albe, MD as Consulting Physician (Urology)  Arturo Feliciano MD as Consulting Physician (Gastroenterology)     Problem List:     Patient Active Problem List   Diagnosis    Benign essential hypertension    Mixed hyperlipidemia    Abnormal blood sugar    AC (acromioclavicular) joint bone spurs    Enlarged prostate without lower urinary tract symptoms (luts)    GERD without esophagitis    Internal hemorrhoids    Obstructive sleep apnea    Postgastrectomy malabsorption    History of colon polyps    S/P bariatric surgery    BMI 37 0-37 9, adult    Class 2 severe obesity due to excess calories with serious comorbidity and body mass index (BMI) of 37 0 to 37 9 in adult [facility])    Arthritis of carpometacarpal ([address]) joint of left thumb    Rotator cuff tear arthropathy of right shoulder      Past Medical and Surgical History:     Past Medical History:   Diagnosis Date    AC (acromioclavicular) joint bone spurs     Last assessed - 2/19/15    Achilles tendinitis, unspecified leg 06/04/2010    Resolved - 1/8/18    Anemia 08/12/2010    Resolved - 1/11/16    Blood in urine     BPH without urinary obstruction     Hypertrophy    Deep venous thrombosis of distal lower extremity ([address] )     Deep venous thrombosis of distal lower extremity (HCC)     Last assessed - 6/4/15    Heartburn     Last assessed - 12/12/14    Hemorrhoids     Hernia, inguinal 06/30/2009    Resolved - 1/8/18    History of stomach ulcers     Hypertension     Kidney stones     Mixed hyperlipidemia     Nephrolithiasis     Right inguinal hernia     Last assessed - 4/4/16    Sleep apnea     unable to tolerate c-pap    Tear of left rotator cuff     Last assessed - 2/19/15     Past Surgical History:   Procedure Laterality Date    BARIATRIC SURGERY  06/15/2015    Laparoscopic Longitudinal Gastrectomy for morbid obesity, Managed by - Blank Simpson     EXPLORATORY LAPAROTOMY W/ BOWEL RESECTION Right 3/3/2016    Procedure: Incarcerated possibly strangulated right inguinal hernia; Surgeon: Sudha Fried MD;  Location: [address] [place] [address];  Service:    [address]  [facility]      2011 - Rt Hip Replacement, 2012 - Lt Hip Replacement, 2013 - Lt Hip Revision    INGUINAL HERNIA REPAIR      Last assessed - 4/4/16    JOINT REPLACEMENT      KNEE SURGERY  2004    Double     TN COLONOSCOPY FLX DX W/COLLJ SPEC WHEN PFRMD N/A 1/15/2016    Procedure: COLONOSCOPY;  Surgeon: Dru Smith MD;  Location: [facility] GI LAB; Service: Gastroenterology    TN COLONOSCOPY FLX DX W/COLLJ SPEC WHEN PFRMD N/A 2/26/2019    Procedure: COLONOSCOPY;  Surgeon: Dru Smith MD;  Location: [facility] GI [facility]; Service: Gastroenterology    TN RECONSTR TOTAL SHOULDER IMPLANT Right 2/4/2021    Procedure: ARTHROPLASTY SHOULDER REVERSE (RIGHT);   Surgeon: Ronda Morrison MD;  Location: [place] [facility];  Service: Orthopedics    TONSILLECTOMY        Family History:     Family History   Problem Relation Age of Onset    Arthritis Father     Diabetes Father     Hypertension Father     Hyperlipidemia Father     Diabetes Family     Emphysema Family     Heart disease Family     Hypertension Family     Mental illness Neg Hx       Social History:     E-Cigarette/Vaping    E-Cigarette Use Never User      E-Cigarette/Vaping Substances    Nicotine No     THC No     CBD No     Flavoring No     Other No     Unknown No      Social History     Socioeconomic History    Marital status: /Civil Union     Spouse name: None    Number of children: None    Years of education: None    Highest education level: None   Occupational History    None   Social Needs    Financial resource strain: None    Food insecurity     Worry: None     Inability: None    Transportation needs     Medical: None     Non-medical: None   Tobacco Use    Smoking status: Former Smoker     Quit date: 1994     Years since quittin 9    Smokeless tobacco: Never Used   Substance and Sexual Activity    Alcohol use: Yes     Frequency: 2-4 times a month     Drinks per session: 1 or 2     Binge frequency: Never     Comment:  Occ     Drug use: Never    Sexual activity: Yes     Partners: Female   Lifestyle    Physical activity     Days per week: None     Minutes per session: None    Stress: None   Relationships    Social connections     Talks on phone: None     Gets together: None     Attends [employer] service: None     Active member of club or organization: None     Attends meetings of clubs or organizations: None     Relationship status: None    Intimate partner violence     Fear of current or ex partner: None     Emotionally abused: None     Physically abused: None     Forced sexual activity: None   Other Topics Concern    None   Social History Narrative    Exercise - Walking once a day      Medications and Allergies:     Current Outpatient Medications   Medication Sig Dispense Refill    atorvastatin (LIPITOR) 20 mg tablet Take 1 tablet (20 mg total) by mouth daily (Patient taking differently: Take 20 mg by mouth daily at bedtime ) 90 tablet 1    Calcium Polycarbophil (FIBER-CAPS PO) Take by mouth      Cyanocobalamin (VITAMIN B-12) 5000 MCG LOZG Place under the tongue      lisinopril (ZESTRIL) 10 mg tablet Take 1 tablet (10 mg total) by mouth daily 90 tablet 1    NON FORMULARY Liquid Multi vitamin      Calcium Carbonate (CALCIUM 600) 1500 (600 Ca) MG TABS Take by mouth daily       Cholecalciferol (VITAMIN D3) 2000 units TABS Take by mouth daily       Multiple Vitamins-Minerals (MULTIVITAMIN ADULT EXTRA C) CHEW Chew      traMADol (ULTRAM) 50 mg tablet Take 1 tablet (50 mg total) by mouth every 6 (six) hours as needed for moderate pain for up to 20 doses (Patient not taking: Reported on 3/12/2021) 20 tablet 0     No current facility-administered medications for this visit  Allergies   Allergen Reactions    Percocet [Oxycodone-Acetaminophen] Hallucinations    Percolone [Oxycodone] Hallucinations      Immunizations:     Immunization History   Administered Date(s) Administered    Influenza Quadrivalent Preservative Free 3 years and older IM 11/03/2014    Influenza Split High Dose Preservative Free IM 10/02/2015, 01/03/2017, 01/08/2018    Influenza, high dose seasonal 0 7 mL 01/25/2019, 02/17/2020, 01/04/2021    Influenza, seasonal, injectable 01/15/2013    Pneumococcal Conjugate 13-Valent 06/30/2015    Pneumococcal Polysaccharide PPV23 01/03/2017    Tdap 05/13/2010    Zoster Vaccine Recombinant 04/13/2019, 06/24/2019      Health Maintenance:         Topic Date Due    Colonoscopy Surveillance  02/26/2022    Colorectal Cancer Screening  02/26/2029    Hepatitis C Screening  Completed         Topic Date Due    COVID-19 Vaccine (1 of 2) 01/09/1966    DTaP,Tdap,and Td Vaccines (2 - Td) 05/13/2020      Medicare Health Risk Assessment:     /60   Pulse (!) 106   Temp 99 2 °F (37 3 °C)   Resp 16   Ht 5' 7" (1 702 m)   Wt 105 kg (232 lb)   SpO2 94%   BMI 36 34 kg/m²          Health Risk Assessment:   Patient rates overall health as excellent  Patient feels that their physical health rating is much better  Patient is very satisfied with their life  Eyesight was rated as same  Hearing was rated as same  Patient feels that their emotional and mental health rating is much better   Patients states they are never, rarely angry  Patient states they are never, rarely unusually tired/fatigued  Pain experienced in the last 7 days has been some  Patient's pain rating has been 3/10  Patient states that he has experienced no weight loss or gain in last 6 months  Depression Screening:   PHQ-2 Score: 0      Fall Risk Screening: In the past year, patient has experienced: history of falling in past year    Number of falls: 1  Injured during fall?: Yes    Feels unsteady when standing or walking?: No    Worried about falling?: No      Home Safety:  Patient does not have trouble with stairs inside or outside of their home  Patient has working smoke alarms and has working carbon monoxide detector  Home safety hazards include: none  Nutrition:   Current diet is Regular and Low Carb  Medications:   Patient is currently taking over-the-counter supplements  OTC medications include: see medication list  Patient is able to manage medications  Activities of Daily Living (ADLs)/Instrumental Activities of Daily Living (IADLs):   Walk and transfer into and out of bed and chair?: Yes  Dress and groom yourself?: Yes    Bathe or shower yourself?: Yes    Feed yourself?  Yes  Do your laundry/housekeeping?: Yes  Manage your money, pay your bills and track your expenses?: Yes  Make your own meals?: Yes    Do your own shopping?: Yes    Previous Hospitalizations:   Any hospitalizations or ED visits within the last 12 months?: Yes    How many hospitalizations have you had in the last year?: 1-2    Hospitalization Comments: Right shoulder reversal replacement    Advance Care Planning:   Living will: No    Durable POA for healthcare: No    Advanced directive: No      PREVENTIVE SCREENINGS      Cardiovascular Screening:    General: Screening Not Indicated and History Lipid Disorder      Diabetes Screening:     General: Screening Current      Colorectal Cancer Screening:     General: Screening Current      Abdominal Aortic Aneurysm (AAA) Screening:    Risk factors include: age between 73-67 yo and tobacco use        Lung Cancer Screening:     General: Screening Not Indicated      Hepatitis C Screening:    General: Screening Current    Screening, Brief Intervention, and Referral to Treatment (SBIRT)    Screening  Typical number of drinks in a day: 0  Typical number of drinks in a week: 0  Interpretation: Low risk drinking behavior      Single Item Drug Screening:  How often have you used an illegal drug (including marijuana) or a prescription medication for non-medical reasons in the past year? never    Single Item Drug Screen Score: 0  Interpretation: Negative screen for possible drug use disorder      Eladio Mccracken,

## 2021-03-12 NOTE — PROGRESS NOTES
FAMILY PRACTICE HEALTH MAINTENANCE OFFICE VISIT  [facility] - [facility]    NAME: Tete Welsh  AGE: 70 y o  SEX: male  : 1950     DATE: 3/12/2021    Assessment and Plan     1  Well adult exam    2  Mixed hyperlipidemia    3  Enlarged prostate without lower urinary tract symptoms (luts)    4  Benign essential hypertension  Assessment & Plan:  excellent      5  Postgastrectomy malabsorption    6  Severe obesity (BMI 35 0-39  9) with comorbidity ([address] )    7  BMI 36 0-36 9,adult      · Patient Counseling:   · Nutrition: Stressed importance of a well balanced diet, moderation of sodium/saturated fat, caloric balance and sufficient intake of fiber  · Exercise: Stressed the importance of regular exercise with a goal of 150 minutes per week  · Dental Health: Discussed daily flossing and brushing and regular dental visits     · Immunizations reviewed: Up To Date  · Discussed benefits of:  Colon Cancer Screening, Prostate Cancer Screening  and Screening labs   BMI Counseling: Body mass index is 36 34 kg/m²  Discussed with patient's BMI with him  The BMI is above normal  Nutrition recommendations include reducing portion sizes  No follow-ups on file          Chief Complaint     Chief Complaint   Patient presents with   [facility] Wellness Visit     sas/cma       History of Present Illness     Pt is shed for an AWV - but he does not have medicare  Pt also just had rt shoulder surgery - doing well  Doing physical therapy    Pt see urology - Dr Quintin Chong      Well Adult Physical   Patient here for a comprehensive physical exam       Diet and Physical Activity  Diet: well balanced diet  Exercise: frequently      Depression Screen  PHQ-9 Depression Screening    PHQ-9:   Frequency of the following problems over the past two weeks:      Little interest or pleasure in doing things: 0 - not at all  Feeling down, depressed, or hopeless: 0 - not at all  PHQ-2 Score: 0          General Health  Hearing: Normal:  bilateral  Vision: no vision problems  Dental:  false teeth    Reproductive Health  No issues       The following portions of the patient's history were reviewed and updated as appropriate: allergies, current medications, past family history, past medical history, past social history, past surgical history and problem list     Review of Systems     Review of Systems    Past Medical History     Past Medical History:   Diagnosis Date    AC (acromioclavicular) joint bone spurs     Last assessed - 2/19/15    Achilles tendinitis, unspecified leg 06/04/2010    Resolved - 1/8/18    Anemia 08/12/2010    Resolved - 1/11/16    Blood in urine     BPH without urinary obstruction     Hypertrophy    Deep venous thrombosis of distal lower extremity ([address] )     Deep venous thrombosis of distal lower extremity ([address] )     Last assessed - 6/4/15    Heartburn     Last assessed - 12/12/14    Hemorrhoids     Hernia, inguinal 06/30/2009    Resolved - 1/8/18    History of stomach ulcers     Hypertension     Kidney stones     Mixed hyperlipidemia     Nephrolithiasis     Right inguinal hernia     Last assessed - 4/4/16    Sleep apnea     unable to tolerate c-pap    Tear of left rotator cuff     Last assessed - 2/19/15       Past Surgical History     Past Surgical History:   Procedure Laterality Date    BARIATRIC SURGERY  06/15/2015    Laparoscopic Longitudinal Gastrectomy for morbid obesity, Managed by - Blank Suresh     EXPLORATORY LAPAROTOMY W/ BOWEL RESECTION Right 3/3/2016    Procedure: Incarcerated possibly strangulated right inguinal hernia;   Surgeon: Zoe Weinstein MD;  Location: [address] [place] [address];  Service:    [address] [facility]      2011 - Rt Hip Replacement, 2012 - Lt Hip Replacement, 2013 - Lt Hip Revision    INGUINAL HERNIA REPAIR      Last assessed - 4/4/16    JOINT REPLACEMENT      KNEE SURGERY  2004    Double     MI COLONOSCOPY FLX DX W/COLLJ SPEC WHEN PFRMD N/A 1/15/2016    Procedure: COLONOSCOPY;  Surgeon: Akira Srivastava MD;  Location: [place] [address] [facility]; Service: Gastroenterology    IA COLONOSCOPY FLX DX W/COLLJ SPEC WHEN PFRMD N/A 2019    Procedure: COLONOSCOPY;  Surgeon: Akira Srivastava MD;  Location: [place] [address] [facility]; Service: Gastroenterology    IA RECONSTR TOTAL SHOULDER IMPLANT Right 2021    Procedure: ARTHROPLASTY SHOULDER REVERSE (RIGHT); Surgeon: Ranjana Pina MD;  Location: [place] [facility];  Service: Orthopedics    TONSILLECTOMY         Social History     Social History     Socioeconomic History    Marital status: /Civil Union     Spouse name: None    Number of children: None    Years of education: None    Highest education level: None   Occupational History    None   Social Needs    Financial resource strain: None    Food insecurity     Worry: None     Inability: None    Transportation needs     Medical: None     Non-medical: None   Tobacco Use    Smoking status: Former Smoker     Quit date: 1994     Years since quittin 9    Smokeless tobacco: Never Used   Substance and Sexual Activity    Alcohol use: Yes     Frequency: 2-4 times a month     Drinks per session: 1 or 2     Binge frequency: Never     Comment:  Occ     Drug use: Never    Sexual activity: Yes     Partners: Female   Lifestyle    Physical activity     Days per week: None     Minutes per session: None    Stress: None   Relationships    Social connections     Talks on phone: None     Gets together: None     Attends [employer] service: None     Active member of club or organization: None     Attends meetings of clubs or organizations: None     Relationship status: None    Intimate partner violence     Fear of current or ex partner: None     Emotionally abused: None     Physically abused: None     Forced sexual activity: None   Other Topics Concern    None   Social History Narrative    Exercise - Walking once a day       Family History     Family History   Problem Relation Age of Onset    Arthritis Father     Diabetes Father     Hypertension Father     Hyperlipidemia Father     Diabetes Family     Emphysema Family     Heart disease Family     Hypertension Family     Mental illness Neg Hx        Current Medications       Current Outpatient Medications:     atorvastatin (LIPITOR) 20 mg tablet, Take 1 tablet (20 mg total) by mouth daily (Patient taking differently: Take 20 mg by mouth daily at bedtime ), Disp: 90 tablet, Rfl: 1    Calcium Polycarbophil (FIBER-CAPS PO), Take by mouth, Disp: , Rfl:     Cyanocobalamin (VITAMIN B-12) 5000 MCG LOZG, Place under the tongue, Disp: , Rfl:     lisinopril (ZESTRIL) 10 mg tablet, Take 1 tablet (10 mg total) by mouth daily, Disp: 90 tablet, Rfl: 1    NON FORMULARY, Liquid Multi vitamin, Disp: , Rfl:     Calcium Carbonate (CALCIUM 600) 1500 (600 Ca) MG TABS, Take by mouth daily , Disp: , Rfl:     Cholecalciferol (VITAMIN D3) 2000 units TABS, Take by mouth daily , Disp: , Rfl:     Multiple Vitamins-Minerals (MULTIVITAMIN ADULT EXTRA C) CHEW, Chew, Disp: , Rfl:     traMADol (ULTRAM) 50 mg tablet, Take 1 tablet (50 mg total) by mouth every 6 (six) hours as needed for moderate pain for up to 20 doses (Patient not taking: Reported on 3/12/2021), Disp: 20 tablet, Rfl: 0     Allergies     Allergies   Allergen Reactions    Percocet [Oxycodone-Acetaminophen] Hallucinations    Percolone [Oxycodone] Hallucinations       Objective     /60   Pulse (!) 106   Temp 99 2 °F (37 3 °C)   Resp 16   Ht 5' 7" (1 702 m)   Wt 105 kg (232 lb)   SpO2 94%   BMI 36 34 kg/m²      Physical Exam      No exam data present        Roshan Durham DO  [facility]  BMI Counseling: Body mass index is 36 34 kg/m²  The BMI is above normal  Nutrition recommendations include reducing portion sizes  Falls Plan of Care: Balance, strength, and gait training instructions were provided

## 2021-03-12 NOTE — PATIENT INSTRUCTIONS
Medicare Preventive Visit Patient Instructions  Thank you for completing your Welcome to Medicare Visit or Medicare Annual Wellness Visit today  Your next wellness visit will be due in one year (3/13/2022)  The screening/preventive services that you may require over the next 5-10 years are detailed below  Some tests may not apply to you based off risk factors and/or age  Screening tests ordered at today's visit but not completed yet may show as past due  Also, please note that scanned in results may not display below  Preventive Screenings:  Service Recommendations Previous Testing/Comments   Colorectal Cancer Screening  · Colonoscopy    · Fecal Occult Blood Test (FOBT)/Fecal Immunochemical Test (FIT)  · Fecal DNA/Cologuard Test  · Flexible Sigmoidoscopy Age: 54-65 years old   Colonoscopy: every 10 years (May be performed more frequently if at higher risk)  OR  FOBT/FIT: every 1 year  OR  Cologuard: every 3 years  OR  Sigmoidoscopy: every 5 years  Screening may be recommended earlier than age 48 if at higher risk for colorectal cancer  Also, an individualized decision between you and your healthcare provider will decide whether screening between the ages of 74-80 would be appropriate   Colonoscopy: 02/26/2019  FOBT/FIT: Not on file  Cologuard: Not on file  Sigmoidoscopy: Not on file    Screening Current     Prostate Cancer Screening Individualized decision between patient and health care provider in men between ages of 53-78   Medicare will cover every 12 months beginning on the day after your 50th birthday PSA: 3 8 ng/mL           Hepatitis C Screening Once for adults born between 1945 and 1965  More frequently in patients at high risk for Hepatitis C Hep C Antibody: 01/17/2019    Screening Current   Diabetes Screening 1-2 times per year if you're at risk for diabetes or have pre-diabetes Fasting glucose: 96 mg/dL   A1C: 5 6 %    Screening Current   Cholesterol Screening Once every 5 years if you don't have a lipid disorder  May order more often based on risk factors  Lipid panel: 03/05/2021    Screening Not Indicated  History Lipid Disorder      Other Preventive Screenings Covered by Medicare:  1  Abdominal Aortic Aneurysm (AAA) Screening: covered once if your at risk  You're considered to be at risk if you have a family history of AAA or a male between the age of 73-68 who smoking at least 100 cigarettes in your lifetime  2  Lung Cancer Screening: covers low dose CT scan once per year if you meet all of the following conditions: (1) Age 50-69; (2) No signs or symptoms of lung cancer; (3) Current smoker or have quit smoking within the last 15 years; (4) You have a tobacco smoking history of at least 30 pack years (packs per day x number of years you smoked); (5) You get a written order from a healthcare provider  3  Glaucoma Screening: covered annually if you're considered high risk: (1) You have diabetes OR (2) Family history of glaucoma OR (3)  aged 48 and older OR (3)  American aged 72 and older  3  Osteoporosis Screening: covered every 2 years if you meet one of the following conditions: (1) Have a vertebral abnormality; (2) On glucocorticoid therapy for more than 3 months; (3) Have primary hyperparathyroidism; (4) On osteoporosis medications and need to assess response to drug therapy  5  HIV Screening: covered annually if you're between the age of 12-76  Also covered annually if you are younger than 13 and older than 72 with risk factors for HIV infection  For pregnant patients, it is covered up to 3 times per pregnancy      Immunizations:  Immunization Recommendations   Influenza Vaccine Annual influenza vaccination during flu season is recommended for all persons aged >= 6 months who do not have contraindications   Pneumococcal Vaccine (Prevnar and Pneumovax)  * Prevnar = PCV13  * Pneumovax = PPSV23 Adults 25-60 years old: 1-3 doses may be recommended based on certain risk factors  Adults 72 years old: Prevnar (PCV13) vaccine recommended followed by Pneumovax (PPSV23) vaccine  If already received PPSV23 since turning 65, then PCV13 recommended at least one year after PPSV23 dose  Hepatitis B Vaccine 3 dose series if at intermediate or high risk (ex: diabetes, end stage renal disease, liver disease)   Tetanus (Td) Vaccine - COST NOT COVERED BY MEDICARE PART B Following completion of primary series, a booster dose should be given every 10 years to maintain immunity against tetanus  Td may also be given as tetanus wound prophylaxis  Tdap Vaccine - COST NOT COVERED BY MEDICARE PART B Recommended at least once for all adults  For pregnant patients, recommended with each pregnancy  Shingles Vaccine (Shingrix) - COST NOT COVERED BY MEDICARE PART B  2 shot series recommended in those aged 48 and above     Health Maintenance Due:      Topic Date Due    Colonoscopy Surveillance  02/26/2022    Colorectal Cancer Screening  02/26/2029    Hepatitis C Screening  Completed     Immunizations Due:      Topic Date Due    COVID-19 Vaccine (1 of 2) 01/09/1966    DTaP,Tdap,and Td Vaccines (2 - Td) 05/13/2020     Advance Directives   What are advance directives? Advance directives are legal documents that state your wishes and plans for medical care  These plans are made ahead of time in case you lose your ability to make decisions for yourself  Advance directives can apply to any medical decision, such as the treatments you want, and if you want to donate organs  What are the types of advance directives? There are many types of advance directives, and each state has rules about how to use them  You may choose a combination of any of the following:  · Living will: This is a written record of the treatment you want  You can also choose which treatments you do not want, which to limit, and which to stop at a certain time  This includes surgery, medicine, IV fluid, and tube feedings  · Durable power of  for healthcare [facility]): This is a written record that states who you want to make healthcare choices for you when you are unable to make them for yourself  This person, called a proxy, is usually a family member or a friend  You may choose more than 1 proxy  · Do not resuscitate (DNR) order:  A DNR order is used in case your heart stops beating or you stop breathing  It is a request not to have certain forms of treatment, such as CPR  A DNR order may be included in other types of advance directives  · Medical directive: This covers the care that you want if you are in a coma, near death, or unable to make decisions for yourself  You can list the treatments you want for each condition  Treatment may include pain medicine, surgery, blood transfusions, dialysis, IV or tube feedings, and a ventilator (breathing machine)  · Values history: This document has questions about your views, beliefs, and how you feel and think about life  This information can help others choose the care that you would choose  Why are advance directives important? An advance directive helps you control your care  Although spoken wishes may be used, it is better to have your wishes written down  Spoken wishes can be misunderstood, or not followed  Treatments may be given even if you do not want them  An advance directive may make it easier for your family to make difficult choices about your care  Fall Prevention    Fall prevention  includes ways to make your home and other areas safer  It also includes ways you can move more carefully to prevent a fall  Health conditions that cause changes in your blood pressure, vision, or muscle strength and coordination may increase your risk for falls  Medicines may also increase your risk for falls if they make you dizzy, weak, or sleepy  Fall prevention tips:   · Stand or sit up slowly  · Use assistive devices as directed      · Wear shoes that fit well and have soles that   · Wear a personal alarm  · Stay active  · Manage your medical conditions  Home Safety Tips:  · Add items to prevent falls in the bathroom  · Keep paths clear  · Install bright lights in your home  · Keep items you use often on shelves within reach  · Paint or place reflective tape on the edges of your stairs  Weight Management   Why it is important to manage your weight:  Being overweight increases your risk of health conditions such as heart disease, high blood pressure, type 2 diabetes, and certain types of cancer  It can also increase your risk for osteoarthritis, sleep apnea, and other respiratory problems  Aim for a slow, steady weight loss  Even a small amount of weight loss can lower your risk of health problems  How to lose weight safely:  A safe and healthy way to lose weight is to eat fewer calories and get regular exercise  You can lose up about 1 pound a week by decreasing the number of calories you eat by 500 calories each day  Healthy meal plan for weight management:  A healthy meal plan includes a variety of foods, contains fewer calories, and helps you stay healthy  A healthy meal plan includes the following:  · Eat whole-grain foods more often  A healthy meal plan should contain fiber  Fiber is the part of grains, fruits, and vegetables that is not broken down by your body  Whole-grain foods are healthy and provide extra fiber in your diet  Some examples of whole-grain foods are whole-wheat breads and pastas, oatmeal, brown rice, and bulgur  · Eat a variety of vegetables every day  Include dark, leafy greens such as spinach, kale, emily greens, and mustard greens  Eat yellow and orange vegetables such as carrots, sweet potatoes, and winter squash  · Eat a variety of fruits every day  Choose fresh or canned fruit (canned in its own juice or light syrup) instead of juice  Fruit juice has very little or no fiber  · Eat low-fat dairy foods    Drink fat-free (skim) milk or 1% milk  Eat fat-free yogurt and low-fat cottage cheese  Try low-fat cheeses such as mozzarella and other reduced-fat cheeses  · Choose meat and other protein foods that are low in fat  Choose beans or other legumes such as split peas or lentils  Choose fish, skinless poultry (chicken or turkey), or lean cuts of red meat (beef or pork)  Before you cook meat or poultry, cut off any visible fat  · Use less fat and oil  Try baking foods instead of frying them  Add less fat, such as margarine, sour cream, regular salad dressing and mayonnaise to foods  Eat fewer high-fat foods  Some examples of high-fat foods include french fries, doughnuts, ice cream, and cakes  · Eat fewer sweets  Limit foods and drinks that are high in sugar  This includes candy, cookies, regular soda, and sweetened drinks  Exercise:  Exercise at least 30 minutes per day on most days of the week  Some examples of exercise include walking, biking, dancing, and swimming  You can also fit in more physical activity by taking the stairs instead of the elevator or parking farther away from stores  Ask your healthcare provider about the best exercise plan for you  © Copyright [employer] 2018 Information is for End User's use only and may not be sold, redistributed or otherwise used for commercial purposes  All illustrations and images included in CareNotes® are the copyrighted property of A D A M , Inc  or [address] [place] [address]  Obesity   AMBULATORY CARE:   Obesity  is when your body mass index (BMI) is greater than 30  Your healthcare provider will use your height and weight to measure your BMI  The risks of obesity include  many health problems, such as injuries or physical disability   You may need tests to check for the following:  · Diabetes    · High blood pressure or high cholesterol    · Heart disease    · Gallbladder or liver disease    · Cancer of the colon, breast, prostate, liver, or kidney    · Sleep apnea    · Arthritis or gout    Seek care immediately if:   · You have a severe headache, confusion, or difficulty speaking  · You have weakness on one side of your body  · You have chest pain, sweating, or shortness of breath  Contact your healthcare provider if:   · You have symptoms of gallbladder or liver disease, such as pain in your upper abdomen  · You have knee or hip pain and discomfort while walking  · You have symptoms of diabetes, such as intense hunger and thirst, and frequent urination  · You have symptoms of sleep apnea, such as snoring or daytime sleepiness  · You have questions or concerns about your condition or care  Treatment for obesity  focuses on helping you lose weight to improve your health  Even a small decrease in BMI can reduce the risk for many health problems  Your healthcare provider will help you set a weight-loss goal   · Lifestyle changes  are the first step in treating obesity  These include making healthy food choices and getting regular physical activity  Your healthcare provider may suggest a weight-loss program that involves coaching, education, and therapy  · Medicine  may help you lose weight when it is used with a healthy diet and physical activity  · Surgery  can help you lose weight if you are very obese and have other health problems  There are several types of weight-loss surgery  Ask your healthcare provider for more information  Be successful losing weight:   · Set small, realistic goals  An example of a small goal is to walk for 20 minutes 5 days a week  Anther goal is to lose 5% of your body weight  · Tell friends, family members, and coworkers about your goals  and ask for their support  Ask a friend to lose weight with you, or join a weight-loss support group  · Identify foods or triggers that may cause you to overeat , and find ways to avoid them  Remove tempting high-calorie foods from your home and workplace   Place a bowl of fresh fruit on your kitchen counter  If stress causes you to eat, then find other ways to cope with stress  · Keep a diary to track what you eat and drink  Also write down how many minutes of physical activity you do each day  Weigh yourself once a week and record it in your diary  Eating changes: You will need to eat 500 to 1,000 fewer calories each day than you currently eat to lose 1 to 2 pounds a week  The following changes will help you cut calories:  · Eat smaller portions  Use small plates, no larger than 9 inches in diameter  Fill your plate half full of fruits and vegetables  Measure your food using measuring cups until you know what a serving size looks like  · Eat 3 meals and 1 or 2 snacks each day  Plan your meals in advance  J[place] and eat at home most of the time  Eat slowly  Do not skip meals  Skipping meals can lead to overeating later in the day  This can make it harder for you to lose weight  Talk with a dietitian to help you make a meal plan and schedule that is right for you  · Eat fruits and vegetables at every meal   They are low in calories and high in fiber, which makes you feel full  Do not add butter, margarine, or cream sauce to vegetables  Use herbs to season steamed vegetables  · Eat less fat and fewer fried foods  Eat more baked or grilled chicken and fish  These protein sources are lower in calories and fat than red meat  Limit fast food  Dress your salads with olive oil and vinegar instead of bottled dressing  · Limit the amount of sugar you eat  Do not drink sugary beverages  Limit alcohol  Activity changes:  Physical activity is good for your body in many ways  It helps you burn calories and build strong muscles  It decreases stress and depression, and improves your mood  It can also help you sleep better  Talk to your healthcare provider before you begin an exercise program   · Exercise for at least 30 minutes 5 days a week  Start slowly   Set aside time each day for physical activity that you enjoy and that is convenient for you  It is best to do both weight training and an activity that increases your heart rate, such as walking, bicycling, or swimming  · Find ways to be more active  Do yard work and housecleaning  Walk up the stairs instead of using elevators  Spend your leisure time going to events that require walking, such as outdoor festivals or fairs  This extra physical activity can help you lose weight and keep it off  Follow up with your healthcare provider as directed: You may need to meet with a dietitian  Write down your questions so you remember to ask them during your visits  © Copyright [address] Information is for End User's use only and may not be sold, redistributed or otherwise used for commercial purposes  All illustrations and images included in CareNotes® are the copyrighted property of A D A M , Inc  or [employer]   The above information is an  only  It is not intended as medical advice for individual conditions or treatments  Talk to your doctor, nurse or pharmacist before following any medical regimen to see if it is safe and effective for you  Fall Prevention   AMBULATORY CARE:   Fall prevention  includes ways to make your home and other areas safer  It also includes ways you can move more carefully to prevent a fall  Health conditions that cause changes in your blood pressure, vision, or muscle strength and coordination may increase your risk for falls  Medicines may also increase your risk for falls if they make you dizzy, weak, or sleepy  Call 911 or have someone else call if:   · You have fallen and are unconscious  · You have fallen and cannot move part of your body  Contact your healthcare provider if:   · You have fallen and have pain or a headache  · You have questions or concerns about your condition or care  Fall prevention tips:   · Stand or sit up slowly  This may help you keep your balance and prevent falls  · Use assistive devices as directed  Your healthcare provider may suggest that you use a cane or walker to help you keep your balance  You may need to have grab bars put in your bathroom near the toilet or in the shower  · Wear shoes that fit well and have soles that   Wear shoes both inside and outside  Use slippers with good   Do not wear shoes with high heels  · Wear a personal alarm  This is a device that allows you to call 911 if you fall and need help  Ask your healthcare provider for more information  · Stay active  Exercise can help strengthen your muscles and improve your balance  Your healthcare provider may recommend water aerobics or walking  He or she may also recommend physical therapy to improve your coordination  Never start an exercise program without talking to your healthcare provider first          · Manage your medical conditions  Keep all appointments with your healthcare providers  Visit your eye doctor as directed  Home safety tips:       · Add items to prevent falls in the bathroom  Put nonslip strips on your bath or shower floor to prevent you from slipping  Use a bath mat if you do not have carpet in the bathroom  This will prevent you from falling when you step out of the bath or shower  Use a shower seat so you do not need to stand while you shower  Sit on the toilet or a chair in your bathroom to dry yourself and put on clothing  This will prevent you from losing your balance from drying or dressing yourself while you are standing  · Keep paths clear  Remove books, shoes, and other objects from walkways and stairs  Place cords for telephones and lamps out of the way so that you do not need to walk over them  Tape them down if you cannot move them  Remove small rugs  If you cannot remove a rug, secure it with double-sided tape  This will prevent you from tripping      · Install bright lights in your home   Use night lights to help light paths to the bathroom or kitchen  Always turn on the light before you start walking  · Keep items you use often on shelves within reach  Do not use a step stool to help you reach an item  · Paint or place reflective tape on the edges of your stairs  This will help you see the stairs better  Follow up with your healthcare provider as directed:  Write down your questions so you remember to ask them during your visits  © Copyright [address] Information is for End User's use only and may not be sold, redistributed or otherwise used for commercial purposes  All illustrations and images included in CareNotes® are the copyrighted property of A D A M , Inc  or [address] [place] [address]  The above information is an  only  It is not intended as medical advice for individual conditions or treatments  Talk to your doctor, nurse or pharmacist before following any medical regimen to see if it is safe and effective for you

## 2021-03-15 ENCOUNTER — EVALUATION (OUTPATIENT)
Dept: PHYSICAL THERAPY | Facility: CLINIC | Age: 71
End: 2021-03-15
Payer: COMMERCIAL

## 2021-03-15 DIAGNOSIS — Z96.611 S/P REVERSE TOTAL SHOULDER ARTHROPLASTY, RIGHT: ICD-10-CM

## 2021-03-15 DIAGNOSIS — S46.111D RUPTURE OF RIGHT LONG HEAD BICEPS TENDON, SUBSEQUENT ENCOUNTER: Primary | ICD-10-CM

## 2021-03-15 DIAGNOSIS — M75.101 ROTATOR CUFF TEAR ARTHROPATHY OF RIGHT SHOULDER: ICD-10-CM

## 2021-03-15 DIAGNOSIS — M12.811 ROTATOR CUFF TEAR ARTHROPATHY OF RIGHT SHOULDER: ICD-10-CM

## 2021-03-15 PROCEDURE — 97110 THERAPEUTIC EXERCISES: CPT | Performed by: PHYSICAL THERAPIST

## 2021-03-15 PROCEDURE — 97140 MANUAL THERAPY 1/> REGIONS: CPT | Performed by: PHYSICAL THERAPIST

## 2021-03-15 NOTE — PROGRESS NOTES
PT Re-Evaluation     Today's date: 3/15/2021  Patient name: Cheryl Ricci  : 1950  MRN: [id number]  Referring provider: Daryl Winn MD  Dx:   Encounter Diagnosis     ICD-10-CM    1  Rupture of right long head biceps tendon, subsequent encounter  S46 111D    2  Rotator cuff tear arthropathy of right shoulder  M75 101     M12 811    3  S/P reverse total shoulder arthroplasty, right  Z96 611                   Assessment  Assessment details: 2021: Cheryl Ricci is a 70 y o  male who presents with pain, decreased strength, decreased ROM, decreased joint mobility, joint effusion and post-op precaution  Due to these impairments, patient has difficulty performing ADL's, recreational activities, lifting/carrying, transfers, reaching  Patient's clinical presentation is consistent with their referring diagnosis of Rotator cuff tear arthropathy of right shoulder, Rupture of right long head biceps tendon, subsequent encounter; Reverse TSA  Incision is healing well  Pt has poor tolerance to PROM today and cannot tolerate supine lying  He was cautioned on post-op precautions as he reports trying to actively move his arm in flex/abd/exten and elbow flex/ext already  Plan: Ambulatory referral to Physical Therapy  Patient has been educated in post-op contraindications / precautions, home exercise program and plan of care  Patient would benefit from skilled physical therapy services to address their aforementioned functional limitations and progress towards prior level of function and independence with home exercise program      3/15/2021: Pt is progressing w/ shoulder A/AA/PROM along protocol expectations  He continues to have pain and ROM limitation in wrist/thumb/hand, but this is improving - pt can now oppose thumb to each finger and can grasp and hold a 2# weight which he could not do at evaluation  Pt will benefit from continued PT per POC   He will advance to full D/C of sling and to AROM phase later this week  Pain is improving each week  Impairments: abnormal or restricted ROM, activity intolerance, impaired physical strength, pain with function, scapular dyskinesis and poor posture   Functional limitations: limited AA/AROM vs PROM; limited R /wrist/opposition strength  Understanding of Dx/Px/POC: good   Prognosis: good    Goals  Short Term Goals to be met in 4 weeks (3/17/2021)  1  Pt to be independent w/ prelimary HEP - met  2  PROM R shoulder to be within 120 degrees  - met  3  Pt to D/C sling  - will D/C 3/18/2021  4  Improve AROM flexion/extension of R elbow to [facility]  - improved/not met    Long Term Goals to be met in 12 weeks (5/12/2021) - ongoing all LTG's  1  AROM R shoulder to be at least 110 degrees to allow indep w/ reaching OH  2  AROM R shoulder abduction to 100 degrees  3  Undisturbed sleep  4  Improve R shoulder strength to 3+/5 or better, R bicep strength to 4+/5 to allow ease w to be indep w/ ADL's w/o difficulty  5  Improve FOTO to 60 or better  Plan  Plan details:       Patient would benefit from: PT eval and skilled physical therapy  Planned modality interventions: cryotherapy, thermotherapy: hydrocollator packs and unattended electrical stimulation  Planned therapy interventions: manual therapy, neuromuscular re-education, therapeutic exercise, therapeutic activities, home exercise program, stretching, patient education and postural training  Frequency: 2x week (2-3x/week)  Plan of Care beginning date: 2/17/2021  Plan of Care expiration date: 5/12/2021  Treatment plan discussed with: patient        Subjective Evaluation    History of Present Illness  Date of onset: 11/7/2020  Date of surgery: 2/4/2021  Mechanism of injury: 2/17/2021: Pt reports he injured his R arm when falling in November 2020  He went to the ED, and they examined his hand only  He went to an ortho MD a week later  He was told he dislocated his shoulder and it reduced on his own   He saw Dr Sera Flores as a 2nd opinion, which showed the RC and bicep were torn, and he needed reverse TSA  He followed up w/ Dr Vivar President for his hand due to continued edema and difficulty making a fist  He was told there is no fx in his hand  Oral Prednisone did not help much with his hand function  3/15/2021: Pt reports his shoulder is feeling better with reducing pain and improving ROM  He still struggles with wrist/thumb/hand AROM and strength with some remaining swelling in his hand  He will D/C sling fully later this week  Pain  At best pain ratin  At worst pain ratin  Pain location: along anterior arm  Quality: dull ache and sharp  Relieving factors: rest and support  Exacerbated by: movement  Progression: improved    Social Support  Lives with: spouse    Hand dominance: ambidextrous (writes R handed)      Diagnostic Tests  MRI studies: abnormal  Treatments  Previous treatment: immobilization  Current treatment: physical therapy  Patient Goals  Patient goals for therapy: decreased pain, increased motion, increased strength and independence with ADLs/IADLs          Objective    Flowsheet Rows      Most Recent Value   PT/OT G-Codes   Current Score  55   Projected Score  67        Objective    Posture: wearing abduction sling      AROM: standing R  R  L     3/15/2021 2021 2021  Shoulder flex        95  NT  150  Shoulder abd       80  NT  140  Functional ER  NT  NT  85  Functional IR  NT  NT  L1  Elbow exten  -10  -30  0  Elbow flexion  140  130  140  Supination  50  40  70  Pronation  90  90  90  Wrist exten  55  35  70    PROM: reclined R  R  L     3/15/2021 2021 2021  Shoulder flex        120  50*  155  Shoulder abd       90  NT  145  ER @neutral  [email or web address]  Not elvis  90  IR @neutral  NT  NT  NT       MMT:    R  R  L     3/15/2021 2021 2021  Shoulder flex  3/5  NT  4+/5  Shoulder abd  3-/5  NT  4+/5  IR   3+/5  NT  5/5  ER   3-/5  NT  4/5      Tenderness/Palpation: tender along anterior humerus and thumb extensor  Joint Mobility: min/mod fan   2/17/2021guarded - poor       Precautions: protocol - attached to paper chart- can D/C sling and start shld AROM 3/18/2021  SOC: 2/17/2021  DOS: 2/4/2021  FOTO: 2/17/2021  POC expiration: 5/12/2021    POC expiration: 5/12/2021  Date 3/4/2021 3/8/2021 3/9/2021 3/11/2021 3/15/2021   Visit # 6 7 8 9 10   Manual        PROM R shld flex/ER no>30; elbow flex/ext 10' 10'  10' 10' 10'   R wrist exten mobs 5"x10  5" 2x10 5"x10 5"x10           There Exer 35' 35' 35' 30' 30'    pendulums cw/ccw/ML/fwd bkwd 20x each 20x each  20x each 20x each 20x each   AROM elbow flex/ext stand Thumb up 20x  thumb up 20x Thumb up 20x Thumb up 20x Thumb up 20x   Backward rolls 1x10 2x10       AROM supination 2x10 2x10  20x 20x 20x w/ hammer mid handle   Supported wrist ext 2# 2x10 2# 2x10  2# 2x10 2# 2x10 2# 2x10   grn digiflex gripping grn 2x10 grn 2x10  grn 2x10 grn 2x10 grn 2x10   grn digiflex indiv finger flex 1x10 each 1x10 each  1x10 each 1x10 each 1x10 each   Wrist exten stretch 10"x2 5"x10       kirk flex (palm down)/ext/abd w/ ball 5"x15 5"x15  5"x15 each 5"x15 each 5"x15 each   Thumb to finger opposition 10x each 10x each  10x each 10x each 10x each   Reclined flexion self HHA 2x10 2x10  2x10 Supine w/ cane 90 and beyond 5" 2x10 Supine 5"x10   Supine ER w/ cane scap plane 5" 2x10 5" 2x10  5" 2x10 Supine w/ towel roll 5" 2x10 Supine w/ towel roll 5" 2x10   Stand cane B/L flexion     2x5   Stand cane abd     2x5   HEP Updated/reviewed       NMRed                                                                                Modalities                                  Access Code: [email or web address]   URL: [employer]  [email or web address]   Date: 02/17/2021   Prepared by:  Christina Castro     Exercises  Standing Backward Shoulder Rolls - 10 reps - 2 sets - 2x daily - 7x weekly  Seated Elbow Flexion AAROM - 10 reps - 2 sets - 2x daily - 7x weekly  Seated Forearm Pronation Supination AROM - 10 reps - 2 sets - 2x daily - 7x weekly  Wrist Flexion Extension AROM - Palms Down - 10 reps - 2 sets - 2x daily - 7x weekly  Seated Bilateral Shoulder Flexion Towel Slide at Table Top - 5 reps - 3 sets - 2x daily - 7x weekly

## 2021-03-16 ENCOUNTER — OFFICE VISIT (OUTPATIENT)
Dept: PHYSICAL THERAPY | Facility: CLINIC | Age: 71
End: 2021-03-16
Payer: COMMERCIAL

## 2021-03-16 DIAGNOSIS — M12.811 ROTATOR CUFF TEAR ARTHROPATHY OF RIGHT SHOULDER: ICD-10-CM

## 2021-03-16 DIAGNOSIS — M75.101 ROTATOR CUFF TEAR ARTHROPATHY OF RIGHT SHOULDER: ICD-10-CM

## 2021-03-16 DIAGNOSIS — Z96.611 S/P REVERSE TOTAL SHOULDER ARTHROPLASTY, RIGHT: ICD-10-CM

## 2021-03-16 DIAGNOSIS — S46.111D RUPTURE OF RIGHT LONG HEAD BICEPS TENDON, SUBSEQUENT ENCOUNTER: Primary | ICD-10-CM

## 2021-03-16 PROCEDURE — 97110 THERAPEUTIC EXERCISES: CPT

## 2021-03-16 PROCEDURE — 97140 MANUAL THERAPY 1/> REGIONS: CPT

## 2021-03-16 NOTE — PROGRESS NOTES
Daily Note     Today's date: 3/16/2021  Patient name: Staci Ventura  : 1950  MRN: [id number]  Referring provider: Amber Bates MD  Dx:   Encounter Diagnosis     ICD-10-CM    1  Rupture of right long head biceps tendon, subsequent encounter  S46 111D    2  Rotator cuff tear arthropathy of right shoulder  M75 101     M12 811    3  S/P reverse total shoulder arthroplasty, right  Z96 611        Start Time: 1615  Stop Time: 1658  Total time in clinic (min): 43 minutes    Subjective: Pt reports he is feeling good after his session yesterday, notes slight soreness in the bicep which may be attributed to the cold  Objective: See treatment diary below      Assessment: Tolerated treatment well and reports improved comfort w/ doing ER and flexion w/ cane supine instead of reclined (decreases eccentric loading of bicep)  Patient is progressing as expected w/ flexion AAROM  Patient performed standing exercises today with his cane ROM activities, patient challenged but improving with finger dexterity and thumb motion, patient challenged with opposition which is an imperative motion for him to return  Plan: Progress treament per protocol        Precautions: protocol rev TSA and bicep precautions- attached to paper chart  [facility]  DOS: 2021  FOTO: 3/2/2021  POC expiration: 2021  Date 3/16/2021 3/8/2021 3/9/2021 3/11/2021 3/15/2021   Visit # 11 7 8 9 10   Manual        PROM R shld flex/ER no>30; elbow flex/ext 10' 10'  10' 10' 10'   R wrist exten mobs 5"x10  5" 2x10 5"x10 5"x10           There Exer 35' 35' 35' 30' 30'    pendulums cw/ccw/ML/fwd bkwd 20x each 20x each  20x each 20x each 20x each   AROM elbow flex/ext stand Thumb up 20x  thumb up 20x Thumb up 20x Thumb up 20x Thumb up 20x   Backward rolls 2x10 2x10       AROM supination 2x10 2x10  20x 20x 20x w/ hammer mid handle   Supported wrist ext 2# 2x10 2# 2x10  2# 2x10 2# 2x10 2# 2x10   grn digiflex gripping grn 2x10 grn 2x10  grn 2x10 grn 2x10 grn 2x10   grn digiflex indiv finger flex 1x10 each 1x10 each  1x10 each 1x10 each 1x10 each   Wrist exten stretch 10"x2 5"x10       kirk flex (palm down)/ext/abd w/ ball 5"x15 5"x15  5"x15 each 5"x15 each 5"x15 each   Thumb to finger opposition 10x each 10x each  10x each 10x each 10x each   Reclined flexion self HHA 2x10 2x10  2x10 Supine w/ cane 90 and beyond 5" 2x10 Supine 5"x10   Supine ER w/ cane scap plane Standing 2x10, 5" 5" 2x10  5" 2x10 Supine w/ towel roll 5" 2x10 Supine w/ towel roll 5" 2x10   Stand cane B/L flexion Standing 2x10, 5"    2x5   Stand cane abd Standing 2x10, 5"    2x5   HEP Updated/reviewed       Screw board 10'                                                                               Modalities                                      Access Code: [email or web address]   URL: [employer]  [email or web address]   Date: 03/04/2021   Prepared by:  Ajit Polite     Exercises  Standing Backward Shoulder Rolls - 10 reps - 2 sets - 2x daily - 7x weekly  Wrist Flexion Extension AROM - Palms Down - 10 reps - 2 sets - 2x daily - 7x weekly  Seated Bilateral Shoulder Flexion Towel Slide at Table Top - 5 reps - 3 sets - 2x daily - 7x weekly  Standing Elbow Flexion Extension AROM - 10 reps - 2 sets - 2x daily - 7x weekly  Wrist Extension Stretch Pronated - 5 reps - 1 sets - 10 hold - 2x daily - 7x weekly  Seated Gripping Towel - 10 reps - 2 sets - 2x daily - 7x weekly  Standing Forearm Pronation and Supination AROM - 10 reps - 2 sets - 1x daily - 7x weekly  Circular Shoulder Pendulum with Table Support - 10 reps - 1 sets - 2x daily - 7x weekly  Supine Shoulder Flexion AAROM - 10 reps - 2 sets - 1x daily - 7x weekly  Supine Shoulder External Rotation in 45 Degrees Abduction AAROM with Dowel - 10 reps - 2 sets - 5 hold - 1x daily - 7x weekly

## 2021-03-18 ENCOUNTER — OFFICE VISIT (OUTPATIENT)
Dept: PHYSICAL THERAPY | Facility: CLINIC | Age: 71
End: 2021-03-18
Payer: COMMERCIAL

## 2021-03-18 DIAGNOSIS — S46.111D RUPTURE OF RIGHT LONG HEAD BICEPS TENDON, SUBSEQUENT ENCOUNTER: Primary | ICD-10-CM

## 2021-03-18 DIAGNOSIS — M12.811 ROTATOR CUFF TEAR ARTHROPATHY OF RIGHT SHOULDER: ICD-10-CM

## 2021-03-18 DIAGNOSIS — M75.101 ROTATOR CUFF TEAR ARTHROPATHY OF RIGHT SHOULDER: ICD-10-CM

## 2021-03-18 DIAGNOSIS — Z96.611 S/P REVERSE TOTAL SHOULDER ARTHROPLASTY, RIGHT: ICD-10-CM

## 2021-03-18 PROCEDURE — 97110 THERAPEUTIC EXERCISES: CPT

## 2021-03-18 PROCEDURE — 97140 MANUAL THERAPY 1/> REGIONS: CPT

## 2021-03-18 NOTE — PROGRESS NOTES
Daily Note     Today's date: 3/18/2021  Patient name: Ada Sunshine  : 1950  MRN: [id number]  Referring provider: Arturo Mcgill MD  Dx:   Encounter Diagnosis     ICD-10-CM    1  Rupture of right long head biceps tendon, subsequent encounter  S46 111D    2  Rotator cuff tear arthropathy of right shoulder  M75 101     M12 811    3  S/P reverse total shoulder arthroplasty, right  Z96 611                   Subjective: pt reports his thumb to pinky opposition has gotten better  Objective: See treatment diary below      Assessment: Tolerated treatment well  Patient would benefit from continued PT increases in bicep fatigue post session  D/C sling after today  incorporate gradual bicep strengthening as tolerated per MD protocol  Plan: Continue per plan of care        Precautions: protocol rev TSA and bicep precautions- attached to paper chart  SOC: 2021  DOS: 2021  FOTO: 3/2/2021  POC expiration: 2021  Date 3/16/2021 3/18/2021      Visit # 11 12      Manual  10'      PROM R shld flex/ER no>30; elbow flex/ext 10' 10'       R wrist exten mobs 5"x10               There Exer 35' 35'       pendulums cw/ccw/ML/fwd bkwd 20x each 20x each       AROM elbow flex/ext stand Thumb up 20x  thumb up 20x      Backward rolls 2x10 2x10       AROM supination 2x10 W/ hammer mid handle 2x10       Supported wrist ext 2# 2x10 2# 2x10       grn digiflex gripping grn 2x10 grn 2x10       grn digiflex indiv finger flex 1x10 each 1x10 each       Wrist exten stretch 10"x2 5"x10       kirk flex (palm down)/ext/abd w/ ball 5"x15 5"x15       Thumb to finger opposition 10x each 10x each       Reclined flexion self HHA 2x10 5"x15       Standing ER w/ cane scap plane Standing 2x10, 5" 5" 2x10       Stand cane B/L flexion Standing 2x10, 5" 5" 2x10      Stand cane abd Standing 2x10, 5" 5" 2x10       HEP Updated/reviewed       Screw board 10' 5' Modalities                                      Access Code: [email or web address]   URL: [email or web address]   Date: 03/04/2021   Prepared by:  Collin Frausto     Exercises  Standing Backward Shoulder Rolls - 10 reps - 2 sets - 2x daily - 7x weekly  Wrist Flexion Extension AROM - Palms Down - 10 reps - 2 sets - 2x daily - 7x weekly  Seated Bilateral Shoulder Flexion Towel Slide at Table Top - 5 reps - 3 sets - 2x daily - 7x weekly  Standing Elbow Flexion Extension AROM - 10 reps - 2 sets - 2x daily - 7x weekly  Wrist Extension Stretch Pronated - 5 reps - 1 sets - 10 hold - 2x daily - 7x weekly  Seated Gripping Towel - 10 reps - 2 sets - 2x daily - 7x weekly  Standing Forearm Pronation and Supination AROM - 10 reps - 2 sets - 1x daily - 7x weekly  Circular Shoulder Pendulum with Table Support - 10 reps - 1 sets - 2x daily - 7x weekly  Supine Shoulder Flexion AAROM - 10 reps - 2 sets - 1x daily - 7x weekly  Supine Shoulder External Rotation in 45 Degrees Abduction AAROM with Dowel - 10 reps - 2 sets - 5 hold - 1x daily - 7x weekly

## 2021-03-22 ENCOUNTER — OFFICE VISIT (OUTPATIENT)
Dept: PHYSICAL THERAPY | Facility: CLINIC | Age: 71
End: 2021-03-22
Payer: COMMERCIAL

## 2021-03-22 DIAGNOSIS — Z96.611 S/P REVERSE TOTAL SHOULDER ARTHROPLASTY, RIGHT: ICD-10-CM

## 2021-03-22 DIAGNOSIS — M12.811 ROTATOR CUFF TEAR ARTHROPATHY OF RIGHT SHOULDER: ICD-10-CM

## 2021-03-22 DIAGNOSIS — M75.101 ROTATOR CUFF TEAR ARTHROPATHY OF RIGHT SHOULDER: ICD-10-CM

## 2021-03-22 DIAGNOSIS — S46.111D RUPTURE OF RIGHT LONG HEAD BICEPS TENDON, SUBSEQUENT ENCOUNTER: Primary | ICD-10-CM

## 2021-03-22 PROCEDURE — 97110 THERAPEUTIC EXERCISES: CPT | Performed by: PHYSICAL THERAPIST

## 2021-03-22 PROCEDURE — 97140 MANUAL THERAPY 1/> REGIONS: CPT | Performed by: PHYSICAL THERAPIST

## 2021-03-22 NOTE — PROGRESS NOTES
Daily Note     Today's date: 3/22/2021  Patient name: Ras Fischer  : 1950  MRN: [id number]  Referring provider: Susannah Kimbrough MD  Dx:   Encounter Diagnosis     ICD-10-CM    1  Rupture of right long head biceps tendon, subsequent encounter  S46 111D    2  Rotator cuff tear arthropathy of right shoulder  M75 101     M12 811    3  S/P reverse total shoulder arthroplasty, right  Z96 611                   Subjective: Pt reports he is not really wearing his sling during the daytime  His bicep gets a little achy, so once in a while he'll put it on for a few minutes to relieve it  He can now touch his thumb to all fingers, but feels his thumb ROM remains limited/painful  Objective: See treatment diary below      Assessment: Tolerated treatment well  Patient does report SL ER "smarts", but no c/o w/ any other additions to program today  He is making transition to [facility], plan is to advance to AROM R shoulder as elvis      Plan: Continue per plan of care        Precautions: protocol rev TSA and bicep precautions- attached to paper chart  SOC: 2021  DOS: 2021  FOTO: 3/2/2021  POC expiration: 2021  Date 3/16/2021 3/18/2021 3/22/2021     Visit # 11 12 13     Manual  10'      PROM R shld flex/ER no>30; elbow flex/ext 10' 10'  10' flex/scaption/ER     R wrist exten mobs 5"x10  TT             There Exer 35' 35' 35'      pendulums cw/ccw/ML/fwd bkwd 20x each 20x each  20x each     AROM elbow flex/ext stand Thumb up 20x  thumb up 20x 1# DB's 2x10     Backward rolls 2x10 2x10  2x10     AROM supination 2x10 W/ hammer mid handle 2x10  End of handle 2x15     Supported wrist ext 2# 2x10 2# 2x10  2# 2x10     grn digiflex gripping grn 2x10 grn 2x10  Gripper green spring 10# 2x10     grn digiflex indiv finger flex 1x10 each 1x10 each  1x10 each     Wrist exten stretch 10"x2 5"x10  manual     kirk flex (palm down)/ext/abd w/ ball 5"x15 5"x15  D/C     Thumb to finger opposition 10x each 10x each  10x each Reclined flexion self HHA 2x10 5"x15       Standing ER w/ cane scap plane Standing 2x10, 5" 5" 2x10  Stand Elbow on counter w/ towel roll 5" 2x10     Stand cane B/L flexion Standing 2x10, 5" 5" 2x10 2x10     Stand cane abd Standing 2x10, 5" 5" 2x10  2x10     B/L tricep exten stand   vikram 5# 2x10     B/L UE exten   vikram 3# 2x10     HEP Updated/reviewed       Screw board 10' 5'  5'     SL ER w/ towel roll   2x5                                                                     Modalities                                      Access Code: [email or web address]   URL: [employer]/   Date: 03/04/2021   Prepared by:  Alecia Tomlinson     Exercises  Standing Backward Shoulder Rolls - 10 reps - 2 sets - 2x daily - 7x weekly  Wrist Flexion Extension AROM - Palms Down - 10 reps - 2 sets - 2x daily - 7x weekly  Seated Bilateral Shoulder Flexion Towel Slide at Table Top - 5 reps - 3 sets - 2x daily - 7x weekly  Standing Elbow Flexion Extension AROM - 10 reps - 2 sets - 2x daily - 7x weekly  Wrist Extension Stretch Pronated - 5 reps - 1 sets - 10 hold - 2x daily - 7x weekly  Seated Gripping Towel - 10 reps - 2 sets - 2x daily - 7x weekly  Standing Forearm Pronation and Supination AROM - 10 reps - 2 sets - 1x daily - 7x weekly  Circular Shoulder Pendulum with Table Support - 10 reps - 1 sets - 2x daily - 7x weekly  Supine Shoulder Flexion AAROM - 10 reps - 2 sets - 1x daily - 7x weekly  Supine Shoulder External Rotation in 45 Degrees Abduction AAROM with Dowel - 10 reps - 2 sets - 5 hold - 1x daily - 7x weekly

## 2021-03-23 ENCOUNTER — OFFICE VISIT (OUTPATIENT)
Dept: PHYSICAL THERAPY | Facility: CLINIC | Age: 71
End: 2021-03-23
Payer: COMMERCIAL

## 2021-03-23 DIAGNOSIS — M12.811 ROTATOR CUFF TEAR ARTHROPATHY OF RIGHT SHOULDER: ICD-10-CM

## 2021-03-23 DIAGNOSIS — M75.101 ROTATOR CUFF TEAR ARTHROPATHY OF RIGHT SHOULDER: ICD-10-CM

## 2021-03-23 DIAGNOSIS — Z96.611 S/P REVERSE TOTAL SHOULDER ARTHROPLASTY, RIGHT: ICD-10-CM

## 2021-03-23 DIAGNOSIS — S46.111D RUPTURE OF RIGHT LONG HEAD BICEPS TENDON, SUBSEQUENT ENCOUNTER: Primary | ICD-10-CM

## 2021-03-23 PROCEDURE — 97110 THERAPEUTIC EXERCISES: CPT | Performed by: PHYSICAL THERAPIST

## 2021-03-23 PROCEDURE — 97140 MANUAL THERAPY 1/> REGIONS: CPT | Performed by: PHYSICAL THERAPIST

## 2021-03-23 NOTE — PROGRESS NOTES
Daily Note     Today's date: 3/23/2021  Patient name: Darinel Robledo  : 1950  MRN: [id number]  Referring provider: Lei Castro MD  Dx:   Encounter Diagnosis     ICD-10-CM    1  Rupture of right long head biceps tendon, subsequent encounter  S46 111D    2  Rotator cuff tear arthropathy of right shoulder  M75 101     M12 811    3  S/P reverse total shoulder arthroplasty, right  Z96 611                   Subjective: Pt reports his arm was sore/achey last night after program, but is fine today  Objective: See treatment diary below      Assessment: Tolerated treatment well  Patient demonstrated improved tolerance to [facility] ER w/ cane in scap plane vs yesterday, held SL ER today due to c/o pain w/ it yesterday  Plan: Progress treament per protocol        Precautions: protocol rev TSA and bicep precautions- attached to paper chart  SOC: 2021  DOS: 2021  FOTO: 3/2/2021  POC expiration: 2021  Date 3/16/2021 3/18/2021 3/22/2021 3/23/2021    Visit # 11 12 13 14    Manual  10'      PROM R shld flex/ER no>30; elbow flex/ext 10' 10'  10' flex/scaption/ER 10' flex, scaption, ER    R wrist exten mobs 5"x10  TT             There Exer 35' 35' 35' 35'     pendulums cw/ccw/ML/fwd bkwd 20x each 20x each  20x each 20x each    AROM elbow flex/ext stand Thumb up 20x  thumb up 20x 1# DB's 2x10 1# DB's 2x10    Backward rolls 2x10 2x10  2x10     AROM supination 2x10 W/ hammer mid handle 2x10  End of handle 2x15 End of handle 2x10    Supported wrist ext 2# 2x10 2# 2x10  2# 2x10 2# 2x10    grn digiflex gripping grn 2x10 grn 2x10  Gripper green spring 10# 2x10 Gripper green spring 10# 2x15    grn digiflex indiv finger flex 1x10 each 1x10 each  1x10 each 2x10 each    Wrist exten stretch 10"x2 5"x10  manual manual    kirk flex (palm down)/ext/abd w/ ball 5"x15 5"x15  D/C     Thumb to finger opposition 10x each 10x each  10x each 10x each    Reclined flexion self HHA 2x10 5"x15       Standing ER w/ cane scap plane Standing 2x10, 5" 5" 2x10  Stand Elbow on counter w/ towel roll 5" 2x10     Stand cane B/L flexion Standing 2x10, 5" 5" 2x10 2x10 2x10    Stand cane abd Standing 2x10, 5" 5" 2x10  2x10 2x10    B/L tricep exten stand   vikram 5# 2x10 vikram 5# 2x10    B/L UE exten   vikram 3# 2x10 vikram 3# 2x10    HEP Updated/reviewed       Screw board 10' 5'  5' 5'    SL ER w/ towel roll   2x5 NT    Blocking thumb IP & MCP    1x10    Thumb place and hold    1x10                                                    Modalities                                      Access Code: [email or web address]   URL: [employer]/   Date: 03/04/2021   Prepared by:  Sarah Corea     Exercises  Standing Backward Shoulder Rolls - 10 reps - 2 sets - 2x daily - 7x weekly  Wrist Flexion Extension AROM - Palms Down - 10 reps - 2 sets - 2x daily - 7x weekly  Seated Bilateral Shoulder Flexion Towel Slide at Table Top - 5 reps - 3 sets - 2x daily - 7x weekly  Standing Elbow Flexion Extension AROM - 10 reps - 2 sets - 2x daily - 7x weekly  Wrist Extension Stretch Pronated - 5 reps - 1 sets - 10 hold - 2x daily - 7x weekly  Seated Gripping Towel - 10 reps - 2 sets - 2x daily - 7x weekly  Standing Forearm Pronation and Supination AROM - 10 reps - 2 sets - 1x daily - 7x weekly  Circular Shoulder Pendulum with Table Support - 10 reps - 1 sets - 2x daily - 7x weekly  Supine Shoulder Flexion AAROM - 10 reps - 2 sets - 1x daily - 7x weekly  Supine Shoulder External Rotation in 45 Degrees Abduction AAROM with Dowel - 10 reps - 2 sets - 5 hold - 1x daily - 7x weekly

## 2021-03-25 ENCOUNTER — OFFICE VISIT (OUTPATIENT)
Dept: PHYSICAL THERAPY | Facility: CLINIC | Age: 71
End: 2021-03-25
Payer: COMMERCIAL

## 2021-03-25 DIAGNOSIS — I10 BENIGN ESSENTIAL HYPERTENSION: ICD-10-CM

## 2021-03-25 DIAGNOSIS — M12.811 ROTATOR CUFF TEAR ARTHROPATHY OF RIGHT SHOULDER: ICD-10-CM

## 2021-03-25 DIAGNOSIS — M75.101 ROTATOR CUFF TEAR ARTHROPATHY OF RIGHT SHOULDER: ICD-10-CM

## 2021-03-25 DIAGNOSIS — Z96.611 S/P REVERSE TOTAL SHOULDER ARTHROPLASTY, RIGHT: ICD-10-CM

## 2021-03-25 DIAGNOSIS — S46.111D RUPTURE OF RIGHT LONG HEAD BICEPS TENDON, SUBSEQUENT ENCOUNTER: Primary | ICD-10-CM

## 2021-03-25 DIAGNOSIS — E78.2 MIXED HYPERLIPIDEMIA: ICD-10-CM

## 2021-03-25 PROCEDURE — 97110 THERAPEUTIC EXERCISES: CPT

## 2021-03-25 RX ORDER — ATORVASTATIN CALCIUM 20 MG/1
TABLET, FILM COATED ORAL
Qty: 90 TABLET | Refills: 1 | Status: SHIPPED | OUTPATIENT
Start: 2021-03-25 | End: 2021-09-20

## 2021-03-25 RX ORDER — LISINOPRIL 10 MG/1
TABLET ORAL
Qty: 90 TABLET | Refills: 1 | Status: SHIPPED | OUTPATIENT
Start: 2021-03-25 | End: 2021-09-20

## 2021-03-25 NOTE — PROGRESS NOTES
Daily Note     Today's date: 3/25/2021  Patient name: Felicia Fritz  : 1950  MRN: [id number]  Referring provider: Kallie West MD  Dx:   Encounter Diagnosis     ICD-10-CM    1  Rupture of right long head biceps tendon, subsequent encounter  S46 111D    2  Rotator cuff tear arthropathy of right shoulder  M75 101     M12 811    3  S/P reverse total shoulder arthroplasty, right  Z96 611                   Subjective: pt reports some soreness in his anterior shoulder and lateral arm on arrival  Has not had that increased pain since stopped the SL ER       Objective: See treatment diary below      Assessment: Tolerated treatment well  Patient would benefit from continued PT observable shoulder hiking during cane flexion  ER ROM remains limited in PROM  Plan: Continue per plan of care        Precautions: protocol rev TSA and bicep precautions- attached to paper chart  SOC: 2021  DOS: 2021  FOTO: 3/2/2021  POC expiration: 2021  Date 3/16/2021 3/18/2021 3/22/2021 3/23/2021 3/25/2021   Visit # 11 12 13 14 15 FOTO    Manual  10'   10'   PROM R shld flex/ER no>30; elbow flex/ext 10' 10'  10' flex/scaption/ER 10' flex, scaption, ER 10' flex, scaption, ER    R wrist exten mobs 5"x10  TT             There Exer 35' 35' 35' 35' 40'    pendulums cw/ccw/ML/fwd bkwd 20x each 20x each  20x each 20x each 20xeach    AROM elbow flex/ext stand Thumb up 20x  thumb up 20x 1# DB's 2x10 1# DB's 2x10 2# DB's 2x10   Backward rolls 2x10 2x10  2x10     AROM supination 2x10 W/ hammer mid handle 2x10  End of handle 2x15 End of handle 2x10 End of handle 2x15    Supported wrist ext 2# 2x10 2# 2x10  2# 2x10 2# 2x10 2# 2x10    grn digiflex gripping grn 2x10 grn 2x10  Gripper green spring 10# 2x10 Gripper green spring 10# 2x15 Gripper green spring 10# 2x15    grn digiflex indiv finger flex 1x10 each 1x10 each  1x10 each 2x10 each 2x10 each    Wrist exten stretch 10"x2 5"x10  manual manual    kirk flex (palm down)/ext/abd w/ ball 5"x15 5"x15  D/C     Thumb to finger opposition 10x each 10x each  10x each 10x each 10x    Reclined flexion self HHA 2x10 5"x15       Standing ER w/ cane scap plane Standing 2x10, 5" 5" 2x10  Stand Elbow on counter w/ towel roll 5" 2x10  Stand Elbow on counter w/ towel roll 5"x15   Stand cane B/L flexion Standing 2x10, 5" 5" 2x10 2x10 2x10 2x10   Stand cane abd Standing 2x10, 5" 5" 2x10  2x10 2x10 2x10    B/L tricep exten stand   vikram 5# 2x10 vikram 5# 2x10 vikram 5# 2x10   B/L UE exten   vikram 3# 2x10 vikram 3# 2x10 vikram 3# 2x10   HEP Updated/reviewed       Screw board 10' 5'  5' 5' 5'    SL ER w/ towel roll   2x5 NT    Blocking thumb IP & MCP    1x10    Thumb place and hold    1x10                                                    Modalities                                      Access Code: [email or web address]   URL: [employer]  [employer]/   Date: 03/04/2021   Prepared by:  Christina Castro     Exercises  Standing Backward Shoulder Rolls - 10 reps - 2 sets - 2x daily - 7x weekly  Wrist Flexion Extension AROM - Palms Down - 10 reps - 2 sets - 2x daily - 7x weekly  Seated Bilateral Shoulder Flexion Towel Slide at Table Top - 5 reps - 3 sets - 2x daily - 7x weekly  Standing Elbow Flexion Extension AROM - 10 reps - 2 sets - 2x daily - 7x weekly  Wrist Extension Stretch Pronated - 5 reps - 1 sets - 10 hold - 2x daily - 7x weekly  Seated Gripping Towel - 10 reps - 2 sets - 2x daily - 7x weekly  Standing Forearm Pronation and Supination AROM - 10 reps - 2 sets - 1x daily - 7x weekly  Circular Shoulder Pendulum with Table Support - 10 reps - 1 sets - 2x daily - 7x weekly  Supine Shoulder Flexion AAROM - 10 reps - 2 sets - 1x daily - 7x weekly  Supine Shoulder External Rotation in 45 Degrees Abduction AAROM with Dowel - 10 reps - 2 sets - 5 hold - 1x daily - 7x weekly

## 2021-03-26 ENCOUNTER — OFFICE VISIT (OUTPATIENT)
Dept: OBGYN CLINIC | Facility: CLINIC | Age: 71
End: 2021-03-26

## 2021-03-26 DIAGNOSIS — Z96.611 STATUS POST SHOULDER REPLACEMENT, RIGHT: Primary | ICD-10-CM

## 2021-03-26 PROCEDURE — 99024 POSTOP FOLLOW-UP VISIT: CPT | Performed by: ORTHOPAEDIC SURGERY

## 2021-03-26 NOTE — LETTER
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

## 2021-03-26 NOTE — PROGRESS NOTES
Assessment/Plan:  1  Status post shoulder replacement, right       The patient is doing well and will continue physical therapy on the reverse total shoulder protocol  He should still avoid any heavy lifting or activity  He is cleared to return to work but should not lift, pull, or push more than 10 pounds  He will follow-up in 6 weeks for repeat evaluation  Subjective:   Jordan Olson is a 70 y o  male who presents today for follow-up of his right shoulder, now about 7 weeks status post reverse total shoulder replacement  He is doing well and has been doing PT  He is now out of his sling and notes improving ROM and strength  He notes good sensation of the left upper extremity  He does complain of swelling and stiffness about his hand, though this is improving some  Review of Systems      Past Medical History:   Diagnosis Date    AC (acromioclavicular) joint bone spurs     Last assessed - 2/19/15    Achilles tendinitis, unspecified leg 06/04/2010    Resolved - 1/8/18    Anemia 08/12/2010    Resolved - 1/11/16    Blood in urine     BPH without urinary obstruction     Hypertrophy    Deep venous thrombosis of distal lower extremity (HCC)     Deep venous thrombosis of distal lower extremity (HCC)     Last assessed - 6/4/15    Heartburn     Last assessed - 12/12/14    Hemorrhoids     Hernia, inguinal 06/30/2009    Resolved - 1/8/18    History of stomach ulcers     Hypertension     Kidney stones     Mixed hyperlipidemia     Nephrolithiasis     Right inguinal hernia     Last assessed - 4/4/16    Sleep apnea     unable to tolerate c-pap    Tear of left rotator cuff     Last assessed - 2/19/15       Past Surgical History:   Procedure Laterality Date    BARIATRIC SURGERY  06/15/2015    Laparoscopic Longitudinal Gastrectomy for morbid obesity, Managed by - Blank Wallace     EXPLORATORY LAPAROTOMY W/ BOWEL RESECTION Right 3/3/2016    Procedure:  Incarcerated possibly strangulated right inguinal hernia; Surgeon: Vivien Lomeli MD;  Location: [facility];  Service:    [address]  [employer] SURGERY      2011 - Rt Hip Replacement, 2012 - Lt Hip Replacement, 2013 - Lt Hip Revision    INGUINAL HERNIA REPAIR      Last assessed - 16    JOINT REPLACEMENT      KNEE SURGERY  2004    Double     IL COLONOSCOPY FLX DX W/COLLJ SPEC WHEN PFRMD N/A 1/15/2016    Procedure: COLONOSCOPY;  Surgeon: Robin Saldana MD;  Location: [place] [address] [facility]; Service: Gastroenterology    IL COLONOSCOPY FLX DX W/COLLJ SPEC WHEN PFRMD N/A 2019    Procedure: COLONOSCOPY;  Surgeon: Robin Saldana MD;  Location: [place] [address] [facility]; Service: Gastroenterology    IL RECONSTR TOTAL SHOULDER IMPLANT Right 2021    Procedure: ARTHROPLASTY SHOULDER REVERSE (RIGHT); Surgeon: Tian Garcia MD;  Location: [facility];  Service: Orthopedics    TONSILLECTOMY         Family History   Problem Relation Age of Onset    Arthritis Father     Diabetes Father     Hypertension Father     Hyperlipidemia Father     Diabetes Family     Emphysema Family     Heart disease Family     Hypertension Family     Mental illness Neg Hx        Social History     Occupational History    Not on file   Tobacco Use    Smoking status: Former Smoker     Quit date: 1994     Years since quittin 9    Smokeless tobacco: Never Used   Substance and Sexual Activity    Alcohol use: Yes     Frequency: 2-4 times a month     Drinks per session: 1 or 2     Binge frequency: Never     Comment:  Occ     Drug use: Never    Sexual activity: Yes     Partners: Female         Current Outpatient Medications:     atorvastatin (LIPITOR) 20 mg tablet, TAKE ONE TABLET BY MOUTH EVERY DAY, Disp: 90 tablet, Rfl: 1    Calcium Carbonate (CALCIUM 600) 1500 (600 Ca) MG TABS, Take by mouth daily , Disp: , Rfl:     Calcium Polycarbophil (FIBER-CAPS PO), Take by mouth, Disp: , Rfl:     Cholecalciferol (VITAMIN D3) 2000 units TABS, Take by mouth daily , Disp: , Rfl:    Cyanocobalamin (VITAMIN B-12) 5000 MCG LOZG, Place under the tongue, Disp: , Rfl:     lisinopril (ZESTRIL) 10 mg tablet, TAKE ONE TABLET BY MOUTH EVERY DAY, Disp: 90 tablet, Rfl: 1    Multiple Vitamins-Minerals (MULTIVITAMIN ADULT EXTRA C) CHEW, Chew, Disp: , Rfl:     NON FORMULARY, Liquid Multi vitamin, Disp: , Rfl:     traMADol (ULTRAM) 50 mg tablet, Take 1 tablet (50 mg total) by mouth every 6 (six) hours as needed for moderate pain for up to 20 doses, Disp: 20 tablet, Rfl: 0    Allergies   Allergen Reactions    Percocet [Oxycodone-Acetaminophen] Hallucinations    Percolone [Oxycodone] Hallucinations       Objective: There were no vitals filed for this visit  Right Shoulder Exam     Tenderness   The patient is experiencing no tenderness  Range of Motion   Active abduction: 100   External rotation: 20   Forward flexion: 120     Other   Erythema: absent  Scars: present  Sensation: normal  Pulse: present    Comments:  Diffuse swelling into the hand   Patient unable to make a full fist              Physical Exam

## 2021-03-29 ENCOUNTER — OFFICE VISIT (OUTPATIENT)
Dept: PHYSICAL THERAPY | Facility: CLINIC | Age: 71
End: 2021-03-29
Payer: COMMERCIAL

## 2021-03-29 DIAGNOSIS — S46.111D RUPTURE OF RIGHT LONG HEAD BICEPS TENDON, SUBSEQUENT ENCOUNTER: Primary | ICD-10-CM

## 2021-03-29 DIAGNOSIS — M12.811 ROTATOR CUFF TEAR ARTHROPATHY OF RIGHT SHOULDER: ICD-10-CM

## 2021-03-29 DIAGNOSIS — M75.101 ROTATOR CUFF TEAR ARTHROPATHY OF RIGHT SHOULDER: ICD-10-CM

## 2021-03-29 DIAGNOSIS — Z96.611 S/P REVERSE TOTAL SHOULDER ARTHROPLASTY, RIGHT: ICD-10-CM

## 2021-03-29 PROCEDURE — 97140 MANUAL THERAPY 1/> REGIONS: CPT | Performed by: PHYSICAL THERAPIST

## 2021-03-29 PROCEDURE — 97110 THERAPEUTIC EXERCISES: CPT | Performed by: PHYSICAL THERAPIST

## 2021-03-29 NOTE — PROGRESS NOTES
Daily Note     Today's date: 3/29/2021  Patient name: Ras Fischer  : 1950  MRN: [id number]  Referring provider: Susannah Kimbrough MD  Dx:   Encounter Diagnosis     ICD-10-CM    1  Rupture of right long head biceps tendon, subsequent encounter  S46 111D    2  Rotator cuff tear arthropathy of right shoulder  M75 101     M12 811    3  S/P reverse total shoulder arthroplasty, right  Z96 611                   Subjective: pt reports MD was pleased w/ his progress to date and is allowing him to RTW w/ lifting restrictions  They discussed pt will cont PT approx another 4 weeks  Objective: See treatment diary below      Assessment: Tolerated treatment well  Patient demonstrated fatigue post treatment and improving AROM R shoulder (strength)      Plan: Continue per plan of care  Precautions: protocol rev TSA and bicep precautions- attached to paper chart  [place]: 2021  DOS: 2021  RE: 3/15/2021  FOTO: 3/25/2021  POC expiration: 2021  Date 3/29/2021       Visit # 16       Manual 10'       PROM R shld flex/ER no>30; elbow flex/ext Flex, scaption, ER       R wrist exten mobs TT               Thumb blocking @ IP, MCP 1x10 each       There Exer 35'        pendulums cw/ccw/ML/fwd bkwd 20x each       AROM elbow flex/ext stand 2# DB's 2x10       Supination w/ hammer 2x15       Supported wrist ext 2# 2x10       gripper grn spring 15# 2x15       grn digiflex indiv finger flex 2x10 each       Thumb to finger opposition 10x       Sup ER w/ cane in 70 abd w/ roll under elbow 5"x20       Stand cane B/L flexion 2x10       Stand cane abd 2x10       B/L tricep exten stand vikram 7 5# 2x10       B/L UE exten virkam 4# 2x10       HEP        Screw board 5'       SL Abd to 90 2x10                                                       Modalities                                      Access Code: [email or web address]   URL: [email or web address]   Date: 2021   Prepared by:  [facility]     Exercises  Standing Backward Shoulder Rolls - 10 reps - 2 sets - 2x daily - 7x weekly  Wrist Flexion Extension AROM - Palms Down - 10 reps - 2 sets - 2x daily - 7x weekly  Seated Bilateral Shoulder Flexion Towel Slide at Table Top - 5 reps - 3 sets - 2x daily - 7x weekly  Standing Elbow Flexion Extension AROM - 10 reps - 2 sets - 2x daily - 7x weekly  Wrist Extension Stretch Pronated - 5 reps - 1 sets - 10 hold - 2x daily - 7x weekly  Seated Gripping Towel - 10 reps - 2 sets - 2x daily - 7x weekly  Standing Forearm Pronation and Supination AROM - 10 reps - 2 sets - 1x daily - 7x weekly  Circular Shoulder Pendulum with Table Support - 10 reps - 1 sets - 2x daily - 7x weekly  Supine Shoulder Flexion AAROM - 10 reps - 2 sets - 1x daily - 7x weekly  Supine Shoulder External Rotation in 45 Degrees Abduction AAROM with Dowel - 10 reps - 2 sets - 5 hold - 1x daily - 7x weekly

## 2021-03-30 ENCOUNTER — OFFICE VISIT (OUTPATIENT)
Dept: PHYSICAL THERAPY | Facility: CLINIC | Age: 71
End: 2021-03-30
Payer: COMMERCIAL

## 2021-03-30 DIAGNOSIS — S46.111D RUPTURE OF RIGHT LONG HEAD BICEPS TENDON, SUBSEQUENT ENCOUNTER: Primary | ICD-10-CM

## 2021-03-30 DIAGNOSIS — M12.811 ROTATOR CUFF TEAR ARTHROPATHY OF RIGHT SHOULDER: ICD-10-CM

## 2021-03-30 DIAGNOSIS — Z96.611 S/P REVERSE TOTAL SHOULDER ARTHROPLASTY, RIGHT: ICD-10-CM

## 2021-03-30 DIAGNOSIS — M75.101 ROTATOR CUFF TEAR ARTHROPATHY OF RIGHT SHOULDER: ICD-10-CM

## 2021-03-30 PROCEDURE — 97140 MANUAL THERAPY 1/> REGIONS: CPT

## 2021-03-30 PROCEDURE — 97110 THERAPEUTIC EXERCISES: CPT

## 2021-03-30 NOTE — PROGRESS NOTES
Daily Note     Today's date: 3/30/2021  Patient name: Park Buenrostro  : 1950  MRN: [id number]  Referring provider: Yudith Ledezma MD  Dx:   Encounter Diagnosis     ICD-10-CM    1  Rupture of right long head biceps tendon, subsequent encounter  S46 111D    2  Rotator cuff tear arthropathy of right shoulder  M75 101     M12 811    3  S/P reverse total shoulder arthroplasty, right  Z96 611                   Subjective: pt reports MD was pleased w/ his progress to date and is allowing him to RTW w/ lifting restrictions  They discussed pt will cont PT approx another 4 weeks  Objective: See treatment diary below      Assessment: Tolerated treatment well  Patient demonstrated fatigue post treatment and improving AROM R shoulder (strength)      Plan: Continue per plan of care  Precautions: protocol rev TSA and bicep precautions- attached to paper chart  [place]: 2021  DOS: 2021  RE: 3/15/2021  FOTO: 3/25/2021  POC expiration: 2021  Date 3/29/2021       Visit # 16       Manual 10'       PROM R shld flex/ER no>30; elbow flex/ext Flex, scaption, ER       R wrist exten mobs TT               Thumb blocking @ IP, MCP 1x10 each       There Exer 35'        pendulums cw/ccw/ML/fwd bkwd 20x each       AROM elbow flex/ext stand 2# DB's 2x10       Supination w/ hammer 2x15       Supported wrist ext 2# 2x10       gripper grn spring 15# 2x15       grn digiflex indiv finger flex 2x10 each       Thumb to finger opposition 10x       Sup ER w/ cane in 70 abd w/ roll under elbow 5"x20       Stand cane B/L flexion 2x10       Stand cane abd 2x10       B/L tricep exten stand vikram 7 5# 2x10       B/L UE exten vikram 4# 2x10       HEP        Screw board 5'       SL Abd to 90 2x10                                                       Modalities                                      Access Code: [email or web address]   URL: [employer]  [email or web address]   Date: 2021   Prepared by:  [employer]     Exercises  Standing Backward Shoulder Rolls - 10 reps - 2 sets - 2x daily - 7x weekly  Wrist Flexion Extension AROM - Palms Down - 10 reps - 2 sets - 2x daily - 7x weekly  Seated Bilateral Shoulder Flexion Towel Slide at Table Top - 5 reps - 3 sets - 2x daily - 7x weekly  Standing Elbow Flexion Extension AROM - 10 reps - 2 sets - 2x daily - 7x weekly  Wrist Extension Stretch Pronated - 5 reps - 1 sets - 10 hold - 2x daily - 7x weekly  Seated Gripping Towel - 10 reps - 2 sets - 2x daily - 7x weekly  Standing Forearm Pronation and Supination AROM - 10 reps - 2 sets - 1x daily - 7x weekly  Circular Shoulder Pendulum with Table Support - 10 reps - 1 sets - 2x daily - 7x weekly  Supine Shoulder Flexion AAROM - 10 reps - 2 sets - 1x daily - 7x weekly  Supine Shoulder External Rotation in 45 Degrees Abduction AAROM with Dowel - 10 reps - 2 sets - 5 hold - 1x daily - 7x weekly

## 2021-03-30 NOTE — PROGRESS NOTES
Daily Note     Today's date: 3/30/2021  Patient name: Anand Ornelas  : 1950  MRN: [id number]  Referring provider: Garry Christopher MD  Dx:   Encounter Diagnosis     ICD-10-CM    1  Rupture of right long head biceps tendon, subsequent encounter  S46 111D    2  Rotator cuff tear arthropathy of right shoulder  M75 101     M12 811    3  S/P reverse total shoulder arthroplasty, right  Z96 611        Start Time: 1615  Stop Time: 1700  Total time in clinic (min): 45 minutes    Subjective: pt reports he is doing well and has not had any changes since last session  Slight anterior should pain with movements  Objective: See treatment diary below      Assessment: Tolerated treatment well and had improved motion throughout the session  Patient tolerated new additional exercises for shoulder strengthening well and had n additional pain  He had good motivation for therapy and is eager to improve his condition  Patient demonstrated fatigue post treatment and improving AROM R shoulder (strength)  Patient would benefit from continued skilled PT to improve ROM, strength, pain, and function  Plan: Continue per plan of care  Precautions: protocol rev TSA and bicep precautions- attached to paper chart  [facility]: 2021  DOS: 2021  RE: 3/15/2021  FOTO: 3/25/2021  POC expiration: 2021  Date 3/29/2021 3/30/2021      Visit # 16       Manual 10' 15'      PROM R shld flex/ER no>30; elbow flex/ext Flex, scaption, ER Flex, scaption, ER      R wrist exten mobs TT TE              Thumb blocking @ IP, MCP 1x10 each       There Exer 35'        pendulums cw/ccw/ML/fwd bkwd 20x each 20x each      AROM elbow flex/ext stand 2# DB's 2x10 2# DB's 2x10      Supination w/ hammer 2x15 2x15      Supported wrist ext 2# 2x10 2# 2x10      gripper grn spring 15# 2x15 grn spring 15# 2x15      grn digiflex indiv finger flex 2x10 each 2x10 ea        Thumb to finger opposition 10x       Sup ER w/ cane in 70 abd w/ roll under elbow 5"x20       Stand cane B/L flexion 2x10 2x10      Stand cane abd 2x10 2x10      B/L tricep exten stand vikram 7 5# 2x10       B/L UE exten vikram 4# 2x10       Dynamic shoulder stability with flexbar  [id number] green               HEP        Screw board 5' 5'      SL Abd to 90 2x10                                                       Modalities                                      Access Code: [email or web address]   URL: [employer]  [employer]/   Date: 03/04/2021   Prepared by:  Buzz Belch     Exercises  Standing Backward Shoulder Rolls - 10 reps - 2 sets - 2x daily - 7x weekly  Wrist Flexion Extension AROM - Palms Down - 10 reps - 2 sets - 2x daily - 7x weekly  Seated Bilateral Shoulder Flexion Towel Slide at Table Top - 5 reps - 3 sets - 2x daily - 7x weekly  Standing Elbow Flexion Extension AROM - 10 reps - 2 sets - 2x daily - 7x weekly  Wrist Extension Stretch Pronated - 5 reps - 1 sets - 10 hold - 2x daily - 7x weekly  Seated Gripping Towel - 10 reps - 2 sets - 2x daily - 7x weekly  Standing Forearm Pronation and Supination AROM - 10 reps - 2 sets - 1x daily - 7x weekly  Circular Shoulder Pendulum with Table Support - 10 reps - 1 sets - 2x daily - 7x weekly  Supine Shoulder Flexion AAROM - 10 reps - 2 sets - 1x daily - 7x weekly  Supine Shoulder External Rotation in 45 Degrees Abduction AAROM with Dowel - 10 reps - 2 sets - 5 hold - 1x daily - 7x weekly

## 2021-04-01 ENCOUNTER — OFFICE VISIT (OUTPATIENT)
Dept: PHYSICAL THERAPY | Facility: CLINIC | Age: 71
End: 2021-04-01
Payer: COMMERCIAL

## 2021-04-01 DIAGNOSIS — S46.111D RUPTURE OF RIGHT LONG HEAD BICEPS TENDON, SUBSEQUENT ENCOUNTER: Primary | ICD-10-CM

## 2021-04-01 DIAGNOSIS — M12.811 ROTATOR CUFF TEAR ARTHROPATHY OF RIGHT SHOULDER: ICD-10-CM

## 2021-04-01 DIAGNOSIS — M75.101 ROTATOR CUFF TEAR ARTHROPATHY OF RIGHT SHOULDER: ICD-10-CM

## 2021-04-01 DIAGNOSIS — Z96.611 S/P REVERSE TOTAL SHOULDER ARTHROPLASTY, RIGHT: ICD-10-CM

## 2021-04-01 PROCEDURE — 97140 MANUAL THERAPY 1/> REGIONS: CPT

## 2021-04-01 PROCEDURE — 97110 THERAPEUTIC EXERCISES: CPT

## 2021-04-01 NOTE — PROGRESS NOTES
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, and can now make a fist almost fully  He reports increasing thumb ROM too  Objective: See treatment diary below      Assessment: Tolerated treatment well  Patient would benefit from continued PT      Plan: Continue per plan of care  Precautions: protocol rev TSA and bicep precautions- attached to paper chart  SOC: 2021  DOS: 2021  RE: 3/15/2021  FOTO: 3/25/2021  POC expiration: 2021  Date 3/29/2021 3/30/2021 2021     Visit # 16  18     Manual 10' 15' 10'      PROM R shld flex/ER no>30; elbow flex/ext Flex, scaption, ER Flex, scaption, ER Flex, scaption, ER      R wrist exten mobs TT TE              Thumb blocking @ IP, MCP 1x10 each       There Exer 35'  35'       pendulums cw/ccw/ML/fwd bkwd 20x each 20x each 2# 20x each     AROM elbow flex/ext stand 2# DB's 2x10 2# DB's 2x10 2# DB's 2x15     Supination w/ hammer 2x15 2x15 2x20     Supported wrist ext 2# 2x10 2# 2x10 2# 2x10      gripper grn spring 15# 2x15 grn spring 15# 2x15 grn spring 15# 2x15      grn digiflex indiv finger flex 2x10 each 2x10 ea   2x10 ea     Thumb to finger opposition 10x       Sup ER w/ cane in 70 abd w/ roll under elbow 5"x20       Stand cane B/L flexion 2x10 2x10 2x10     Stand cane abd 2x10 2x10 2x10      B/L tricep exten stand vikram 7 5# 2x10       B/L UE exten vikram 4# 2x10  vikram 4# 2x20     Dynamic shoulder stability with flexbar  [id number] green  [id number] green      Ball on wall @90 flexion   vert 2# MB 2x10     HEP        Screw board 5' 5' 5'      SL Abd to 90 2x10                                                       Modalities                                      Access Code: [email or web address]   URL: [employer]  com/   Date: 03/04/2021   Prepared by:  Nam Castañeda     Exercises  Standing Backward Shoulder Rolls - 10 reps - 2 sets - 2x daily - 7x weekly  Wrist Flexion Extension AROM - Palms Down - 10 reps - 2 sets - 2x daily - 7x weekly  Seated Bilateral Shoulder Flexion Towel Slide at Table Top - 5 reps - 3 sets - 2x daily - 7x weekly  Standing Elbow Flexion Extension AROM - 10 reps - 2 sets - 2x daily - 7x weekly  Wrist Extension Stretch Pronated - 5 reps - 1 sets - 10 hold - 2x daily - 7x weekly  Seated Gripping Towel - 10 reps - 2 sets - 2x daily - 7x weekly  Standing Forearm Pronation and Supination AROM - 10 reps - 2 sets - 1x daily - 7x weekly  Circular Shoulder Pendulum with Table Support - 10 reps - 1 sets - 2x daily - 7x weekly  Supine Shoulder Flexion AAROM - 10 reps - 2 sets - 1x daily - 7x weekly  Supine Shoulder External Rotation in 45 Degrees Abduction AAROM with Dowel - 10 reps - 2 sets - 5 hold - 1x daily - 7x weekly

## 2021-04-05 ENCOUNTER — OFFICE VISIT (OUTPATIENT)
Dept: PHYSICAL THERAPY | Facility: CLINIC | Age: 71
End: 2021-04-05
Payer: COMMERCIAL

## 2021-04-05 DIAGNOSIS — S46.111D RUPTURE OF RIGHT LONG HEAD BICEPS TENDON, SUBSEQUENT ENCOUNTER: Primary | ICD-10-CM

## 2021-04-05 DIAGNOSIS — Z96.611 S/P REVERSE TOTAL SHOULDER ARTHROPLASTY, RIGHT: ICD-10-CM

## 2021-04-05 DIAGNOSIS — M12.811 ROTATOR CUFF TEAR ARTHROPATHY OF RIGHT SHOULDER: ICD-10-CM

## 2021-04-05 DIAGNOSIS — M75.101 ROTATOR CUFF TEAR ARTHROPATHY OF RIGHT SHOULDER: ICD-10-CM

## 2021-04-05 PROCEDURE — 97140 MANUAL THERAPY 1/> REGIONS: CPT

## 2021-04-05 PROCEDURE — 97110 THERAPEUTIC EXERCISES: CPT

## 2021-04-05 NOTE — PROGRESS NOTES
Daily Note     Today's date: 2021  Patient name: Michael Magallon  : 1950  MRN: [id number]  Referring provider: Deandre Landon MD  Dx:   Encounter Diagnosis     ICD-10-CM    1  Rupture of right long head biceps tendon, subsequent encounter  S46 111D    2  Rotator cuff tear arthropathy of right shoulder  M75 101     M12 811    3  S/P reverse total shoulder arthroplasty, right  Z96 611                   Subjective: pt reports no pain at rest, 2/10 pain with motion  Objective: See treatment diary below      Assessment: Tolerated treatment well  Patient would benefit from continued PT pt cont to make ROM improvements with minimal  Increases in pain  Bicep fatigue with ball on wall shoulder stabilization exercises  Plan: Continue per plan of care  Precautions: protocol rev TSA and bicep precautions- attached to paper chart  SOC: 2021  DOS: 2021  RE: 3/15/2021  FOTO: 3/25/2021  POC expiration: 2021  Date 3/29/2021 3/30/2021 2021 2021    Visit # 16  18 19    Manual 10' 15' 10'  10'     PROM R shld flex/ER no>30; elbow flex/ext Flex, scaption, ER Flex, scaption, ER Flex, scaption, ER  Flex, scaption, ER      R wrist exten mobs TT TE              Thumb blocking @ IP, MCP 1x10 each       There Exer 35'  35'  35'     Pulleys    5'      pendulums cw/ccw/ML/fwd bkwd 20x each 20x each 2# 20x each 2# 20x each     AROM elbow flex/ext stand 2# DB's 2x10 2# DB's 2x10 2# DB's 2x15 3# DB's 2x10    Supination w/ hammer 2x15 2x15 2x20 3# DB 2x20     Supported wrist ext 2# 2x10 2# 2x10 2# 2x10  3# 2x10     gripper grn spring 15# 2x15 grn spring 15# 2x15 grn spring 15# 2x15  grn spring 30# 2x15     grn digiflex indiv finger flex 2x10 each 2x10 ea   2x10 ea 2x10 ea     Thumb to finger opposition 10x       Sup ER w/ cane in 70 abd w/ roll under elbow 5"x20       Stand cane B/L flexion 2x10 2x10 2x10 2x10    Stand cane abd 2x10 2x10 2x10  2x10     B/L tricep exten stand vikram 7 5# 2x10 B/L UE exten vikram 4# 2x10  vikram 4# 2x20 Seated Light grn tubing 2x15     Dynamic shoulder stability with flexbar  [id number] green  [id number] green      Ball on wall @90 flexion   vert 2# MB 2x10 Vert/horz 2# MB 2x10 each     HEP        Screw board 5' 5' 5'  5'     SL Abd to 90 2x10   2x10     Wrist maze    3'                                             Modalities                                      Access Code: [email or web address]   URL: [email or web address]   Date: 03/04/2021   Prepared by:  Madilyn Boast     Exercises  Standing Backward Shoulder Rolls - 10 reps - 2 sets - 2x daily - 7x weekly  Wrist Flexion Extension AROM - Palms Down - 10 reps - 2 sets - 2x daily - 7x weekly  Seated Bilateral Shoulder Flexion Towel Slide at Table Top - 5 reps - 3 sets - 2x daily - 7x weekly  Standing Elbow Flexion Extension AROM - 10 reps - 2 sets - 2x daily - 7x weekly  Wrist Extension Stretch Pronated - 5 reps - 1 sets - 10 hold - 2x daily - 7x weekly  Seated Gripping Towel - 10 reps - 2 sets - 2x daily - 7x weekly  Standing Forearm Pronation and Supination AROM - 10 reps - 2 sets - 1x daily - 7x weekly  Circular Shoulder Pendulum with Table Support - 10 reps - 1 sets - 2x daily - 7x weekly  Supine Shoulder Flexion AAROM - 10 reps - 2 sets - 1x daily - 7x weekly  Supine Shoulder External Rotation in 45 Degrees Abduction AAROM with Dowel - 10 reps - 2 sets - 5 hold - 1x daily - 7x weekly

## 2021-04-08 ENCOUNTER — OFFICE VISIT (OUTPATIENT)
Dept: PHYSICAL THERAPY | Facility: CLINIC | Age: 71
End: 2021-04-08
Payer: COMMERCIAL

## 2021-04-08 DIAGNOSIS — Z96.611 S/P REVERSE TOTAL SHOULDER ARTHROPLASTY, RIGHT: ICD-10-CM

## 2021-04-08 DIAGNOSIS — M75.101 ROTATOR CUFF TEAR ARTHROPATHY OF RIGHT SHOULDER: ICD-10-CM

## 2021-04-08 DIAGNOSIS — S46.111D RUPTURE OF RIGHT LONG HEAD BICEPS TENDON, SUBSEQUENT ENCOUNTER: Primary | ICD-10-CM

## 2021-04-08 DIAGNOSIS — M12.811 ROTATOR CUFF TEAR ARTHROPATHY OF RIGHT SHOULDER: ICD-10-CM

## 2021-04-08 PROCEDURE — 97110 THERAPEUTIC EXERCISES: CPT

## 2021-04-08 PROCEDURE — 97140 MANUAL THERAPY 1/> REGIONS: CPT

## 2021-04-08 NOTE — PROGRESS NOTES
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, shifting wasn't to difficult  He was getting tired by the time he did 4 stops at work  His bicep was a little achy  Objective: See treatment diary below      Assessment: Tolerated treatment well  Patient would benefit from continued PT  Pt cont to challenged with current program, fatigued with dynamic stabilization exercises  Plan: Continue per plan of care  Precautions: protocol rev TSA and bicep precautions- attached to paper chart  SOC: 2021  DOS: 2021  RE: 3/15/2021  FOTO: 3/25/2021  POC expiration: 2021  Date 3/29/2021 3/30/2021 2021 2021 2021   Visit # 16  18 19 20   Manual 10' 15' 10'  10'  10'    PROM R shld flex/ER no>30; elbow flex/ext Flex, scaption, ER Flex, scaption, ER Flex, scaption, ER  Flex, scaption, ER   Flex, scaption, ER     R wrist exten mobs TT TE              Thumb blocking @ IP, MCP 1x10 each       There Exer 35'  35'  35'  35'   Pulleys    5'  5'     pendulums cw/ccw/ML/fwd bkwd 20x each 20x each 2# 20x each 2# 20x each  2# 20x each     AROM elbow flex/ext stand 2# DB's 2x10 2# DB's 2x10 2# DB's 2x15 3# DB's 2x10 3# DB's 2x10    Supination w/ hammer 2x15 2x15 2x20 3# DB 2x20  3# DB 2x20    Supported wrist ext 2# 2x10 2# 2x10 2# 2x10  3# 2x10  3# 2x10     gripper grn spring 15# 2x15 grn spring 15# 2x15 grn spring 15# 2x15  grn spring 30# 2x15  grn spring 30# 2x15     grn digiflex indiv finger flex 2x10 each 2x10 ea   2x10 ea 2x10 ea  2x10 ea   Thumb to finger opposition 10x       Sup ER w/ cane in 70 abd w/ roll under elbow 5"x20       Stand cane B/L flexion 2x10 2x10 2x10 2x10 2x10   Stand cane abd 2x10 2x10 2x10  2x10  2x10    B/L tricep exten stand vikram 7 5# 2x10       B/L UE exten vikram 4# 2x10  vikram 4# 2x20 Seated Light grn tubing 2x15  Seated Light grn tubing 2x15     Dynamic shoulder stability with flexbar  [id number] green  [id number] green   [id number] green    Ball on wall @90 flexion   vert 2# MB 2x10 Vert/horz 2# MB 2x10 each      HEP        Screw board 5' 5' 5'  5'     SL Abd to 90 2x10   2x10  2x10    Wrist maze    3'  3'                                            Modalities                                      Access Code: [email or web address]   URL: [email or web address]   Date: 03/04/2021   Prepared by:  Cole Shorten     Exercises  Standing Backward Shoulder Rolls - 10 reps - 2 sets - 2x daily - 7x weekly  Wrist Flexion Extension AROM - Palms Down - 10 reps - 2 sets - 2x daily - 7x weekly  Seated Bilateral Shoulder Flexion Towel Slide at Table Top - 5 reps - 3 sets - 2x daily - 7x weekly  Standing Elbow Flexion Extension AROM - 10 reps - 2 sets - 2x daily - 7x weekly  Wrist Extension Stretch Pronated - 5 reps - 1 sets - 10 hold - 2x daily - 7x weekly  Seated Gripping Towel - 10 reps - 2 sets - 2x daily - 7x weekly  Standing Forearm Pronation and Supination AROM - 10 reps - 2 sets - 1x daily - 7x weekly  Circular Shoulder Pendulum with Table Support - 10 reps - 1 sets - 2x daily - 7x weekly  Supine Shoulder Flexion AAROM - 10 reps - 2 sets - 1x daily - 7x weekly  Supine Shoulder External Rotation in 45 Degrees Abduction AAROM with Dowel - 10 reps - 2 sets - 5 hold - 1x daily - 7x weekly

## 2021-04-12 ENCOUNTER — OFFICE VISIT (OUTPATIENT)
Dept: PHYSICAL THERAPY | Facility: CLINIC | Age: 71
End: 2021-04-12
Payer: COMMERCIAL

## 2021-04-12 DIAGNOSIS — Z96.611 S/P REVERSE TOTAL SHOULDER ARTHROPLASTY, RIGHT: ICD-10-CM

## 2021-04-12 DIAGNOSIS — M12.811 ROTATOR CUFF TEAR ARTHROPATHY OF RIGHT SHOULDER: ICD-10-CM

## 2021-04-12 DIAGNOSIS — M75.101 ROTATOR CUFF TEAR ARTHROPATHY OF RIGHT SHOULDER: ICD-10-CM

## 2021-04-12 DIAGNOSIS — S46.111D RUPTURE OF RIGHT LONG HEAD BICEPS TENDON, SUBSEQUENT ENCOUNTER: Primary | ICD-10-CM

## 2021-04-12 PROCEDURE — 97110 THERAPEUTIC EXERCISES: CPT

## 2021-04-12 PROCEDURE — 97140 MANUAL THERAPY 1/> REGIONS: CPT

## 2021-04-12 NOTE — PROGRESS NOTES
Daily Note     Today's date: 2021  Patient name: Romero Quiles  : 1950  MRN: [id number]  Referring provider: Elizabeth Tobias MD  Dx:   Encounter Diagnosis     ICD-10-CM    1  Rupture of right long head biceps tendon, subsequent encounter  S46 111D    2  Rotator cuff tear arthropathy of right shoulder  M75 101     M12 811    3  S/P reverse total shoulder arthroplasty, right  Z96 611                   Subjective: pt reports his shoulder has not been giving him much trouble with his work duties at this point  Reports he does feel some stiffness in the front of the shoulder  Reports he thinks his hand is becoming more functional at home and has been try to use it more and more  Objective: See treatment diary below      Assessment: Tolerated treatment well  Patient would benefit from continued PT pt cont to be challenged with current program, [facility] and ER remain difficult  Addition of IR/ER isometrics today with no increases in pain  Progress as tolerated per MD protocol  Plan: Continue per plan of care        Precautions: protocol rev TSA and bicep precautions- attached to paper chart  [facility]: 2021  DOS: 2021  RE: 3/15/2021  FOTO: 3/25/2021  POC expiration: 2021  Date 2021       Visit # 21       Manual 10'       PROM R shld flex/ER no>30; elbow flex/ext Flex, scaption, ER       R wrist exten mobs                Thumb blocking @ IP, MCP        There Exer 35'       Wall slides         Pulleys 5'         pendulums cw/ccw/ML/fwd bkwd 2# 20x each       AROM elbow flex/ext stand 3# DB's 2x15       Supination w/ hammer 3# 2x15       Supported wrist ext 3# 2x15       gripper grn spring 30# 2x15       grn digiflex indiv finger flex 2x10 each       Thumb to finger opposition        Sup ER w/ cane in 70 abd w/ roll under elbow 5"x20       Stand cane B/L flexion 2x10       Stand cane abd 2x10       B/L tricep exten stand vikram 7 5# 2x10       B/L UE exten vikram 4# 2x10       Dynamic shoulder stability with grn flexbar        Ball on wall cw/ccw @90 flexion 2# MB       HEP        Screw board 5'       SL Abd to 90 2x10       Wrist maze        ER/IR submax isometrics   5" 2x10 each                                        Modalities                                      Access Code: [email or web address]   URL: [employer]  [email or web address]   Date: 03/04/2021   Prepared by:  Alem Castillo     Exercises  Standing Backward Shoulder Rolls - 10 reps - 2 sets - 2x daily - 7x weekly  Wrist Flexion Extension AROM - Palms Down - 10 reps - 2 sets - 2x daily - 7x weekly  Seated Bilateral Shoulder Flexion Towel Slide at Table Top - 5 reps - 3 sets - 2x daily - 7x weekly  Standing Elbow Flexion Extension AROM - 10 reps - 2 sets - 2x daily - 7x weekly  Wrist Extension Stretch Pronated - 5 reps - 1 sets - 10 hold - 2x daily - 7x weekly  Seated Gripping Towel - 10 reps - 2 sets - 2x daily - 7x weekly  Standing Forearm Pronation and Supination AROM - 10 reps - 2 sets - 1x daily - 7x weekly  Circular Shoulder Pendulum with Table Support - 10 reps - 1 sets - 2x daily - 7x weekly  Supine Shoulder Flexion AAROM - 10 reps - 2 sets - 1x daily - 7x weekly  Supine Shoulder External Rotation in 45 Degrees Abduction AAROM with Dowel - 10 reps - 2 sets - 5 hold - 1x daily - 7x weekly

## 2021-04-15 ENCOUNTER — EVALUATION (OUTPATIENT)
Dept: PHYSICAL THERAPY | Facility: CLINIC | Age: 71
End: 2021-04-15
Payer: COMMERCIAL

## 2021-04-15 DIAGNOSIS — M12.811 ROTATOR CUFF TEAR ARTHROPATHY OF RIGHT SHOULDER: ICD-10-CM

## 2021-04-15 DIAGNOSIS — S46.111D RUPTURE OF RIGHT LONG HEAD BICEPS TENDON, SUBSEQUENT ENCOUNTER: Primary | ICD-10-CM

## 2021-04-15 DIAGNOSIS — Z96.611 S/P REVERSE TOTAL SHOULDER ARTHROPLASTY, RIGHT: ICD-10-CM

## 2021-04-15 DIAGNOSIS — M75.101 ROTATOR CUFF TEAR ARTHROPATHY OF RIGHT SHOULDER: ICD-10-CM

## 2021-04-15 PROCEDURE — 97530 THERAPEUTIC ACTIVITIES: CPT

## 2021-04-15 PROCEDURE — 97164 PT RE-EVAL EST PLAN CARE: CPT

## 2021-04-15 PROCEDURE — 97112 NEUROMUSCULAR REEDUCATION: CPT

## 2021-04-15 NOTE — PROGRESS NOTES
PT Re-Evaluation /Daily Note    Today's date: 4/15/2021  Patient name: Ritchie Florence  : 1950  MRN: [id number]  Referring provider: Delfino Robles MD  Dx:   Encounter Diagnosis     ICD-10-CM    1  Rupture of right long head biceps tendon, subsequent encounter  S46 111D PT plan of care cert/re-cert   2  Rotator cuff tear arthropathy of right shoulder  M75 101 PT plan of care cert/re-cert    C54 369    3  S/P reverse total shoulder arthroplasty, right  Z96 611 PT plan of care cert/re-cert       Start Time: 1530  Stop Time: 1615  Total time in clinic (min): 45 minutes    Assessment  Assessment details: 2021: Ritchie Florence is a 70 y o  male who presents with pain, decreased strength, decreased ROM, decreased joint mobility, joint effusion and post-op precaution  Due to these impairments, patient has difficulty performing ADL's, recreational activities, lifting/carrying, transfers, reaching  Patient's clinical presentation is consistent with their referring diagnosis of Rotator cuff tear arthropathy of right shoulder, Rupture of right long head biceps tendon, subsequent encounter; Reverse TSA  Incision is healing well  Pt has poor tolerance to PROM today and cannot tolerate supine lying  He was cautioned on post-op precautions as he reports trying to actively move his arm in flex/abd/exten and elbow flex/ext already  Plan: Ambulatory referral to Physical Therapy  Patient has been educated in post-op contraindications / precautions, home exercise program and plan of care  Patient would benefit from skilled physical therapy services to address their aforementioned functional limitations and progress towards prior level of function and independence with home exercise program      3/15/2021: Pt is progressing w/ shoulder A/AA/PROM along protocol expectations   He continues to have pain and ROM limitation in wrist/thumb/hand, but this is improving - pt can now oppose thumb to each finger and can grasp and hold a 2# weight which he could not do at evaluation  Pt will benefit from continued PT per POC  He will advance to full D/C of sling and to AROM phase later this week  Pain is improving each week  4/15/21  Patient progressing nicely with further strength and ROM gains  Biggest ROM increases have been in flexion and abduction ranges of motions  Patient uses trunk compensation movements to try to achieve full active motion  Strength has also improved in most shoulder and elbow motions  Slight pain noted with shoulder flexion MMT at anterior shoulder  IR and ER ROM and strength sill appear most limited  This should be a focus of therapy moving forward  Overall, patient would continue to benefit from skilled PT in order to improve strength, ROM, endurance, and function with ADLs  Impairments: abnormal or restricted ROM, activity intolerance, impaired physical strength, pain with function, scapular dyskinesis and poor posture   Functional limitations: limited AA/AROM vs PROM; limited R /wrist/opposition strength  Understanding of Dx/Px/POC: good   Prognosis: good    Goals  Short Term Goals to be met in 4 weeks (3/17/2021)  1  Pt to be independent w/ prelimary HEP - met  2  PROM R shoulder to be within 120 degrees  - met  3  Pt to D/C sling  - will D/C 3/18/2021  4  Improve AROM flexion/extension of R elbow to [facility]  - improved/not met    Long Term Goals to be met in 12 weeks (5/12/2021) - ongoing all LTG's  1  AROM R shoulder to be at least 110 degrees to allow indep w/ reaching OH  2  AROM R shoulder abduction to 100 degrees  3  Undisturbed sleep  4  Improve R shoulder strength to 3+/5 or better, R bicep strength to 4+/5 to allow ease w to be indep w/ ADL's w/o difficulty  5  Improve FOTO to 60 or better      New goals- to be completed in 8 weeks  LTG  - patient will perform over head reach activities while having less than 3/10 pain with movement within 8 weeks  - patient will improve shoulder abduction ROM by 40 degrees within 8 weeks in order to improve performance with over head motions  - patient will improve 90-90 ER MMTstrength score to 4/5 without pain within 8 weeks in order to improve shoulder strength and ROM  Plan  Plan details:       Patient would benefit from: PT eval and skilled physical therapy  Planned modality interventions: cryotherapy, thermotherapy: hydrocollator packs and unattended electrical stimulation  Planned therapy interventions: manual therapy, neuromuscular re-education, therapeutic exercise, therapeutic activities, home exercise program, stretching, patient education and postural training  Frequency: 2x week (2-3x/week)  Plan of Care beginning date: 4/15/2021  Plan of Care expiration date: 2021  Treatment plan discussed with: patient        Subjective Evaluation    History of Present Illness  Date of onset: 2020  Date of surgery: 2021  Mechanism of injury: 2021: Pt reports he injured his R arm when falling in 2020  He went to the ED, and they examined his hand only  He went to an ortho MD a week later  He was told he dislocated his shoulder and it reduced on his own  He saw Dr Jadiel Adams as a 2nd opinion, which showed the RC and bicep were torn, and he needed reverse TSA  He followed up w/ Dr Naya Landeros for his hand due to continued edema and difficulty making a fist  He was told there is no fx in his hand  Oral Prednisone did not help much with his hand function  3/15/2021: Pt reports his shoulder is feeling better with reducing pain and improving ROM  He still struggles with wrist/thumb/hand AROM and strength with some remaining swelling in his hand  He will D/C sling fully later this week  4/15/2021  Patient reports he feels therapy has been going good  He reports feeling stronger and feels he can reach higher up and begin to start reaching back behind him     Pain  Current pain rating: 3  At best pain ratin  At worst pain ratin  Pain location: along anterior arm  Quality: dull ache and sharp  Relieving factors: rest and support  Exacerbated by: movement  Progression: improved    Social Support  Lives with: spouse    Hand dominance: ambidextrous (writes R handed)      Diagnostic Tests  MRI studies: abnormal  Treatments  Previous treatment: immobilization  Current treatment: physical therapy  Patient Goals  Patient goals for therapy: decreased pain, increased motion, increased strength and independence with ADLs/IADLs          Objective      Objective    Posture: wearing abduction sling  AROM: standing R  R  R   L     4/15/21 3/15/2021 2/17/2021 2/17/2021  Shoulder flex        115  95  NT   150  Shoulder abd       95  80  NT   140  Functional ER  10  NT  NT   85  Functional IR  5  NT  NT   L1  Elbow exten  WNL  -10  -30   0  Elbow flexion  140  140  130   140  Supination  70  50  40   70  Pronation  90  90  90   90  Wrist exten  45  55  35   70    PROM: stand R reclined R  R  L       3/15/2021 2/17/2021 2/17/2021  Shoulder flex        120  120  50*  155  Shoulder abd       110  90  NT  145  ER @neutral  10  Apollinaris[email or web address]  Not elvis  90  IR @neutral  10  NT  NT  NT       MMT:    R  R  R  L     4/15/21 3/15/2021 2/17/2021 2/17/2021  Shoulder flex  3/5*  3/5  NT  4+/5  Shoulder abd  4/5  3-/5  NT  4+/5  IR   3+/5*  3+/5  NT  5/5  ER   3+/5*  3-/5  NT  4/5      Tenderness/Palpation:no TTP    Joint Mobility: min/mod fan    Decrease GH inferior glide         NMRE  Fine motor coordination with object stacking at shoulder 90 degrees  20'  Screw board for fine motor and strength 5'  THEREX  Pulleys 5'  Elbow flex/ext 3lb 3x10  Elbow supination 3lb 3x10        Precautions: protocol - attached to paper chart- can D/C sling and start shld AROM 3/18/2021  SOC: 2/17/2021  DOS: 2/4/2021  FOTO: 2/17/2021  POC expiration: 5/12/2021    POC expiration: 5/12/2021            Access Code: [email or web address]   URL: [email or web address]  [email or web address]   Date: 02/17/2021   Prepared by:  Renate Cordova Exercises  Standing Backward Shoulder Rolls - 10 reps - 2 sets - 2x daily - 7x weekly  Seated Elbow Flexion AAROM - 10 reps - 2 sets - 2x daily - 7x weekly  Seated Forearm Pronation Supination AROM - 10 reps - 2 sets - 2x daily - 7x weekly  Wrist Flexion Extension AROM - Palms Down - 10 reps - 2 sets - 2x daily - 7x weekly  Seated Bilateral Shoulder Flexion Towel Slide at Table Top - 5 reps - 3 sets - 2x daily - 7x weekly

## 2021-04-19 ENCOUNTER — APPOINTMENT (OUTPATIENT)
Dept: PHYSICAL THERAPY | Facility: CLINIC | Age: 71
End: 2021-04-19
Payer: COMMERCIAL

## 2021-04-22 ENCOUNTER — APPOINTMENT (OUTPATIENT)
Dept: PHYSICAL THERAPY | Facility: CLINIC | Age: 71
End: 2021-04-22
Payer: COMMERCIAL

## 2021-04-26 ENCOUNTER — APPOINTMENT (OUTPATIENT)
Dept: PHYSICAL THERAPY | Facility: CLINIC | Age: 71
End: 2021-04-26
Payer: COMMERCIAL

## 2021-04-26 ENCOUNTER — TELEPHONE (OUTPATIENT)
Dept: PHYSICAL THERAPY | Facility: CLINIC | Age: 71
End: 2021-04-26

## 2021-04-26 NOTE — TELEPHONE ENCOUNTER
Pt called to cancel his appt for today, he had to take his son for a procedure in [facility] and it is taking longer than expected and will not be back in time

## 2021-04-29 ENCOUNTER — OFFICE VISIT (OUTPATIENT)
Dept: PHYSICAL THERAPY | Facility: CLINIC | Age: 71
End: 2021-04-29
Payer: COMMERCIAL

## 2021-04-29 ENCOUNTER — IMMUNIZATIONS (OUTPATIENT)
Dept: FAMILY MEDICINE CLINIC | Facility: HOSPITAL | Age: 71
End: 2021-04-29

## 2021-04-29 DIAGNOSIS — M12.811 ROTATOR CUFF TEAR ARTHROPATHY OF RIGHT SHOULDER: ICD-10-CM

## 2021-04-29 DIAGNOSIS — Z96.611 S/P REVERSE TOTAL SHOULDER ARTHROPLASTY, RIGHT: ICD-10-CM

## 2021-04-29 DIAGNOSIS — S46.111D RUPTURE OF RIGHT LONG HEAD BICEPS TENDON, SUBSEQUENT ENCOUNTER: Primary | ICD-10-CM

## 2021-04-29 DIAGNOSIS — Z23 ENCOUNTER FOR IMMUNIZATION: Primary | ICD-10-CM

## 2021-04-29 DIAGNOSIS — M75.101 ROTATOR CUFF TEAR ARTHROPATHY OF RIGHT SHOULDER: ICD-10-CM

## 2021-04-29 PROCEDURE — 0012A SARS-COV-2 / COVID-19 MRNA VACCINE (MODERNA) 100 MCG: CPT

## 2021-04-29 PROCEDURE — 91301 SARS-COV-2 / COVID-19 MRNA VACCINE (MODERNA) 100 MCG: CPT

## 2021-04-29 PROCEDURE — 97110 THERAPEUTIC EXERCISES: CPT

## 2021-04-29 NOTE — PROGRESS NOTES
Bedside and Verbal shift change report given to Cherry Hickey RN (oncoming nurse) by Aura Desouza RN (offgoing nurse). Report included the following information SBAR, Procedure Summary, Intake/Output and MAR. Daily Note     Today's date: 2021  Patient name: Tisha Abbott  : 1950  MRN: [id number]  Referring provider: Elias Dumas MD  Dx:   Encounter Diagnosis     ICD-10-CM    1  Rupture of right long head biceps tendon, subsequent encounter  S46 111D    2  Rotator cuff tear arthropathy of right shoulder  M75 101     M12 811    3  S/P reverse total shoulder arthroplasty, right  Z96 611                   Subjective: reports the arm still feels achy when he uses it too much but other than that it does not feel bad  Objective: See treatment diary below      Assessment: Tolerated treatment well  Patient would benefit from continued PT PROM limited most into ER, additional strengthening today with no increases in pain  Pt cont to struggle with AROM OH and present with compensatory trunk lean  Plan: Continue per plan of care        Date 2021      Visit # 21 23      Manual 10'       PROM R shld flex/ER no>30; elbow flex/ext Flex, scaption, ER Flex, scaption, ER       R wrist exten mobs                Thumb blocking @ IP, MCP        There Exer 35' 20'      Wall slides   10"x10 flex/abd      Pulleys 5'  5'        pendulums cw/ccw/ML/fwd bkwd 2# 20x each       AROM elbow flex/ext stand 3# DB's 2x15 3# DB's 2x15       Supination w/ hammer 3# 2x15 3# 2x15       Supported wrist ext 3# 2x15 3# 2x15       gripper grn spring 30# 2x15       grn digiflex indiv finger flex 2x10 each       Thumb to finger opposition        Sup ER w/ cane in 70 abd w/ roll under elbow 5"x20       Stand cane B/L flexion 2x10 Supine AROM 2x10       Stand cane abd 2x10 sidelying AROM to 90 2x10         B/L tricep exten stand vikram 7 5# 2x10       B/L UE exten vikram 4# 2x10 Seated vikram 4# 2x10       Shoulder add   Seated [place] 4# 2x10       Dynamic shoulder stability with grn flexbar        Ball on wall cw/ccw @90 flexion/abd  2# MB 2# MB 2x10 each       HEP        Screw board 5'       SL Abd to 90 2x10       Wrist maze        ER/IR submax isometrics   5" 2x10 each  AROM Yellow tubing 2x10 each                                       Modalities                                       Precautions: protocol - attached to paper chart- can D/C sling and start shld AROM 3/18/2021  SOC: 2/17/2021  DOS: 2/4/2021  FOTO: 2/17/2021  POC expiration: 5/12/2021    POC expiration: 5/12/2021            Access Code: [email or web address]   URL: [employer]  [email or web address]   Date: 02/17/2021   Prepared by:  Majo Dunaway     Exercises  Standing Backward Shoulder Rolls - 10 reps - 2 sets - 2x daily - 7x weekly  Seated Elbow Flexion AAROM - 10 reps - 2 sets - 2x daily - 7x weekly  Seated Forearm Pronation Supination AROM - 10 reps - 2 sets - 2x daily - 7x weekly  Wrist Flexion Extension AROM - Palms Down - 10 reps - 2 sets - 2x daily - 7x weekly  Seated Bilateral Shoulder Flexion Towel Slide at Table Top - 5 reps - 3 sets - 2x daily - 7x weekly

## 2021-05-03 ENCOUNTER — TELEPHONE (OUTPATIENT)
Dept: PHYSICAL THERAPY | Facility: CLINIC | Age: 71
End: 2021-05-03

## 2021-05-03 ENCOUNTER — APPOINTMENT (OUTPATIENT)
Dept: PHYSICAL THERAPY | Facility: CLINIC | Age: 71
End: 2021-05-03
Payer: COMMERCIAL

## 2021-05-03 NOTE — TELEPHONE ENCOUNTER
Pt called to cancel his appt today, when [address] [place] [address] asked if everything was okay pt replied with "under the circumstances, Ramy Rico i guess so" He confirmed his appt for  thursday

## 2021-05-06 ENCOUNTER — OFFICE VISIT (OUTPATIENT)
Dept: PHYSICAL THERAPY | Facility: CLINIC | Age: 71
End: 2021-05-06
Payer: COMMERCIAL

## 2021-05-06 DIAGNOSIS — S46.111D RUPTURE OF RIGHT LONG HEAD BICEPS TENDON, SUBSEQUENT ENCOUNTER: Primary | ICD-10-CM

## 2021-05-06 DIAGNOSIS — Z96.611 S/P REVERSE TOTAL SHOULDER ARTHROPLASTY, RIGHT: ICD-10-CM

## 2021-05-06 DIAGNOSIS — M12.811 ROTATOR CUFF TEAR ARTHROPATHY OF RIGHT SHOULDER: ICD-10-CM

## 2021-05-06 DIAGNOSIS — M75.101 ROTATOR CUFF TEAR ARTHROPATHY OF RIGHT SHOULDER: ICD-10-CM

## 2021-05-06 PROCEDURE — 97140 MANUAL THERAPY 1/> REGIONS: CPT

## 2021-05-06 PROCEDURE — 97110 THERAPEUTIC EXERCISES: CPT

## 2021-05-06 NOTE — PROGRESS NOTES
Daily Note     Today's date: 2021  Patient name: Efraín Bach  : 1950  MRN: [id number]  Referring provider: Lore Waldron MD  Dx:   Encounter Diagnosis     ICD-10-CM    1  Rupture of right long head biceps tendon, subsequent encounter  S46 111D    2  Rotator cuff tear arthropathy of right shoulder  M75 101     M12 811    3  S/P reverse total shoulder arthroplasty, right  Z96 611                   Subjective: pt reports he has no resting pain, only has discomfort with OH motions  Objective: See treatment diary below      Assessment: Tolerated treatment well  Patient would benefit from continued PT weak with AROM, requiring A for full abd to prevent compensatory trunk lean  Plan: Continue per plan of care        Date 2021     Visit # 21 23 24      Manual 10'  10'     PROM R shld flex/ER no>30; elbow flex/ext Flex, scaption, ER Flex, scaption, ER  PROM      R wrist exten mobs                Thumb blocking @ IP, MCP        There Exer 35' 20' 35'      Wall slides   10"x10 flex/abd 10"x10 flex/abd      Pulleys 5'  5'  5'       pendulums cw/ccw/ML/fwd bkwd 2# 20x each       AROM elbow flex/ext stand 3# DB's 2x15 3# DB's 2x15  [place] B/L bicep curl 5# 2x10      Supination w/ hammer 3# 2x15 3# 2x15  3# 2x15      Supported wrist ext 3# 2x15 3# 2x15  3# 2x15       gripper grn spring 30# 2x15       grn digiflex indiv finger flex 2x10 each       Thumb to finger opposition        Sup ER w/ cane in 70 abd w/ roll under elbow 5"x20       Stand cane B/L flexion 2x10 Supine AROM 2x10  Standing AROM 2x10      Stand cane abd 2x10 sidelying AROM to 90 2x10    Standing cane abd w/ eccentric lowering 1x10  AROM 1x10       B/L tricep exten stand darci 7 5# 2x10  Darci 10# 2x10      B/L UE exten darci 4# 2x10 Seated darci 4# 2x10       [place] horz add/ abd    1# 2x10 each      Shoulder add   Seated Darci 4# 2x10       Dynamic shoulder stability with grn flexbar        Ball on wall cw/ccw @90 flexion/abd  2# MB 2# MB 2x10 each  [place] 4# 2x10 cw/ccw      HEP        Screw board 5'       SL Abd to 90 2x10       Wrist maze        ER/IR submax isometrics   5" 2x10 each  AROM Yellow tubing 2x10 each  [place] 2# 2x10 each                                      Modalities                                       Precautions: protocol - attached to paper chart- can D/C sling and start shld AROM 3/18/2021  SOC: 2/17/2021  DOS: 2/4/2021  FOTO: 2/17/2021  POC expiration: 5/12/2021    POC expiration: 5/12/2021            Access Code: [email or web address]   URL: [email or web address]   Date: 02/17/2021   Prepared by:  Chirag Chavez     Exercises  Standing Backward Shoulder Rolls - 10 reps - 2 sets - 2x daily - 7x weekly  Seated Elbow Flexion AAROM - 10 reps - 2 sets - 2x daily - 7x weekly  Seated Forearm Pronation Supination AROM - 10 reps - 2 sets - 2x daily - 7x weekly  Wrist Flexion Extension AROM - Palms Down - 10 reps - 2 sets - 2x daily - 7x weekly  Seated Bilateral Shoulder Flexion Towel Slide at Table Top - 5 reps - 3 sets - 2x daily - 7x weekly

## 2021-05-07 ENCOUNTER — OFFICE VISIT (OUTPATIENT)
Dept: OBGYN CLINIC | Facility: CLINIC | Age: 71
End: 2021-05-07
Payer: COMMERCIAL

## 2021-05-07 VITALS
WEIGHT: 231.8 LBS | HEART RATE: 63 BPM | DIASTOLIC BLOOD PRESSURE: 70 MMHG | BODY MASS INDEX: 36.38 KG/M2 | SYSTOLIC BLOOD PRESSURE: 129 MMHG | HEIGHT: 67 IN

## 2021-05-07 DIAGNOSIS — S46.111D RUPTURE OF RIGHT LONG HEAD BICEPS TENDON, SUBSEQUENT ENCOUNTER: ICD-10-CM

## 2021-05-07 DIAGNOSIS — M75.101 ROTATOR CUFF TEAR ARTHROPATHY OF RIGHT SHOULDER: ICD-10-CM

## 2021-05-07 DIAGNOSIS — M12.811 ROTATOR CUFF TEAR ARTHROPATHY OF RIGHT SHOULDER: ICD-10-CM

## 2021-05-07 DIAGNOSIS — Z96.611 STATUS POST SHOULDER REPLACEMENT, RIGHT: Primary | ICD-10-CM

## 2021-05-07 PROCEDURE — 99213 OFFICE O/P EST LOW 20 MIN: CPT | Performed by: ORTHOPAEDIC SURGERY

## 2021-05-07 NOTE — PROGRESS NOTES
Assessment/Plan:  1  Status post shoulder replacement, right     2  Rupture of right long head biceps tendon, subsequent encounter     3  Rotator cuff tear arthropathy of right shoulder       Kendra Milner is doing very well postoperatively from total reverse right shoulder arthroplasty on 2/4/21  He can follow up as needed at 6 weeks  He can call with any questions or concerns  Subjective:   Collin Angeles is a 70 y o  male who presents for f/u of his right shoulder after reverse total shoulder was performed on 2/4/21, now 3 months later  He states he is without pain  He is going to PT 2x/week and has sessions through May  He reports no new injuries  No numbness, tingling  No complaints  Pt is active with his job, working for [employer]  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  Also has been pushing and pulling flower carts  Working on ROM in PT, specifically ER, IR, and elbow supination  Review of Systems   Constitutional: Negative for chills, fever and unexpected weight change  HENT: Negative for hearing loss, nosebleeds and sore throat  Eyes: Negative for pain, redness and visual disturbance  Respiratory: Negative for cough, shortness of breath and wheezing  Cardiovascular: Negative for chest pain, palpitations and leg swelling  Gastrointestinal: Negative for abdominal pain, nausea and vomiting  Endocrine: Negative for polydipsia and polyuria  Genitourinary: Negative for dysuria and hematuria  Skin: Negative for rash and wound  Neurological: Negative for dizziness, numbness and headaches  Psychiatric/Behavioral: Negative for decreased concentration, dysphoric mood and suicidal ideas  The patient is not nervous/anxious          Past Medical History:   Diagnosis Date    AC (acromioclavicular) joint bone spurs     Last assessed - 2/19/15    Achilles tendinitis, unspecified leg 06/04/2010    Resolved - 1/8/18    Anemia 08/12/2010    Resolved - 1/11/16    Blood in urine     BPH without urinary obstruction     Hypertrophy    Deep venous thrombosis of distal lower extremity (HCC)     Deep venous thrombosis of distal lower extremity (HCC)     Last assessed - 6/4/15    Heartburn     Last assessed - 12/12/14    Hemorrhoids     Hernia, inguinal 06/30/2009    Resolved - 1/8/18    History of stomach ulcers     Hypertension     Kidney stones     Mixed hyperlipidemia     Nephrolithiasis     Right inguinal hernia     Last assessed - 4/4/16    Sleep apnea     unable to tolerate c-pap    Tear of left rotator cuff     Last assessed - 2/19/15       Past Surgical History:   Procedure Laterality Date    BARIATRIC SURGERY  06/15/2015    Laparoscopic Longitudinal Gastrectomy for morbid obesity, Managed by - Blank Rivera     EXPLORATORY LAPAROTOMY W/ BOWEL RESECTION Right 3/3/2016    Procedure: Incarcerated possibly strangulated right inguinal hernia; Surgeon: Safia Dee MD;  Location: [address] [place] [address];  [facility]    [facility]      2011 - Rt Hip Replacement, 2012 - Lt Hip Replacement, 2013 - Lt Hip Revision    INGUINAL HERNIA REPAIR      Last assessed - 4/4/16    JOINT REPLACEMENT      KNEE SURGERY  2004    Double     TN COLONOSCOPY FLX DX W/COLLJ SPEC WHEN PFRMD N/A 1/15/2016    Procedure: COLONOSCOPY;  Surgeon: Tomy Navarrete MD;  Location: [facility]; Service: Gastroenterology    TN COLONOSCOPY FLX DX W/COLLJ SPEC WHEN PFRMD N/A 2/26/2019    Procedure: COLONOSCOPY;  Surgeon: Tomy Navarrete MD;  Location: [facility]; Service: Gastroenterology    TN RECONSTR TOTAL SHOULDER IMPLANT Right 2/4/2021    Procedure: ARTHROPLASTY SHOULDER REVERSE (RIGHT);   Surgeon: Xander Mccann MD;  Location: [facility];  Service: Orthopedics    TONSILLECTOMY         Family History   Problem Relation Age of Onset    Arthritis Father     Diabetes Father     Hypertension Father     Hyperlipidemia Father     Diabetes Family     Emphysema Family  Heart disease Family     Hypertension Family     Mental illness Neg Hx        Social History     Occupational History    Not on file   Tobacco Use    Smoking status: Former Smoker     Quit date: 1994     Years since quittin 0    Smokeless tobacco: Never Used   Substance and Sexual Activity    Alcohol use: Yes     Frequency: 2-4 times a month     Drinks per session: 1 or 2     Binge frequency: Never     Comment: Occ     Drug use: Never    Sexual activity: Yes     Partners: Female         Current Outpatient Medications:     atorvastatin (LIPITOR) 20 mg tablet, TAKE ONE TABLET BY MOUTH EVERY DAY, Disp: 90 tablet, Rfl: 1    Calcium Carbonate (CALCIUM 600) 1500 (600 Ca) MG TABS, Take by mouth daily , Disp: , Rfl:     Calcium Polycarbophil (FIBER-CAPS PO), Take by mouth, Disp: , Rfl:     Cholecalciferol (VITAMIN D3) 2000 units TABS, Take by mouth daily , Disp: , Rfl:     Cyanocobalamin (VITAMIN B-12) 5000 MCG LOZG, Place under the tongue, Disp: , Rfl:     lisinopril (ZESTRIL) 10 mg tablet, TAKE ONE TABLET BY MOUTH EVERY DAY, Disp: 90 tablet, Rfl: 1    Multiple Vitamins-Minerals (MULTIVITAMIN ADULT EXTRA C) CHEW, Chew, Disp: , Rfl:     NON FORMULARY, Liquid Multi vitamin, Disp: , Rfl:     Allergies   Allergen Reactions    Percocet [Oxycodone-Acetaminophen] Hallucinations    Percolone [Oxycodone] Hallucinations       Objective:  Vitals:    21 0805   BP: 129/70   Pulse: 63     Ortho Exam   RIGHT SHOULDER:  Erythema: no  Swelling: no  Increased Warmth: no  Incision: well healed, clean, dry     Tenderness: none    ROM  Abduction: 120  External Rotation: 60  Internal Rotation: Right Gluteus    Strength  Abduction: 4+/5  ER: 3/5  IR: 5/5    Drop-Arm: negative  Emptycan: negative  Belly Press: negative    Bell: negative  Neer: negative  Speeds: negative    Physical Exam  Vitals signs reviewed  Constitutional:       Appearance: He is well-developed     HENT:      Head: Normocephalic and atraumatic  Eyes:      General: No scleral icterus  Right eye: No discharge  Left eye: No discharge  Neck:      Musculoskeletal: Normal range of motion  Cardiovascular:      Rate and Rhythm: Normal rate  Pulses: Normal pulses  Pulmonary:      Effort: Pulmonary effort is normal  No respiratory distress  Breath sounds: No stridor  Skin:     General: Skin is warm and dry  Neurological:      Mental Status: He is alert and oriented to person, place, and time     Psychiatric:         Behavior: Behavior normal

## 2021-05-10 ENCOUNTER — TELEPHONE (OUTPATIENT)
Dept: PHYSICAL THERAPY | Facility: CLINIC | Age: 71
End: 2021-05-10

## 2021-05-10 ENCOUNTER — APPOINTMENT (OUTPATIENT)
Dept: PHYSICAL THERAPY | Facility: CLINIC | Age: 71
End: 2021-05-10
Payer: COMMERCIAL

## 2021-05-13 ENCOUNTER — APPOINTMENT (OUTPATIENT)
Dept: PHYSICAL THERAPY | Facility: CLINIC | Age: 71
End: 2021-05-13
Payer: COMMERCIAL

## 2021-05-17 ENCOUNTER — APPOINTMENT (OUTPATIENT)
Dept: PHYSICAL THERAPY | Facility: CLINIC | Age: 71
End: 2021-05-17
Payer: COMMERCIAL

## 2021-05-20 ENCOUNTER — OFFICE VISIT (OUTPATIENT)
Dept: PHYSICAL THERAPY | Facility: CLINIC | Age: 71
End: 2021-05-20
Payer: COMMERCIAL

## 2021-05-20 DIAGNOSIS — S46.111D RUPTURE OF RIGHT LONG HEAD BICEPS TENDON, SUBSEQUENT ENCOUNTER: Primary | ICD-10-CM

## 2021-05-20 DIAGNOSIS — M75.101 ROTATOR CUFF TEAR ARTHROPATHY OF RIGHT SHOULDER: ICD-10-CM

## 2021-05-20 DIAGNOSIS — Z96.611 S/P REVERSE TOTAL SHOULDER ARTHROPLASTY, RIGHT: ICD-10-CM

## 2021-05-20 DIAGNOSIS — M12.811 ROTATOR CUFF TEAR ARTHROPATHY OF RIGHT SHOULDER: ICD-10-CM

## 2021-05-20 PROCEDURE — 97140 MANUAL THERAPY 1/> REGIONS: CPT | Performed by: PHYSICAL THERAPIST

## 2021-05-20 NOTE — PROGRESS NOTES
PT Discharge    Today's date: 2021  Patient name: Ayah Ennis  : 1950  MRN: [id number]  Referring provider: Ko Magallanes MD  Dx:   Encounter Diagnosis     ICD-10-CM    1  Rupture of right long head biceps tendon, subsequent encounter  S46 111D    2  Rotator cuff tear arthropathy of right shoulder  M75 101     M12 811    3  S/P reverse total shoulder arthroplasty, right  Z96 611        Start Time: 1645  Stop Time: 1705  Total time in clinic (min): 20 minutes    Assessment  Assessment details: 2021: Ayah Ennis is a 70 y o  male who presents with pain, decreased strength, decreased ROM, decreased joint mobility, joint effusion and post-op precaution  Due to these impairments, patient has difficulty performing ADL's, recreational activities, lifting/carrying, transfers, reaching  Patient's clinical presentation is consistent with their referring diagnosis of Rotator cuff tear arthropathy of right shoulder, Rupture of right long head biceps tendon, subsequent encounter; Reverse TSA  Incision is healing well  Pt has poor tolerance to PROM today and cannot tolerate supine lying  He was cautioned on post-op precautions as he reports trying to actively move his arm in flex/abd/exten and elbow flex/ext already  Plan: Ambulatory referral to Physical Therapy  Patient has been educated in post-op contraindications / precautions, home exercise program and plan of care  Patient would benefit from skilled physical therapy services to address their aforementioned functional limitations and progress towards prior level of function and independence with home exercise program      3/15/2021: Pt is progressing w/ shoulder A/AA/PROM along protocol expectations  He continues to have pain and ROM limitation in wrist/thumb/hand, but this is improving - pt can now oppose thumb to each finger and can grasp and hold a 2# weight which he could not do at evaluation  Pt will benefit from continued PT per POC   He will advance to full D/C of sling and to AROM phase later this week  Pain is improving each week  4/15/21: Patient progressing nicely with further strength and ROM gains  Biggest ROM increases have been in flexion and abduction ranges of motions  Patient uses trunk compensation movements to try to achieve full active motion  Strength has also improved in most shoulder and elbow motions  Slight pain noted with shoulder flexion MMT at anterior shoulder  IR and ER ROM and strength sill appear most limited  This should be a focus of therapy moving forward  Overall, patient would continue to benefit from skilled PT in order to improve strength, ROM, endurance, and function with ADLs     5/20/2021: Pt has met all goals and has RTW and resumed PLOF  He does not feel he needs further therapy, and does not have time w/ work schedule  He has exceeded expectations w/ reverse TSA w/ bicep repair and is appropriate to D/C to HEP  Functional limitations: limited R thumb strengthUnderstanding of Dx/Px/POC: good   Prognosis: good    Goals  Short Term Goals to be met in 4 weeks (3/17/2021)  1  Pt to be independent w/ prelimary HEP - met  2  PROM R shoulder to be within 120 degrees  - met  3  Pt to D/C sling  - will D/C 3/18/2021- met  4  Improve AROM flexion/extension of R elbow to [facility]  - met  Long Term Goals to be met in 12 weeks (5/12/2021) - met all  1  AROM R shoulder to be at least 110 degrees to allow indep w/ reaching OH  2  AROM R shoulder abduction to 100 degrees  3  Undisturbed sleep  4  Improve R shoulder strength to 3+/5 or better, R bicep strength to 4+/5 to allow ease w to be indep w/ ADL's w/o difficulty  5  Improve FOTO to 60 or better      New goals- to be completed in 8 weeks  LTG  - patient will perform over head reach activities while having less than 3/10 pain with movement within 8 weeks - met  - patient will improve shoulder abduction ROM by 40 degrees within 8 weeks in order to improve performance with over head motions-met  - patient will improve 90-90 ER MMTstrength score to 4/5 without pain within 8 weeks in order to improve shoulder strength and ROM  - met      Plan  Plan details:       Planned therapy interventions: home exercise program  Frequency: 2-3x/week  Plan of Care beginning date: 4/15/2021  Plan of Care expiration date: 2021  Treatment plan discussed with: patient        Subjective Evaluation    History of Present Illness  Date of onset: 2020  Date of surgery: 2021  Mechanism of injury: 2021: Pt reports he injured his R arm when falling in 2020  He went to the ED, and they examined his hand only  He went to an ortho MD a week later  He was told he dislocated his shoulder and it reduced on his own  He saw Dr Jose Daniel Briseno as a 2nd opinion, which showed the RC and bicep were torn, and he needed reverse TSA  He followed up w/ Dr Cesar Lares for his hand due to continued edema and difficulty making a fist  He was told there is no fx in his hand  Oral Prednisone did not help much with his hand function  3/15/2021: Pt reports his shoulder is feeling better with reducing pain and improving ROM  He still struggles with wrist/thumb/hand AROM and strength with some remaining swelling in his hand  He will D/C sling fully later this week  4/15/2021: Patient reports he feels therapy has been going good  He reports feeling stronger and feels he can reach higher up and begin to start reaching back behind him  2021: Pt reports he has RTW and performs all duties w/o limitation or pain, including reaching for plants overhead, using R arm to help pull himself into and lower himself out of his truck  He has no pain and reports he was released by his surgeon  His only limitation vs PLOF is he does not tolerate R sidelying  Pain  Current pain ratin  At best pain ratin  At worst pain ratin  Pain location: along anterior arm    Quality: dull ache  Relieving factors: rest and support  Progression: resolved    Social Support  Lives with: spouse    Hand dominance: ambidextrous (writes R handed)      Diagnostic Tests  MRI studies: abnormal  Treatments  Previous treatment: immobilization  Current treatment: physical therapy  Patient Goals  Patient goals for therapy: decreased pain, increased motion, increased strength and independence with ADLs/IADLs          Objective          AROM: standing R  R  R  R  L     5/20/2021 4/15/21 3/15/2021 2/17/2021 2/17/2021  Shoulder flex        140  115  95  NT  150  Shoulder abd       125  95  80  NT  140  Functional ER  45/occiput 10  NT  NT  85  Functional IR  Buttocks Lat hip  NT  NT  L1  Elbow exten  WnL  WNL  -10  -30  0   Elbow flexion  140  140  140  130  140  Supination  70  70  50  40  70  Pronation  90  90  90  90  90  Wrist exten  50  45  55  35  70    PROM: reclined R  R  R  R  L     5/20/21 4/15/21 3/15/2021 2/17/2021 2/17/2021  Shoulder flex        150  120  120  50*  155  Shoulder abd       125  110  90  NT  145  ER @neutral  45  10  [employer]  Not elvis  90  IR @neutral  45  10  NT  NT  NT       MMT:    R  R  R  R  L     5/20/21 4/15/21 3/15/2021 2/17/2021 2/17/2021  Shoulder flex  4/5  3/5*  3/5  NT  4+/5  Shoulder abd  4/5  4/5  3-/5  NT  4+/5  IR   4+/5  3+/5*  3+/5  NT  5/5  ER   4/5  3+/5*  3-/5  NT  4/5  Bicep   4+/5  NT  NT  NT  5/5  Supination  4+/5  NT  NT  NT  5/5  Pronation  4+/5  NT  NT  NT  5/5    Tenderness/Palpation:no TTP    Joint Mobility: min/mod fan Decrease GH inferior glide                 Precautions: protocol - attached to paper chart- can D/C sling and start shld AROM 3/18/2021  SOC: 2/17/2021  DOS: 2/4/2021  FOTO: 5/20/2021  POC expiration: 7/8/2021      Date 4/12/2021 4/29/21 5/6/2021 5/20/2021  RE/DC/FOTO    Visit # 21 23 24      Manual 10'  10'     PROM R shld flex/ER no>30; elbow flex/ext Flex, scaption, ER Flex, scaption, ER  PROM      IASTM R bicep    15'    ROM/MMT    5'    Thumb blocking @ IP, MCP        There Exer 28' 20' 35'      Wall slides   10"x10 flex/abd 10"x10 flex/abd      Pulleys 5'  5'  5'  5'     pendulums cw/ccw/ML/fwd bkwd 2# 20x each       AROM elbow flex/ext stand 3# DB's 2x15 3# DB's 2x15  [place] B/L bicep curl 5# 2x10      Supination w/ hammer 3# 2x15 3# 2x15  3# 2x15  20x    Supported wrist ext 3# 2x15 3# 2x15  3# 2x15       gripper grn spring 30# 2x15       grn digiflex indiv finger flex 2x10 each       Thumb to finger opposition        Sup ER w/ cane in 70 abd w/ roll under elbow 5"x20       Stand cane B/L flexion 2x10 Supine AROM 2x10  Standing AROM 2x10      Stand cane abd 2x10 sidelying AROM to 90 2x10    Standing cane abd w/ eccentric lowering 1x10  AROM 1x10       B/L tricep exten stand darci 7 5# 2x10  [place] 10# 2x10      B/L UE exten darci 4# 2x10 Seated darci 4# 2x10       [place] horz add/ abd    1# 2x10 each      Shoulder add   Seated [place] 4# 2x10       Dynamic shoulder stability with grn flexbar        Ball on wall cw/ccw @90 flexion/abd  2# MB 2# MB 2x10 each  Darci 4# 2x10 cw/ccw      HEP        Screw board 5'       SL Abd to 90 2x10       Wrist maze        ER/IR submax isometrics   5" 2x10 each  AROM Yellow tubing 2x10 each  Darci 2# 2x10 each                                      Modalities                                             Access Code: [email or web address]   URL: [email or web address]   Date: 02/17/2021   Prepared by:  Collins Enter     Exercises  Standing Backward Shoulder Rolls - 10 reps - 2 sets - 2x daily - 7x weekly  Seated Elbow Flexion AAROM - 10 reps - 2 sets - 2x daily - 7x weekly  Seated Forearm Pronation Supination AROM - 10 reps - 2 sets - 2x daily - 7x weekly  Wrist Flexion Extension AROM - Palms Down - 10 reps - 2 sets - 2x daily - 7x weekly  Seated Bilateral Shoulder Flexion Towel Slide at Table Top - 5 reps - 3 sets - 2x daily - 7x weekly

## 2021-05-24 ENCOUNTER — APPOINTMENT (OUTPATIENT)
Dept: PHYSICAL THERAPY | Facility: CLINIC | Age: 71
End: 2021-05-24
Payer: COMMERCIAL

## 2021-05-27 ENCOUNTER — APPOINTMENT (OUTPATIENT)
Dept: PHYSICAL THERAPY | Facility: CLINIC | Age: 71
End: 2021-05-27
Payer: COMMERCIAL

## 2021-06-07 ENCOUNTER — OFFICE VISIT (OUTPATIENT)
Dept: FAMILY MEDICINE CLINIC | Facility: CLINIC | Age: 71
End: 2021-06-07
Payer: COMMERCIAL

## 2021-06-07 VITALS
SYSTOLIC BLOOD PRESSURE: 144 MMHG | DIASTOLIC BLOOD PRESSURE: 76 MMHG | HEART RATE: 82 BPM | TEMPERATURE: 98 F | WEIGHT: 236 LBS | HEIGHT: 67 IN | OXYGEN SATURATION: 98 % | BODY MASS INDEX: 37.04 KG/M2 | RESPIRATION RATE: 16 BRPM

## 2021-06-07 DIAGNOSIS — K40.90 NON-RECURRENT UNILATERAL INGUINAL HERNIA WITHOUT OBSTRUCTION OR GANGRENE: Primary | ICD-10-CM

## 2021-06-07 DIAGNOSIS — I10 BENIGN ESSENTIAL HYPERTENSION: ICD-10-CM

## 2021-06-07 PROCEDURE — 99213 OFFICE O/P EST LOW 20 MIN: CPT | Performed by: FAMILY MEDICINE

## 2021-06-07 RX ORDER — ACETAMINOPHEN 500 MG
500 TABLET ORAL
COMMUNITY
End: 2021-08-17

## 2021-06-07 RX ORDER — ACETAMINOPHEN/DIPHENHYDRAMINE 500MG-25MG
1 TABLET ORAL
COMMUNITY
End: 2021-07-07 | Stop reason: HOSPADM

## 2021-06-07 NOTE — PROGRESS NOTES
Assessment/Plan:    1  Non-recurrent unilateral inguinal hernia without obstruction or gangrene  -     Ambulatory referral to General Surgery; Future    2  Benign essential hypertension    Pt opted for us to watch his BP  Overall doing well  PT has had rt inbguinal hernia repair in the past - pt sent to gen surgery for repair        There are no Patient Instructions on file for this visit  No follow-ups on file  Subjective:      Patient ID: Maris Craft is a 70 y o  male  Chief Complaint   Patient presents with    Follow-up     ER visit - stomach pain  mz cma       Pt is here to follow up ed stay for abdominal pain  Pain was in rt lower quad  Pt had ct vargas  Pt had a hernia - was reduced in the ed   Pt was not operated on - advised to follow up with PCP for referaal for hernia repair      The following portions of the patient's history were reviewed and updated as appropriate: allergies, current medications, past family history, past medical history, past social history, past surgical history and problem list     Review of Systems   Constitutional: Negative for activity change, appetite change, chills, diaphoresis, fatigue, fever and unexpected weight change  HENT: Negative for congestion, dental problem, ear pain, mouth sores, sinus pressure, sinus pain, sore throat and trouble swallowing  Eyes: Negative for photophobia, discharge and itching  Respiratory: Negative for apnea, chest tightness and shortness of breath  Cardiovascular: Negative for chest pain, palpitations and leg swelling  Gastrointestinal: Negative for abdominal distention, abdominal pain, blood in stool, nausea and vomiting  Endocrine: Negative for cold intolerance, heat intolerance, polydipsia, polyphagia and polyuria  Genitourinary: Negative for difficulty urinating  Musculoskeletal: Negative for arthralgias  Skin: Negative for color change and wound     Neurological: Negative for dizziness, syncope, speech difficulty and headaches  Hematological: Negative for adenopathy  Psychiatric/Behavioral: Negative for agitation and behavioral problems  Current Outpatient Medications   Medication Sig Dispense Refill    acetaminophen (TYLENOL) 500 mg tablet Take 500 mg by mouth      atorvastatin (LIPITOR) 20 mg tablet TAKE ONE TABLET BY MOUTH EVERY DAY 90 tablet 1    Calcium Carbonate (CALCIUM 600) 1500 (600 Ca) MG TABS Take by mouth daily       Calcium Polycarbophil (FIBER-CAPS PO) Take by mouth      Cholecalciferol (VITAMIN D3) 2000 units TABS Take by mouth daily       Cyanocobalamin (VITAMIN B-12) 5000 MCG LOZG Place under the tongue      diphenhydrAMINE-acetaminophen (Tylenol PM Extra Strength)  MG TABS Take 1 tablet by mouth      lisinopril (ZESTRIL) 10 mg tablet TAKE ONE TABLET BY MOUTH EVERY DAY 90 tablet 1    Multiple Vitamins-Minerals (MULTIVITAMIN ADULT EXTRA C) CHEW Chew      NON FORMULARY Liquid Multi vitamin       No current facility-administered medications for this visit  Objective:    /76   Pulse 82   Temp 98 °F (36 7 °C)   Resp 16   Ht 5' 7" (1 702 m)   Wt 107 kg (236 lb)   SpO2 98%   BMI 36 96 kg/m²        Physical Exam  Vitals signs and nursing note reviewed  Constitutional:       General: He is not in acute distress  Appearance: He is well-developed  He is not diaphoretic  HENT:      Head: Normocephalic and atraumatic  Right Ear: External ear normal       Left Ear: External ear normal       Nose: Nose normal       Mouth/Throat:      Pharynx: No oropharyngeal exudate  Eyes:      General: No scleral icterus  Right eye: No discharge  Left eye: No discharge  Pupils: Pupils are equal, round, and reactive to light  Neck:      Thyroid: No thyromegaly  Cardiovascular:      Rate and Rhythm: Normal rate  Heart sounds: Normal heart sounds  No murmur  Pulmonary:      Effort: Pulmonary effort is normal  No respiratory distress        Breath sounds: Normal breath sounds  No wheezing  Abdominal:      General: Bowel sounds are normal  There is no distension  Palpations: Abdomen is soft  There is no mass  Tenderness: There is no abdominal tenderness  There is no guarding or rebound  Hernia: A hernia (bulge in rt inguinal area) is present  Musculoskeletal: Normal range of motion  Skin:     General: Skin is warm and dry  Findings: No erythema or rash  Neurological:      Mental Status: He is alert        Coordination: Coordination normal       Deep Tendon Reflexes: Reflexes normal    Psychiatric:         Behavior: Behavior normal                 Radha Hill DO

## 2021-06-09 ENCOUNTER — CONSULT (OUTPATIENT)
Dept: SURGERY | Facility: CLINIC | Age: 71
End: 2021-06-09
Payer: COMMERCIAL

## 2021-06-09 VITALS
WEIGHT: 235 LBS | HEART RATE: 78 BPM | HEIGHT: 67 IN | TEMPERATURE: 96.9 F | BODY MASS INDEX: 36.88 KG/M2 | DIASTOLIC BLOOD PRESSURE: 80 MMHG | SYSTOLIC BLOOD PRESSURE: 140 MMHG

## 2021-06-09 DIAGNOSIS — N40.0 ENLARGED PROSTATE WITHOUT LOWER URINARY TRACT SYMPTOMS (LUTS): ICD-10-CM

## 2021-06-09 DIAGNOSIS — K40.91: Primary | ICD-10-CM

## 2021-06-09 DIAGNOSIS — Z98.84 S/P BARIATRIC SURGERY: ICD-10-CM

## 2021-06-09 DIAGNOSIS — K40.90 NON-RECURRENT UNILATERAL INGUINAL HERNIA WITHOUT OBSTRUCTION OR GANGRENE: ICD-10-CM

## 2021-06-09 DIAGNOSIS — G47.33 OBSTRUCTIVE SLEEP APNEA: ICD-10-CM

## 2021-06-09 PROCEDURE — 99244 OFF/OP CNSLTJ NEW/EST MOD 40: CPT | Performed by: SPECIALIST

## 2021-06-09 PROCEDURE — 1036F TOBACCO NON-USER: CPT | Performed by: SPECIALIST

## 2021-06-09 PROCEDURE — 1160F RVW MEDS BY RX/DR IN RCRD: CPT | Performed by: SPECIALIST

## 2021-06-09 NOTE — PROGRESS NOTES
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    He developed the abdominal distension and discomfort and he local ER [facility] CT scan was done and patient was kept in the hospital for 1 day noted to have incarcerated the recurrent right inguinal hernia he was treated conservatively and his symptoms got better and was discharged with advice to follow his local doctor for possible surgery     his hernia surgery was done in 2016 by Dr Angelica Guerrero MD     denies any nausea vomiting diarrhea,  As a history of constipation   urinary problem some straining he has not seen Dr Julianna Ozuna MD urologist for more than a year and he is of a prostate medication     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    Historical Information   The following portions of the patient's history were reviewed and updated as appropriate  Past Medical History:   Diagnosis Date    AC (acromioclavicular) joint bone spurs     Last assessed - 2/19/15    Achilles tendinitis, unspecified leg 06/04/2010    Resolved - 1/8/18    Anemia 08/12/2010    Resolved - 1/11/16    Blood in urine     BPH without urinary obstruction     Hypertrophy    Deep venous thrombosis of distal lower extremity ([address] )     Deep venous thrombosis of distal lower extremity ([address] )     Last assessed - 6/4/15    Heartburn     Last assessed - 12/12/14    Hemorrhoids     Hernia, inguinal 06/30/2009    Resolved - 1/8/18    History of stomach ulcers     Hypertension     Kidney stones     Mixed hyperlipidemia     Nephrolithiasis     Right inguinal hernia     Last assessed - 4/4/16    Sleep apnea     unable to tolerate c-pap    Tear of left rotator cuff     Last assessed - 2/19/15     Past Surgical History:   Procedure Laterality Date    BARIATRIC SURGERY  06/15/2015    Laparoscopic Longitudinal Gastrectomy for morbid obesity, Managed by - Blank Lawler     EXPLORATORY LAPAROTOMY W/ BOWEL RESECTION Right 3/3/2016    Procedure: Incarcerated possibly strangulated right inguinal hernia; Surgeon: Toi Chung MD;  Location: [address] [place] [address];  Service:    [address] SURGERY       - Rt Hip Replacement,  - Lt Hip Replacement, 2013 - Lt Hip Revision    INGUINAL HERNIA REPAIR      Last assessed - 16    JOINT REPLACEMENT      KNEE SURGERY      Double     GA COLONOSCOPY FLX DX W/COLLJ SPEC WHEN PFRMD N/A 1/15/2016    Procedure: COLONOSCOPY;  Surgeon: Michael York MD;  Location: [place] [address] GI LAB; Service: Gastroenterology    GA COLONOSCOPY FLX DX W/COLLJ SPEC WHEN PFRMD N/A 2019    Procedure: COLONOSCOPY;  Surgeon: Michael York MD;  Location: [place] [address] GI LAB; Service: Gastroenterology    GA RECONSTR TOTAL SHOULDER IMPLANT Right 2021    Procedure: ARTHROPLASTY SHOULDER REVERSE (RIGHT);   Surgeon: Rosie Chin MD;  Location: [facility];  Service: Orthopedics    TONSILLECTOMY       Social History   Social History     Substance and Sexual Activity   Alcohol Use Yes    Frequency: 2-4 times a month    Drinks per session: 1 or 2    Binge frequency: Never    Comment: Occ      Social History     Substance and Sexual Activity   Drug Use Never     Social History     Tobacco Use   Smoking Status Former Smoker    Quit date: 1994    Years since quittin 1   Smokeless Tobacco Never Used     Family History:   Family History   Problem Relation Age of Onset    Arthritis Father     Diabetes Father     Hypertension Father     Hyperlipidemia Father     Diabetes Family     Emphysema Family     Heart disease Family     Hypertension Family     Mental illness Neg Hx        Meds/Allergies   Allergies   Allergen Reactions    Percocet [Oxycodone-Acetaminophen] Hallucinations    Percolone [Oxycodone] Hallucinations       Current Outpatient Medications:     acetaminophen (TYLENOL) 500 mg tablet, Take 500 mg by mouth, Disp: , Rfl:     atorvastatin (LIPITOR) 20 mg tablet, TAKE ONE TABLET BY MOUTH EVERY DAY, Disp: 90 tablet, Rfl: 1    diphenhydrAMINE-acetaminophen (Tylenol PM Extra Strength)  MG TABS, Take 1 tablet by mouth, Disp: , Rfl:     lisinopril (ZESTRIL) 10 mg tablet, TAKE ONE TABLET BY MOUTH EVERY DAY, Disp: 90 tablet, Rfl: 1    Calcium Carbonate (CALCIUM 600) 1500 (600 Ca) MG TABS, Take by mouth daily , Disp: , Rfl:     Calcium Polycarbophil (FIBER-CAPS PO), Take by mouth, Disp: , Rfl:     Cholecalciferol (VITAMIN D3) 2000 units TABS, Take by mouth daily , Disp: , Rfl:     Cyanocobalamin (VITAMIN B-12) 5000 MCG LOZG, Place under the tongue, Disp: , Rfl:     Multiple Vitamins-Minerals (MULTIVITAMIN ADULT EXTRA C) CHEW, Chew, Disp: , Rfl:     NON FORMULARY, Liquid Multi vitamin, Disp: , Rfl:      REVIEW OF SYSTEMS  Constitutional:  Denies fever or chills   Eyes:  Denies change in visual acuity   HENT:  Denies nasal congestion or sore throat   Respiratory:  Denies cough or shortness of breath   Cardiovascular:  Denies chest pain or edema   GI:  Denies abdominal pain, nausea, vomiting, bloody stools or diarrhea   has history of constipation   :  Denies dysuria, frequency, difficulty in micturition and nocturia   decreased stream,  straining and some daytime frequency  Musculoskeletal:  Denies back pain or joint pain   Neurologic:  Denies headache, focal weakness or sensory changes   Endocrine:  Denies polyuria or polydipsia   Lymphatic:  Denies swollen glands   Psychiatric:  Denies depression or anxiety     Objective   Current Vitals:   /80 (BP Location: Right arm, Patient Position: Sitting, Cuff Size: Extra-Large)   Pulse 78   Temp (!) 96 9 °F (36 1 °C) (Core)   Ht 5' 7" (1 702 m)   Wt 107 kg (235 lb)   BMI 36 81 kg/m²   Body mass index is 36 81 kg/m²  PHYSICAL EXAMS  General:  Patient is not in acute distress, laying in the bed comfortably, awake, alert responding to commands,   HEENT:  Both pupils normal-size atraumatic, normocephalic, nonicteric  Neck:  JVP not raised  Trachea central  Respiratory:  normal Breath sounds clear to auscultation,  Cardiovascular:  S1-S2 normal without any murmur   GI:  Abdomen soft nontender  Right groin small cough impulse scar from previous surgery left groin small cough impulse no obvious the big hernia/  Testis in the scrotum appropriate to his age rectal examination deferred  Scar from previous surgery redundant abdominal skin secondary to weight loss  Musculoskeletal:  No back pain  Integument:  No skin rashes or ulceration  Lymphatic:  No inguinal lymphadenopathy  Neurologic:  Patient is awake alert, responding to command, well-oriented to time and place and person moving all extremities ambulating well    Visit Diagnosis:   Diagnoses and all orders for this visit:    Recurrent inguinal hernia without obstruction or gangrene    Non-recurrent unilateral inguinal hernia without obstruction or gangrene  Comments:   do non recurrent left inguinal hernia without obstruction or incarceration  Orders:  -     Ambulatory referral to General Surgery    BMI 37 0-37 9, adult    S/P bariatric surgery    Enlarged prostate without lower urinary tract symptoms (luts)    Obstructive sleep apnea       Plan of care was discussed with patient in detail    Pertinent labs reviewed  Pertinent images and available reads personally reviewed  No results found  Pertinent notes reviewed    Assessment/Plan   Assessment:   recurrent right inguinal hernia with history of incarceration last witnessed    small asymptomatic left inguinal hernia   status post bilateral knee surgery   s/p the bariatric surgery    Plan:    The risks, benefits, alternatives,and probabilities of success were discussed in detail with no guarantee made as to outcome  All questions were answered to the patient's satisfaction  in view of recurrent  right inguinal hernia  with history of incarceration may benefit with laparoscopic hernia surgery without removal of  previous mesh  And possible repair of the left inguinal hernia     will discuss with Dr Edith Mendez MD    Counseling / Coordination of Care  Expained patient in detailed about coordination of care  A description of the counseling / coordination of care:  I performed an interim history, pertinent images and labs, performed a physical examination to arrive at the plan delineated above with associated thought processes  Dr Ramy Trinidad MD [facility]    Office   Tel  [phone]  Fax   [phone]

## 2021-06-10 ENCOUNTER — OFFICE VISIT (OUTPATIENT)
Dept: SURGERY | Facility: CLINIC | Age: 71
End: 2021-06-10
Payer: COMMERCIAL

## 2021-06-10 VITALS
DIASTOLIC BLOOD PRESSURE: 78 MMHG | WEIGHT: 237 LBS | SYSTOLIC BLOOD PRESSURE: 140 MMHG | HEART RATE: 66 BPM | HEIGHT: 67 IN | BODY MASS INDEX: 37.2 KG/M2 | TEMPERATURE: 98.6 F

## 2021-06-10 DIAGNOSIS — K40.90 NON-RECURRENT UNILATERAL INGUINAL HERNIA WITHOUT OBSTRUCTION OR GANGRENE: Primary | ICD-10-CM

## 2021-06-10 DIAGNOSIS — Z01.818 PREOPERATIVE EXAMINATION: ICD-10-CM

## 2021-06-10 DIAGNOSIS — K40.91 UNILATERAL RECURRENT INGUINAL HERNIA WITHOUT OBSTRUCTION OR GANGRENE: ICD-10-CM

## 2021-06-10 PROCEDURE — 1036F TOBACCO NON-USER: CPT | Performed by: SURGERY

## 2021-06-10 PROCEDURE — 99214 OFFICE O/P EST MOD 30 MIN: CPT | Performed by: SURGERY

## 2021-06-10 PROCEDURE — 3077F SYST BP >= 140 MM HG: CPT | Performed by: SURGERY

## 2021-06-10 PROCEDURE — 1160F RVW MEDS BY RX/DR IN RCRD: CPT | Performed by: SURGERY

## 2021-06-10 PROCEDURE — 3078F DIAST BP <80 MM HG: CPT | Performed by: SURGERY

## 2021-06-10 PROCEDURE — 3008F BODY MASS INDEX DOCD: CPT | Performed by: SURGERY

## 2021-06-10 RX ORDER — CEFAZOLIN SODIUM 2 G/50ML
2000 SOLUTION INTRAVENOUS ONCE
Status: CANCELLED | OUTPATIENT
Start: 2021-06-25 | End: 2021-06-10

## 2021-06-10 NOTE — H&P (VIEW-ONLY)
[facility] History and Physical Note:    Assessment:   recurrent right inguinal hernia and non recurrent left inguinal hernia  Pertinent labs reviewed   I reviewed his CMP from March 2021   I reviewed his CBC from February 2021   I reviewed his EKG from November 2020  Pertinent images and available reads personally reviewed   I reviewed the CT scan results from [facility] dated to June 2021  Pertinent notes reviewed  I personally reviewed the note by Dr Nayla Flores,  Dated 9 June    Plan:  Laparoscopic (TEP)  Bilateral inguinal hernia repair  I explained the procedure length to the patient who understands, has had all questions answered, and desires to proceed  Will attempt to obtain his EKG and laboratory studies performed last week at [facility]:   "I am here to get the surgery scheduled"    HPI    Patient is a 70-year-old male who was referred to me by my partner, Dr Nayla Flores,  For consideration of laparoscopic bilateral inguinal hernia repair  He has a history of a right inguinal hernia repair in  2016 by Dr Nikita Hernandez,  And was admitted to [address] [place] [address] with signs and symptoms of an incarcerated right inguinal hernia that resolved spontaneously  Upon evaluation in our office yesterday  He was noted to have small, bilateral inguinal hernias that were reducible  The right is recurrent        PMH:  Past Medical History:   Diagnosis Date    AC (acromioclavicular) joint bone spurs     Last assessed - 2/19/15    Achilles tendinitis, unspecified leg 06/04/2010    Resolved - 1/8/18    Anemia 08/12/2010    Resolved - 1/11/16    Blood in urine     BPH without urinary obstruction     Hypertrophy    Deep venous thrombosis of distal lower extremity ([address] )     Deep venous thrombosis of distal lower extremity (H[facility])     Last assessed - 6/4/15    Heartburn     Last assessed - 12/12/14    Hemorrhoids     Hernia, inguinal 06/30/2009    Resolved - 1/8/18    History of stomach ulcers     Hypertension     Kidney stones     Mixed hyperlipidemia     Nephrolithiasis     Right inguinal hernia     Last assessed - 4/4/16    Sleep apnea     unable to tolerate c-pap    Tear of left rotator cuff     Last assessed - 2/19/15       PSH:  Past Surgical History:   Procedure Laterality Date    BARIATRIC SURGERY  06/15/2015    Laparoscopic Longitudinal Gastrectomy for morbid obesity, Managed by - Blank Dhillon     EXPLORATORY LAPAROTOMY W/ BOWEL RESECTION Right 3/3/2016    Procedure: Incarcerated possibly strangulated right inguinal hernia; Surgeon: Emmy Ocampo MD;  Location: [address] [place] [address];  Service:    [facility] SURGERY      2011 - Rt Hip Replacement, 2012 - Lt Hip Replacement, 2013 - Lt Hip Revision    INGUINAL HERNIA REPAIR      Last assessed - 4/4/16    JOINT REPLACEMENT      KNEE SURGERY  2004    Double     OK COLONOSCOPY FLX DX W/COLLJ SPEC WHEN PFRMD N/A 1/15/2016    Procedure: COLONOSCOPY;  Surgeon: Oscar Dobson MD;  Location: [place] [address] GI LAB; Service: Gastroenterology    OK COLONOSCOPY FLX DX W/COLLJ SPEC WHEN PFRMD N/A 2/26/2019    Procedure: COLONOSCOPY;  Surgeon: Oscar Dobson MD;  Location: [place] [address] [facility] LAB; Service: Gastroenterology    OK RECONSTR TOTAL SHOULDER IMPLANT Right 2/4/2021    Procedure: ARTHROPLASTY SHOULDER REVERSE (RIGHT);   Surgeon: Abeba Syed MD;  Location: [address] [place] [address];  Service: Orthopedics    TONSILLECTOMY         Home Meds:  Current Outpatient Medications on File Prior to Visit   Medication Sig Dispense Refill    acetaminophen (TYLENOL) 500 mg tablet Take 500 mg by mouth      atorvastatin (LIPITOR) 20 mg tablet TAKE ONE TABLET BY MOUTH EVERY DAY 90 tablet 1    Calcium Carbonate (CALCIUM 600) 1500 (600 Ca) MG TABS Take by mouth daily       Calcium Polycarbophil (FIBER-CAPS PO) Take by mouth      Cholecalciferol (VITAMIN D3) 2000 units TABS Take by mouth daily       Cyanocobalamin (VITAMIN B-12) [facility] under the tongue      diphenhydrAMINE-acetaminophen (Tylenol PM Extra Strength)  MG TABS Take 1 tablet by mouth      lisinopril (ZESTRIL) 10 mg tablet TAKE ONE TABLET BY MOUTH EVERY DAY 90 tablet 1    Multiple Vitamins-Minerals (MULTIVITAMIN ADULT EXTRA C) CHEW Chew      NON FORMULARY Liquid Multi vitamin       No current facility-administered medications on file prior to visit  Allergies: Allergies   Allergen Reactions    Percocet [Oxycodone-Acetaminophen] Hallucinations    Percolone [Oxycodone] Hallucinations       Social Hx:  Social History     Socioeconomic History    Marital status: /Civil Union     Spouse name: Not on file    Number of children: Not on file    Years of education: Not on file    Highest education level: Not on file   Occupational History    Not on file   Social Needs    Financial resource strain: Not on file    Food insecurity     Worry: Not on file     Inability: Not on file   [employer] needs     Medical: Not on file     Non-medical: Not on file   Tobacco Use    Smoking status: Former Smoker     Quit date: 1994     Years since quittin 1    Smokeless tobacco: Never Used   Substance and Sexual Activity    Alcohol use: Yes     Frequency: 2-4 times a month     Drinks per session: 1 or 2     Binge frequency: Never     Comment:  Occ     Drug use: Never    Sexual activity: Yes     Partners: Female   Lifestyle    Physical activity     Days per week: Not on file     Minutes per session: Not on file    Stress: Not on file   Relationships    Social connections     Talks on phone: Not on file     Gets together: Not on file     Attends Confucianist service: Not on file     Active member of club or organization: Not on file     Attends meetings of clubs or organizations: Not on file     Relationship status: Not on file    Intimate partner violence     Fear of current or ex partner: Not on file     Emotionally abused: Not on file Physically abused: Not on file     Forced sexual activity: Not on file   Other Topics Concern    Not on file   Social History Narrative    Exercise - Walking once a day        Family Hx:    Family History   Problem Relation Age of Onset    Arthritis Father     Diabetes Father     Hypertension Father     Hyperlipidemia Father     Diabetes Family     Emphysema Family     Heart disease Family     Hypertension Family     Mental illness Neg Hx          Review of Systems   Constitutional: Negative for chills, fatigue and unexpected weight change  HENT: Negative  Respiratory: Negative  Cardiovascular: Negative  Gastrointestinal:         History of constipation   Genitourinary:         History of straining for urination and nocturia   Musculoskeletal: Negative  Skin: Negative  Neurological: Negative  Hematological: Negative  /78 (BP Location: Left arm, Patient Position: Sitting, Cuff Size: Extra-Large)   Pulse 66   Temp 98 6 °F (37 °C) (Core)   Ht 5' 7" (1 702 m)   Wt 108 kg (237 lb)   BMI 37 12 kg/m²     Physical Exam  Constitutional:       Appearance: Normal appearance  He is obese  HENT:      Head: Normocephalic  Mouth/Throat:      Pharynx: Oropharynx is clear  Eyes:      Pupils: Pupils are equal, round, and reactive to light  Neck:      Musculoskeletal: Normal range of motion and neck supple  Cardiovascular:      Rate and Rhythm: Normal rate and regular rhythm  Pulmonary:      Effort: Pulmonary effort is normal  No respiratory distress  Breath sounds: Normal breath sounds  Abdominal:      General: There is no distension  Palpations: Abdomen is soft  Comments:  Small right inguinal hernia  Reducible at this time  Small left inguinal hernia  Reducible  Lymphadenopathy:      Cervical: No cervical adenopathy  Skin:     General: Skin is warm and dry  Neurological:      General: No focal deficit present        Mental Status: He is alert and oriented to person, place, and time     Psychiatric:         Mood and Affect: Mood normal          Behavior: Behavior normal          Pertinent labs reviewed   I reviewed his CMP from March 2021   I reviewed his CBC from February 2021   I reviewed his EKG from November 2020  Pertinent images and available reads personally reviewed   I reviewed the CT scan results from [facility] dated to June 2021  Pertinent notes reviewed  I personally reviewed the note by Dr Marianne Sanchez,  Dated 9 June 2021     Joanna Chang MD [id number] [facility]  [phone]

## 2021-06-10 NOTE — PROGRESS NOTES
[facility] History and Physical Note:    Assessment:   recurrent right inguinal hernia and non recurrent left inguinal hernia  Pertinent labs reviewed   I reviewed his CMP from March 2021   I reviewed his CBC from February 2021   I reviewed his EKG from November 2020  Pertinent images and available reads personally reviewed   I reviewed the CT scan results from [facility] dated to June 2021  Pertinent notes reviewed  I personally reviewed the note by Dr Pablo aDllas,  Dated 9 June    Plan:  Laparoscopic (TEP)  Bilateral inguinal hernia repair  I explained the procedure length to the patient who understands, has had all questions answered, and desires to proceed  Will attempt to obtain his EKG and laboratory studies performed last week at [facility]:   "I am here to get the surgery scheduled"    HPI    Patient is a 61-year-old male who was referred to me by my partner, Dr Pablo Dallas,  For consideration of laparoscopic bilateral inguinal hernia repair  He has a history of a right inguinal hernia repair in  2016 by Dr Abhijit Collazo,  And was admitted to [address] [place] [address] with signs and symptoms of an incarcerated right inguinal hernia that resolved spontaneously  Upon evaluation in our office yesterday  He was noted to have small, bilateral inguinal hernias that were reducible  The right is recurrent        PMH:  Past Medical History:   Diagnosis Date    AC (acromioclavicular) joint bone spurs     Last assessed - 2/19/15    Achilles tendinitis, unspecified leg 06/04/2010    Resolved - 1/8/18    Anemia 08/12/2010    Resolved - 1/11/16    Blood in urine     BPH without urinary obstruction     Hypertrophy    Deep venous thrombosis of distal lower extremity ([address] )     Deep venous thrombosis of distal lower extremity (HCC)     Last assessed - 6/4/15    Heartburn     Last assessed - 12/12/14    Hemorrhoids     Hernia, inguinal 06/30/2009    Resolved - 1/8/18    History of stomach ulcers     Hypertension     Kidney stones     Mixed hyperlipidemia     Nephrolithiasis     Right inguinal hernia     Last assessed - 4/4/16    Sleep apnea     unable to tolerate c-pap    Tear of left rotator cuff     Last assessed - 2/19/15       PSH:  Past Surgical History:   Procedure Laterality Date    BARIATRIC SURGERY  06/15/2015    Laparoscopic Longitudinal Gastrectomy for morbid obesity, Managed by - Florentino Favre, Maher     EXPLORATORY LAPAROTOMY W/ BOWEL RESECTION Right 3/3/2016    Procedure: Incarcerated possibly strangulated right inguinal hernia; Surgeon: Violet Pickett MD;  Location: [address] [place] [address];  Service:    [facility] SURGERY      2011 - Rt Hip Replacement, 2012 - Lt Hip Replacement, 2013 - Lt Hip Revision    INGUINAL HERNIA REPAIR      Last assessed - 4/4/16    JOINT REPLACEMENT      KNEE SURGERY  2004    Double     MN COLONOSCOPY FLX DX W/COLLJ SPEC WHEN PFRMD N/A 1/15/2016    Procedure: COLONOSCOPY;  Surgeon: Ghislaine Gallo MD;  Location: [facility]; Service: Gastroenterology    MN COLONOSCOPY FLX DX W/COLLJ SPEC WHEN PFRMD N/A 2/26/2019    Procedure: COLONOSCOPY;  Surgeon: Ghislaine Gallo MD;  Location: [facility]; Service: Gastroenterology    MN RECONSTR TOTAL SHOULDER IMPLANT Right 2/4/2021    Procedure: ARTHROPLASTY SHOULDER REVERSE (RIGHT);   Surgeon: Wm Dover MD;  Location: [address] [place] [address];  Service: Orthopedics    TONSILLECTOMY         Home Meds:  Current Outpatient Medications on File Prior to Visit   Medication Sig Dispense Refill    acetaminophen (TYLENOL) 500 mg tablet Take 500 mg by mouth      atorvastatin (LIPITOR) 20 mg tablet TAKE ONE TABLET BY MOUTH EVERY DAY 90 tablet 1    Calcium Carbonate (CALCIUM 600) 1500 (600 Ca) MG TABS Take by mouth daily       Calcium Polycarbophil (FIBER-CAPS PO) Take by mouth      Cholecalciferol (VITAMIN D3) 2000 units TABS Take by mouth daily       Cyanocobalamin (VITAMIN B-12) [facility] under the tongue      diphenhydrAMINE-acetaminophen (Tylenol PM Extra Strength)  MG TABS Take 1 tablet by mouth      lisinopril (ZESTRIL) 10 mg tablet TAKE ONE TABLET BY MOUTH EVERY DAY 90 tablet 1    Multiple Vitamins-Minerals (MULTIVITAMIN ADULT EXTRA C) CHEW Chew      NON FORMULARY Liquid Multi vitamin       No current facility-administered medications on file prior to visit  Allergies: Allergies   Allergen Reactions    Percocet [Oxycodone-Acetaminophen] Hallucinations    Percolone [Oxycodone] Hallucinations       Social Hx:  Social History     Socioeconomic History    Marital status: /Civil Union     Spouse name: Not on file    Number of children: Not on file    Years of education: Not on file    Highest education level: Not on file   Occupational History    Not on file   Social Needs    Financial resource strain: Not on file    Food insecurity     Worry: Not on file     Inability: Not on file   [employer] needs     Medical: Not on file     Non-medical: Not on file   Tobacco Use    Smoking status: Former Smoker     Quit date: 1994     Years since quittin 1    Smokeless tobacco: Never Used   Substance and Sexual Activity    Alcohol use: Yes     Frequency: 2-4 times a month     Drinks per session: 1 or 2     Binge frequency: Never     Comment:  Occ     Drug use: Never    Sexual activity: Yes     Partners: Female   Lifestyle    Physical activity     Days per week: Not on file     Minutes per session: Not on file    Stress: Not on file   Relationships    Social connections     Talks on phone: Not on file     Gets together: Not on file     Attends Nondenominational service: Not on file     Active member of club or organization: Not on file     Attends meetings of clubs or organizations: Not on file     Relationship status: Not on file    Intimate partner violence     Fear of current or ex partner: Not on file     Emotionally abused: Not on file Physically abused: Not on file     Forced sexual activity: Not on file   Other Topics Concern    Not on file   Social History Narrative    Exercise - Walking once a day        Family Hx:    Family History   Problem Relation Age of Onset    Arthritis Father     Diabetes Father     Hypertension Father     Hyperlipidemia Father     Diabetes Family     Emphysema Family     Heart disease Family     Hypertension Family     Mental illness Neg Hx          Review of Systems   Constitutional: Negative for chills, fatigue and unexpected weight change  HENT: Negative  Respiratory: Negative  Cardiovascular: Negative  Gastrointestinal:         History of constipation   Genitourinary:         History of straining for urination and nocturia   Musculoskeletal: Negative  Skin: Negative  Neurological: Negative  Hematological: Negative  /78 (BP Location: Left arm, Patient Position: Sitting, Cuff Size: Extra-Large)   Pulse 66   Temp 98 6 °F (37 °C) (Core)   Ht 5' 7" (1 702 m)   Wt 108 kg (237 lb)   BMI 37 12 kg/m²     Physical Exam  Constitutional:       Appearance: Normal appearance  He is obese  HENT:      Head: Normocephalic  Mouth/Throat:      Pharynx: Oropharynx is clear  Eyes:      Pupils: Pupils are equal, round, and reactive to light  Neck:      Musculoskeletal: Normal range of motion and neck supple  Cardiovascular:      Rate and Rhythm: Normal rate and regular rhythm  Pulmonary:      Effort: Pulmonary effort is normal  No respiratory distress  Breath sounds: Normal breath sounds  Abdominal:      General: There is no distension  Palpations: Abdomen is soft  Comments:  Small right inguinal hernia  Reducible at this time  Small left inguinal hernia  Reducible  Lymphadenopathy:      Cervical: No cervical adenopathy  Skin:     General: Skin is warm and dry  Neurological:      General: No focal deficit present        Mental Status: He is alert and oriented to person, place, and time     Psychiatric:         Mood and Affect: Mood normal          Behavior: Behavior normal          Pertinent labs reviewed   I reviewed his CMP from March 2021   I reviewed his CBC from February 2021   I reviewed his EKG from November 2020  Pertinent images and available reads personally reviewed   I reviewed the CT scan results from [facility] dated to June 2021  Pertinent notes reviewed  I personally reviewed the note by Dr Kelly Bates,  Dated 9 June 2021     Remi Lesches, MD [address] [facility]  [phone]

## 2021-06-10 NOTE — H&P
[facility] History and Physical Note:    Assessment:   recurrent right inguinal hernia and non recurrent left inguinal hernia  Pertinent labs reviewed   I reviewed his CMP from March 2021   I reviewed his CBC from February 2021   I reviewed his EKG from November 2020  Pertinent images and available reads personally reviewed   I reviewed the CT scan results from [facility] dated to June 2021  Pertinent notes reviewed  I personally reviewed the note by Dr Sharlene Barone,  Dated 9 June    Plan:  Laparoscopic (TEP)  Bilateral inguinal hernia repair  I explained the procedure length to the patient who understands, has had all questions answered, and desires to proceed  Will attempt to obtain his EKG and laboratory studies performed last week at [facility]:   "I am here to get the surgery scheduled"    HPI    Patient is a 22-year-old male who was referred to me by my partner, Dr Sharlene Barone,  For consideration of laparoscopic bilateral inguinal hernia repair  He has a history of a right inguinal hernia repair in  2016 by Dr Rl Lye,  And was admitted to [address] [place] [address] with signs and symptoms of an incarcerated right inguinal hernia that resolved spontaneously  Upon evaluation in our office yesterday  He was noted to have small, bilateral inguinal hernias that were reducible  The right is recurrent        PMH:  Past Medical History:   Diagnosis Date    AC (acromioclavicular) joint bone spurs     Last assessed - 2/19/15    Achilles tendinitis, unspecified leg 06/04/2010    Resolved - 1/8/18    Anemia 08/12/2010    Resolved - 1/11/16    Blood in urine     BPH without urinary obstruction     Hypertrophy    Deep venous thrombosis of distal lower extremity ([address] )     Deep venous thrombosis of distal lower extremity (HCC)     Last assessed - 6/4/15    Heartburn     Last assessed - 12/12/14    Hemorrhoids     Hernia, inguinal 06/30/2009    Resolved - 1/8/18    History of stomach ulcers     Hypertension     Kidney stones     Mixed hyperlipidemia     Nephrolithiasis     Right inguinal hernia     Last assessed - 4/4/16    Sleep apnea     unable to tolerate c-pap    Tear of left rotator cuff     Last assessed - 2/19/15       PSH:  Past Surgical History:   Procedure Laterality Date    BARIATRIC SURGERY  06/15/2015    Laparoscopic Longitudinal Gastrectomy for morbid obesity, Managed by - Blank Torres     EXPLORATORY LAPAROTOMY W/ BOWEL RESECTION Right 3/3/2016    Procedure: Incarcerated possibly strangulated right inguinal hernia; Surgeon: Yang Cox MD;  Location: [address] [place] [address];  Service:    [facility] SURGERY      2011 - Rt Hip Replacement, 2012 - Lt Hip Replacement, 2013 - Lt Hip Revision    INGUINAL HERNIA REPAIR      Last assessed - 4/4/16    JOINT REPLACEMENT      KNEE SURGERY  2004    Double     [place] COLONOSCOPY FLX DX W/COLLJ SPEC WHEN PFRMD N/A 1/15/2016    Procedure: COLONOSCOPY;  Surgeon: Lisa Chow MD;  Location: [facility]; Service: Gastroenterology    [place] COLONOSCOPY FLX DX W/COLLJ SPEC WHEN PFRMD N/A 2/26/2019    Procedure: COLONOSCOPY;  Surgeon: Lisa Chow MD;  Location: [facility]; Service: Gastroenterology    [place] RECONSTR TOTAL SHOULDER IMPLANT Right 2/4/2021    Procedure: ARTHROPLASTY SHOULDER REVERSE (RIGHT);   Surgeon: Ivy Telles MD;  Location: [address] [place] [address];  Service: Orthopedics    TONSILLECTOMY         Home Meds:  Current Outpatient Medications on File Prior to Visit   Medication Sig Dispense Refill    acetaminophen (TYLENOL) 500 mg tablet Take 500 mg by mouth      atorvastatin (LIPITOR) 20 mg tablet TAKE ONE TABLET BY MOUTH EVERY DAY 90 tablet 1    Calcium Carbonate (CALCIUM 600) 1500 (600 Ca) MG TABS Take by mouth daily       Calcium Polycarbophil (FIBER-CAPS PO) Take by mouth      Cholecalciferol (VITAMIN D3) 2000 units TABS Take by mouth daily       Cyanocobalamin (VITAMIN B-12) [facility] under the tongue      diphenhydrAMINE-acetaminophen (Tylenol PM Extra Strength)  MG TABS Take 1 tablet by mouth      lisinopril (ZESTRIL) 10 mg tablet TAKE ONE TABLET BY MOUTH EVERY DAY 90 tablet 1    Multiple Vitamins-Minerals (MULTIVITAMIN ADULT EXTRA C) CHEW Chew      NON FORMULARY Liquid Multi vitamin       No current facility-administered medications on file prior to visit  Allergies: Allergies   Allergen Reactions    Percocet [Oxycodone-Acetaminophen] Hallucinations    Percolone [Oxycodone] Hallucinations       Social Hx:  Social History     Socioeconomic History    Marital status: /Civil Union     Spouse name: Not on file    Number of children: Not on file    Years of education: Not on file    Highest education level: Not on file   Occupational History    Not on file   Social Needs    Financial resource strain: Not on file    Food insecurity     Worry: Not on file     Inability: Not on file   [employer] needs     Medical: Not on file     Non-medical: Not on file   Tobacco Use    Smoking status: Former Smoker     Quit date: 1994     Years since quittin 1    Smokeless tobacco: Never Used   Substance and Sexual Activity    Alcohol use: Yes     Frequency: 2-4 times a month     Drinks per session: 1 or 2     Binge frequency: Never     Comment:  Occ     Drug use: Never    Sexual activity: Yes     Partners: Female   Lifestyle    Physical activity     Days per week: Not on file     Minutes per session: Not on file    Stress: Not on file   Relationships    Social connections     Talks on phone: Not on file     Gets together: Not on file     Attends Faith service: Not on file     Active member of club or organization: Not on file     Attends meetings of clubs or organizations: Not on file     Relationship status: Not on file    Intimate partner violence     Fear of current or ex partner: Not on file     Emotionally abused: Not on file Physically abused: Not on file     Forced sexual activity: Not on file   Other Topics Concern    Not on file   Social History Narrative    Exercise - Walking once a day        Family Hx:    Family History   Problem Relation Age of Onset    Arthritis Father     Diabetes Father     Hypertension Father     Hyperlipidemia Father     Diabetes Family     Emphysema Family     Heart disease Family     Hypertension Family     Mental illness Neg Hx          Review of Systems   Constitutional: Negative for chills, fatigue and unexpected weight change  HENT: Negative  Respiratory: Negative  Cardiovascular: Negative  Gastrointestinal:         History of constipation   Genitourinary:         History of straining for urination and nocturia   Musculoskeletal: Negative  Skin: Negative  Neurological: Negative  Hematological: Negative  /78 (BP Location: Left arm, Patient Position: Sitting, Cuff Size: Extra-Large)   Pulse 66   Temp 98 6 °F (37 °C) (Core)   Ht 5' 7" (1 702 m)   Wt 108 kg (237 lb)   BMI 37 12 kg/m²     Physical Exam  Constitutional:       Appearance: Normal appearance  He is obese  HENT:      Head: Normocephalic  Mouth/Throat:      Pharynx: Oropharynx is clear  Eyes:      Pupils: Pupils are equal, round, and reactive to light  Neck:      Musculoskeletal: Normal range of motion and neck supple  Cardiovascular:      Rate and Rhythm: Normal rate and regular rhythm  Pulmonary:      Effort: Pulmonary effort is normal  No respiratory distress  Breath sounds: Normal breath sounds  Abdominal:      General: There is no distension  Palpations: Abdomen is soft  Comments:  Small right inguinal hernia  Reducible at this time  Small left inguinal hernia  Reducible  Lymphadenopathy:      Cervical: No cervical adenopathy  Skin:     General: Skin is warm and dry  Neurological:      General: No focal deficit present        Mental Status: He is alert and oriented to person, place, and time     Psychiatric:         Mood and Affect: Mood normal          Behavior: Behavior normal          Pertinent labs reviewed   I reviewed his CMP from March 2021   I reviewed his CBC from February 2021   I reviewed his EKG from November 2020  Pertinent images and available reads personally reviewed   I reviewed the CT scan results from [facility] dated to June 2021  Pertinent notes reviewed  I personally reviewed the note by Dr Suellen Zavala,  Dated 9 June 2021     Nayely Sandoval MD [id number] [facility]  [phone]

## 2021-06-10 NOTE — H&P (VIEW-ONLY)
[facility] History and Physical Note:    Assessment:   recurrent right inguinal hernia and non recurrent left inguinal hernia  Pertinent labs reviewed   I reviewed his CMP from March 2021   I reviewed his CBC from February 2021   I reviewed his EKG from November 2020  Pertinent images and available reads personally reviewed   I reviewed the CT scan results from [facility] dated to June 2021  Pertinent notes reviewed  I personally reviewed the note by Dr Gerda Ray,  Dated 9 June    Plan:  Laparoscopic (TEP)  Bilateral inguinal hernia repair  I explained the procedure length to the patient who understands, has had all questions answered, and desires to proceed  Will attempt to obtain his EKG and laboratory studies performed last week at [facility]:   "I am here to get the surgery scheduled"    HPI    Patient is a 51-year-old male who was referred to me by my partner, Dr Gerda Ray,  For consideration of laparoscopic bilateral inguinal hernia repair  He has a history of a right inguinal hernia repair in  2016 by Dr Elan Vickers,  And was admitted to [address] [place] [address] with signs and symptoms of an incarcerated right inguinal hernia that resolved spontaneously  Upon evaluation in our office yesterday  He was noted to have small, bilateral inguinal hernias that were reducible  The right is recurrent        PMH:  Past Medical History:   Diagnosis Date    AC (acromioclavicular) joint bone spurs     Last assessed - 2/19/15    Achilles tendinitis, unspecified leg 06/04/2010    Resolved - 1/8/18    Anemia 08/12/2010    Resolved - 1/11/16    Blood in urine     BPH without urinary obstruction     Hypertrophy    Deep venous thrombosis of distal lower extremity ([address] )     Deep venous thrombosis of distal lower extremity (HCC)     Last assessed - 6/4/15    Heartburn     Last assessed - 12/12/14    Hemorrhoids     Hernia, inguinal 06/30/2009    Resolved - 1/8/18    History of stomach ulcers     Hypertension     Kidney stones     Mixed hyperlipidemia     Nephrolithiasis     Right inguinal hernia     Last assessed - 4/4/16    Sleep apnea     unable to tolerate c-pap    Tear of left rotator cuff     Last assessed - 2/19/15       PSH:  Past Surgical History:   Procedure Laterality Date    BARIATRIC SURGERY  06/15/2015    Laparoscopic Longitudinal Gastrectomy for morbid obesity, Managed by - Blank Dhillon     EXPLORATORY LAPAROTOMY W/ BOWEL RESECTION Right 3/3/2016    Procedure: Incarcerated possibly strangulated right inguinal hernia; Surgeon: Emmy Ocampo MD;  Location: [address] [place] [address];  Service:    [facility] SURGERY      2011 - Rt Hip Replacement, 2012 - Lt Hip Replacement, 2013 - Lt Hip Revision    INGUINAL HERNIA REPAIR      Last assessed - 4/4/16    JOINT REPLACEMENT      KNEE SURGERY  2004    Double     RI COLONOSCOPY FLX DX W/COLLJ SPEC WHEN PFRMD N/A 1/15/2016    Procedure: COLONOSCOPY;  Surgeon: Oscar Dobson MD;  Location: [place] [address] GI LAB; Service: Gastroenterology    RI COLONOSCOPY FLX DX W/COLLJ SPEC WHEN PFRMD N/A 2/26/2019    Procedure: COLONOSCOPY;  Surgeon: Oscar Dobson MD;  Location: [place] [address] GI LAB; Service: Gastroenterology    RI RECONSTR TOTAL SHOULDER IMPLANT Right 2/4/2021    Procedure: ARTHROPLASTY SHOULDER REVERSE (RIGHT);   Surgeon: Abeba Syed MD;  Location: [address] [place] [address];  Service: Orthopedics    TONSILLECTOMY         Home Meds:  Current Outpatient Medications on File Prior to Visit   Medication Sig Dispense Refill    acetaminophen (TYLENOL) 500 mg tablet Take 500 mg by mouth      atorvastatin (LIPITOR) 20 mg tablet TAKE ONE TABLET BY MOUTH EVERY DAY 90 tablet 1    Calcium Carbonate (CALCIUM 600) 1500 (600 Ca) MG TABS Take by mouth daily       Calcium Polycarbophil (FIBER-CAPS PO) Take by mouth      Cholecalciferol (VITAMIN D3) 2000 units TABS Take by mouth daily       Cyanocobalamin (VITAMIN B-12) [address] under the tongue      diphenhydrAMINE-acetaminophen (Tylenol PM Extra Strength)  MG TABS Take 1 tablet by mouth      lisinopril (ZESTRIL) 10 mg tablet TAKE ONE TABLET BY MOUTH EVERY DAY 90 tablet 1    Multiple Vitamins-Minerals (MULTIVITAMIN ADULT EXTRA C) CHEW Chew      NON FORMULARY Liquid Multi vitamin       No current facility-administered medications on file prior to visit  Allergies: Allergies   Allergen Reactions    Percocet [Oxycodone-Acetaminophen] Hallucinations    Percolone [Oxycodone] Hallucinations       Social Hx:  Social History     Socioeconomic History    Marital status: /Civil Union     Spouse name: Not on file    Number of children: Not on file    Years of education: Not on file    Highest education level: Not on file   Occupational History    Not on file   Social Needs    Financial resource strain: Not on file    Food insecurity     Worry: Not on file     Inability: Not on file   [employer] needs     Medical: Not on file     Non-medical: Not on file   Tobacco Use    Smoking status: Former Smoker     Quit date: 1994     Years since quittin 1    Smokeless tobacco: Never Used   Substance and Sexual Activity    Alcohol use: Yes     Frequency: 2-4 times a month     Drinks per session: 1 or 2     Binge frequency: Never     Comment:  Occ     Drug use: Never    Sexual activity: Yes     Partners: Female   Lifestyle    Physical activity     Days per week: Not on file     Minutes per session: Not on file    Stress: Not on file   Relationships    Social connections     Talks on phone: Not on file     Gets together: Not on file     Attends [employer]ism service: Not on file     Active member of club or organization: Not on file     Attends meetings of clubs or organizations: Not on file     Relationship status: Not on file    Intimate partner violence     Fear of current or ex partner: Not on file     Emotionally abused: Not on file Physically abused: Not on file     Forced sexual activity: Not on file   Other Topics Concern    Not on file   Social History Narrative    Exercise - Walking once a day        Family Hx:    Family History   Problem Relation Age of Onset    Arthritis Father     Diabetes Father     Hypertension Father     Hyperlipidemia Father     Diabetes Family     Emphysema Family     Heart disease Family     Hypertension Family     Mental illness Neg Hx          Review of Systems   Constitutional: Negative for chills, fatigue and unexpected weight change  HENT: Negative  Respiratory: Negative  Cardiovascular: Negative  Gastrointestinal:         History of constipation   Genitourinary:         History of straining for urination and nocturia   Musculoskeletal: Negative  Skin: Negative  Neurological: Negative  Hematological: Negative  /78 (BP Location: Left arm, Patient Position: Sitting, Cuff Size: Extra-Large)   Pulse 66   Temp 98 6 °F (37 °C) (Core)   Ht 5' 7" (1 702 m)   Wt 108 kg (237 lb)   BMI 37 12 kg/m²     Physical Exam  Constitutional:       Appearance: Normal appearance  He is obese  HENT:      Head: Normocephalic  Mouth/Throat:      Pharynx: Oropharynx is clear  Eyes:      Pupils: Pupils are equal, round, and reactive to light  Neck:      Musculoskeletal: Normal range of motion and neck supple  Cardiovascular:      Rate and Rhythm: Normal rate and regular rhythm  Pulmonary:      Effort: Pulmonary effort is normal  No respiratory distress  Breath sounds: Normal breath sounds  Abdominal:      General: There is no distension  Palpations: Abdomen is soft  Comments:  Small right inguinal hernia  Reducible at this time  Small left inguinal hernia  Reducible  Lymphadenopathy:      Cervical: No cervical adenopathy  Skin:     General: Skin is warm and dry  Neurological:      General: No focal deficit present        Mental Status: He is alert and oriented to person, place, and time     Psychiatric:         Mood and Affect: Mood normal          Behavior: Behavior normal          Pertinent labs reviewed   I reviewed his CMP from March 2021   I reviewed his CBC from February 2021   I reviewed his EKG from November 2020  Pertinent images and available reads personally reviewed   I reviewed the CT scan results from [facility] dated to June 2021  Pertinent notes reviewed  I personally reviewed the note by Dr Marie Norton,  Dated 9 June 2021     Cosme Mario MD [address] [facility]  [phone]

## 2021-06-11 ENCOUNTER — TRANSCRIBE ORDERS (OUTPATIENT)
Dept: ADMINISTRATIVE | Facility: HOSPITAL | Age: 71
End: 2021-06-11

## 2021-06-11 ENCOUNTER — OFFICE VISIT (OUTPATIENT)
Dept: LAB | Facility: HOSPITAL | Age: 71
End: 2021-06-11
Attending: SURGERY
Payer: COMMERCIAL

## 2021-06-11 DIAGNOSIS — Z01.818 PREOPERATIVE EXAMINATION: ICD-10-CM

## 2021-06-11 PROCEDURE — 93005 ELECTROCARDIOGRAM TRACING: CPT

## 2021-06-13 LAB
ATRIAL RATE: 61 BPM
P AXIS: 53 DEGREES
PR INTERVAL: 218 MS
QRS AXIS: -35 DEGREES
QRSD INTERVAL: 130 MS
QT INTERVAL: 410 MS
QTC INTERVAL: 412 MS
T WAVE AXIS: 72 DEGREES
VENTRICULAR RATE: 61 BPM

## 2021-06-13 PROCEDURE — 93010 ELECTROCARDIOGRAM REPORT: CPT | Performed by: INTERNAL MEDICINE

## 2021-06-18 ENCOUNTER — ANESTHESIA EVENT (OUTPATIENT)
Dept: PERIOP | Facility: HOSPITAL | Age: 71
End: 2021-06-18
Payer: COMMERCIAL

## 2021-06-20 PROCEDURE — U0003 INFECTIOUS AGENT DETECTION BY NUCLEIC ACID (DNA OR RNA); SEVERE ACUTE RESPIRATORY SYNDROME CORONAVIRUS 2 (SARS-COV-2) (CORONAVIRUS DISEASE [COVID-19]), AMPLIFIED PROBE TECHNIQUE, MAKING USE OF HIGH THROUGHPUT TECHNOLOGIES AS DESCRIBED BY CMS-2020-01-R: HCPCS | Performed by: SURGERY

## 2021-06-20 PROCEDURE — U0005 INFEC AGEN DETEC AMPLI PROBE: HCPCS | Performed by: SURGERY

## 2021-06-21 NOTE — PRE-PROCEDURE INSTRUCTIONS
My Surgical Experience    The following information was developed to assist you to prepare for your operation  What do I need to do before coming to the hospital?   Arrange for a responsible person to drive you to and from the hospital    Arrange care for your children at home  Children are not allowed in the recovery areas of the hospital   Plan to wear clothing that is easy to put on and take off  If you are having shoulder surgery, wear a shirt that buttons or zippers in the front  Bathing  o Shower the evening before and the morning of your surgery with an antibacterial soap  Please refer to the Pre Op Showering Instructions for Surgery Patients Sheet   o Remove nail polish and all body piercing jewelry  o Do not shave any body part for at least 24 hours before surgery-this includes face, arms, legs and upper body  Food  o Nothing to eat or drink after midnight the night before your surgery  This includes candy and chewing gum  o Exception: If your surgery is after 12:00pm (noon), you may have clear liquids such as 7-Up®, ginger ale, apple or cranberry juice, Jell-O®, water, or clear broth until 8:00 am  o Do not drink milk or juice with pulp on the morning before surgery  o Do not drink alcohol 24 hours before surgery  Medicine  o Follow instructions you received from your surgeon about which medicines you may take on the day of surgery  o If instructed to take medicine on the morning of surgery, take pills with just a small sip of water  Call your prescribing doctor for specific infroamtion on what to do if you take insulin    What should I bring to the hospital?    Bring:  Franck Duff or a walker, if you have them, for foot or knee surgery   A list of the daily medicines, vitamins, minerals, herbals and nutritional supplements you take   Include the dosages of medicines and the time you take them each day   Glasses, dentures or hearing aids   Minimal clothing; you will be wearing hospital sleepwear   Photo ID; required to verify your identity   If you have a Living Will or Power of , bring a copy of the documents   If you have an ostomy, bring an extra pouch and any supplies you use    Do not bring   Medicines or inhalers   Money, valuables or jewelry    What other information should I know about the day of surgery?  Notify your surgeons if you develop a cold, sore throat, cough, fever, rash or any other illness   Report to the Ambulatory Surgical/Same Day Surgery Unit   You will be instructed to stop at Registration only if you have not been pre-registered   Inform your  fi they do not stay that they will be asked by the staff to leave a phone number where they can be reached   Be available to be reached before surgery  In the event the operating room schedule changes, you may be asked to come in earlier or later than expected    *It is important to tell your doctor and others involved in your health care if you are taking or have been taking any non-prescription drugs, vitamins, minerals, herbals or other nutritional supplements  Any of these may interact with some food or medicines and cause a reaction      Pre-Surgery Instructions:   Medication Instructions    acetaminophen (TYLENOL) 500 mg tablet Instructed patient per Anesthesia Guidelines   atorvastatin (LIPITOR) 20 mg tablet Instructed patient per Anesthesia Guidelines   Calcium Carbonate (CALCIUM 600) 1500 (600 Ca) MG TABS Instructed patient per Anesthesia Guidelines   Calcium Polycarbophil (FIBER-CAPS PO) Instructed patient per Anesthesia Guidelines   Cholecalciferol (VITAMIN D3) 2000 units TABS Instructed patient per Anesthesia Guidelines   Cyanocobalamin (VITAMIN B-12) 5000 MCG LOZG Instructed patient per Anesthesia Guidelines   diphenhydrAMINE-acetaminophen (Tylenol PM Extra Strength)  MG TABS Instructed patient per Anesthesia Guidelines      lisinopril (ZESTRIL) 10 mg tablet Instructed patient per Anesthesia Guidelines   Multiple Vitamins-Minerals (MULTIVITAMIN ADULT EXTRA C) CHEW Instructed patient per Anesthesia Guidelines

## 2021-06-24 NOTE — ANESTHESIA PREPROCEDURE EVALUATION
Procedure:  REPAIR HERNIA INGUINAL, LAPAROSCOPIC (Bilateral Groin)    Relevant Problems   CARDIO   (+) Benign essential hypertension   (+) Mixed hyperlipidemia      GI/HEPATIC   (+) GERD without esophagitis   (+) S/P bariatric surgery      MUSCULOSKELETAL   (+) Arthritis of carpometacarpal (CMC) joint of left thumb      NEURO/PSYCH   (+) History of colon polyps      PULMONARY   (+) Obstructive sleep apnea        Physical Exam    Airway    Mallampati score: II  TM Distance: >3 FB  Neck ROM: full     Dental   No notable dental hx     Cardiovascular  Cardiovascular exam normal    Pulmonary  Pulmonary exam normal     Other Findings        Anesthesia Plan  ASA Score- 3     Anesthesia Type- general with ASA Monitors  Additional Monitors:   Airway Plan: ETT  Plan Factors-Exercise tolerance (METS): >4 METS  Chart reviewed  Imaging results reviewed  Existing labs reviewed  Patient summary reviewed  Patient is not a current smoker  Induction- intravenous  Postoperative Plan- Plan for postoperative opioid use  Informed Consent- Anesthetic plan and risks discussed with patient  I personally reviewed this patient with the CRNA  Discussed and agreed on the Anesthesia Plan with the CRNA  Maninder Cornelius

## 2021-06-25 ENCOUNTER — HOSPITAL ENCOUNTER (OUTPATIENT)
Facility: HOSPITAL | Age: 71
Setting detail: OBSERVATION
Discharge: HOME | End: 2021-06-26
Attending: EMERGENCY MEDICINE | Admitting: SURGERY
Payer: COMMERCIAL

## 2021-06-25 ENCOUNTER — ANESTHESIA (OUTPATIENT)
Dept: PERIOP | Facility: HOSPITAL | Age: 71
End: 2021-06-25
Payer: COMMERCIAL

## 2021-06-25 ENCOUNTER — HOSPITAL ENCOUNTER (OUTPATIENT)
Facility: HOSPITAL | Age: 71
Setting detail: OUTPATIENT SURGERY
Discharge: HOME | End: 2021-06-25
Attending: SURGERY | Admitting: SURGERY
Payer: COMMERCIAL

## 2021-06-25 VITALS
HEART RATE: 87 BPM | BODY MASS INDEX: 36.76 KG/M2 | DIASTOLIC BLOOD PRESSURE: 70 MMHG | TEMPERATURE: 98 F | OXYGEN SATURATION: 93 % | WEIGHT: 234.2 LBS | HEIGHT: 67 IN | SYSTOLIC BLOOD PRESSURE: 140 MMHG | RESPIRATION RATE: 18 BRPM

## 2021-06-25 DIAGNOSIS — R33.9 URINARY RETENTION: Primary | ICD-10-CM

## 2021-06-25 DIAGNOSIS — G89.18 POSTOPERATIVE PAIN: Primary | ICD-10-CM

## 2021-06-25 PROCEDURE — C1727 CATH, BAL TIS DIS, NON-VAS: HCPCS | Performed by: SURGERY

## 2021-06-25 PROCEDURE — C1781 MESH (IMPLANTABLE): HCPCS | Performed by: SURGERY

## 2021-06-25 PROCEDURE — 49650 LAP ING HERNIA REPAIR INIT: CPT | Performed by: PHYSICIAN ASSISTANT

## 2021-06-25 PROCEDURE — 99285 EMERGENCY DEPT VISIT HI MDM: CPT | Performed by: EMERGENCY MEDICINE

## 2021-06-25 PROCEDURE — 49651 LAP ING HERNIA REPAIR RECUR: CPT | Performed by: PHYSICIAN ASSISTANT

## 2021-06-25 PROCEDURE — 49651 LAP ING HERNIA REPAIR RECUR: CPT | Performed by: SURGERY

## 2021-06-25 PROCEDURE — 49650 LAP ING HERNIA REPAIR INIT: CPT | Performed by: SURGERY

## 2021-06-25 PROCEDURE — 99284 EMERGENCY DEPT VISIT MOD MDM: CPT

## 2021-06-25 DEVICE — BARD 3DMAX MESH RIGHT MEDIUM
Type: IMPLANTABLE DEVICE | Site: INGUINAL | Status: FUNCTIONAL
Brand: BARD 3DMAX MESH

## 2021-06-25 DEVICE — BARD 3DMAX MESH LEFT MEDIUM
Type: IMPLANTABLE DEVICE | Site: INGUINAL | Status: FUNCTIONAL
Brand: BARD 3DMAX MESH

## 2021-06-25 RX ORDER — HEPARIN SODIUM 5000 [USP'U]/ML
5000 INJECTION, SOLUTION INTRAVENOUS; SUBCUTANEOUS EVERY 8 HOURS SCHEDULED
Status: DISCONTINUED | OUTPATIENT
Start: 2021-06-26 | End: 2021-06-26 | Stop reason: HOSPADM

## 2021-06-25 RX ORDER — ROCURONIUM BROMIDE 10 MG/ML
INJECTION, SOLUTION INTRAVENOUS AS NEEDED
Status: DISCONTINUED | OUTPATIENT
Start: 2021-06-25 | End: 2021-06-25

## 2021-06-25 RX ORDER — SODIUM CHLORIDE, SODIUM LACTATE, POTASSIUM CHLORIDE, CALCIUM CHLORIDE 600; 310; 30; 20 MG/100ML; MG/100ML; MG/100ML; MG/100ML
125 INJECTION, SOLUTION INTRAVENOUS CONTINUOUS
Status: DISCONTINUED | OUTPATIENT
Start: 2021-06-25 | End: 2021-06-25 | Stop reason: HOSPADM

## 2021-06-25 RX ORDER — MEPERIDINE HYDROCHLORIDE 25 MG/ML
12.5 INJECTION INTRAMUSCULAR; INTRAVENOUS; SUBCUTANEOUS
Status: DISCONTINUED | OUTPATIENT
Start: 2021-06-25 | End: 2021-06-25 | Stop reason: HOSPADM

## 2021-06-25 RX ORDER — GLYCOPYRROLATE 0.2 MG/ML
INJECTION INTRAMUSCULAR; INTRAVENOUS AS NEEDED
Status: DISCONTINUED | OUTPATIENT
Start: 2021-06-25 | End: 2021-06-25

## 2021-06-25 RX ORDER — ALBUMIN, HUMAN INJ 5% 5 %
SOLUTION INTRAVENOUS CONTINUOUS PRN
Status: DISCONTINUED | OUTPATIENT
Start: 2021-06-25 | End: 2021-06-25

## 2021-06-25 RX ORDER — TAMSULOSIN HYDROCHLORIDE 0.4 MG/1
0.4 CAPSULE ORAL ONCE
Status: COMPLETED | OUTPATIENT
Start: 2021-06-25 | End: 2021-06-25

## 2021-06-25 RX ORDER — EPHEDRINE SULFATE 50 MG/ML
INJECTION INTRAVENOUS AS NEEDED
Status: DISCONTINUED | OUTPATIENT
Start: 2021-06-25 | End: 2021-06-25

## 2021-06-25 RX ORDER — LIDOCAINE HYDROCHLORIDE 10 MG/ML
INJECTION, SOLUTION EPIDURAL; INFILTRATION; INTRACAUDAL; PERINEURAL AS NEEDED
Status: DISCONTINUED | OUTPATIENT
Start: 2021-06-25 | End: 2021-06-25

## 2021-06-25 RX ORDER — DEXAMETHASONE SODIUM PHOSPHATE 10 MG/ML
INJECTION, SOLUTION INTRAMUSCULAR; INTRAVENOUS AS NEEDED
Status: DISCONTINUED | OUTPATIENT
Start: 2021-06-25 | End: 2021-06-25

## 2021-06-25 RX ORDER — TAMSULOSIN HYDROCHLORIDE 0.4 MG/1
0.4 CAPSULE ORAL
Status: DISCONTINUED | OUTPATIENT
Start: 2021-06-26 | End: 2021-06-26 | Stop reason: HOSPADM

## 2021-06-25 RX ORDER — PROPOFOL 10 MG/ML
INJECTION, EMULSION INTRAVENOUS AS NEEDED
Status: DISCONTINUED | OUTPATIENT
Start: 2021-06-25 | End: 2021-06-25

## 2021-06-25 RX ORDER — MAGNESIUM HYDROXIDE 1200 MG/15ML
LIQUID ORAL AS NEEDED
Status: DISCONTINUED | OUTPATIENT
Start: 2021-06-25 | End: 2021-06-25 | Stop reason: HOSPADM

## 2021-06-25 RX ORDER — FENTANYL CITRATE/PF 50 MCG/ML
25 SYRINGE (ML) INJECTION
Status: DISCONTINUED | OUTPATIENT
Start: 2021-06-25 | End: 2021-06-25 | Stop reason: HOSPADM

## 2021-06-25 RX ORDER — FENTANYL CITRATE 50 UG/ML
INJECTION, SOLUTION INTRAMUSCULAR; INTRAVENOUS AS NEEDED
Status: DISCONTINUED | OUTPATIENT
Start: 2021-06-25 | End: 2021-06-25

## 2021-06-25 RX ORDER — HYDROCODONE BITARTRATE AND ACETAMINOPHEN 5; 325 MG/1; MG/1
1 TABLET ORAL EVERY 6 HOURS PRN
Qty: 10 TABLET | Refills: 0 | Status: SHIPPED | OUTPATIENT
Start: 2021-06-25 | End: 2021-07-07 | Stop reason: HOSPADM

## 2021-06-25 RX ORDER — NEOSTIGMINE METHYLSULFATE 1 MG/ML
INJECTION INTRAVENOUS AS NEEDED
Status: DISCONTINUED | OUTPATIENT
Start: 2021-06-25 | End: 2021-06-25

## 2021-06-25 RX ORDER — ATORVASTATIN CALCIUM 20 MG/1
20 TABLET, FILM COATED ORAL DAILY
Status: DISCONTINUED | OUTPATIENT
Start: 2021-06-26 | End: 2021-06-26 | Stop reason: HOSPADM

## 2021-06-25 RX ORDER — ONDANSETRON 2 MG/ML
INJECTION INTRAMUSCULAR; INTRAVENOUS AS NEEDED
Status: DISCONTINUED | OUTPATIENT
Start: 2021-06-25 | End: 2021-06-25

## 2021-06-25 RX ORDER — HYDROCODONE BITARTRATE AND ACETAMINOPHEN 5; 325 MG/1; MG/1
1 TABLET ORAL EVERY 4 HOURS PRN
Status: DISCONTINUED | OUTPATIENT
Start: 2021-06-25 | End: 2021-06-26 | Stop reason: HOSPADM

## 2021-06-25 RX ORDER — TAMSULOSIN HYDROCHLORIDE 0.4 MG/1
0.4 CAPSULE ORAL
Status: DISCONTINUED | OUTPATIENT
Start: 2021-06-25 | End: 2021-06-25 | Stop reason: DRUGHIGH

## 2021-06-25 RX ORDER — MIDAZOLAM HYDROCHLORIDE 2 MG/2ML
INJECTION, SOLUTION INTRAMUSCULAR; INTRAVENOUS AS NEEDED
Status: DISCONTINUED | OUTPATIENT
Start: 2021-06-25 | End: 2021-06-25

## 2021-06-25 RX ORDER — CEFAZOLIN SODIUM 2 G/50ML
2000 SOLUTION INTRAVENOUS ONCE
Status: COMPLETED | OUTPATIENT
Start: 2021-06-25 | End: 2021-06-25

## 2021-06-25 RX ORDER — LISINOPRIL 10 MG/1
10 TABLET ORAL DAILY
Status: DISCONTINUED | OUTPATIENT
Start: 2021-06-26 | End: 2021-06-26 | Stop reason: HOSPADM

## 2021-06-25 RX ORDER — HYDROMORPHONE HCL/PF 1 MG/ML
0.5 SYRINGE (ML) INJECTION
Status: DISCONTINUED | OUTPATIENT
Start: 2021-06-25 | End: 2021-06-25 | Stop reason: HOSPADM

## 2021-06-25 RX ORDER — MORPHINE SULFATE 4 MG/ML
4 INJECTION, SOLUTION INTRAMUSCULAR; INTRAVENOUS ONCE
Status: COMPLETED | OUTPATIENT
Start: 2021-06-25 | End: 2021-06-25

## 2021-06-25 RX ORDER — ACETAMINOPHEN 325 MG/1
650 TABLET ORAL EVERY 6 HOURS PRN
Status: DISCONTINUED | OUTPATIENT
Start: 2021-06-25 | End: 2021-06-26 | Stop reason: HOSPADM

## 2021-06-25 RX ORDER — ONDANSETRON 2 MG/ML
4 INJECTION INTRAMUSCULAR; INTRAVENOUS ONCE AS NEEDED
Status: DISCONTINUED | OUTPATIENT
Start: 2021-06-25 | End: 2021-06-25 | Stop reason: HOSPADM

## 2021-06-25 RX ORDER — BUPIVACAINE HYDROCHLORIDE AND EPINEPHRINE 5; 5 MG/ML; UG/ML
INJECTION, SOLUTION PERINEURAL AS NEEDED
Status: DISCONTINUED | OUTPATIENT
Start: 2021-06-25 | End: 2021-06-25 | Stop reason: HOSPADM

## 2021-06-25 RX ORDER — DOCUSATE SODIUM 100 MG/1
100 CAPSULE, LIQUID FILLED ORAL 2 TIMES DAILY PRN
Status: DISCONTINUED | OUTPATIENT
Start: 2021-06-25 | End: 2021-06-26 | Stop reason: HOSPADM

## 2021-06-25 RX ORDER — SODIUM CHLORIDE 9 MG/ML
50 INJECTION, SOLUTION INTRAVENOUS CONTINUOUS
Status: DISCONTINUED | OUTPATIENT
Start: 2021-06-25 | End: 2021-06-26 | Stop reason: HOSPADM

## 2021-06-25 RX ADMIN — MIDAZOLAM 1 MG: 1 INJECTION INTRAMUSCULAR; INTRAVENOUS at 08:05

## 2021-06-25 RX ADMIN — ROCURONIUM BROMIDE 10 MG: 10 INJECTION, SOLUTION INTRAVENOUS at 09:11

## 2021-06-25 RX ADMIN — PHENYLEPHRINE HYDROCHLORIDE 20 MCG/MIN: 10 INJECTION INTRAVENOUS at 08:29

## 2021-06-25 RX ADMIN — ROCURONIUM BROMIDE 10 MG: 10 INJECTION, SOLUTION INTRAVENOUS at 09:50

## 2021-06-25 RX ADMIN — SODIUM CHLORIDE, SODIUM LACTATE, POTASSIUM CHLORIDE, AND CALCIUM CHLORIDE: .6; .31; .03; .02 INJECTION, SOLUTION INTRAVENOUS at 09:43

## 2021-06-25 RX ADMIN — FENTANYL CITRATE 50 MCG: 50 INJECTION, SOLUTION INTRAMUSCULAR; INTRAVENOUS at 08:29

## 2021-06-25 RX ADMIN — NEOSTIGMINE METHYLSULFATE 5 MG: 1 INJECTION INTRAVENOUS at 10:11

## 2021-06-25 RX ADMIN — FENTANYL CITRATE 25 MCG: 50 INJECTION, SOLUTION INTRAMUSCULAR; INTRAVENOUS at 11:03

## 2021-06-25 RX ADMIN — ONDANSETRON 4 MG: 2 INJECTION INTRAMUSCULAR; INTRAVENOUS at 08:24

## 2021-06-25 RX ADMIN — ROCURONIUM BROMIDE 50 MG: 10 INJECTION, SOLUTION INTRAVENOUS at 08:13

## 2021-06-25 RX ADMIN — LIDOCAINE HYDROCHLORIDE 50 MG: 10 INJECTION, SOLUTION EPIDURAL; INFILTRATION; INTRACAUDAL; PERINEURAL at 08:12

## 2021-06-25 RX ADMIN — SODIUM CHLORIDE, SODIUM LACTATE, POTASSIUM CHLORIDE, AND CALCIUM CHLORIDE 125 ML/HR: .6; .31; .03; .02 INJECTION, SOLUTION INTRAVENOUS at 07:45

## 2021-06-25 RX ADMIN — EPHEDRINE SULFATE 10 MG: 50 INJECTION, SOLUTION INTRAVENOUS at 08:47

## 2021-06-25 RX ADMIN — DEXAMETHASONE SODIUM PHOSPHATE 4 MG: 10 INJECTION, SOLUTION INTRAMUSCULAR; INTRAVENOUS at 08:24

## 2021-06-25 RX ADMIN — EPHEDRINE SULFATE 10 MG: 50 INJECTION, SOLUTION INTRAVENOUS at 08:51

## 2021-06-25 RX ADMIN — TAMSULOSIN HYDROCHLORIDE 0.4 MG: 0.4 CAPSULE ORAL at 22:51

## 2021-06-25 RX ADMIN — GLYCOPYRROLATE 0.8 MG: 0.2 INJECTION, SOLUTION INTRAMUSCULAR; INTRAVENOUS at 10:11

## 2021-06-25 RX ADMIN — ALBUMIN (HUMAN): 12.5 INJECTION, SOLUTION INTRAVENOUS at 08:56

## 2021-06-25 RX ADMIN — PROPOFOL 140 MG: 10 INJECTION, EMULSION INTRAVENOUS at 08:12

## 2021-06-25 RX ADMIN — FENTANYL CITRATE 25 MCG: 50 INJECTION, SOLUTION INTRAMUSCULAR; INTRAVENOUS at 10:47

## 2021-06-25 RX ADMIN — SODIUM CHLORIDE 50 ML/HR: 0.9 INJECTION, SOLUTION INTRAVENOUS at 22:48

## 2021-06-25 RX ADMIN — FENTANYL CITRATE 50 MCG: 50 INJECTION, SOLUTION INTRAMUSCULAR; INTRAVENOUS at 08:12

## 2021-06-25 RX ADMIN — MORPHINE SULFATE 4 MG: 4 INJECTION INTRAVENOUS at 22:47

## 2021-06-25 RX ADMIN — FENTANYL CITRATE 25 MCG: 50 INJECTION, SOLUTION INTRAMUSCULAR; INTRAVENOUS at 11:11

## 2021-06-25 RX ADMIN — CEFAZOLIN SODIUM 2000 MG: 2 SOLUTION INTRAVENOUS at 08:19

## 2021-06-25 NOTE — DISCHARGE INSTR - AVS FIRST PAGE
1  Take Tylenol, 2 pills, 3 times a day regularly  2  Take Vicodin, in addition to the Tylenol, if you need further pain control  3  Keep incisions clean and dry for 2 days  After 2 days, they may get wet in the shower  No dressings are required  4  If you do not already have a follow-up appointment, call my office at [phone] for appointment to be seen in about 10-14 days

## 2021-06-25 NOTE — INTERVAL H&P NOTE
H&P reviewed  After examining the patient I find no changes in the patients condition since the H&P had been written      Vitals:    06/25/21 0724   BP: 141/75   Pulse: 72   Resp: 16   Temp: 98 °F (36 7 °C)   SpO2: 94%

## 2021-06-25 NOTE — PERIOPERATIVE NURSING NOTE
Numerous attempt to urinate have been unsuccessful  Contacted Dr Rodo Trinidad to make aware  Patient allowed to leave w/o urinating with education that he will return to hospital if he does not urinate by 7pm  This would be 8 hrs since he came to [address] [place] [address]

## 2021-06-25 NOTE — OP NOTE
OPERATIVE REPORT  PATIENT NAME: Allegra Barbosa    :  1950  MRN: [id number]  Pt Location: [facility] OR ROOM 01    SURGERY DATE: 2021    Surgeon(s) and Role:     * Jasen Pradhan MD - Primary     * Dev Rogers PA-C - Assisting    Preop Diagnosis:  Non-recurrent unilateral inguinal hernia without obstruction or gangrene [K40 90]  Unilateral recurrent inguinal hernia without obstruction or gangrene [K40 91]    Post-Op Diagnosis Codes:     * Non-recurrent unilateral inguinal hernia without obstruction or gangrene [K40 90]     * Unilateral recurrent inguinal hernia without obstruction or gangrene [K40 91]    Procedure(s) (LRB):  REPAIR HERNIA INGUINAL, LAPAROSCOPIC (Bilateral)    Specimen(s):  * No specimens in log *    Estimated Blood Loss:   Minimal    Drains:  NG/OG/Enteral Tube Orogastric 16 Fr Center mouth (Active)   Number of days: 0       Urethral Catheter Latex 16 Fr  (Active)   Number of days: 0       Anesthesia Type:   General    Operative Indications:  Non-recurrent unilateral inguinal hernia without obstruction or gangrene [K40 90]  Unilateral recurrent inguinal hernia without obstruction or gangrene [K40 91]      Operative Findings:  1  Large right recurrent indirect inguinal hernia  2  Small left indirect inguinal hernia and large cord lipoma    Complications:   None    Procedure and Technique:  Laparoscopic bilateral inguinal hernia repair TEP    Patient was taken back to main operating room, placed supine on the operating table, general anesthesia was induced, a Mckeon catheter was placed, and the abdomen was prepped and draped in normal fashion  Utilizing a small incision at the umbilicus, the skin was incised  The anterior rectus fascia on the left was opened and the space maker Plus balloon system was placed into the retro rectus space  The patient was placed into Trendelenburg position and the dissection balloon was inflated  A 5 minutes pause was conducted    The space maker dissection balloon was removed and a pneumoperitoneum was created in the preperitoneal space  The camera was placed and 2, 5 mm ports were placed in low midline  Dissection  was first performed in the right groin  A large, chronically scarred indirect sac was dissected free from the cord  Due to the previous right inguinal hernia repair, the dissection the sac and identification of the spermatic cord was very difficult and required meticulous dissection for approximately 45 minutes to avoid injuring the spermatic cord and reducing the entire sac  There was a small tear in the peritoneum which was closed with an 0 PDS endoloop  After the anatomy was clearly identified, a right, 3D max medium mesh was placed  A Pro Tacker was used to tack the mesh to the rectus muscle anteriorly, Dominick's ligament inferior medially, and 1 tack was placed in the tail laterally  Our attention was then turned to the left groin  A similar dissection was performed  A small indirect hernia sac was reduced and a large cord lipoma was also reduced  A left 3D max medium mesh was chosen and fixed to the rectus muscle anteriorly, Dominick's ligament inferior and medially  Is single tack was placed laterally  The pneumoperitoneum was then released from the preperitoneal space and the peritoneum was visualized as it came in contact to the mesh  Attention was then turned to the previously existing small umbilical hernia  The intra-abdominal pneumo was released via the small umbilical defect and the umbilical defect was closed with a single interrupted 0 Ethibond figure-of-eight suture  The anterior rectus fascia on the left was closed with 0 Vicryl suture  Four 0 Monocryl was used to approximate all skin edges  Exofin was placed as a dressing  The Mckeon catheter was removed, the patient awoke from general anesthesia, was extubated in the operating room, sent to the PACU in stable condition      BETY Devine was required for technical assistance and retraction  I was present for the entire procedure and a qualified resident physician was not available      Patient Disposition:  PACU     SIGNATURE: Farida Benitez MD  DATE: June 25, 2021  TIME: 10:15 AM

## 2021-06-25 NOTE — ANESTHESIA POSTPROCEDURE EVALUATION
Post-Op Assessment Note    CV Status:  Stable  Pain Score: 5    Pain management: adequate     Mental Status:  Sleepy and arousable   Hydration Status:  Stable   PONV Controlled:  None   Airway Patency:  Patent      Post Op Vitals Reviewed: Yes      Staff: CRNA   Comments: spontaneously breathing, HOB @ 20-30 degrees, vss, simple mask to O2, mod abd discomfort, orders for prn pain coverage placed, fully endorsed to recovery w/o AC        No complications documented      BP   146/70   Temp      Pulse  88   Resp   17   SpO2   100

## 2021-06-25 NOTE — INTERVAL H&P NOTE
H&P reviewed  After examining the patient I find no changes in the patients condition since the H&P had been written      Vitals:    06/25/21 0724   BP: 141/75   Pulse: 72   Resp: 16   Temp: 98 °F (36 7 °C)   SpO2: 94%       General:  alert, awake, oriented, no acute distress   Head: atraumatic  Heart:  regular rate and rhythm  Lungs: Clear to auscultation bilaterally  Abdomen:  soft, non distended, no tenderness to palpation  Extremities:  no erythema, no edema

## 2021-06-25 NOTE — PERIOPERATIVE NURSING NOTE
Patient received back to APU rm2  Patient A&O  Patient incisions to abd x 3  Dry and intact  Patient tolerating liquids  Call bell within reach will continue to monitor for urge to urinate

## 2021-06-25 NOTE — H&P
[facility] History and Physical Note:    Assessment:   recurrent right inguinal hernia and non recurrent left inguinal hernia  Pertinent labs reviewed   I reviewed his CMP from March 2021   I reviewed his CBC from February 2021   I reviewed his EKG from November 2020  Pertinent images and available reads personally reviewed   I reviewed the CT scan results from [facility] dated to June 2021  Pertinent notes reviewed  I personally reviewed the note by Dr Darling,  Dated 9 June    Plan:  Laparoscopic (TEP)  Bilateral inguinal hernia repair  I explained the procedure length to the patient who understands, has had all questions answered, and desires to proceed  Will attempt to obtain his EKG and laboratory studies performed last week at [facility]:   "I am here to get the surgery scheduled"    HPI    Patient is a 28-year-old male who was referred to me by my partner, Dr Darling,  For consideration of laparoscopic bilateral inguinal hernia repair  He has a history of a right inguinal hernia repair in  2016 by Dr Jason Paniagua,  And was admitted to [address] [place] [address] with signs and symptoms of an incarcerated right inguinal hernia that resolved spontaneously  Upon evaluation in our office yesterday  He was noted to have small, bilateral inguinal hernias that were reducible  The right is recurrent        PMH:  Past Medical History:   Diagnosis Date    AC (acromioclavicular) joint bone spurs     Last assessed - 2/19/15    Achilles tendinitis, unspecified leg 06/04/2010    Resolved - 1/8/18    Anemia 08/12/2010    Resolved - 1/11/16    Blood in urine     BPH without urinary obstruction     Hypertrophy    Deep venous thrombosis of distal lower extremity ([address] )     Deep venous thrombosis of distal lower extremity (HCC)     Last assessed - 6/4/15    Heartburn     Last assessed - 12/12/14    Hemorrhoids     Hernia, inguinal 06/30/2009    Resolved - 1/8/18    History of stomach ulcers     Hypertension     Kidney stones     Mixed hyperlipidemia     Nephrolithiasis     Right inguinal hernia     Last assessed - 4/4/16    Sleep apnea     unable to tolerate c-pap    Tear of left rotator cuff     Last assessed - 2/19/15       PSH:  Past Surgical History:   Procedure Laterality Date    BARIATRIC SURGERY  06/15/2015    Laparoscopic Longitudinal Gastrectomy for morbid obesity, Managed by - Blank Rodarte     EXPLORATORY LAPAROTOMY W/ BOWEL RESECTION Right 3/3/2016    Procedure: Incarcerated possibly strangulated right inguinal hernia; Surgeon: Pili Ledesma MD;  Location: [address] [place] [address];  Service:    [facility] SURGERY      2011 - Rt Hip Replacement, 2012 - Lt Hip Replacement, 2013 - Lt Hip Revision    INGUINAL HERNIA REPAIR      Last assessed - 4/4/16    JOINT REPLACEMENT      KNEE SURGERY  2004    Double     HI COLONOSCOPY FLX DX W/COLLJ SPEC WHEN PFRMD N/A 1/15/2016    Procedure: COLONOSCOPY;  Surgeon: Dulce Mena MD;  Location: [facility] GI LAB; Service: Gastroenterology    HI COLONOSCOPY FLX DX W/COLLJ SPEC WHEN PFRMD N/A 2/26/2019    Procedure: COLONOSCOPY;  Surgeon: Dulce Mena MD;  Location: [facility]; Service: Gastroenterology    HI RECONSTR TOTAL SHOULDER IMPLANT Right 2/4/2021    Procedure: ARTHROPLASTY SHOULDER REVERSE (RIGHT);   Surgeon: Shelia Oliver MD;  Location: [address] [place] [address];  Service: Orthopedics    TONSILLECTOMY         Home Meds:  Current Outpatient Medications on File Prior to Visit   Medication Sig Dispense Refill    acetaminophen (TYLENOL) 500 mg tablet Take 500 mg by mouth      atorvastatin (LIPITOR) 20 mg tablet TAKE ONE TABLET BY MOUTH EVERY DAY 90 tablet 1    Calcium Carbonate (CALCIUM 600) 1500 (600 Ca) MG TABS Take by mouth daily       Calcium Polycarbophil (FIBER-CAPS PO) Take by mouth      Cholecalciferol (VITAMIN D3) 2000 units TABS Take by mouth daily       Cyanocobalamin (VITAMIN B-12) [facility] under the tongue      diphenhydrAMINE-acetaminophen (Tylenol PM Extra Strength)  MG TABS Take 1 tablet by mouth      lisinopril (ZESTRIL) 10 mg tablet TAKE ONE TABLET BY MOUTH EVERY DAY 90 tablet 1    Multiple Vitamins-Minerals (MULTIVITAMIN ADULT EXTRA C) CHEW Chew      NON FORMULARY Liquid Multi vitamin       No current facility-administered medications on file prior to visit  Allergies: Allergies   Allergen Reactions    Percocet [Oxycodone-Acetaminophen] Hallucinations    Percolone [Oxycodone] Hallucinations       Social Hx:  Social History     Socioeconomic History    Marital status: /Civil Union     Spouse name: Not on file    Number of children: Not on file    Years of education: Not on file    Highest education level: Not on file   Occupational History    Not on file   Social Needs    Financial resource strain: Not on file    Food insecurity     Worry: Not on file     Inability: Not on file   [employer] needs     Medical: Not on file     Non-medical: Not on file   Tobacco Use    Smoking status: Former Smoker     Quit date: 1994     Years since quittin 1    Smokeless tobacco: Never Used   Substance and Sexual Activity    Alcohol use: Yes     Frequency: 2-4 times a month     Drinks per session: 1 or 2     Binge frequency: Never     Comment:  Occ     Drug use: Never    Sexual activity: Yes     Partners: Female   Lifestyle    Physical activity     Days per week: Not on file     Minutes per session: Not on file    Stress: Not on file   Relationships    Social connections     Talks on phone: Not on file     Gets together: Not on file     Attends Confucianist service: Not on file     Active member of club or organization: Not on file     Attends meetings of clubs or organizations: Not on file     Relationship status: Not on file    Intimate partner violence     Fear of current or ex partner: Not on file     Emotionally abused: Not on file Physically abused: Not on file     Forced sexual activity: Not on file   Other Topics Concern    Not on file   Social History Narrative    Exercise - Walking once a day        Family Hx:    Family History   Problem Relation Age of Onset    Arthritis Father     Diabetes Father     Hypertension Father     Hyperlipidemia Father     Diabetes Family     Emphysema Family     Heart disease Family     Hypertension Family     Mental illness Neg Hx          Review of Systems   Constitutional: Negative for chills, fatigue and unexpected weight change  HENT: Negative  Respiratory: Negative  Cardiovascular: Negative  Gastrointestinal:         History of constipation   Genitourinary:         History of straining for urination and nocturia   Musculoskeletal: Negative  Skin: Negative  Neurological: Negative  Hematological: Negative  /78 (BP Location: Left arm, Patient Position: Sitting, Cuff Size: Extra-Large)   Pulse 66   Temp 98 6 °F (37 °C) (Core)   Ht 5' 7" (1 702 m)   Wt 108 kg (237 lb)   BMI 37 12 kg/m²     Physical Exam  Constitutional:       Appearance: Normal appearance  He is obese  HENT:      Head: Normocephalic  Mouth/Throat:      Pharynx: Oropharynx is clear  Eyes:      Pupils: Pupils are equal, round, and reactive to light  Neck:      Musculoskeletal: Normal range of motion and neck supple  Cardiovascular:      Rate and Rhythm: Normal rate and regular rhythm  Pulmonary:      Effort: Pulmonary effort is normal  No respiratory distress  Breath sounds: Normal breath sounds  Abdominal:      General: There is no distension  Palpations: Abdomen is soft  Comments:  Small right inguinal hernia  Reducible at this time  Small left inguinal hernia  Reducible  Lymphadenopathy:      Cervical: No cervical adenopathy  Skin:     General: Skin is warm and dry  Neurological:      General: No focal deficit present        Mental Status: He is alert and oriented to person, place, and time     Psychiatric:         Mood and Affect: Mood normal          Behavior: Behavior normal          Pertinent labs reviewed   I reviewed his CMP from March 2021   I reviewed his CBC from February 2021   I reviewed his EKG from November 2020  Pertinent images and available reads personally reviewed   I reviewed the CT scan results from [facility] dated to June 2021  Pertinent notes reviewed  I personally reviewed the note by Dr Akin Clifton,  Dated 9 June 2021     Erick Zepeda MD [address] [facility]  [phone]

## 2021-06-25 NOTE — H&P (VIEW-ONLY)
[facility] History and Physical Note:    Assessment:   recurrent right inguinal hernia and non recurrent left inguinal hernia  Pertinent labs reviewed   I reviewed his CMP from March 2021   I reviewed his CBC from February 2021   I reviewed his EKG from November 2020  Pertinent images and available reads personally reviewed   I reviewed the CT scan results from [facility] dated to June 2021  Pertinent notes reviewed  I personally reviewed the note by Dr Torey Ba,  Dated 9 June    Plan:  Laparoscopic (TEP)  Bilateral inguinal hernia repair  I explained the procedure length to the patient who understands, has had all questions answered, and desires to proceed  Will attempt to obtain his EKG and laboratory studies performed last week at [facility]:   "I am here to get the surgery scheduled"    HPI    Patient is a 70-year-old male who was referred to me by my partner, Dr Torey Ba,  For consideration of laparoscopic bilateral inguinal hernia repair  He has a history of a right inguinal hernia repair in  2016 by Dr Sebastian Olguin,  And was admitted to [address] [place] [address] with signs and symptoms of an incarcerated right inguinal hernia that resolved spontaneously  Upon evaluation in our office yesterday  He was noted to have small, bilateral inguinal hernias that were reducible  The right is recurrent        PMH:  Past Medical History:   Diagnosis Date    AC (acromioclavicular) joint bone spurs     Last assessed - 2/19/15    Achilles tendinitis, unspecified leg 06/04/2010    Resolved - 1/8/18    Anemia 08/12/2010    Resolved - 1/11/16    Blood in urine     BPH without urinary obstruction     Hypertrophy    Deep venous thrombosis of distal lower extremity ([address] )     Deep venous thrombosis of distal lower extremity (HCC)     Last assessed - 6/4/15    Heartburn     Last assessed - 12/12/14    Hemorrhoids     Hernia, inguinal 06/30/2009    Resolved - 1/8/18    History of stomach ulcers     Hypertension     Kidney stones     Mixed hyperlipidemia     Nephrolithiasis     Right inguinal hernia     Last assessed - 4/4/16    Sleep apnea     unable to tolerate c-pap    Tear of left rotator cuff     Last assessed - 2/19/15       PSH:  Past Surgical History:   Procedure Laterality Date    BARIATRIC SURGERY  06/15/2015    Laparoscopic Longitudinal Gastrectomy for morbid obesity, Managed by - Blank Abel     EXPLORATORY LAPAROTOMY W/ BOWEL RESECTION Right 3/3/2016    Procedure: Incarcerated possibly strangulated right inguinal hernia; Surgeon: Nancy Hansen MD;  Location: [address] [place] [address];  Service:    0 [facility] SURGERY      2011 - Rt Hip Replacement, 2012 - Lt Hip Replacement, 2013 - Lt Hip Revision    INGUINAL HERNIA REPAIR      Last assessed - 4/4/16    JOINT REPLACEMENT      KNEE SURGERY  2004    Double     DC COLONOSCOPY FLX DX W/COLLJ SPEC WHEN PFRMD N/A 1/15/2016    Procedure: COLONOSCOPY;  Surgeon: Pepito Hernandez MD;  Location: [place] [address] GI LAB; Service: Gastroenterology    DC COLONOSCOPY FLX DX W/COLLJ SPEC WHEN PFRMD N/A 2/26/2019    Procedure: COLONOSCOPY;  Surgeon: Pepito Hernandez MD;  Location: [place] [address] GI LAB; Service: Gastroenterology    DC RECONSTR TOTAL SHOULDER IMPLANT Right 2/4/2021    Procedure: ARTHROPLASTY SHOULDER REVERSE (RIGHT);   Surgeon: Anurag Parrish MD;  Location: [address] [place] [address];  Service: Orthopedics    TONSILLECTOMY         Home Meds:  Current Outpatient Medications on File Prior to Visit   Medication Sig Dispense Refill    acetaminophen (TYLENOL) 500 mg tablet Take 500 mg by mouth      atorvastatin (LIPITOR) 20 mg tablet TAKE ONE TABLET BY MOUTH EVERY DAY 90 tablet 1    Calcium Carbonate (CALCIUM 600) 1500 (600 Ca) MG TABS Take by mouth daily       Calcium Polycarbophil (FIBER-CAPS PO) Take by mouth      Cholecalciferol (VITAMIN D3) 2000 units TABS Take by mouth daily       Cyanocobalamin (VITAMIN B-12) [facility] under the tongue      diphenhydrAMINE-acetaminophen (Tylenol PM Extra Strength)  MG TABS Take 1 tablet by mouth      lisinopril (ZESTRIL) 10 mg tablet TAKE ONE TABLET BY MOUTH EVERY DAY 90 tablet 1    Multiple Vitamins-Minerals (MULTIVITAMIN ADULT EXTRA C) CHEW Chew      NON FORMULARY Liquid Multi vitamin       No current facility-administered medications on file prior to visit  Allergies: Allergies   Allergen Reactions    Percocet [Oxycodone-Acetaminophen] Hallucinations    Percolone [Oxycodone] Hallucinations       Social Hx:  Social History     Socioeconomic History    Marital status: /Civil Union     Spouse name: Not on file    Number of children: Not on file    Years of education: Not on file    Highest education level: Not on file   Occupational History    Not on file   Social Needs    Financial resource strain: Not on file    Food insecurity     Worry: Not on file     Inability: Not on file   [employer] needs     Medical: Not on file     Non-medical: Not on file   Tobacco Use    Smoking status: Former Smoker     Quit date: 1994     Years since quittin 1    Smokeless tobacco: Never Used   Substance and Sexual Activity    Alcohol use: Yes     Frequency: 2-4 times a month     Drinks per session: 1 or 2     Binge frequency: Never     Comment:  Occ     Drug use: Never    Sexual activity: Yes     Partners: Female   Lifestyle    Physical activity     Days per week: Not on file     Minutes per session: Not on file    Stress: Not on file   Relationships    Social connections     Talks on phone: Not on file     Gets together: Not on file     Attends Anabaptism service: Not on file     Active member of club or organization: Not on file     Attends meetings of clubs or organizations: Not on file     Relationship status: Not on file    Intimate partner violence     Fear of current or ex partner: Not on file     Emotionally abused: Not on file Physically abused: Not on file     Forced sexual activity: Not on file   Other Topics Concern    Not on file   Social History Narrative    Exercise - Walking once a day        Family Hx:    Family History   Problem Relation Age of Onset    Arthritis Father     Diabetes Father     Hypertension Father     Hyperlipidemia Father     Diabetes Family     Emphysema Family     Heart disease Family     Hypertension Family     Mental illness Neg Hx          Review of Systems   Constitutional: Negative for chills, fatigue and unexpected weight change  HENT: Negative  Respiratory: Negative  Cardiovascular: Negative  Gastrointestinal:         History of constipation   Genitourinary:         History of straining for urination and nocturia   Musculoskeletal: Negative  Skin: Negative  Neurological: Negative  Hematological: Negative  /78 (BP Location: Left arm, Patient Position: Sitting, Cuff Size: Extra-Large)   Pulse 66   Temp 98 6 °F (37 °C) (Core)   Ht 5' 7" (1 702 m)   Wt 108 kg (237 lb)   BMI 37 12 kg/m²     Physical Exam  Constitutional:       Appearance: Normal appearance  He is obese  HENT:      Head: Normocephalic  Mouth/Throat:      Pharynx: Oropharynx is clear  Eyes:      Pupils: Pupils are equal, round, and reactive to light  Neck:      Musculoskeletal: Normal range of motion and neck supple  Cardiovascular:      Rate and Rhythm: Normal rate and regular rhythm  Pulmonary:      Effort: Pulmonary effort is normal  No respiratory distress  Breath sounds: Normal breath sounds  Abdominal:      General: There is no distension  Palpations: Abdomen is soft  Comments:  Small right inguinal hernia  Reducible at this time  Small left inguinal hernia  Reducible  Lymphadenopathy:      Cervical: No cervical adenopathy  Skin:     General: Skin is warm and dry  Neurological:      General: No focal deficit present        Mental Status: He is alert and oriented to person, place, and time     Psychiatric:         Mood and Affect: Mood normal          Behavior: Behavior normal          Pertinent labs reviewed   I reviewed his CMP from March 2021   I reviewed his CBC from February 2021   I reviewed his EKG from November 2020  Pertinent images and available reads personally reviewed   I reviewed the CT scan results from [facility] dated to June 2021  Pertinent notes reviewed  I personally reviewed the note by Dr Nayla Flores,  Dated 9 June 2021     Skye Tovar MD [address] [facility]  [phone]

## 2021-06-26 VITALS
DIASTOLIC BLOOD PRESSURE: 77 MMHG | WEIGHT: 242 LBS | HEIGHT: 67 IN | SYSTOLIC BLOOD PRESSURE: 141 MMHG | RESPIRATION RATE: 18 BRPM | BODY MASS INDEX: 37.98 KG/M2 | OXYGEN SATURATION: 95 % | HEART RATE: 79 BPM | TEMPERATURE: 98.3 F

## 2021-06-26 PROBLEM — R33.8 POSTOPERATIVE URINARY RETENTION: Status: ACTIVE | Noted: 2021-06-26

## 2021-06-26 PROBLEM — N99.89 POSTOPERATIVE URINARY RETENTION: Status: ACTIVE | Noted: 2021-06-26

## 2021-06-26 PROCEDURE — 94760 N-INVAS EAR/PLS OXIMETRY 1: CPT

## 2021-06-26 PROCEDURE — 99024 POSTOP FOLLOW-UP VISIT: CPT | Performed by: SURGERY

## 2021-06-26 RX ORDER — TAMSULOSIN HYDROCHLORIDE 0.4 MG/1
0.4 CAPSULE ORAL
Qty: 10 CAPSULE | Refills: 0 | Status: SHIPPED | OUTPATIENT
Start: 2021-06-26 | End: 2021-07-08 | Stop reason: SDUPTHER

## 2021-06-26 RX ADMIN — HYDROCODONE BITARTRATE AND ACETAMINOPHEN 1 TABLET: 5; 325 TABLET ORAL at 14:12

## 2021-06-26 RX ADMIN — HEPARIN SODIUM 5000 UNITS: 5000 INJECTION INTRAVENOUS; SUBCUTANEOUS at 06:47

## 2021-06-26 RX ADMIN — LISINOPRIL 10 MG: 10 TABLET ORAL at 12:46

## 2021-06-26 RX ADMIN — HYDROCODONE BITARTRATE AND ACETAMINOPHEN 1 TABLET: 5; 325 TABLET ORAL at 00:11

## 2021-06-26 NOTE — PLAN OF CARE
Problem: GENITOURINARY - ADULT  Goal: Maintains or returns to baseline urinary function  Description: INTERVENTIONS:  - Assess urinary function  - Encourage oral fluids to ensure adequate hydration if ordered  - Administer IV fluids as ordered to ensure adequate hydration  - Administer ordered medications as needed  - Offer frequent toileting  - Follow urinary retention protocol if ordered  Outcome: Progressing  Goal: Absence of urinary retention  Description: INTERVENTIONS:  - Assess patients ability to void and empty bladder  - Monitor I/O  - Bladder scan as needed  - Discuss with physician/AP medications to alleviate retention as needed  - Discuss catheterization for long term situations as appropriate  Outcome: Progressing     Problem: PAIN - ADULT  Goal: Verbalizes/displays adequate comfort level or baseline comfort level  Description: Interventions:  - Encourage patient to monitor pain and request assistance  - Assess pain using appropriate pain scale  - Administer analgesics based on type and severity of pain and evaluate response  - Implement non-pharmacological measures as appropriate and evaluate response  - Consider cultural and social influences on pain and pain management  - Notify physician/advanced practitioner if interventions unsuccessful or patient reports new pain  Outcome: Progressing     Problem: SAFETY ADULT  Goal: Patient will remain free of falls  Description: INTERVENTIONS:  - Educate patient/family on patient safety including physical limitations  - Instruct patient to call for assistance with activity   - Consult OT/PT to assist with strengthening/mobility   - Keep Call bell within reach  - Keep bed low and locked with side rails adjusted as appropriate  - Keep care items and personal belongings within reach  - Initiate and maintain comfort rounds  - Make Fall Risk Sign visible to staff  - Offer Toileting every 2 Hours, in advance of need  - Obtain necessary fall risk management equipment:   - Apply yellow socks and bracelet for high fall risk patients  - Consider moving patient to room near nurses station  Outcome: Progressing

## 2021-06-26 NOTE — PLAN OF CARE
Problem: GENITOURINARY - ADULT  Goal: Maintains or returns to baseline urinary function  Description: INTERVENTIONS:  - Assess urinary function  - Encourage oral fluids to ensure adequate hydration if ordered  - Administer IV fluids as ordered to ensure adequate hydration  - Administer ordered medications as needed  - Offer frequent toileting  - Follow urinary retention protocol if ordered  Outcome: Completed  Goal: Absence of urinary retention  Description: INTERVENTIONS:  - Assess patients ability to void and empty bladder  - Monitor I/O  - Bladder scan as needed  - Discuss with physician/AP medications to alleviate retention as needed  - Discuss catheterization for long term situations as appropriate  Outcome: Completed     Problem: PAIN - ADULT  Goal: Verbalizes/displays adequate comfort level or baseline comfort level  Description: Interventions:  - Encourage patient to monitor pain and request assistance  - Assess pain using appropriate pain scale  - Administer analgesics based on type and severity of pain and evaluate response  - Implement non-pharmacological measures as appropriate and evaluate response  - Consider cultural and social influences on pain and pain management  - Notify physician/advanced practitioner if interventions unsuccessful or patient reports new pain  Outcome: Completed     Problem: SAFETY ADULT  Goal: Patient will remain free of falls  Description: INTERVENTIONS:  - Educate patient/family on patient safety including physical limitations  - Instruct patient to call for assistance with activity   - Consult OT/PT to assist with strengthening/mobility   - Keep Call bell within reach  - Keep bed low and locked with side rails adjusted as appropriate  - Keep care items and personal belongings within reach  - Initiate and maintain comfort rounds  - Make Fall Risk Sign visible to staff  -- Apply yellow socks and bracelet for high fall risk patients  - Consider moving patient to room near nurses station  Outcome: Completed

## 2021-06-26 NOTE — H&P
H&P Exam - General Surgery   Skip Rene 70 y o  male MRN: [id number]  Unit/Bed#: 2 [place] [address] Encounter: [id number]    Assessment/Plan     Assessment:  43-year-old male with bilateral inguinal hernias status post repair presenting with acute postoperative urinary retention    Obstructive sleep apnea  Hyperlipidemia  Hypertension  Heartburn    Plan:  1  Acute urinary retention  - I discussed with the patient that this is in fact quite common following laparoscopic inguinal hernia repair, given the fact that he has a Mckeon catheter and the dissection plane is near the bladder  It is also common with patients with enlarged prostate and anesthesia to experience some retention  - patient has been voiding on his own since arrival, believes that his flow has increased this morning  - check bladder scan to confirm no retention despite small volume voiding  - continue Flomax  - I discussed with the patient that if he is not retaining we will send him with a prescription of Flomax to follow-up with his urologist   Alternatively, if he is retaining or cannot go again this morning he will need a leg bag Mckeon catheter  - patient to be discharged today    History of Present Illness     HPI:  Skip Rene is a 70 y o  male who presents with acute urinary retention  Patient underwent a laparoscopic bilateral inguinal hernia repair with Dr Cheryl Katz on 06/25/2021  Patient postoperatively was doing well however he failed to void prior to discharge from the hospital   He was advised to follow up in the emergency department if he was unable to void by 6:00 p m     The patient presented to the emergency department due to failure to void  In the emergency department the patient was noted to have a bladder scan for approximately 150 cc  He well standing, was able to have very small volume voids  He believes that this morning his flow has been improved     Patient received dose of Flomax in the emergency department last night    Patient has a history of a known enlarged prostate  He follows with Dr Miles Hernandez, was not seen last year due to the COVID pandemic    REVIEW OF SYSTEMS  Constitutional:  Denies fever or chills   Eyes:  Denies change in visual acuity   HENT:  Denies nasal congestion or sore throat   Respiratory:  Denies cough or shortness of breath   Cardiovascular:  Denies chest pain or edema history of hypertension  GI:  Denies abdominal pain, nausea, vomiting, bloody stools or diarrhea   :  + urinary retention immediately postoperatively  The patient has been voiding in the hospital although his 1st 2 voids were noted to be of low flow    He reports the most recent this morning was almost back to normal  Musculoskeletal:  Denies back pain   Neurologic:  Denies headache, focal weakness or sensory changes   Endocrine:  History of diabetes mellitus  Lymphatic:  Denies swollen glands   Psychiatric:  Denies depression or anxiety       Historical Information   Past Medical History:   Diagnosis Date    AC (acromioclavicular) joint bone spurs     Last assessed - 2/19/15    Achilles tendinitis, unspecified leg 06/04/2010    Resolved - 1/8/18    Anemia 08/12/2010    Resolved - 1/11/16    Blood in urine     BPH without urinary obstruction     Hypertrophy    Deep venous thrombosis of distal lower extremity (HCC)     Last assessed - 6/4/15    Heartburn     Last assessed - 12/12/14    Hemorrhoids     Hernia, inguinal 06/30/2009    Resolved - 1/8/18    History of stomach ulcers     Hypertension     Mixed hyperlipidemia     Nephrolithiasis     Right inguinal hernia     Last assessed - 4/4/16    Sleep apnea     unable to tolerate c-pap    Tear of left rotator cuff     Last assessed - 2/19/15     Past Surgical History:   Procedure Laterality Date    BARIATRIC SURGERY  06/15/2015    Laparoscopic Longitudinal Gastrectomy for morbid obesity, Managed by - Blank Abernathy     EXPLORATORY LAPAROTOMY W/ BOWEL RESECTION Right 3/3/2016    Procedure: Incarcerated possibly strangulated right inguinal hernia; Surgeon: Sumaya Baker MD;  Location: [address] [place] [address];  [facility]    [address]  [facility]       - Rt Hip Replacement,  - Lt Hip Replacement, 2013 - Lt Hip Revision    INGUINAL HERNIA REPAIR      Last assessed - 16    JOINT REPLACEMENT      KNEE SURGERY  2004    Double     OH COLONOSCOPY FLX DX W/COLLJ SPEC WHEN PFRMD N/A 1/15/2016    Procedure: COLONOSCOPY;  Surgeon: Fabio Briseno MD;  Location: [place] [address] GI LAB; Service: Gastroenterology    OH COLONOSCOPY FLX DX W/COLLJ SPEC WHEN PFRMD N/A 2019    Procedure: COLONOSCOPY;  Surgeon: Fabio Briseno MD;  Location: [place] [address] GI LAB; Service: Gastroenterology    OH Nghia Ascencio 19 Bilateral 2021    Procedure: REPAIR HERNIA INGUINAL, LAPAROSCOPIC;  Surgeon: Marcella Blackburn MD;  Location: [address] [place] [address];  Service: General    OH RECONSTR TOTAL SHOULDER IMPLANT Right 2021    Procedure: ARTHROPLASTY SHOULDER REVERSE (RIGHT);   Surgeon: Ignacio De La Rosa MD;  Location: [facility];  Service: Orthopedics    TONSILLECTOMY       Social History   Social History     Substance and Sexual Activity   Alcohol Use Yes    Comment: Occ, very rarely      Social History     Substance and Sexual Activity   Drug Use Never     Social History     Tobacco Use   Smoking Status Former Smoker    Quit date: 1994    Years since quittin 1   Smokeless Tobacco Never Used     E-Cigarette/Vaping    E-Cigarette Use Never User      E-Cigarette/Vaping Substances    Nicotine No     THC No     CBD No     Flavoring No     Other No     Unknown No      Family History: non-contributory    Meds/Allergies   all medications and allergies reviewed  Allergies   Allergen Reactions    Percocet [Oxycodone-Acetaminophen] Hallucinations    Percolone [Oxycodone] Hallucinations       Objective   First Vitals:   Blood Pressure: 164/82 (21 2100)  Pulse: 105 (21 2100)  Temperature: 98 6 °F (37 °C) (06/25/21 2100)  Respirations: 18 (06/25/21 2100)  Height: 5' 7" (170 2 cm) (06/25/21 2351)  Weight - Scale: 110 kg (242 lb 1 oz) (06/25/21 2100)  SpO2: 94 % (06/25/21 2100)    Current Vitals:   Blood Pressure: 141/77 (06/26/21 0314)  Pulse: 77 (06/26/21 0314)  Temperature: 97 8 °F (36 6 °C) (06/26/21 0314)  Respirations: 18 (06/26/21 0314)  Height: 5' 7" (170 2 cm) (06/25/21 2351)  Weight - Scale: 110 kg (242 lb) (06/26/21 0600)  SpO2: 93 % (06/26/21 0611)      Intake/Output Summary (Last 24 hours) at 6/26/2021 0758  Last data filed at 6/26/2021 0601  Gross per 24 hour   Intake --   Output 250 ml   Net -250 ml       Invasive Devices     Peripheral Intravenous Line            Peripheral IV 06/25/21 Left Antecubital <1 day                Physical Exam:  Vital Signs: /77   Pulse 77   Temp 97 8 °F (36 6 °C)   Resp 18   Ht 5' 7" (1 702 m)   Wt 110 kg (242 lb)   SpO2 93%   BMI 37 90 kg/m²   Gen: No acute distress    Eyes: Extraocular motion intact, conjunctiva normal    Neck: Trachea midline, no thyromegaly, No JVD   Pulm: No increased work of breathing    Card:Regular rate    Abd: Soft, non-distended, non-tender, no guarding, no rebound  Appropriately tender infraumbilically around his incision sites  Some ecchymosis noted    Ext: No cyanosis or edema bilateral    Neuro: Patient oriented to time and place   Psych: Appropriate affect, no obvious psychosis   Skin: No rashes   Rectal: deferred      Lab Results: I have personally reviewed pertinent lab results  , CBC: No results found for: WBC, HGB, HCT, MCV, PLT, ADJUSTEDWBC, MCH, MCHC, RDW, MPV, NRBC, CMP: No results found for: SODIUM, K, CL, CO2, ANIONGAP, BUN, CREATININE, GLUCOSE, CALCIUM, AST, ALT, ALKPHOS, PROT, BILITOT, EGFR  Imaging: I have personally reviewed pertinent reports  EKG, Pathology, and Other Studies: I have personally reviewed pertinent reports        Code Status: Level 1 - Full Code  Advance Directive and Living Will:      Power of :    POLST:      Counseling / Coordination of Care  Total floor / unit time spent today 30 minutes  Greater than 50% of total time was spent with the patient and / or family counseling and / or coordination of care  A description of the counseling / coordination of care: 30

## 2021-06-26 NOTE — UTILIZATION REVIEW
Initial Clinical Review    Elective surgical procedure  Age/Sex: 70 y o  male   Admitted as observation for post op urinary retention  Anesthesia Start Date/Time: 06/25/21 0806   Procedure: REPAIR HERNIA INGUINAL, LAPAROSCOPIC (Bilateral Groin)   Anesthesia type: general   Diagnosis:        Non-recurrent unilateral inguinal hernia without obstruction or gangrene [K40 90]       Unilateral recurrent inguinal hernia without obstruction or gangrene [K40 91]   Pre-op diagnosis:        Non-recurrent unilateral inguinal hernia without obstruction or gangrene [K40 90]       Unilateral recurrent inguinal hernia without obstruction or gangrene [K40 91]     Procedure(s) (LRB):  REPAIR HERNIA INGUINAL, LAPAROSCOPIC (Bilateral)    Estimated Blood Loss:   Minimal  NG/OG/Enteral Tube Orogastric 16 Fr Center mouth (Active)  Operative Findings:  1  Large right recurrent indirect inguinal hernia    2  Small left indirect inguinal hernia and large cord lipoma     Complications:   None    POD#1 Progress Note:       Discharged 6-26     Admission Orders: Date/Time/Statement:   Admission Orders (From admission, onward)     Ordered        06/25/21 2237  Place in Observation  Once                   Orders Placed This Encounter   Procedures    Place in Observation     Standing Status:   Standing     Number of Occurrences:   1     Order Specific Question:   Level of Care     Answer:   Med Surg [16]     Vital Signs: /77   Pulse 79   Temp 98 3 °F (36 8 °C)   Resp 18   Ht 5' 7" (1 702 m)   Wt 110 kg (242 lb)   SpO2 95%   BMI 37 90 kg/m²     Pertinent Labs/Diagnostic Test Results:   Results from last 7 days   Lab Units 06/20/21  1221   SARS-COV-2  Negative       Diet: regular  Mobility: ambulatory  DVT Prophylaxis:  heparin    Medications/Pain Control:  controlled  Scheduled Medications:  atorvastatin, 20 mg, Oral, Daily  heparin (porcine), 5,000 Units, Subcutaneous, Q8H JESIKA  lisinopril, 10 mg, Oral, Daily  tamsulosin, 0 4 mg, Oral, Daily With Dinner      Continuous IV Infusions:  sodium chloride, 50 mL/hr, Intravenous, Continuous      PRN Meds:  acetaminophen, 650 mg, Oral, Q6H PRN  docusate sodium, 100 mg, Oral, BID PRN  HYDROcodone-acetaminophen, 1 tablet, Oral, Q4H PRN        Network Utilization Review Department  ATTENTION: Please call with any questions or concerns to [phone] and carefully listen to the prompts so that you are directed to the right person  All voicemails are confidential   Ryan Petersen all requests for admission clinical reviews, approved or denied determinations and any other requests to dedicated fax number below belonging to the campus where the patient is receiving treatment   List of dedicated fax numbers for the Facilities:  [address] DENIALS (Administrative/Medical Necessity) [phone]   [address] (Maternity/NICU/Pediatrics) [phone]   [address] [place] [address] [address] [place] [address] [address] Carlton Daniels [address] [facility] [place] [address] [phone]

## 2021-06-26 NOTE — DISCHARGE INSTRUCTIONS
Take flomax for 1 week  Please call the office with further concerns if you are not urinating normally, or feel you are not emptying completely   Please schedule an appointment with urology (Dr Rivka Gongora)     Please follow-up as scheduled  Activity:  - No lifting greater than 20 pounds or strenuous physical activity or exercise for 2 weeks  - Walking and normal light activities are encouraged  - Normal daily activities including climbing steps are okay  - No driving until no longer using pain medications  Diet:    - You may resume your normal diet  Wound Care:  - May shower daily  No tub baths or swimming until cleared by your surgeon   - Wash incision gently with soap and water and pat dry  - Do not apply any creams or ointments unless instructed to do so by your surgeon   - Joaquim Gray may apply ice as needed (no longer than 20 minutes at a time) for the first 48 hours  - Bruising is not unusual and will go away with a little time  You may apply a warm, moist compress that may help the bruising resolve quicker  - You may remove the dressings the day after surgery (unless otherwise instructed)  Leave any skin tapes (steri-strips) on the incision(s) in place until they fall off on their own  Any new dressings are optional     Medications:    - You may resume all of your regular medications, including blood thinners and aspirin, after going home unless otherwise instructed  Please refer to your discharge medication list for further details  - Please take the pain medications as directed  - You are encouraged to use non-narcotic pain medications first and whenever possible  Reserve the use of narcotic pain medication for moderate to severe pain not controlled by non-narcotic medications   - No driving while taking narcotic pain medications  - You may become constipated, especially if taking pain medications  You may take any over the counter stool softeners or laxatives as needed   Examples: Milk of Magnesia, Alina Manzanares  Additional Instructions:  - If you have any questions or concerns after discharge please call the office   - Call office or return to ER if fever greater than 101, chills, persistent nausea/vomiting, worsening/uncontrollable pain, and/or increasing redness or purulent/foul smelling drainage from incision(s)

## 2021-06-26 NOTE — ED PROVIDER NOTES
History  Chief Complaint   Patient presents with    Difficulty Urinating     Pt released after Hernia repair Sx this AM and has not urinated since d/c  Told to return to ED if he was unable to urinate by 1900  Pt states "only drips coming out"  No fever chills n/v diarrhea  HPI    77-year-old male presents today with difficulty urinating  Patient states this morning he had bilateral inguinal hernia repair  He is with was advised to come to the ED if he is not able to urinate by 6:00 p m  Pamwalter Banks Patient states he has been having a difficult time he is having some dribbling  Patient states a mild fullness around his bladder area  Denies any fevers or chills  Denies any hematuria  77-year-old male that presents today with difficulty urinating  Spoke with general surgery who recommended the patient to the hospital     Prior to Admission Medications   Prescriptions Last Dose Informant Patient Reported? Taking?    Calcium Carbonate (CALCIUM 600) 1500 (600 Ca) MG TABS Not Taking at Unknown time  Yes No   Sig: Take by mouth daily    Patient not taking: Reported on 6/25/2021   Calcium Polycarbophil (FIBER-CAPS PO) Not Taking at Unknown time  Yes No   Sig: Take by mouth   Patient not taking: Reported on 6/25/2021   Cholecalciferol (VITAMIN D3) 2000 units TABS Not Taking at Unknown time  Yes No   Sig: Take by mouth daily    Patient not taking: Reported on 6/25/2021   Cyanocobalamin (VITAMIN B-12) 5000 MCG LOZG Not Taking at Unknown time  Yes No   Sig: Place under the tongue   Patient not taking: Reported on 6/25/2021   HYDROcodone-acetaminophen (NORCO) 5-325 mg per tablet Not Taking at Unknown time  No No   Sig: Take 1 tablet by mouth every 6 (six) hours as needed for pain for up to 10 dosesMax Daily Amount: 4 tablets   Patient not taking: Reported on 6/25/2021   Multiple Vitamins-Minerals (MULTIVITAMIN ADULT EXTRA C) CHEW Not Taking at Unknown time  Yes No   Sig: Chew   Patient not taking: Reported on 6/25/2021   NON FORMULARY Not Taking at Unknown time Spouse/Significant Other Yes No   Sig: Liquid Multi vitamin   Patient not taking: Reported on 6/25/2021   acetaminophen (TYLENOL) 500 mg tablet Past Week at Unknown time  Yes Yes   Sig: Take 500 mg by mouth   atorvastatin (LIPITOR) 20 mg tablet Past Week at Unknown time  No Yes   Sig: TAKE ONE TABLET BY MOUTH EVERY DAY   diphenhydrAMINE-acetaminophen (Tylenol PM Extra Strength)  MG TABS Not Taking at Unknown time  Yes No   Sig: Take 1 tablet by mouth   Patient not taking: Reported on 6/25/2021   lisinopril (ZESTRIL) 10 mg tablet 6/24/2021 at 0900  No Yes   Sig: TAKE ONE TABLET BY MOUTH EVERY DAY      Facility-Administered Medications: None       Past Medical History:   Diagnosis Date    AC (acromioclavicular) joint bone spurs     Last assessed - 2/19/15    Achilles tendinitis, unspecified leg 06/04/2010    Resolved - 1/8/18    Anemia 08/12/2010    Resolved - 1/11/16    Blood in urine     BPH without urinary obstruction     Hypertrophy    Deep venous thrombosis of distal lower extremity (HCC)     Last assessed - 6/4/15    Heartburn     Last assessed - 12/12/14    Hemorrhoids     Hernia, inguinal 06/30/2009    Resolved - 1/8/18    History of stomach ulcers     Hypertension     Mixed hyperlipidemia     Nephrolithiasis     Right inguinal hernia     Last assessed - 4/4/16    Sleep apnea     unable to tolerate c-pap    Tear of left rotator cuff     Last assessed - 2/19/15       Past Surgical History:   Procedure Laterality Date    BARIATRIC SURGERY  06/15/2015    Laparoscopic Longitudinal Gastrectomy for morbid obesity, Managed by - Blank Johnston     EXPLORATORY LAPAROTOMY W/ BOWEL RESECTION Right 3/3/2016    Procedure: Incarcerated possibly strangulated right inguinal hernia;   Surgeon: Radha Miller MD;  Location: [facility];  Service:    [address]  [facility] SURGERY      2011 - Rt Hip Replacement, 2012 - Lt Hip Replacement, 2013 - Lt Hip Revision    INGUINAL HERNIA REPAIR      Last assessed - 16    JOINT REPLACEMENT      KNEE SURGERY  2004    Double     MT COLONOSCOPY FLX DX W/COLLJ SPEC WHEN PFRMD N/A 1/15/2016    Procedure: COLONOSCOPY;  Surgeon: Kasia Schofield MD;  Location: [place] [address] [facility]; Service: Gastroenterology    MT COLONOSCOPY FLX DX W/COLLJ SPEC WHEN PFRMD N/A 2019    Procedure: COLONOSCOPY;  Surgeon: Kasia Schofield MD;  Location: [place] [address] [facility]; Service: Gastroenterology    MT Nghia Ascencio 19 Bilateral 2021    Procedure: REPAIR HERNIA INGUINAL, LAPAROSCOPIC;  Surgeon: Sridhar Reilly MD;  Location: [address] [place] [address];  Service: General    MT RECONSTR TOTAL SHOULDER IMPLANT Right 2021    Procedure: ARTHROPLASTY SHOULDER REVERSE (RIGHT); Surgeon: Chen Ham MD;  Location: [facility];  Service: Orthopedics    TONSILLECTOMY         Family History   Problem Relation Age of Onset    Arthritis Father     Diabetes Father     Hypertension Father     Hyperlipidemia Father     Diabetes Family     Emphysema Family     Heart disease Family     Hypertension Family     Mental illness Neg Hx      I have reviewed and agree with the history as documented  E-Cigarette/Vaping    E-Cigarette Use Never User      E-Cigarette/Vaping Substances    Nicotine No     THC No     CBD No     Flavoring No     Other No     Unknown No      Social History     Tobacco Use    Smoking status: Former Smoker     Quit date: 1994     Years since quittin 1    Smokeless tobacco: Never Used   Vaping Use    Vaping Use: Never used   Substance Use Topics    Alcohol use: Yes     Comment: Occ, very rarely     Drug use: Never       Review of Systems   Constitutional: Negative  Negative for diaphoresis and fever  HENT: Negative  Respiratory: Negative  Negative for cough, shortness of breath and wheezing  Cardiovascular: Negative  Negative for chest pain, palpitations and leg swelling     Gastrointestinal: Negative for abdominal distention, abdominal pain, nausea and vomiting  Genitourinary: Positive for difficulty urinating  Musculoskeletal: Negative  Skin: Negative  Neurological: Negative  Psychiatric/Behavioral: Negative  All other systems reviewed and are negative  Physical Exam  Physical Exam  Vitals reviewed  Constitutional:       General: He is not in acute distress  Appearance: He is well-developed  He is not diaphoretic  HENT:      Head: Normocephalic and atraumatic  Nose: Nose normal    Eyes:      Conjunctiva/sclera: Conjunctivae normal       Pupils: Pupils are equal, round, and reactive to light  Cardiovascular:      Rate and Rhythm: Normal rate and regular rhythm  Heart sounds: Normal heart sounds  No murmur heard  Pulmonary:      Effort: Pulmonary effort is normal  No respiratory distress  Breath sounds: Normal breath sounds  No wheezing or rales  Abdominal:      General: Bowel sounds are normal  There is no distension  Palpations: Abdomen is soft  Tenderness: There is no abdominal tenderness  There is no guarding or rebound  Musculoskeletal:         General: No tenderness or deformity  Normal range of motion  Cervical back: Normal range of motion and neck supple  Skin:     General: Skin is warm and dry  Coloration: Skin is not pale  Findings: No rash  Neurological:      Mental Status: He is alert and oriented to person, place, and time  Cranial Nerves: No cranial nerve deficit           Vital Signs  ED Triage Vitals [06/25/21 2100]   Temperature Pulse Respirations Blood Pressure SpO2   98 6 °F (37 °C) 105 18 164/82 94 %      Temp src Heart Rate Source Patient Position - Orthostatic VS BP Location FiO2 (%)   -- Monitor Standing Right arm --      Pain Score       3           Vitals:    06/25/21 2100 06/25/21 2317 06/25/21 2351 06/26/21 0314   BP: 164/82 160/72 147/80 141/77   Pulse: 105 97 84 77   Patient Position - Orthostatic VS: Standing Lying           Visual Acuity      ED Medications  Medications   sodium chloride 0 9 % infusion (50 mL/hr Intravenous New Bag 6/25/21 2248)   tamsulosin (FLOMAX) capsule 0 4 mg (has no administration in time range)   atorvastatin (LIPITOR) tablet 20 mg (has no administration in time range)   lisinopril (ZESTRIL) tablet 10 mg (has no administration in time range)   docusate sodium (COLACE) capsule 100 mg (has no administration in time range)   heparin (porcine) subcutaneous injection 5,000 Units (has no administration in time range)   acetaminophen (TYLENOL) tablet 650 mg (has no administration in time range)   HYDROcodone-acetaminophen (NORCO) 5-325 mg per tablet 1 tablet (1 tablet Oral Given 6/26/21 0011)   tamsulosin (FLOMAX) capsule 0 4 mg (0 4 mg Oral Given 6/25/21 2251)   morphine (PF) 4 mg/mL injection 4 mg (4 mg Intravenous Given 6/25/21 2247)       Diagnostic Studies  Results Reviewed     None                 No orders to display              Procedures  Procedures         ED Course  ED Course as of Jun 26 0500   Fri Jun 25, 2021 2225 Spoke with general surgery Dr Lawyer Hernández, who recommended admit and flomax and fluids at 50cc/hr                                                MDM    Disposition  Final diagnoses:   Urinary retention     Time reflects when diagnosis was documented in both MDM as applicable and the Disposition within this note     Time User Action Codes Description Comment    6/25/2021 11:38 PM Jose Garcia Add [R33 [id number]] Urinary retention       ED Disposition     ED Disposition Condition Date/Time Comment    Admit Stable Fri Jun 25, 2021 11:38 PM Case was discussed with surgery and the patient's admission status was agreed to be Admission Status: outpatient status to the service of Dr Lawyer Hernández           Follow-up Information    None         Current Discharge Medication List      CONTINUE these medications which have NOT CHANGED    Details   acetaminophen (TYLENOL) 500 mg tablet Take 500 mg by mouth      atorvastatin (LIPITOR) 20 mg tablet TAKE ONE TABLET BY MOUTH EVERY DAY  Qty: 90 tablet, Refills: 1    Associated Diagnoses: Mixed hyperlipidemia      lisinopril (ZESTRIL) 10 mg tablet TAKE ONE TABLET BY MOUTH EVERY DAY  Qty: 90 tablet, Refills: 1    Associated Diagnoses: Benign essential hypertension      Calcium Carbonate (CALCIUM 600) 1500 (600 Ca) MG TABS Take by mouth daily       Calcium Polycarbophil (FIBER-CAPS PO) Take by mouth      Cholecalciferol (VITAMIN D3) 2000 units TABS Take by mouth daily       Cyanocobalamin (VITAMIN B-12) 5000 MCG LOZG Place under the tongue      diphenhydrAMINE-acetaminophen (Tylenol PM Extra Strength)  MG TABS Take 1 tablet by mouth      HYDROcodone-acetaminophen (NORCO) 5-325 mg per tablet Take 1 tablet by mouth every 6 (six) hours as needed for pain for up to 10 dosesMax Daily Amount: 4 tablets  Qty: 10 tablet, Refills: 0    Associated Diagnoses: Postoperative pain      Multiple Vitamins-Minerals (MULTIVITAMIN ADULT EXTRA C) CHEW Chew      NON FORMULARY Liquid Multi vitamin           No discharge procedures on file      PDMP Review     None          ED Provider  Electronically Signed by           Javier Juárez MD  06/26/21 1858

## 2021-06-26 NOTE — NURSING NOTE
Pt discharged to home with belongings  Confirmed with MD to continue discharge without completing blood work  IV removed  AVS reviewed with pt and wife and all questions answered

## 2021-07-04 ENCOUNTER — APPOINTMENT (EMERGENCY)
Dept: RADIOLOGY | Facility: HOSPITAL | Age: 71
DRG: 176 | End: 2021-07-04
Payer: COMMERCIAL

## 2021-07-04 ENCOUNTER — HOSPITAL ENCOUNTER (INPATIENT)
Facility: HOSPITAL | Age: 71
LOS: 2 days | Discharge: HOME | DRG: 176 | End: 2021-07-07
Attending: EMERGENCY MEDICINE | Admitting: INTERNAL MEDICINE
Payer: COMMERCIAL

## 2021-07-04 DIAGNOSIS — I26.99 MULTIPLE PULMONARY EMBOLI (HCC): ICD-10-CM

## 2021-07-04 DIAGNOSIS — K40.90 NON-RECURRENT UNILATERAL INGUINAL HERNIA WITHOUT OBSTRUCTION OR GANGRENE: ICD-10-CM

## 2021-07-04 DIAGNOSIS — R07.81 PLEURITIC CHEST PAIN: Primary | ICD-10-CM

## 2021-07-04 LAB
ALBUMIN SERPL BCP-MCNC: 3 G/DL (ref 3.5–5)
ALP SERPL-CCNC: 72 U/L (ref 46–116)
ALT SERPL W P-5'-P-CCNC: 25 U/L (ref 12–78)
ANION GAP SERPL CALCULATED.3IONS-SCNC: 5 MMOL/L (ref 4–13)
APTT PPP: 30 SECONDS (ref 23–37)
AST SERPL W P-5'-P-CCNC: 18 U/L (ref 5–45)
BACTERIA UR QL AUTO: NORMAL /HPF
BASOPHILS # BLD AUTO: 0.03 THOUSANDS/ΜL (ref 0–0.1)
BASOPHILS NFR BLD AUTO: 0 % (ref 0–1)
BILIRUB SERPL-MCNC: 0.3 MG/DL (ref 0.2–1)
BILIRUB UR QL STRIP: NEGATIVE
BUN SERPL-MCNC: 18 MG/DL (ref 5–25)
CALCIUM ALBUM COR SERPL-MCNC: 8.8 MG/DL (ref 8.3–10.1)
CALCIUM SERPL-MCNC: 8 MG/DL (ref 8.3–10.1)
CHLORIDE SERPL-SCNC: 106 MMOL/L (ref 100–108)
CLARITY UR: CLEAR
CO2 SERPL-SCNC: 30 MMOL/L (ref 21–32)
COLOR UR: YELLOW
CREAT SERPL-MCNC: 1.02 MG/DL (ref 0.6–1.3)
EOSINOPHIL # BLD AUTO: 0.32 THOUSAND/ΜL (ref 0–0.61)
EOSINOPHIL NFR BLD AUTO: 5 % (ref 0–6)
ERYTHROCYTE [DISTWIDTH] IN BLOOD BY AUTOMATED COUNT: 14.7 % (ref 11.6–15.1)
GFR SERPL CREATININE-BSD FRML MDRD: 74 ML/MIN/1.73SQ M
GLUCOSE SERPL-MCNC: 104 MG/DL (ref 65–140)
GLUCOSE UR STRIP-MCNC: NEGATIVE MG/DL
HCT VFR BLD AUTO: 36.6 % (ref 36.5–49.3)
HGB BLD-MCNC: 11.2 G/DL (ref 12–17)
HGB UR QL STRIP.AUTO: ABNORMAL
IMM GRANULOCYTES # BLD AUTO: 0.02 THOUSAND/UL (ref 0–0.2)
IMM GRANULOCYTES NFR BLD AUTO: 0 % (ref 0–2)
INR PPP: 0.98 (ref 0.84–1.19)
KETONES UR STRIP-MCNC: NEGATIVE MG/DL
LACTATE SERPL-SCNC: 1 MMOL/L (ref 0.5–2)
LEUKOCYTE ESTERASE UR QL STRIP: NEGATIVE
LYMPHOCYTES # BLD AUTO: 1.51 THOUSANDS/ΜL (ref 0.6–4.47)
LYMPHOCYTES NFR BLD AUTO: 22 % (ref 14–44)
MCH RBC QN AUTO: 27.7 PG (ref 26.8–34.3)
MCHC RBC AUTO-ENTMCNC: 30.6 G/DL (ref 31.4–37.4)
MCV RBC AUTO: 91 FL (ref 82–98)
MONOCYTES # BLD AUTO: 0.54 THOUSAND/ΜL (ref 0.17–1.22)
MONOCYTES NFR BLD AUTO: 8 % (ref 4–12)
NEUTROPHILS # BLD AUTO: 4.57 THOUSANDS/ΜL (ref 1.85–7.62)
NEUTS SEG NFR BLD AUTO: 65 % (ref 43–75)
NITRITE UR QL STRIP: NEGATIVE
NON-SQ EPI CELLS URNS QL MICRO: NORMAL /HPF
NRBC BLD AUTO-RTO: 0 /100 WBCS
NT-PROBNP SERPL-MCNC: 34 PG/ML
PH UR STRIP.AUTO: 5.5 [PH]
PLATELET # BLD AUTO: 251 THOUSANDS/UL (ref 149–390)
PMV BLD AUTO: 9.7 FL (ref 8.9–12.7)
POTASSIUM SERPL-SCNC: 3.9 MMOL/L (ref 3.5–5.3)
PROT SERPL-MCNC: 7 G/DL (ref 6.4–8.2)
PROT UR STRIP-MCNC: NEGATIVE MG/DL
PROTHROMBIN TIME: 12.9 SECONDS (ref 11.6–14.5)
RBC # BLD AUTO: 4.04 MILLION/UL (ref 3.88–5.62)
RBC #/AREA URNS AUTO: NORMAL /HPF
SODIUM SERPL-SCNC: 141 MMOL/L (ref 136–145)
SP GR UR STRIP.AUTO: 1.02 (ref 1–1.03)
TROPONIN I SERPL-MCNC: <0.02 NG/ML
UROBILINOGEN UR QL STRIP.AUTO: 0.2 E.U./DL
WBC # BLD AUTO: 6.99 THOUSAND/UL (ref 4.31–10.16)
WBC #/AREA URNS AUTO: NORMAL /HPF

## 2021-07-04 PROCEDURE — 85025 COMPLETE CBC W/AUTO DIFF WBC: CPT | Performed by: EMERGENCY MEDICINE

## 2021-07-04 PROCEDURE — 99285 EMERGENCY DEPT VISIT HI MDM: CPT

## 2021-07-04 PROCEDURE — 85730 THROMBOPLASTIN TIME PARTIAL: CPT | Performed by: EMERGENCY MEDICINE

## 2021-07-04 PROCEDURE — 84484 ASSAY OF TROPONIN QUANT: CPT | Performed by: EMERGENCY MEDICINE

## 2021-07-04 PROCEDURE — 99219 PR INITIAL OBSERVATION CARE/DAY 50 MINUTES: CPT | Performed by: PHYSICIAN ASSISTANT

## 2021-07-04 PROCEDURE — 85610 PROTHROMBIN TIME: CPT | Performed by: EMERGENCY MEDICINE

## 2021-07-04 PROCEDURE — 83605 ASSAY OF LACTIC ACID: CPT | Performed by: EMERGENCY MEDICINE

## 2021-07-04 PROCEDURE — 96372 THER/PROPH/DIAG INJ SC/IM: CPT

## 2021-07-04 PROCEDURE — 81001 URINALYSIS AUTO W/SCOPE: CPT | Performed by: PHYSICIAN ASSISTANT

## 2021-07-04 PROCEDURE — 83880 ASSAY OF NATRIURETIC PEPTIDE: CPT | Performed by: EMERGENCY MEDICINE

## 2021-07-04 PROCEDURE — G1004 CDSM NDSC: HCPCS

## 2021-07-04 PROCEDURE — 36415 COLL VENOUS BLD VENIPUNCTURE: CPT | Performed by: EMERGENCY MEDICINE

## 2021-07-04 PROCEDURE — 99285 EMERGENCY DEPT VISIT HI MDM: CPT | Performed by: EMERGENCY MEDICINE

## 2021-07-04 PROCEDURE — 71275 CT ANGIOGRAPHY CHEST: CPT

## 2021-07-04 PROCEDURE — 80053 COMPREHEN METABOLIC PANEL: CPT | Performed by: EMERGENCY MEDICINE

## 2021-07-04 PROCEDURE — 87040 BLOOD CULTURE FOR BACTERIA: CPT | Performed by: EMERGENCY MEDICINE

## 2021-07-04 PROCEDURE — 93005 ELECTROCARDIOGRAM TRACING: CPT

## 2021-07-04 RX ORDER — LISINOPRIL 10 MG/1
10 TABLET ORAL DAILY
Status: DISCONTINUED | OUTPATIENT
Start: 2021-07-05 | End: 2021-07-05

## 2021-07-04 RX ORDER — TAMSULOSIN HYDROCHLORIDE 0.4 MG/1
0.4 CAPSULE ORAL
Status: DISCONTINUED | OUTPATIENT
Start: 2021-07-04 | End: 2021-07-07 | Stop reason: HOSPADM

## 2021-07-04 RX ORDER — TAMSULOSIN HYDROCHLORIDE 0.4 MG/1
0.4 CAPSULE ORAL
Status: DISCONTINUED | OUTPATIENT
Start: 2021-07-05 | End: 2021-07-04

## 2021-07-04 RX ORDER — ACETAMINOPHEN 325 MG/1
650 TABLET ORAL EVERY 4 HOURS PRN
Status: DISCONTINUED | OUTPATIENT
Start: 2021-07-04 | End: 2021-07-07 | Stop reason: HOSPADM

## 2021-07-04 RX ORDER — ATORVASTATIN CALCIUM 20 MG/1
20 TABLET, FILM COATED ORAL DAILY
Status: DISCONTINUED | OUTPATIENT
Start: 2021-07-04 | End: 2021-07-07 | Stop reason: HOSPADM

## 2021-07-04 RX ORDER — ATORVASTATIN CALCIUM 20 MG/1
20 TABLET, FILM COATED ORAL DAILY
Status: DISCONTINUED | OUTPATIENT
Start: 2021-07-05 | End: 2021-07-04

## 2021-07-04 RX ADMIN — TAMSULOSIN HYDROCHLORIDE 0.4 MG: 0.4 CAPSULE ORAL at 22:44

## 2021-07-04 RX ADMIN — IOHEXOL 100 ML: 350 INJECTION, SOLUTION INTRAVENOUS at 20:03

## 2021-07-04 RX ADMIN — ENOXAPARIN SODIUM 105 MG: 120 INJECTION SUBCUTANEOUS at 21:23

## 2021-07-04 RX ADMIN — ATORVASTATIN CALCIUM 20 MG: 20 TABLET, FILM COATED ORAL at 22:44

## 2021-07-04 NOTE — ED PROVIDER NOTES
History  Chief Complaint   Patient presents with    Pain With Breathing     Patient c/o right sided chest pain with breathing starting last night  States he had pneumonia before "and this is what it felt like "      71 yo male one week s/p hernia surgery  He has been resting and doing fine  He did start moving around more yesterday  Last night he noted left calf pain and then sharp right anterior chest pain only when he takes a deep breath  No fever, cough, vomiting, diarrhea  History provided by:  Patient   used: No        Prior to Admission Medications   Prescriptions Last Dose Informant Patient Reported? Taking?    Calcium Carbonate (CALCIUM 600) 1500 (600 Ca) MG TABS   Yes No   Sig: Take by mouth daily    Patient not taking: Reported on 6/25/2021   Calcium Polycarbophil (FIBER-CAPS PO)   Yes No   Sig: Take by mouth   Patient not taking: Reported on 6/25/2021   Cholecalciferol (VITAMIN D3) 2000 units TABS   Yes No   Sig: Take by mouth daily    Patient not taking: Reported on 6/25/2021   Cyanocobalamin (VITAMIN B-12) 5000 MCG LOZG   Yes No   Sig: Place under the tongue   Patient not taking: Reported on 6/25/2021   HYDROcodone-acetaminophen (NORCO) 5-325 mg per tablet   No No   Sig: Take 1 tablet by mouth every 6 (six) hours as needed for pain for up to 10 dosesMax Daily Amount: 4 tablets   Patient not taking: Reported on 6/25/2021   Multiple Vitamins-Minerals (MULTIVITAMIN ADULT EXTRA C) CHEW   Yes No   Sig: Chew   Patient not taking: Reported on 6/25/2021   NON FORMULARY  Spouse/Significant Other Yes No   Sig: Liquid Multi vitamin   Patient not taking: Reported on 6/25/2021   acetaminophen (TYLENOL) 500 mg tablet 7/3/2021 at Unknown time  Yes Yes   Sig: Take 500 mg by mouth   atorvastatin (LIPITOR) 20 mg tablet 7/3/2021 at Unknown time  No Yes   Sig: TAKE ONE TABLET BY MOUTH EVERY DAY   diphenhydrAMINE-acetaminophen (Tylenol PM Extra Strength)  MG TABS   Yes No   Sig: Take 1 tablet by mouth   Patient not taking: Reported on 6/25/2021   lisinopril (ZESTRIL) 10 mg tablet 7/4/2021 at 0530  No Yes   Sig: TAKE ONE TABLET BY MOUTH EVERY DAY   tamsulosin (FLOMAX) 0 4 mg 7/3/2021 at Unknown time  No Yes   Sig: Take 1 capsule (0 4 mg total) by mouth daily with dinner      Facility-Administered Medications: None       Past Medical History:   Diagnosis Date    AC (acromioclavicular) joint bone spurs     Last assessed - 2/19/15    Achilles tendinitis, unspecified leg 06/04/2010    Resolved - 1/8/18    Anemia 08/12/2010    Resolved - 1/11/16    Blood in urine     BPH without urinary obstruction     Hypertrophy    Deep venous thrombosis of distal lower extremity (HCC)     Last assessed - 6/4/15    Heartburn     Last assessed - 12/12/14    Hemorrhoids     Hernia, inguinal 06/30/2009    Resolved - 1/8/18    History of stomach ulcers     Hypertension     Mixed hyperlipidemia     Nephrolithiasis     Right inguinal hernia     Last assessed - 4/4/16    Sleep apnea     unable to tolerate c-pap    Tear of left rotator cuff     Last assessed - 2/19/15       Past Surgical History:   Procedure Laterality Date    BARIATRIC SURGERY  06/15/2015    Laparoscopic Longitudinal Gastrectomy for morbid obesity, Managed by - Blank Chacon     EXPLORATORY LAPAROTOMY W/ BOWEL RESECTION Right 3/3/2016    Procedure: Incarcerated possibly strangulated right inguinal hernia; Surgeon: Renae Palma MD;  Location: [address] [place] [address];  Service:    [facility] SURGERY      2011 - Rt Hip Replacement, 2012 - Lt Hip Replacement, 2013 - Lt Hip Revision    INGUINAL HERNIA REPAIR      Last assessed - 4/4/16    JOINT REPLACEMENT      KNEE SURGERY  2004    Double     NH COLONOSCOPY FLX DX W/COLLJ SPEC WHEN PFRMD N/A 1/15/2016    Procedure: COLONOSCOPY;  Surgeon: Alan Thompson MD;  Location: [place] [address] GI LAB;   Service: Gastroenterology    NH COLONOSCOPY FLX DX W/COLLJ SPEC WHEN PFRMD N/A 2/26/2019 Procedure: COLONOSCOPY;  Surgeon: Ethan Ovalles MD;  Location: [facility] GI LAB; Service: Gastroenterology    NV Nghia [address] Bilateral 2021    Procedure: REPAIR HERNIA INGUINAL, LAPAROSCOPIC;  Surgeon: Miguelina Oro MD;  Location: [address] [place] [address];  Service: General    NV RECONSTR TOTAL SHOULDER IMPLANT Right 2021    Procedure: ARTHROPLASTY SHOULDER REVERSE (RIGHT); Surgeon: Imtiaz Dickey MD;  Location: [facility];  Service: Orthopedics    TONSILLECTOMY         Family History   Problem Relation Age of Onset    Arthritis Father     Diabetes Father     Hypertension Father     Hyperlipidemia Father     Diabetes Family     Emphysema Family     Heart disease Family     Hypertension Family     Mental illness Neg Hx      I have reviewed and agree with the history as documented  E-Cigarette/Vaping    E-Cigarette Use Never User      E-Cigarette/Vaping Substances    Nicotine No     THC No     CBD No     Flavoring No     Other No     Unknown No      Social History     Tobacco Use    Smoking status: Former Smoker     Quit date: 1994     Years since quittin 2    Smokeless tobacco: Never Used   Vaping Use    Vaping Use: Never used   Substance Use Topics    Alcohol use: Yes     Comment: Occ, very rarely     Drug use: Never       Review of Systems   Constitutional: Negative  Negative for chills and fever  HENT: Negative  Negative for congestion and sore throat  Eyes: Negative  Respiratory: Negative  Negative for cough and shortness of breath  Cardiovascular: Positive for chest pain  Negative for leg swelling  Gastrointestinal: Negative  Negative for abdominal pain, diarrhea, nausea and vomiting  Genitourinary: Negative  Negative for dysuria, flank pain and hematuria  Musculoskeletal: Negative  Negative for back pain and myalgias  Left calf pain   Skin: Negative  Negative for rash and wound  Neurological: Negative    Negative for dizziness and headaches  Psychiatric/Behavioral: Negative  Negative for confusion and hallucinations  The patient is not nervous/anxious  All other systems reviewed and are negative  Physical Exam  Physical Exam  Vitals and nursing note reviewed  Constitutional:       General: He is not in acute distress  Appearance: He is well-developed  He is not ill-appearing, toxic-appearing or diaphoretic  HENT:      Head: Normocephalic and atraumatic  Eyes:      General: No scleral icterus  Conjunctiva/sclera: Conjunctivae normal    Cardiovascular:      Rate and Rhythm: Normal rate and regular rhythm  Heart sounds: Normal heart sounds  No murmur heard  Pulmonary:      Effort: Pulmonary effort is normal  No respiratory distress  Breath sounds: Normal breath sounds  Chest:      Chest wall: No tenderness  Abdominal:      General: Bowel sounds are normal  There is no distension  Palpations: Abdomen is soft  Tenderness: There is no abdominal tenderness  Musculoskeletal:         General: Tenderness present  No deformity  Normal range of motion  Cervical back: Normal range of motion and neck supple  Comments: + left posterior calf ttp   Skin:     General: Skin is warm and dry  Coloration: Skin is not pale  Findings: No erythema or rash  Neurological:      General: No focal deficit present  Mental Status: He is alert and oriented to person, place, and time  Cranial Nerves: No cranial nerve deficit     Psychiatric:         Mood and Affect: Mood normal          Behavior: Behavior normal          Vital Signs  ED Triage Vitals [07/04/21 1832]   Temperature Pulse Respirations Blood Pressure SpO2   (!) 97 2 °F (36 2 °C) 92 16 (!) 175/82 97 %      Temp Source Heart Rate Source Patient Position - Orthostatic VS BP Location FiO2 (%)   Tympanic Monitor Lying Right arm --      Pain Score       Worst Possible Pain           Vitals:    07/04/21 1832 07/04/21 2043 BP: (!) 175/82 159/77   Pulse: 92 70   Patient Position - Orthostatic VS: Lying Lying         Visual Acuity      ED Medications  Medications   iohexol (OMNIPAQUE) 350 MG/ML injection (SINGLE-DOSE) 100 mL (100 mL Intravenous Given 7/4/21 2003)   enoxaparin (LOVENOX) subcutaneous injection 105 mg (105 mg Subcutaneous Given 7/4/21 2123)       Diagnostic Studies  Results Reviewed     Procedure Component Value Units Date/Time    NT-BNP PRO [[id number]]  (Normal) Collected: 07/04/21 1858    Lab Status: Final result Specimen: Blood from Arm, Left Updated: 07/04/21 2115     NT-proBNP 34 pg/mL     Protime-INR [[id number]]  (Normal) Collected: 07/04/21 1858    Lab Status: Final result Specimen: Blood from Arm, Left Updated: 07/04/21 1938     Protime 12 9 seconds      INR 0 98    APTT [[id number]]  (Normal) Collected: 07/04/21 1858    Lab Status: Final result Specimen: Blood from Arm, Left Updated: 07/04/21 1938     PTT 30 seconds     Troponin I [[id number]]  (Normal) Collected: 07/04/21 1858    Lab Status: Final result Specimen: Blood from Arm, Left Updated: 07/04/21 1927     Troponin I <0 02 ng/mL     Lactic acid, plasma [[id number]]  (Normal) Collected: 07/04/21 1858    Lab Status: Final result Specimen: Blood from Arm, Left Updated: 07/04/21 1927     LACTIC ACID 1 0 mmol/L     Narrative:      Result may be elevated if tourniquet was used during collection      Comprehensive metabolic panel [[id number]]  (Abnormal) Collected: 07/04/21 1858    Lab Status: Final result Specimen: Blood from Arm, Left Updated: 07/04/21 1924     Sodium 141 mmol/L      Potassium 3 9 mmol/L      Chloride 106 mmol/L      CO2 30 mmol/L      ANION GAP 5 mmol/L      BUN 18 mg/dL      Creatinine 1 02 mg/dL      Glucose 104 mg/dL      Calcium 8 0 mg/dL      Corrected Calcium 8 8 mg/dL      AST 18 U/L      ALT 25 U/L      Alkaline Phosphatase 72 U/L      Total Protein 7 0 g/dL      Albumin 3 0 g/dL      Total Bilirubin 0 30 mg/dL      eGFR 74 ml/min/1 73sq m Narrative:      National Kidney Disease Foundation guidelines for Chronic Kidney Disease (CKD):     Stage 1 with normal or high GFR (GFR > 90 mL/min/1 73 square meters)    Stage 2 Mild CKD (GFR = 60-89 mL/min/1 73 square meters)    Stage 3A Moderate CKD (GFR = 45-59 mL/min/1 73 square meters)    Stage 3B Moderate CKD (GFR = 30-44 mL/min/1 73 square meters)    Stage 4 Severe CKD (GFR = 15-29 mL/min/1 73 square meters)    Stage 5 End Stage CKD (GFR <15 mL/min/1 73 square meters)  Note: GFR calculation is accurate only with a steady state creatinine    CBC and differential [[id number]]  (Abnormal) Collected: 07/04/21 1858    Lab Status: Final result Specimen: Blood from Arm, Left Updated: 07/04/21 1908     WBC 6 99 Thousand/uL      RBC 4 04 Million/uL      Hemoglobin 11 2 g/dL      Hematocrit 36 6 %      MCV 91 fL      MCH 27 7 pg      MCHC 30 6 g/dL      RDW 14 7 %      MPV 9 7 fL      Platelets 869 Thousands/uL      nRBC 0 /100 WBCs      Neutrophils Relative 65 %      Immat GRANS % 0 %      Lymphocytes Relative 22 %      Monocytes Relative 8 %      Eosinophils Relative 5 %      Basophils Relative 0 %      Neutrophils Absolute 4 57 Thousands/µL      Immature Grans Absolute 0 02 Thousand/uL      Lymphocytes Absolute 1 51 Thousands/µL      Monocytes Absolute 0 54 Thousand/µL      Eosinophils Absolute 0 32 Thousand/µL      Basophils Absolute 0 03 Thousands/µL     Blood culture #2 [[id number]] Collected: 07/04/21 1858    Lab Status: In process Specimen: Blood from Arm, Left Updated: 07/04/21 1905    Blood culture #1 [[id number]] Collected: 07/04/21 1858    Lab Status: In process Specimen: Blood from Arm, Left Updated: 07/04/21 1905    UA (URINE) with reflex to Scope [[id number]]     Lab Status: No result Specimen: Urine                  CTA ED chest PE study   Final Result by William Dias DO (07/04 2050)      Multiple segmental branch pulmonary artery emboli with right heart strain        The calculated ratio of right ventricular to left ventricular diameter (RV/LV ratio) is 1 07      This is greater than 0 9, which is abnormal and indicates right heart strain  An abnormal RV/LV ratio has been shown to be associated with an increased risk of 30 day mortality in the setting of acute pulmonary embolism  Urgent consultation with the    medical critical care team is recommended  I personally discussed this study with Yaa Gray on 8/6/1801 at 3:76 PM                Workstation performed: [id number]                    Procedures  ECG 12 Lead Documentation Only    Date/Time: 7/4/2021 6:35 PM  Performed by: Chandler Almanzar MD  Authorized by: Chandler Almanzar MD     Indications / Diagnosis:  Right chest pain  ECG reviewed by me, the ED Provider: yes    Patient location:  ED  Previous ECG:     Previous ECG:  Compared to current    Similarity:  No change  Interpretation:     Interpretation: abnormal    Rate:     ECG rate:  93    ECG rate assessment: normal    Rhythm:     Rhythm: sinus rhythm    Ectopy:     Ectopy: none    QRS:     QRS axis:  Left  Conduction:     Conduction: abnormal      Abnormal conduction: complete LBBB    ST segments:     ST segments:  Normal  T waves:     T waves: normal    Comments:      Left bundle and ST/T morphology unchanged from previous             ED Course                             SBIRT 22yo+      Most Recent Value   SBIRT (24 yo +)   In order to provide better care to our patients, we are screening all of our patients for alcohol and drug use  Would it be okay to ask you these screening questions? No Filed at: 07/04/2021 1912                    MDM  Number of Diagnoses or Management Options  Multiple pulmonary emboli [facility])  Pleuritic chest pain  Diagnosis management comments: Discussed with critical care who evaluted results, saw pt  And cleared him to stay here and hospitalist admit  Vitals and pulse ox normal   Pt  Not in distress  Given lovenox        Disposition  Final diagnoses: Pleuritic chest pain   Multiple pulmonary emboli ([facility] [address] )     Time reflects when diagnosis was documented in both MDM as applicable and the Disposition within this note     Time User Action Codes Description Comment    0/7/3619  0:04 PM Citlalli DAVIS Add [C59 85] Pleuritic chest pain     4/3/2549  2:36 PM Kimberlyabdoulaye Hernandez Add [G50 66] Multiple pulmonary emboli [facility])       ED Disposition     ED Disposition Condition Date/Time Comment    Admit Stable Sun Jul 4, 2021  9:29 PM Case was discussed with *hospitalist, ICU** and the patient's admission status was agreed to be Admission Status: observation status to the service of Dr Marilyn Catspitalpete*   Follow-up Information    None         Patient's Medications   Discharge Prescriptions    No medications on file     No discharge procedures on file      PDMP Review     None          ED Provider  Electronically Signed by           Jerry York MD  82/54/58 8803

## 2021-07-05 PROBLEM — I26.99 PULMONARY EMBOLISM (HCC): Status: ACTIVE | Noted: 2021-07-05

## 2021-07-05 PROBLEM — D64.9 ANEMIA: Status: ACTIVE | Noted: 2021-07-05

## 2021-07-05 LAB
ANION GAP SERPL CALCULATED.3IONS-SCNC: 5 MMOL/L (ref 4–13)
BASOPHILS # BLD AUTO: 0.05 THOUSANDS/ΜL (ref 0–0.1)
BASOPHILS NFR BLD AUTO: 1 % (ref 0–1)
BUN SERPL-MCNC: 21 MG/DL (ref 5–25)
CALCIUM SERPL-MCNC: 8.8 MG/DL (ref 8.3–10.1)
CHLORIDE SERPL-SCNC: 107 MMOL/L (ref 100–108)
CO2 SERPL-SCNC: 31 MMOL/L (ref 21–32)
CREAT SERPL-MCNC: 1.06 MG/DL (ref 0.6–1.3)
EOSINOPHIL # BLD AUTO: 0.29 THOUSAND/ΜL (ref 0–0.61)
EOSINOPHIL NFR BLD AUTO: 5 % (ref 0–6)
ERYTHROCYTE [DISTWIDTH] IN BLOOD BY AUTOMATED COUNT: 14.7 % (ref 11.6–15.1)
GFR SERPL CREATININE-BSD FRML MDRD: 70 ML/MIN/1.73SQ M
GLUCOSE P FAST SERPL-MCNC: 96 MG/DL (ref 65–99)
GLUCOSE SERPL-MCNC: 96 MG/DL (ref 65–140)
HCT VFR BLD AUTO: 34.1 % (ref 36.5–49.3)
HGB BLD-MCNC: 10.3 G/DL (ref 12–17)
IMM GRANULOCYTES # BLD AUTO: 0.03 THOUSAND/UL (ref 0–0.2)
IMM GRANULOCYTES NFR BLD AUTO: 1 % (ref 0–2)
LYMPHOCYTES # BLD AUTO: 1.31 THOUSANDS/ΜL (ref 0.6–4.47)
LYMPHOCYTES NFR BLD AUTO: 23 % (ref 14–44)
MCH RBC QN AUTO: 27.4 PG (ref 26.8–34.3)
MCHC RBC AUTO-ENTMCNC: 30.2 G/DL (ref 31.4–37.4)
MCV RBC AUTO: 91 FL (ref 82–98)
MONOCYTES # BLD AUTO: 0.47 THOUSAND/ΜL (ref 0.17–1.22)
MONOCYTES NFR BLD AUTO: 8 % (ref 4–12)
NEUTROPHILS # BLD AUTO: 3.51 THOUSANDS/ΜL (ref 1.85–7.62)
NEUTS SEG NFR BLD AUTO: 62 % (ref 43–75)
NRBC BLD AUTO-RTO: 0 /100 WBCS
PLATELET # BLD AUTO: 226 THOUSANDS/UL (ref 149–390)
PMV BLD AUTO: 9.3 FL (ref 8.9–12.7)
POTASSIUM SERPL-SCNC: 4 MMOL/L (ref 3.5–5.3)
RBC # BLD AUTO: 3.76 MILLION/UL (ref 3.88–5.62)
SODIUM SERPL-SCNC: 143 MMOL/L (ref 136–145)
WBC # BLD AUTO: 5.66 THOUSAND/UL (ref 4.31–10.16)

## 2021-07-05 PROCEDURE — 99232 SBSQ HOSP IP/OBS MODERATE 35: CPT | Performed by: INTERNAL MEDICINE

## 2021-07-05 PROCEDURE — 97161 PT EVAL LOW COMPLEX 20 MIN: CPT

## 2021-07-05 PROCEDURE — 85025 COMPLETE CBC W/AUTO DIFF WBC: CPT | Performed by: PHYSICIAN ASSISTANT

## 2021-07-05 PROCEDURE — 80048 BASIC METABOLIC PNL TOTAL CA: CPT | Performed by: PHYSICIAN ASSISTANT

## 2021-07-05 RX ORDER — LISINOPRIL 10 MG/1
10 TABLET ORAL DAILY
Status: DISCONTINUED | OUTPATIENT
Start: 2021-07-06 | End: 2021-07-07 | Stop reason: HOSPADM

## 2021-07-05 RX ADMIN — TAMSULOSIN HYDROCHLORIDE 0.4 MG: 0.4 CAPSULE ORAL at 17:02

## 2021-07-05 RX ADMIN — LISINOPRIL 10 MG: 10 TABLET ORAL at 10:01

## 2021-07-05 RX ADMIN — ATORVASTATIN CALCIUM 20 MG: 20 TABLET, FILM COATED ORAL at 21:29

## 2021-07-05 RX ADMIN — ENOXAPARIN SODIUM 105 MG: 120 INJECTION SUBCUTANEOUS at 15:41

## 2021-07-05 NOTE — QUICK NOTE
Progress Note - Triage Asssessment   Jarrett Ervin 70 y o  male MRN: [id number]    Time Called ( Time): 2125  Date Called: 07/04/21  Room#: ED 9  Person requesting evaluation: Dr Urszula Callahan     Situation:    70 YOM with 1 day history of pain with deep inspiration  CTA PE study demonstrated multiple segmental branch pulmonary artery emboli with right heart strain, ratio 1 07   Critical care asked to evaluate patient for admission  Patient on room air saturating 98% with no complaints of shortness of breath stating he feels well except for pain with deep inspiration  Troponin and BNP negative  Case discussed with Dr Rebeca Watkins, patient OK for admission with SLIM              Triage Assessment:     Patient can be admitted to med-surg level of care    Recommendations discussed with Dr Rebeca Watkins, Dr Urszula Callahan, Chris Almanzar PA-C

## 2021-07-05 NOTE — OCCUPATIONAL THERAPY NOTE
OT EVALUATION       07/05/21 1010   Note Type   Note type Screen  (pt is independent, no skilled OT Needs )   Sada Horn MS OTR/L [id number]

## 2021-07-05 NOTE — PROGRESS NOTES
[address] Internal Medicine Progress Note  Patient: Tonja Hein 70 y o  male   MRN: [id number]  PCP: Rere Sheriff DO  Unit/Bed#: [address] [place] [address] Encounter: [id number]  Date Of Visit: 07/05/21    Problem List:    Principal Problem:    Pulmonary embolism ([facility] [address] )  Active Problems:    Benign essential hypertension    Mixed hyperlipidemia    Anemia    BMI 37 0-37 9, adult      Assessment & Plan:    * Pulmonary embolism [facility])  Assessment & Plan  Patient recently had bilateral inguinal hernia repair, uncomplicated recovery  He has been progressively more active since his surgery last week, but started a few days ago with L calf pain  Day prior to admission, he started experiencing R sided chest pain only intermittently and with deep inspiration and has been happening more frequently subsequently  CTA showed multiple segmental branch pulmonary artery emboli with right heart strain, RV/LV ratio 1 07 on CT - Pt evaluated by critical care and is appropriate for management on med/surg floor  Still symptomatic but hemodynamically stable  Saturating adequately on room air  Troponin, proBNP negative  Telemetry-unremarkable  Will discontinue  · Continue weight based dosing of Lovenox  · Echo in AM to further evaluate R heart strain  · Venous Doppler lower extremity  · Transition to rivaroxaban in a m  If continues to do well    Will verify coverage with case management  · Monitor symptoms  · Check B12 level  · Referral to Hematology as outpatient due to history of recurrent DVT    Benign essential hypertension  Assessment & Plan  Continue lisinopril 10 mg daily    Anemia  Assessment & Plan  Normocytic  Iron studies, B12, folate  Monitor    Mixed hyperlipidemia  Assessment & Plan  Continue lipitor 20 mg daily        VTE Pharmacologic Prophylaxis:   Pharmacologic: Enoxaparin (Lovenox), therapeutic  Mechanical VTE Prophylaxis in Place: Yes    Patient Centered Rounds: I have performed bedside rounds with nursing staff today     Discussions with Specialists or Other Care Team Provider:  Yes    Education and Discussions with Family / Patient:  Discussed with family at bedside    Time Spent for Care: 1 hour  More than 50% of total time spent on counseling and coordination of care as described above  Current Length of Stay: 0 day(s)    Current Patient Status: Inpatient   Certification Statement: The patient, admitted on an observation basis, will now require > 2 midnight hospital stay due to Bilateral PE with RV strain regarding further monitoring, echocardiogram, outpatient management decision    Discharge Plan:  Likely in a m  Code Status: Level 1 - Full Code      Subjective:   Reports ongoing right-sided pleuritic pain on deep inspiration, more or less unchanged compared to yesterday  Denies any shortness of breath or dizziness  Reports that he ambulated in the hallway without any significant limiting symptoms  Vital signs remains stable      HPI reviewed, recent surgical intervention and brief hospitalization noted    Presented with right-sided pleuritic chest pain for 2 days, you also noted left calf pain few days ago    Patient reported history of multiple surgical intervention in the past and had history of DVT with after 1 of the surgery  Was treated with Coumadin at the time    Treatment plan anticoagulation options were discussed  Pros and cons of Coumadin and NOACs were discussed  Patient reported he prefers once a day treatment opted for Xarelto after reviewing the details    Also reported history of DVT in other family members      Objective:     Vitals:   Temp (24hrs), Av 2 °F (36 8 °C), Min:97 8 °F (36 6 °C), Max:98 6 °F (37 °C)    Temp:  [97 8 °F (36 6 °C)-98 6 °F (37 °C)] 98 1 °F (36 7 °C)  HR:  [70-81] 79  Resp:  [16-20] 18  BP: (130-159)/(70-83) 130/70  SpO2:  [93 %-97 %] 95 %  Body mass index is 36 99 kg/m²  Input and Output Summary (last 24 hours):        Intake/Output Summary (Last 24 hours) at 7/5/2021 2006  Last data filed at 7/5/2021 0836  Gross per 24 hour   Intake 730 ml   Output 1125 ml   Net -395 ml       Physical Exam:     Physical Exam  Constitutional:       General: He is not in acute distress  Appearance: He is obese  HENT:      Head: Normocephalic and atraumatic  Cardiovascular:      Rate and Rhythm: Normal rate  Pulmonary:      Effort: Pulmonary effort is normal  No respiratory distress  Breath sounds: No wheezing, rhonchi or rales  Chest:      Chest wall: No tenderness  Abdominal:      General: Bowel sounds are normal  There is no distension  Palpations: Abdomen is soft  Tenderness: There is no abdominal tenderness  There is no guarding or rebound  Comments: Laparoscopic port incision site C/D/I, no ecchymosis or hematoma   Musculoskeletal:      Cervical back: Normal range of motion and neck supple  Right lower leg: No edema  Left lower leg: Edema (Trace) present  Skin:     General: Skin is warm and dry  Findings: No rash  Neurological:      General: No focal deficit present  Mental Status: He is alert and oriented to person, place, and time  Mental status is at baseline  Cranial Nerves: No cranial nerve deficit  Psychiatric:         Mood and Affect: Mood normal          Behavior: Behavior normal          Thought Content:  Thought content normal          Additional Data:     Labs:    Results from last 7 days   Lab Units 07/05/21  0704   WBC Thousand/uL 5 66   HEMOGLOBIN g/dL 10 3*   HEMATOCRIT % 34 1*   PLATELETS Thousands/uL 226   NEUTROS PCT % 62   LYMPHS PCT % 23   MONOS PCT % 8   EOS PCT % 5     Results from last 7 days   Lab Units 07/05/21  0704 07/04/21  1858   POTASSIUM mmol/L 4 0 3 9   CHLORIDE mmol/L 107 106   CO2 mmol/L 31 30   BUN mg/dL 21 18   CREATININE mg/dL 1 06 1 02   CALCIUM mg/dL 8 8 8 0*   ALK PHOS U/L  --  72   ALT U/L  --  25   AST U/L  --  18     Results from last 7 days   Lab Units 07/04/21  1858   INR 0 98       * I Have Reviewed All Lab Data Listed Above  * Additional Pertinent Lab Tests Reviewed: All Labs Within Last 24 Hours Reviewed      Imaging:  Imaging Reports Reviewed Today Include:  CT scan  Recent Cultures (last 7 days):     Results from last 7 days   Lab Units 07/04/21  3408   BLOOD CULTURE  Received in Microbiology Lab  Culture in Progress  Received in Microbiology Lab  Culture in Progress  Last 24 Hours Medication List:   Current Facility-Administered Medications   Medication Dose Route Frequency Provider Last Rate    acetaminophen  650 mg Oral Q4H PRN Rebeka Anderson PA-C      atorvastatin  20 mg Oral Daily Rebeka Anderson PA-C      enoxaparin  1 mg/kg Subcutaneous Q12H Peggy Hood MD      [START ON 7/6/2021] lisinopril  10 mg Oral Daily Franci Chambers MD      tamsulosin  0 4 mg Oral Daily With Dinner Rebeka Anderson PA-C            Today, Patient Was Seen By: Franci Chambers MD    ** Please Note: "This note has been constructed using a voice recognition system  Therefore there may be syntax, spelling, and/or grammatical errors   Please call if you have any questions  "**

## 2021-07-05 NOTE — UTILIZATION REVIEW
Initial Clinical Review    Admission: Date/Time/Statement:   Admission Orders (From admission, onward)     Ordered        07/04/21 2130  Place in Observation  Once                Orders Placed This Encounter   Procedures    Place in Observation     Standing Status:   Standing     Number of Occurrences:   1     Order Specific Question:   Level of Care     Answer:   Med Surg [16]     ED Arrival Information     Expected Arrival Acuity    - 7/4/2021 18:20 Urgent    Service Admission type    Hospitalist Urgent    Arrival complaint    chest pain     Chief Complaint   Patient presents with    Pain With Breathing     Patient c/o right sided chest pain with breathing starting last night  States he had pneumonia before "and this is what it felt like "      Initial Presentation:   69 y/o male with PMHx HTN, HLD, DVT, and 9 days post op Lap Bilateral Inguinal Hernia Repair on 6/25/21 with post op urinary retention  Presented urgently to [facility] ED on 7/4/21 2nd few day history of sharp "cramping type" pain in his left calf with some swelling AND 1 day history of worsening sharp right-sided chest pain only with deep breath  In ED - HR 92  / 82  Rm Air SpO2 97 %   Exam +  LLE Edema, TTP left medial lower leg  CT showed multiple segmental branch pulmonary artery emboli with right heart strain     Labs: CBC + BMP wnl  INR 0 98  ED Tx:  Lovenox 105 mg  Placed in Observation 7/4/21 at 2130 2nd multiple Pulmonary Emboli with Rt Heart Strain - Lovenox, ECHO, PT + OT Evals    ED Triage Vitals [07/04/21 1832]   Temperature Pulse Respirations Blood Pressure SpO2   (!) 97 2 °F (36 2 °C) 92 16 (!) 175/82 97 %      Temp Source Heart Rate Source Patient Position - Orthostatic VS BP Location FiO2 (%)   Tympanic Monitor Lying Right arm --      Pain Score       Worst Possible Pain          Wt Readings from Last 1 Encounters:   07/04/21 107 kg (236 lb 3 2 oz)     Additional Vital Signs:   07/05/21 11:48:35  98 2 °F (36 8 °C) 75  20  135/73  94  95 %  --  --   07/05/21 0835  98 6 °F (37 °C)  80  20  141/72  95  95 %  None (Room air)  Sitting   07/04/21 22:17:11  98 1 °F (36 7 °C)  81  18  155/83  107  96 %  None (Room air)  Lying   07/04/21 2043  --  70  16  159/77  110  97 %  None (Room air)  Lying     I/O 07/04 0701 07/05 0700 07/05 0701 07/06 0700   P  O  480 240   I V  (mL/kg) 10 (0 1)    Total Intake(mL/kg) 490 (4 6) 240 (2 2)   Urine (mL/kg/hr) 825 300 (0 4)   Total Output 825 300   Net -335 -60     Pertinent Labs/Diagnostic Test Results:     Results from last 7 days   Lab Units 07/05/21  0704 07/04/21  1858   WBC Thousand/uL 5 66 6 99   HEMOGLOBIN g/dL 10 3* 11 2*   HEMATOCRIT % 34 1* 36 6   PLATELETS Thousands/uL 226 251   NEUTROS ABS Thousands/µL 3 51 4 57       Results from last 7 days   Lab Units 07/05/21  0704 07/04/21  1858   SODIUM mmol/L 143 141   POTASSIUM mmol/L 4 0 3 9   CHLORIDE mmol/L 107 106   CO2 mmol/L 31 30   ANION GAP mmol/L 5 5   BUN mg/dL 21 18   CREATININE mg/dL 1 06 1 02   EGFR ml/min/1 73sq m 70 74   CALCIUM mg/dL 8 8 8 0*     Results from last 7 days   Lab Units 07/04/21  1858   AST U/L 18   ALT U/L 25   ALK PHOS U/L 72   TOTAL PROTEIN g/dL 7 0   ALBUMIN g/dL 3 0*   TOTAL BILIRUBIN mg/dL 0 30     Results from last 7 days   Lab Units 07/05/21  0704 07/04/21  1858   GLUCOSE RANDOM mg/dL 96 104     Results from last 7 days   Lab Units 07/04/21  1858   TROPONIN I ng/mL <0 02       Results from last 7 days   Lab Units 07/04/21  1858   PROTIME seconds 12 9   INR  0 98   PTT seconds 30     Results from last 7 days   Lab Units 07/04/21  1858   LACTIC ACID mmol/L 1 0     Results from last 7 days   Lab Units 07/04/21  1858   NT-PRO BNP pg/mL 34     Results from last 7 days   Lab Units 07/04/21  2234   CLARITY UA  Clear   COLOR UA  Yellow   SPEC GRAV UA  1 020   PH UA  5 5   GLUCOSE UA mg/dl Negative   KETONES UA mg/dl Negative   BLOOD UA  Trace-Intact*   PROTEIN UA mg/dl Negative   NITRITE UA  Negative   BILIRUBIN UA  Negative   UROBILINOGEN UA E U /dl 0 2   LEUKOCYTES UA  Negative   WBC UA /hpf 0-1   RBC UA /hpf 1-2   BACTERIA UA /hpf Occasional   EPITHELIAL CELLS WET PREP /hpf Occasional     Results from last 7 days   Lab Units 07/04/21  1858   BLOOD CULTURE  Received in Microbiology Lab  Culture in Progress  Received in Microbiology Lab  Culture in Progress  CTA - CHEST WITH IV CONTRAST - PULMONARY ANGIOGRAM  Multiple segmental branch pulmonary artery emboli with right heart strain  The calculated ratio of right ventricular to left ventricular diameter (RV/LV ratio) is 1 07   This is greater than 0 9, which is abnormal and indicates right heart strain  An abnormal RV/LV ratio has been shown to be associated with an increased risk of 30 day mortality in the setting of acute pulmonary embolism   Urgent consultation with the medical critical care team is recommended       ED Treatment:   Medication Administration from 07/04/2021 1820 to 07/04/2021 2152       Date/Time Order Dose Route Action     07/04/2021 2003 iohexol (OMNIPAQUE) 350 MG/ML injection (SINGLE-DOSE) 100 mL 100 mL Intravenous Given     07/04/2021 2123 enoxaparin (LOVENOX) subcutaneous injection 105 mg 105 mg Subcutaneous Given     Past Medical History:   Diagnosis Date    AC (acromioclavicular) joint bone spurs     Last assessed - 2/19/15    Achilles tendinitis, unspecified leg 06/04/2010    Resolved - 1/8/18    Anemia 08/12/2010    Resolved - 1/11/16    Blood in urine     BPH without urinary obstruction     Hypertrophy    Deep venous thrombosis of distal lower extremity (HCC)     Last assessed - 6/4/15    Heartburn     Last assessed - 12/12/14    Hemorrhoids     Hernia, inguinal 06/30/2009    Resolved - 1/8/18    History of stomach ulcers     Hypertension     Mixed hyperlipidemia     Nephrolithiasis     Right inguinal hernia     Last assessed - 4/4/16    Sleep apnea     unable to tolerate c-pap    Tear of left rotator cuff     Last assessed - 2/19/15     Present on Admission:   Mixed hyperlipidemia   Benign essential hypertension   Pulmonary embolism (HCC)    Admitting Diagnosis: Pleuritic chest pain [R07 81]  Multiple pulmonary emboli (HCC) [I26 99]  Chest pain varying with breathing [R07 1]    Age/Sex: 70 y o  male    Admission Orders:  Telemetry  VS q4hrs  Nasal O2 at 2 L/min - Titrate SpO2 > 92 %  Continuous Pulse Oximetry  Up + OOB as tolerated  Diet Regular: House  I+O q shift  ECHO  PT + OT Evals    Scheduled Medications:  atorvastatin, 20 mg, Oral, Daily  enoxaparin, 1 mg/kg, Subcutaneous, Q24H JESIKA  lisinopril, 10 mg, Oral, Daily  tamsulosin, 0 4 mg, Oral, Daily With Dinner     PRN Meds:  acetaminophen, 650 mg, Oral, Q4H PRN    Network Utilization Review Department  ATTENTION: Please call with any questions or concerns to [phone] and carefully listen to the prompts so that you are directed to the right person  All voicemails are confidential   Jake Rangel all requests for admission clinical reviews, approved or denied determinations and any other requests to dedicated fax number below belonging to the campus where the patient is receiving treatment   List of dedicated fax numbers for the Facilities:  [address] DENIALS (Administrative/Medical Necessity) [phone]   [address] (Maternity/NICU/Pediatrics) [phone]   [address] [place] [phone] [place] [address] [address] [facility] [phone]

## 2021-07-05 NOTE — PHYSICAL THERAPY NOTE
PT EVALUATION  Pt remains at his baseline level of function, no skilled PT or OT needs  Recommend pt ambulate ad shemar      07/05/21 0850   Note Type   Note type Evaluation   Pain Assessment   Pain Assessment Tool Pain Assessment not indicated - pt denies pain   Home Living   Type of [address] One level  (3 KEVIN)   Home Equipment Cane;Walker   Prior Function   Level of [place] Independent with ADLs and functional mobility  (ambulates without device)   Lives With Spouse   ADL Assistance Independent   General   Additional Pertinent History Pt admitted with PE  Pt had B inguinal hernia repairs several weeks ago and pt reports general inactivity since that time  Pt reports onset of L calf pain and chest pain upon deep inspiration prior to hospital admission  Cognition   Overall Cognitive Status WFL   RUE Assessment   RUE Assessment WFL   LUE Assessment   LUE Assessment WFL   RLE Assessment   RLE Assessment WFL  (limited knee flexion)   LLE Assessment   LLE Assessment WFL  (limited knee flexion)   Bed Mobility   Supine to Sit 7  Independent   Sit to Supine 7  Independent   Transfers   Sit to Stand 7  Independent   Stand to Sit 7  Independent   Stand pivot 7  Independent   Ambulation/Elevation   Gait pattern   (wide gladis, increased lateral trunk lean - both chronic)   Gait Assistance 7  Independent   Distance 150 feet;  sp02 and HR stable with activity   Balance   Static Sitting Good   Dynamic Sitting Good   Static Standing Good   Dynamic Standing Good   Ambulatory Good   Activity Tolerance   Activity Tolerance Patient tolerated treatment well  (c/o mild L calf pain after walking)   Assessment   Prognosis Good   Problem List   (none)   Assessment Patient seen for Physical Therapy evaluation  Patient admitted with Pulmonary embolism ([facility] [address] )  Comorbidities affecting patient's physical performance include: B TKA, B ANDRIY, htn    Personal factors affecting patient at time of initial evaluation include: stairs to enter home  Prior to admission, patient was independent with functional mobility without assistive device and independent with ADLS  Please find objective findings from Physical Therapy assessment regarding body systems outlined above with impairments and limitations including none  The Barthel Index was used as a functional outcome tool presenting with a score of 100 today indicating no limitations of functional mobility and ADLS  Patient's clinical presentation is currently stable  as seen in patient's presentation of baseline  As demonstrated by objective findings, the assigned level of complexity for this evaluation is low  The patient's AM-PAC Basic Mobility Inpatient Short Form Raw Score is 24, Standardized Score is 57 68  A standardized score greater than42 9 suggests the patient may benefit from discharge to home     Plan   Treatment/Interventions   (recommend pt ambulate ad shemar)   Recommendation   PT Discharge Recommendation No rehabilitation needs   AM-PAC Basic Mobility Inpatient   Turning in Bed Without Bedrails 4   Lying on Back to Sitting on Edge of Flat Bed 4   Moving Bed to Chair 4   Standing Up From Chair 4   Walk in Room 4   Climb 3-5 Stairs 4   Basic Mobility Inpatient Raw Score 24   Basic Mobility Standardized Score 57 68   Barthel Index   Feeding 10   Bathing 5   Grooming Score 5   Dressing Score 10   Bladder Score 10   Bowels Score 10   Toilet Use Score 10   Transfers (Bed/Chair) Score 15   Mobility (Level Surface) Score 15   Stairs Score 10   Barthel Index Score 445   Licensure   [place] License Number  Julien YESSY [id number]

## 2021-07-05 NOTE — PLAN OF CARE
Problem: Potential for Falls  Goal: Patient will remain free of falls  Description: INTERVENTIONS:  - Educate patient/family on patient safety including physical limitations  - Instruct patient to call for assistance with activity   - Consult OT/PT to assist with strengthening/mobility   - Keep Call bell within reach  - Keep bed low and locked with side rails adjusted as appropriate  - Keep care items and personal belongings within reach  - Initiate and maintain comfort rounds  - Make Fall Risk Sign visible to staff  - Offer Toileting every 2  Hours, in advance of need    - Obtain necessary fall risk management equipment:  - Apply yellow socks and bracelet for high fall risk patients  - Consider moving patient to room near nurses station  Outcome: Progressing

## 2021-07-05 NOTE — H&P
Beth 65 1950, 70 y o  male MRN: [id number]  Unit/Bed#: [address] [place] [address]- Encounter: [id number]  Primary Care Provider: Brett Boudreaux DO   Date and time admitted to hospital: 7/4/2021  6:31 PM    * Pulmonary embolism [facility])  Assessment & Plan  Patient recently had bilateral inguinal hernia repair, uncomplicated recovery  He has been progressively more active since his surgery last week, but started a few days ago with L calf pain  Yesterday he started experiencing R sided chest pain only intermittently and with deep inspiration and has been happening more frequently today  - CTA showed multiple segmental branch pulmonary artery emboli with right heart strain, RV/LV ratio 1 07 on CT  - Pt evaluated by critical care and is appropriate for management on med/surg floor  - Continue weight based dosing of Lovenox  - Echo in AM to further evaluate R heart strain  - Monitor on telemetry  - Continuous pulse oximetry  - Cardiology consult appreciated     Benign essential hypertension  Assessment & Plan  Continue lisinopril 10 mg daily    Mixed hyperlipidemia  Assessment & Plan  Continue lipitor 20 mg daily      VTE Prophylaxis: Enoxaparin (Lovenox)  / sequential compression device   Code Status: level 1  POLST: POLST is not applicable to this patient  Discussion with family: No    Anticipated Length of Stay:  Patient will be admitted on an Observation basis with an anticipated length of stay of  < 2 midnights  Justification for Hospital Stay: pulmonary embolism     Total Time for Visit, including Counseling / Coordination of Care: 30 minutes  Greater than 50% of this total time spent on direct patient counseling and coordination of care  Chief Complaint:   Chest pain    History of Present Illness:    Salud Howard is a 70 y o  male who presents with PMH of HTN, HLD, DVT, and recent bilateral inguinal hernia repair last week   He had some urinary retention after the procedure but has not been having any urinary symptoms since then and states that he is recovering well from his surgery  He denies any pain and has been slowly increasing his activity and has been up and walking more in the past few days  A few days ago he noticed a sharp/cramping type pain in his left calf and noticed some swelling  Yesterday he noticed a sharp R-sided chest pain only with deep breaths which progressively got worse over the past day and he is now noticing it more frequently  He denies any fevers, chills, SOB, palpitations  He states he had a DVT years ago and believes it was also shortly after a surgical procedure  CT PE study in the ED showed multiple segmental branch pulmonary artery emboli with right heart strain  Due to the finding of right heart strain with RV/LV ratio > 0 9, critical care evaluated the patient and after discussion patient was deemed appropriate for management on a med/surg floor  Review of Systems:    Review of Systems   Constitutional: Negative for chills, fatigue and fever  HENT: Negative for congestion, rhinorrhea and sore throat  Eyes: Negative for visual disturbance  Respiratory: Negative for cough, shortness of breath and wheezing  Cardiovascular: Positive for chest pain and leg swelling (LLE)  Negative for palpitations  Gastrointestinal: Negative for abdominal distention, abdominal pain, diarrhea, nausea and vomiting  Genitourinary: Negative for dysuria, frequency, hematuria and urgency  Musculoskeletal: Negative for gait problem and myalgias  Skin: Negative for rash and wound  Neurological: Negative for dizziness, weakness, light-headedness, numbness and headaches          Past Medical and Surgical History:     Past Medical History:   Diagnosis Date    AC (acromioclavicular) joint bone spurs     Last assessed - 2/19/15    Achilles tendinitis, unspecified leg 06/04/2010    Resolved - 1/8/18    Anemia 08/12/2010    Resolved - 1/11/16    Blood in urine     BPH without urinary obstruction     Hypertrophy    Deep venous thrombosis of distal lower extremity (HCC)     Last assessed - 6/4/15    Heartburn     Last assessed - 12/12/14    Hemorrhoids     Hernia, inguinal 06/30/2009    Resolved - 1/8/18    History of stomach ulcers     Hypertension     Mixed hyperlipidemia     Nephrolithiasis     Right inguinal hernia     Last assessed - 4/4/16    Sleep apnea     unable to tolerate c-pap    Tear of left rotator cuff     Last assessed - 2/19/15       Past Surgical History:   Procedure Laterality Date    BARIATRIC SURGERY  06/15/2015    Laparoscopic Longitudinal Gastrectomy for morbid obesity, Managed by - Blank Sims     EXPLORATORY LAPAROTOMY W/ BOWEL RESECTION Right 3/3/2016    Procedure: Incarcerated possibly strangulated right inguinal hernia; Surgeon: Moody Bass MD;  Location: [address] [place] [address];  Service:    [address] HTI SURGERY      2011 - Rt Hip Replacement, 2012 - Lt Hip Replacement, 2013 - Lt Hip Revision    INGUINAL HERNIA REPAIR      Last assessed - 4/4/16    JOINT REPLACEMENT      KNEE SURGERY  2004    Double     OR COLONOSCOPY FLX DX W/COLLJ SPEC WHEN PFRMD N/A 1/15/2016    Procedure: COLONOSCOPY;  Surgeon: Debbie Aparicio MD;  Location: [place] [address] GI LAB; Service: Gastroenterology    OR COLONOSCOPY FLX DX W/COLLJ SPEC WHEN PFRMD N/A 2/26/2019    Procedure: COLONOSCOPY;  Surgeon: Debbie Aparicio MD;  Location: [place] [address] GI LAB; Service: Gastroenterology    OR [address] Bilateral 6/25/2021    Procedure: REPAIR HERNIA INGUINAL, LAPAROSCOPIC;  Surgeon: Marcela Banks MD;  Location: [address] [place] [address];  Service: General    OR RECONSTR TOTAL SHOULDER IMPLANT Right 2/4/2021    Procedure: ARTHROPLASTY SHOULDER REVERSE (RIGHT);   Surgeon: Serena Baugh MD;  Location: [place] [facility];  Service: Orthopedics    TONSILLECTOMY         Meds/Allergies:    Prior to Admission medications    Medication Sig Start Date End Date Taking? Authorizing Provider   acetaminophen (TYLENOL) 500 mg tablet Take 500 mg by mouth   Yes Historical Provider, MD   atorvastatin (LIPITOR) 20 mg tablet TAKE ONE TABLET BY MOUTH EVERY DAY 3/25/21  Yes Adeel Flurry, DO   lisinopril (ZESTRIL) 10 mg tablet TAKE ONE TABLET BY MOUTH EVERY DAY 3/25/21  Yes Adeel Flurry, DO   tamsulosin (FLOMAX) 0 4 mg Take 1 capsule (0 4 mg total) by mouth daily with dinner 6/26/21  Yes Arnulfo Fowler MD   Calcium Carbonate (CALCIUM 600) 1500 (600 Ca) MG TABS Take by mouth daily   Patient not taking: Reported on 6/25/2021    Historical Provider, MD   Calcium Polycarbophil (FIBER-CAPS PO) Take by mouth  Patient not taking: Reported on 6/25/2021    Historical Provider, MD   Cholecalciferol (VITAMIN D3) 2000 units TABS Take by mouth daily   Patient not taking: Reported on 6/25/2021    Historical Provider, MD   Cyanocobalamin (VITAMIN B-12) 5000 MCG LOZG Place under the tongue  Patient not taking: Reported on 6/25/2021    Historical Provider, MD   diphenhydrAMINE-acetaminophen (Tylenol PM Extra Strength)  MG TABS Take 1 tablet by mouth  Patient not taking: Reported on 6/25/2021    Historical Provider, MD   HYDROcodone-acetaminophen (NORCO) 5-325 mg per tablet Take 1 tablet by mouth every 6 (six) hours as needed for pain for up to 10 dosesMax Daily Amount: 4 tablets  Patient not taking: Reported on 6/25/2021 6/25/21   Go Camacho MD   Multiple Vitamins-Minerals (MULTIVITAMIN ADULT EXTRA C) CHEW Chew  Patient not taking: Reported on 6/25/2021    Historical Provider, MD   NON FORMULARY Liquid Multi vitamin  Patient not taking: Reported on 6/25/2021    Historical Provider, MD       Allergies:    Allergies   Allergen Reactions    Percocet [Oxycodone-Acetaminophen] Hallucinations    Percolone [Oxycodone] Hallucinations       Social History:    Social History     Substance and Sexual Activity   Alcohol Use Yes    Comment: Occ, very rarely      Social History     Tobacco Use   Smoking Status Former Smoker    Quit date: 1994    Years since quittin 2   Smokeless Tobacco Never Used     Social History     Substance and Sexual Activity   Drug Use Never       Family History:    Family History   Problem Relation Age of Onset    Arthritis Father     Diabetes Father     Hypertension Father     Hyperlipidemia Father     Diabetes Family     Emphysema Family     Heart disease Family     Hypertension Family     Mental illness Neg Hx        Physical Exam:     Vitals:   Blood Pressure: 155/83 (21)  Pulse: 81 (21)  Temperature: 98 1 °F (36 7 °C) (21)  Temp Source: Oral (21)  Respirations: 18 (21)  Height: 5' 7" (170 2 cm) (21)  Weight - Scale: 107 kg (236 lb 3 2 oz) (21)  SpO2: 96 % (21)    Physical Exam  Vitals reviewed  Constitutional:       General: He is not in acute distress  Appearance: He is well-developed  He is not ill-appearing  HENT:      Head: Normocephalic and atraumatic  Cardiovascular:      Rate and Rhythm: Normal rate and regular rhythm  Heart sounds: Normal heart sounds  No murmur heard  No gallop  Pulmonary:      Effort: Pulmonary effort is normal  No respiratory distress  Breath sounds: Normal breath sounds  No wheezing, rhonchi or rales  Abdominal:      General: Bowel sounds are normal  There is no distension  Palpations: Abdomen is soft  Tenderness: There is no abdominal tenderness  There is no guarding  Musculoskeletal:         General: No tenderness  Left lower leg: Edema (LLE> RLE, TTP left medial lower leg, (-) Ame's) present  Skin:     General: Skin is warm and dry  Findings: No erythema or rash  Neurological:      Mental Status: He is alert and oriented to person, place, and time     Psychiatric:         Mood and Affect: Mood normal          Behavior: Behavior normal            Additional Data:     Lab Results: I have personally reviewed pertinent reports  Results from last 7 days   Lab Units 07/04/21  1858   WBC Thousand/uL 6 99   HEMOGLOBIN g/dL 11 2*   HEMATOCRIT % 36 6   PLATELETS Thousands/uL 251   NEUTROS PCT % 65   LYMPHS PCT % 22   MONOS PCT % 8   EOS PCT % 5     Results from last 7 days   Lab Units 07/04/21  1858   SODIUM mmol/L 141   POTASSIUM mmol/L 3 9   CHLORIDE mmol/L 106   CO2 mmol/L 30   BUN mg/dL 18   CREATININE mg/dL 1 02   ANION GAP mmol/L 5   CALCIUM mg/dL 8 0*   ALBUMIN g/dL 3 0*   TOTAL BILIRUBIN mg/dL 0 30   ALK PHOS U/L 72   ALT U/L 25   AST U/L 18   GLUCOSE RANDOM mg/dL 104     Results from last 7 days   Lab Units 07/04/21  1858   INR  0 98             Results from last 7 days   Lab Units 07/04/21  1858   LACTIC ACID mmol/L 1 0       Imaging: I have personally reviewed pertinent reports  CTA ED chest PE study   Final Result by Duke Guerin DO (07/04 2050)      Multiple segmental branch pulmonary artery emboli with right heart strain  The calculated ratio of right ventricular to left ventricular diameter (RV/LV ratio) is 1 07      This is greater than 0 9, which is abnormal and indicates right heart strain  An abnormal RV/LV ratio has been shown to be associated with an increased risk of 30 day mortality in the setting of acute pulmonary embolism  Urgent consultation with the    medical critical care team is recommended  I personally discussed this study with Dilma Hernandez on 3/4/3124 at 6:90 PM                Workstation performed: [id number]         VAS reflux lower limb venous duplex study with reflux assessment, complete bilateral    (Results Pending)       EKG, Pathology, and Other Studies Reviewed on Admission:   · EKG: sinus with 1st degree AVB, LBBB, similar to previous EKG    Allscripts / Epic Records Reviewed: Yes     ** Please Note: This note has been constructed using a voice recognition system   **

## 2021-07-06 ENCOUNTER — APPOINTMENT (INPATIENT)
Dept: RADIOLOGY | Facility: HOSPITAL | Age: 71
DRG: 176 | End: 2021-07-06
Attending: INTERNAL MEDICINE
Payer: COMMERCIAL

## 2021-07-06 ENCOUNTER — APPOINTMENT (INPATIENT)
Dept: NON INVASIVE DIAGNOSTICS | Facility: HOSPITAL | Age: 71
DRG: 176 | End: 2021-07-06
Payer: COMMERCIAL

## 2021-07-06 ENCOUNTER — TELEPHONE (OUTPATIENT)
Dept: SURGERY | Facility: CLINIC | Age: 71
End: 2021-07-06

## 2021-07-06 LAB
ANION GAP SERPL CALCULATED.3IONS-SCNC: 9 MMOL/L (ref 4–13)
ATRIAL RATE: 93 BPM
BUN SERPL-MCNC: 17 MG/DL (ref 5–25)
CALCIUM SERPL-MCNC: 8.7 MG/DL (ref 8.3–10.1)
CHLORIDE SERPL-SCNC: 105 MMOL/L (ref 100–108)
CO2 SERPL-SCNC: 27 MMOL/L (ref 21–32)
CREAT SERPL-MCNC: 1.04 MG/DL (ref 0.6–1.3)
ERYTHROCYTE [DISTWIDTH] IN BLOOD BY AUTOMATED COUNT: 14.7 % (ref 11.6–15.1)
FERRITIN SERPL-MCNC: 248 NG/ML (ref 8–388)
FOLATE SERPL-MCNC: 6.9 NG/ML (ref 3.1–17.5)
GFR SERPL CREATININE-BSD FRML MDRD: 72 ML/MIN/1.73SQ M
GLUCOSE SERPL-MCNC: 132 MG/DL (ref 65–140)
HCT VFR BLD AUTO: 35.6 % (ref 36.5–49.3)
HGB BLD-MCNC: 10.8 G/DL (ref 12–17)
IRON SATN MFR SERPL: 18 %
IRON SERPL-MCNC: 47 UG/DL (ref 65–175)
MCH RBC QN AUTO: 27.6 PG (ref 26.8–34.3)
MCHC RBC AUTO-ENTMCNC: 30.3 G/DL (ref 31.4–37.4)
MCV RBC AUTO: 91 FL (ref 82–98)
P AXIS: 71 DEGREES
PLATELET # BLD AUTO: 247 THOUSANDS/UL (ref 149–390)
PMV BLD AUTO: 9.6 FL (ref 8.9–12.7)
POTASSIUM SERPL-SCNC: 3.8 MMOL/L (ref 3.5–5.3)
PR INTERVAL: 222 MS
QRS AXIS: -39 DEGREES
QRSD INTERVAL: 128 MS
QT INTERVAL: 362 MS
QTC INTERVAL: 450 MS
RBC # BLD AUTO: 3.91 MILLION/UL (ref 3.88–5.62)
SODIUM SERPL-SCNC: 141 MMOL/L (ref 136–145)
T WAVE AXIS: 94 DEGREES
TIBC SERPL-MCNC: 260 UG/DL (ref 250–450)
VENTRICULAR RATE: 93 BPM
VIT B12 SERPL-MCNC: 708 PG/ML (ref 100–900)
WBC # BLD AUTO: 5.55 THOUSAND/UL (ref 4.31–10.16)

## 2021-07-06 PROCEDURE — 82607 VITAMIN B-12: CPT | Performed by: INTERNAL MEDICINE

## 2021-07-06 PROCEDURE — 85027 COMPLETE CBC AUTOMATED: CPT | Performed by: INTERNAL MEDICINE

## 2021-07-06 PROCEDURE — 93306 TTE W/DOPPLER COMPLETE: CPT | Performed by: INTERNAL MEDICINE

## 2021-07-06 PROCEDURE — 82746 ASSAY OF FOLIC ACID SERUM: CPT | Performed by: INTERNAL MEDICINE

## 2021-07-06 PROCEDURE — 83540 ASSAY OF IRON: CPT | Performed by: INTERNAL MEDICINE

## 2021-07-06 PROCEDURE — 80048 BASIC METABOLIC PNL TOTAL CA: CPT | Performed by: INTERNAL MEDICINE

## 2021-07-06 PROCEDURE — 93306 TTE W/DOPPLER COMPLETE: CPT

## 2021-07-06 PROCEDURE — 82728 ASSAY OF FERRITIN: CPT | Performed by: INTERNAL MEDICINE

## 2021-07-06 PROCEDURE — 83550 IRON BINDING TEST: CPT | Performed by: INTERNAL MEDICINE

## 2021-07-06 PROCEDURE — 99233 SBSQ HOSP IP/OBS HIGH 50: CPT | Performed by: INTERNAL MEDICINE

## 2021-07-06 PROCEDURE — 93010 ELECTROCARDIOGRAM REPORT: CPT | Performed by: INTERNAL MEDICINE

## 2021-07-06 RX ADMIN — ATORVASTATIN CALCIUM 20 MG: 20 TABLET, FILM COATED ORAL at 21:04

## 2021-07-06 RX ADMIN — ENOXAPARIN SODIUM 105 MG: 120 INJECTION SUBCUTANEOUS at 06:18

## 2021-07-06 RX ADMIN — ENOXAPARIN SODIUM 105 MG: 120 INJECTION SUBCUTANEOUS at 16:38

## 2021-07-06 RX ADMIN — TAMSULOSIN HYDROCHLORIDE 0.4 MG: 0.4 CAPSULE ORAL at 16:38

## 2021-07-06 NOTE — PROGRESS NOTES
Dean 45  Progress Note - Leroy Smith 1950, 70 y o  male MRN: [id number]  Unit/Bed#: [address] 405-01 Encounter: [id number]  Primary Care Provider: Sumaya Gaxiola DO   Date and time admitted to hospital: 7/4/2021  6:31 PM    Anemia  Assessment & Plan  Normocytic  Iron studies, B12, folate  Monitor    BMI 37 0-37 9, adult  Assessment & Plan  counselled on diet and exercise  Mixed hyperlipidemia  Assessment & Plan  Continue lipitor 20 mg daily    Benign essential hypertension  Assessment & Plan  Continue lisinopril 10 mg daily    * Pulmonary embolism [facility])  Assessment & Plan  Patient recently had bilateral inguinal hernia repair, uncomplicated recovery  He has been progressively more active since his surgery last week, but started a few days ago with L calf pain  Day prior to admission, he started experiencing R sided chest pain only intermittently and with deep inspiration and has been happening more frequently subsequently  CTA showed multiple segmental branch pulmonary artery emboli with right heart strain, RV/LV ratio 1 07 on CT - Pt evaluated by critical care and is appropriate for management on med/surg floor  Still symptomatic but hemodynamically stable  Saturating adequately on room air  Troponin, proBNP negative  Telemetry-unremarkable  Will discontinue  · Continue weight based dosing of Lovenox  · Echo did not show any right heart strain  · Venous Doppler lower extremity  · Transition to rivaroxaban in a m  If continues to do well  Will verify coverage with case management  · Monitor symptoms  · B12 level - wnl  · Referral to Hematology as outpatient due to history of recurrent DVT      Subjective:   I have seen and Examined the patient at the bedside  C/o Pleuritic Chest pain, Improving  No fevers or chills, No nausea or vomiting  Overnight events reviewed with the RN  No Other complains       Review of System:   Denies any Cough or Cold  Denies any Fevers or chills  Denies any focal tingling numbness or weakness in any extremities  Denies any abdominal pain, Nausea or vomiting  Objective:   Temp (24hrs), Av 1 °F (36 7 °C), Min:97 8 °F (36 6 °C), Max:98 2 °F (36 8 °C)    Temp:  [97 8 °F (36 6 °C)-98 2 °F (36 8 °C)] 97 8 °F (36 6 °C)  HR:  [72-85] 79  Resp:  [18] 18  BP: (124-136)/(61-75) 136/75  SpO2:  [93 %-95 %] 93 %  Body mass index is 36 99 kg/m²  Input and Output Summary (last 24 hours): Intake/Output Summary (Last 24 hours) at 2021 1839  Last data filed at 2021 0600  Gross per 24 hour   Intake 490 ml   Output 500 ml   Net -10 ml     I/O       701 -  07 -  07 -  0700    P  O  480 720     I V  (mL/kg) 10 (0 1) 10 (0 1)     Total Intake(mL/kg) 490 (4 6) 730 (6 8)     Urine (mL/kg/hr) 825 800 (0 3)     Stool  0     Total Output 825 800     Net -335 -70            Unmeasured Urine Occurrence  2 x     Unmeasured Stool Occurrence  1 x         Physical Exam:   General : Alert, Awake and oriented x 2-3, NAD  Neck : Supple  Eyes:  DIONTE, EOMI  CVS : S1, S2, RRR    R/S : Clear to auscultate anteriorly  Abd: Soft, NT, ND  Bs+ve  Extremity: Left Leg swelling and mild pain in the left calf region  Rt  Leg normal    Skin: No acute Rash noted  CNS: No acute FND       Additional Data:     Labs & Imaging Data Reviewed :    Results from last 7 days   Lab Units 21  0645 21  0704   WBC Thousand/uL 5 55 5 66   HEMOGLOBIN g/dL 10 8* 10 3*   HEMATOCRIT % 35 6* 34 1*   PLATELETS Thousands/uL 247 226   NEUTROS PCT %  --  62   LYMPHS PCT %  --  23   MONOS PCT %  --  8   EOS PCT %  --  5     Results from last 7 days   Lab Units 21  0645 21  1858   POTASSIUM mmol/L 3 8 3 9   CHLORIDE mmol/L 105 106   CO2 mmol/L 27 30   BUN mg/dL 17 18   CREATININE mg/dL 1 04 1 02   CALCIUM mg/dL 8 7 8 0*   ALK PHOS U/L  --  72   ALT U/L  --  25   AST U/L  --  18     Results from last 7 days   Lab Units 07/04/21  1858   INR  0 98     Lab Results   Component Value Date    HGBA1C 5 6 03/05/2021      CTA ED chest PE study   Final Result by Jhon Garcia DO (07/04 2050)      Multiple segmental branch pulmonary artery emboli with right heart strain  The calculated ratio of right ventricular to left ventricular diameter (RV/LV ratio) is 1 07      This is greater than 0 9, which is abnormal and indicates right heart strain  An abnormal RV/LV ratio has been shown to be associated with an increased risk of 30 day mortality in the setting of acute pulmonary embolism  Urgent consultation with the    medical critical care team is recommended  I personally discussed this study with China Comer on 3/9/1381 at 3:09 PM                Workstation performed: [id number]         VAS lower limb venous duplex study, complete bilateral    (Results Pending)       Cultures:   Blood Culture:   Lab Results   Component Value Date    BLOODCX No Growth at 24 hrs  07/04/2021    BLOODCX No Growth at 24 hrs  07/04/2021     Urine Culture:   Lab Results   Component Value Date    URINECX <10,000 cfu/ml Gram Positive Cocci 03/03/2016     Sputum Culture: No components found for: SPUTUMCX  Wound Culture: No results found for: WOUNDCULT    Last 24 Hours Medication List:   Current Facility-Administered Medications   Medication Dose Route Frequency Provider Last Rate    acetaminophen  650 mg Oral Q4H PRN Rebeka Andersno PA-C      atorvastatin  20 mg Oral Daily Rebeka Anderson PA-C      enoxaparin  1 mg/kg Subcutaneous Q12H Peggy Hood MD      lisinopril  10 mg Oral Daily Franci Chambers MD      tamsulosin  0 4 mg Oral Daily With Shirlene Agudelo PA-C         Patient is at moderate risk for morbidity and mortality due to above mentioned illness and comorbidities

## 2021-07-06 NOTE — CASE MANAGEMENT
LOS: 1 DAY  UNPLANNED RA RISK SCORE: 11  RA: NO  BUNDLE: NO    CM met with patient and his wife over speaker phone  Discussed dc planning and the role of CM  Patient states he lives with his wife in a ranch house  There are 3 steps to enter  He is independent of all ADL's and ambulates without assistance  He declines the need for any services at this time  He does own a RW, BSC and a cane but does not use them/need them  He drives and owns a car  He drives himself to appointments normally or his wife will drive him  His PCP is Dr Zavala Smoker  He uses the [facility] in [place]  He denies any h/o MH or D&A problems  He does not have a POA or a LW and is not interested in any information at this time  Patient will have a ride home with his wife Daron Dinero at [facility]  Patient and his wife are requesting that patients attending-Dr Sis Hairston call his surgeon Dr Damir Beltrán  This was relayed to Dr Sis Hairston  Patient also needs pricing on Xarelto (he will be started on at [place])  Prescription needs to be sent over to his pharmacy  CM to follow in the am and check on Xarelto pricing  [facility] -[phone]

## 2021-07-06 NOTE — ASSESSMENT & PLAN NOTE
Patient recently had bilateral inguinal hernia repair, uncomplicated recovery  He has been progressively more active since his surgery last week, but started a few days ago with L calf pain  Day prior to admission, he started experiencing R sided chest pain only intermittently and with deep inspiration and has been happening more frequently subsequently  CTA showed multiple segmental branch pulmonary artery emboli with right heart strain, RV/LV ratio 1 07 on CT - Pt evaluated by critical care and is appropriate for management on med/surg floor  Still symptomatic but hemodynamically stable  Saturating adequately on room air  Troponin, proBNP negative  Telemetry-unremarkable  Will discontinue  · Continue weight based dosing of Lovenox  · Echo did not show any right heart strain  · Venous Doppler lower extremity  · Transition to rivaroxaban in a m  If continues to do well  Will verify coverage with case management  · Monitor symptoms  · B12 level - wnl     · Referral to Hematology as outpatient due to history of recurrent DVT

## 2021-07-06 NOTE — PLAN OF CARE
Problem: Potential for Falls  Goal: Patient will remain free of falls  Description: INTERVENTIONS:  - Educate patient/family on patient safety including physical limitations  - Instruct patient to call for assistance with activity   - Consult OT/PT to assist with strengthening/mobility   - Keep Call bell within reach  - Keep bed low and locked with side rails adjusted as appropriate  - Keep care items and personal belongings within reach  - Initiate and maintain comfort rounds  - Make Fall Risk Sign visible to staff  - Offer Toileting every 2 Hours, in advance of need  - Obtain necessary fall risk management equipment    - Apply yellow socks and bracelet for high fall risk patients  - Consider moving patient to room near nurses station  Outcome: Progressing

## 2021-07-06 NOTE — TELEPHONE ENCOUNTER
Patient's spouse, Fco Agosto, called to cancel patients appointment with Dr Feliciano Speaker today   Patient is inpatient at [facility]

## 2021-07-07 ENCOUNTER — APPOINTMENT (INPATIENT)
Dept: RADIOLOGY | Facility: HOSPITAL | Age: 71
DRG: 176 | End: 2021-07-07
Payer: COMMERCIAL

## 2021-07-07 ENCOUNTER — TRANSITIONAL CARE MANAGEMENT (OUTPATIENT)
Dept: FAMILY MEDICINE CLINIC | Facility: CLINIC | Age: 71
End: 2021-07-07

## 2021-07-07 VITALS
SYSTOLIC BLOOD PRESSURE: 142 MMHG | DIASTOLIC BLOOD PRESSURE: 71 MMHG | RESPIRATION RATE: 20 BRPM | HEIGHT: 67 IN | TEMPERATURE: 97.9 F | BODY MASS INDEX: 37.07 KG/M2 | OXYGEN SATURATION: 93 % | HEART RATE: 87 BPM | WEIGHT: 236.2 LBS

## 2021-07-07 PROCEDURE — 99239 HOSP IP/OBS DSCHRG MGMT >30: CPT | Performed by: INTERNAL MEDICINE

## 2021-07-07 PROCEDURE — 93970 EXTREMITY STUDY: CPT

## 2021-07-07 PROCEDURE — 93970 EXTREMITY STUDY: CPT | Performed by: SURGERY

## 2021-07-07 PROCEDURE — 99024 POSTOP FOLLOW-UP VISIT: CPT | Performed by: SURGERY

## 2021-07-07 RX ADMIN — LISINOPRIL 10 MG: 10 TABLET ORAL at 09:50

## 2021-07-07 RX ADMIN — ENOXAPARIN SODIUM 105 MG: 120 INJECTION SUBCUTANEOUS at 05:43

## 2021-07-07 NOTE — PLAN OF CARE
Problem: Potential for Falls  Goal: Patient will remain free of falls  Description: INTERVENTIONS:  - Educate patient/family on patient safety including physical limitations  - Instruct patient to call for assistance with activity   - Consult OT/PT to assist with strengthening/mobility   - Keep Call bell within reach  - Keep bed low and locked with side rails adjusted as appropriate  - Keep care items and personal belongings within reach  - Initiate and maintain comfort rounds  - Make Fall Risk Sign visible   - Apply yellow socks and bracelet for high fall risk patients  - Consider moving patient to room near nurses station  Outcome: Progressing     Problem: NEUROSENSORY - ADULT  Goal: Achieves stable or improved neurological status  Description: INTERVENTIONS  - Monitor and report changes in neurological status  - Monitor vital signs such as temperature, blood pressure, glucose, and any other labs ordered   - Initiate measures to prevent increased intracranial pressure  - Monitor for seizure activity and implement precautions if appropriate      Outcome: Progressing  Goal: Remains free of injury related to seizures activity  Description: INTERVENTIONS  - Maintain airway, patient safety  and administer oxygen as ordered  - Monitor patient for seizure activity, document and report duration and description of seizure to physician/advanced practitioner  - If seizure occurs,  ensure patient safety during seizure  - Reorient patient post seizure  - Seizure pads on all 4 side rails  - Instruct patient/family to notify RN of any seizure activity including if an aura is experienced  - Instruct patient/family to call for assistance with activity based on nursing assessment  - Administer anti-seizure medications if ordered    Outcome: Progressing  Goal: Achieves maximal functionality and self care  Description: INTERVENTIONS  - Monitor swallowing and airway patency with patient fatigue and changes in neurological status  - Encourage and assist patient to increase activity and self care     - Encourage visually impaired, hearing impaired and aphasic patients to use assistive/communication devices  Outcome: Progressing     Problem: CARDIOVASCULAR - ADULT  Goal: Maintains optimal cardiac output and hemodynamic stability  Description: INTERVENTIONS:  - Monitor I/O, vital signs and rhythm  - Monitor for S/S and trends of decreased cardiac output  - Administer and titrate ordered vasoactive medications to optimize hemodynamic stability  - Assess quality of pulses, skin color and temperature  - Assess for signs of decreased coronary artery perfusion  - Instruct patient to report change in severity of symptoms  Outcome: Progressing  Goal: Absence of cardiac dysrhythmias or at baseline rhythm  Description: INTERVENTIONS:  - Continuous cardiac monitoring, vital signs, obtain 12 lead EKG if ordered  - Administer antiarrhythmic and heart rate control medications as ordered  - Monitor electrolytes and administer replacement therapy as ordered  Outcome: Progressing     Problem: RESPIRATORY - ADULT  Goal: Achieves optimal ventilation and oxygenation  Description: INTERVENTIONS:  - Assess for changes in respiratory status  - Assess for changes in mentation and behavior  - Position to facilitate oxygenation and minimize respiratory effort  - Oxygen administered by appropriate delivery if ordered  - Initiate smoking cessation education as indicated  - Encourage broncho-pulmonary hygiene including cough, deep breathe, Incentive Spirometry  - Assess the need for suctioning and aspirate as needed  - Assess and instruct to report SOB or any respiratory difficulty  - Respiratory Therapy support as indicated  Outcome: Progressing     Problem: GASTROINTESTINAL - ADULT  Goal: Minimal or absence of nausea and/or vomiting  Description: INTERVENTIONS:  - Administer IV fluids if ordered to ensure adequate hydration  - Maintain NPO status until nausea and vomiting are resolved  - Nasogastric tube if ordered  - Administer ordered antiemetic medications as needed  - Provide nonpharmacologic comfort measures as appropriate  - Advance diet as tolerated, if ordered  - Consider nutrition services referral to assist patient with adequate nutrition and appropriate food choices  Outcome: Progressing  Goal: Maintains or returns to baseline bowel function  Description: INTERVENTIONS:  - Assess bowel function  - Encourage oral fluids to ensure adequate hydration  - Administer IV fluids if ordered to ensure adequate hydration  - Administer ordered medications as needed  - Encourage mobilization and activity  - Consider nutritional services referral to assist patient with adequate nutrition and appropriate food choices  Outcome: Progressing  Goal: Maintains adequate nutritional intake  Description: INTERVENTIONS:  - Monitor percentage of each meal consumed  - Identify factors contributing to decreased intake, treat as appropriate  - Assist with meals as needed  - Monitor I&O, weight, and lab values if indicated  - Obtain nutrition services referral as needed  Outcome: Progressing  Goal: Oral mucous membranes remain intact  Description: INTERVENTIONS  - Assess oral mucosa and hygiene practices  - Implement preventative oral hygiene regimen  - Implement oral medicated treatments as ordered  - Initiate Nutrition services referral as needed  Outcome: Progressing     Problem: GENITOURINARY - ADULT  Goal: Maintains or returns to baseline urinary function  Description: INTERVENTIONS:  - Assess urinary function  - Encourage oral fluids to ensure adequate hydration if ordered  - Administer IV fluids as ordered to ensure adequate hydration  - Administer ordered medications as needed  - Offer frequent toileting  - Follow urinary retention protocol if ordered  Outcome: Progressing  Goal: Absence of urinary retention  Description: INTERVENTIONS:  - Assess patients ability to void and empty bladder  - Monitor I/O  - Bladder scan as needed  - Discuss with physician/AP medications to alleviate retention as needed  - Discuss catheterization for long term situations as appropriate  Outcome: Progressing     Problem: METABOLIC, FLUID AND ELECTROLYTES - ADULT  Goal: Electrolytes maintained within normal limits  Description: INTERVENTIONS:  - Monitor labs and assess patient for signs and symptoms of electrolyte imbalances  - Administer electrolyte replacement as ordered  - Monitor response to electrolyte replacements, including repeat lab results as appropriate  - Instruct patient on fluid and nutrition as appropriate  Outcome: Progressing  Goal: Fluid balance maintained  Description: INTERVENTIONS:  - Monitor labs   - Monitor I/O and WT  - Instruct patient on fluid and nutrition as appropriate  - Assess for signs & symptoms of volume excess or deficit  Outcome: Progressing  Goal: Glucose maintained within target range  Description: INTERVENTIONS:  - Monitor Blood Glucose as ordered  - Assess for signs and symptoms of hyperglycemia and hypoglycemia  - Administer ordered medications to maintain glucose within target range  - Assess nutritional intake and initiate nutrition service referral as needed  Outcome: Progressing     Problem: SKIN/TISSUE INTEGRITY - ADULT  Goal: Skin Integrity remains intact(Skin Breakdown Prevention)  Description: Assess:  -Perform Bear assessment every shift  -Clean and moisturize skin every daily  -Inspect skin when repositioning, toileting, and assisting with ADLS  -Assess extremities for adequate circulation and sensation     Bed Management:  -Have minimal linens on bed & keep smooth, unwrinkled  -Change linens as needed when moist or perspiring  -Keep HOB at 30 degrees     Toileting:  -Offer bedside com      Activity  -Encourage activity and walks on unit  -Encourage or provide ROM exercises     -Use appropriate equipment to lift or move patient in bed      Skin Care:  -Avoid use of baby powder, tape, friction and shearing, hot water or constrictive clothing  -Do not massage red bony areas    Next Steps:  -Teach patient strategies to minimize risks such as falls    Outcome: Progressing  Goal: Incision(s), wounds(s) or drain site(s) healing without S/S of infection  Description: INTERVENTIONS  - Assess and document dressing, incision, wound bed, drain sites and surrounding tissue  - Provide patient and family education  Outcome: Progressing     Problem: HEMATOLOGIC - ADULT  Goal: Maintains hematologic stability  Description: INTERVENTIONS  - Assess for signs and symptoms of bleeding or hemorrhage  - Monitor labs  - Administer supportive blood products/factors as ordered and appropriate  Outcome: Progressing     Problem: MUSCULOSKELETAL - ADULT  Goal: Maintain or return mobility to safest level of function  Description: INTERVENTIONS:  - Assess patient's ability to carry out ADLs; assess patient's baseline for ADL function and identify physical deficits which impact ability to perform ADLs (bathing, care of mouth/teeth, toileting, grooming, dressing, etc )  - Assess/evaluate cause of self-care deficits   - Assess range of motion  - Assess patient's mobility  - Assess patient's need for assistive devices and provide as appropriate  - Encourage maximum independence but intervene and supervise when necessary  - Involve family in performance of ADLs  - Assess for home care needs following discharge   - Consider OT consult to assist with ADL evaluation and planning for discharge  - Provide patient education as appropriate  Outcome: Progressing  Goal: Maintain proper alignment of affected body part  Description: INTERVENTIONS:  - Support, maintain and protect limb and body alignment  - Provide patient/ family with appropriate education  Outcome: Progressing     Problem: MOBILITY - ADULT  Goal: Maintain or return to baseline ADL function  Description: INTERVENTIONS:  -  Assess patient's ability to carry out ADLs; assess patient's baseline for ADL function and identify physical deficits which impact ability to perform ADLs (bathing, care of mouth/teeth, toileting, grooming, dressing, etc )  - Assess/evaluate cause of self-care deficits   - Assess range of motion  - Assess patient's mobility; develop plan if impaired  - Assess patient's need for assistive devices and provide as appropriate  - Encourage maximum independence but intervene and supervise when necessary  - Involve family in performance of ADLs  - Assess for home care needs following discharge   - Consider OT consult to assist with ADL evaluation and planning for discharge  - Provide patient education as appropriate  Outcome: Progressing  Goal: Maintains/Returns to pre admission functional level  Description: INTERVENTIONS:  - Perform BMAT or MOVE assessment daily    - Set and communicate daily mobility goal to care team and patient/family/caregiver     - Record patient progress and toleration of activity level   Outcome: Progressing

## 2021-07-07 NOTE — CASE MANAGEMENT
CM spoke with patient at the bedside  CM introduced self and role  Patient updated CM contacted his [facility]  Patient aware there are 2 Xarelto scripts at pharmacy, ready for pickup  Patient's copay for prescriptions total $[phone]  CM provided discount coupon cards for Xarelto to patient  Patient expressed understanding to take cards with him when picking Xarelto prescriptions up  Patient stated his wife will transport him home today  No other questions/concerns noted at this time

## 2021-07-07 NOTE — DISCHARGE SUMMARY
Discharge Summary - [address] [facility]    Patient Information: Fanny Mcdonald 70 y o  male MRN: [id number]  Unit/Bed#: [address] [address] Encounter: [id number]    Discharging Physician / Practitioner: Ld Lei MD  PCP: Aure Alvarez DO  Admission Date: 7/4/2021  Discharge Date: 07/07/21    Reason for Admission: Pain With Breathing (Patient c/o right sided chest pain with breathing starting last night  States he had pneumonia before "and this is what it felt like " )  Discharge Diagnoses:     Primary Discharge Diagnosis:       Principal Problem:    Pulmonary embolism ([address] )  Active Problems:    Benign essential hypertension    Mixed hyperlipidemia    BMI 37 0-37 9, adult    Anemia  Resolved Problems:    * No resolved hospital problems  *    Consultations During Hospital Stay:  IP CONSULT TO CASE MANAGEMENT  IP CONSULT TO ACUTE CARE SURGERY    Procedures Performed:   Significant Findings:   · Refer to hospital course and above listed diagnosis related plan for details    Imaging while in hospital:  CTA Chest PE Study  Bilateral LE Venous duplex   2D Echo  Incidental Findings:     Test Results Pending at Discharge (will require follow up):   · As per After Visit Summary     Outpatient Tests Requested:    Complications:  Refer to hospital course and above listed diagnosis related plan, if any    Hospital Course:     Fanny Mcdonald is a 70 y o  male patient with PMHx of hypertension, hyperlipidemia, DVT, history of recent bilateral inguinal hernia repair 1 week ago who originally presented to the hospital on 7/4/2021 due to see complaints of chest pain  Patient was found to have multiple segmental branch pulmonary emboli with suspected right heart strain, hence patient underwent 2D echocardiogram which did not show any significant right ventricle wall motion abnormalities  Patient however was continued on subcu Lovenox injections initially and then transition to [address] [place] [address]    Patient did complain of left lower extremity calf pain and underwent bilateral lower extremity Dopplers which were negative for DVT  However at some inflammation around the left calf region  Patient was medically stable to be discharged on Xarelto  Patient was eval by General surgery Dr Leeann Green and recommended follow-up as an outpatient  Please see above list of diagnoses and related plan for additional information  Condition at Discharge: fair     Discharge Day Visit / Exam:     Subjective:  I have seen and examined the patient at bedside  Overnight events reviewed with the RN  Vitals: Blood Pressure: 142/71 (07/07/21 0950)  Pulse: 87 (07/07/21 0950)  Temperature: 97 9 °F (36 6 °C) (07/07/21 0950)  Temp Source: Oral (07/06/21 1904)  Respirations: 20 (07/07/21 0950)  Height: 5' 7" (170 2 cm) (07/04/21 2217)  Weight - Scale: 107 kg (236 lb 3 2 oz) (07/04/21 2217)  SpO2: 93 % (07/07/21 0950)  Exam:   Physical Exam  General Exam: Alert and Oriented x 3, NAD  Eyes: DIONTE  Neck: Supple  CVS: S1, S2 Pope, RRR    R/S: Clear to auscultate anteriorly  Abd: Soft, NT, ND, BS+ve  Extremities:  Left lower extremity calf mild swelling noted, and tenderness  Skin: No acute Rash noted  CNS: No acute FND  Moves all 4 extremities  Psych: Co-operative, Not agitated  Discharge instructions/Information to patient and family:(Discharge Medications and Follow up):   See after visit summary for information provided to patient and family  Provisions for Follow-Up Care:  See after visit summary for information related to follow-up care and any pertinent home health orders  Disposition: Home    Planned Readmission:  No     Discharge Statement:  I spent 35 minutes discharging the patient  This time was spent on the day of discharge  I had direct contact with the patient on the day of discharge   Greater than 50% of the total time was spent examining patient, answering all patient questions, arranging and discussing plan of care with patient as well as directly providing post-discharge instructions  Additional time then spent on discharge activities  Discharge Medications:  See after visit summary for reconciled discharge medications provided to patient and family  ** Please Note: "This note has been constructed using a voice recognition system  Therefore there may be syntax, spelling, and/or grammatical errors   Please call if you have any questions  "**

## 2021-07-07 NOTE — CASE MANAGEMENT
CM contacted patient's [facility] at [phone]  Spoke with pharmacist  15 mg Xarelto-copay $104 73  20 mg Xarelto-copay $74 08

## 2021-07-07 NOTE — CONSULTS
Consultation - General Surgery   Yung Mendez 70 y o  male MRN: [id number]  Unit/Bed#: [address] [place] [address] Encounter: [id number]    Assessment/Plan     Assessment:  · Pulmonary embolism  -- identified on CTA chest on admission,  ECHO performed 7/6 estimated EF to be 55-60% with grade 1 diastolic dysfunction without any right heart strain   · Left calf pain  · POD#12 s/p laparoscopic bilateral inguinal hernia repair       Plan:  · Bilateral lower extremity venous duplex to be performed today  · Patient currently on weight based therapeutic dosing of SQ Lovenox, plans to transition to Xarelto today with outpatient hematology follow-up  · Patient can shower  · Infraumbilical laparoscopic port site -- allow tegaderm to fall off by itself, place bandaid over top wound after that       History of Present Illness     HPI:  Yung Mendez is a 70 y o  male whom is postoperative day 12 status post laparoscopic bilateral inguinal hernia repair with Dr Dewayne Hicks who presented two days ago with pleuritic chest pain and LLE pain  Patient was admitted briefly postoperative day 1 for urinary retention, ultimately being discharged that same day with no other issues since that time  Patient reports 4 days ago he experienced sudden onset of left calf pain with exertion  The day after patient reports right-sided chest tightness  This chest tightness progressively worsened and became pleuritic in nature, so patient presented to the emergency department where a CTA was performed revealing multiple segmental branch pulmonary artery emboli with right heart strain  ECHO performed yesterday estimated ejection fraction to be 55-60% with grade 1 diastolic dysfunction without any right heart strain  Patient reports history of lower extremity DVT after bariatric surgery in 2015  At that time he was temporarily placed on Coumadin  Patient has had surgeries since that time without any postoperative thromboembolic events    This morning patient reports complete resolution of chest pain  He denies any shortness of breath, difficulty breathing, nausea, vomiting, fevers, chills, headaches, dizziness  He is still having occasional left calf pain  He is scheduled for a Doppler of left lower extremity today  Inpatient consult to Acute Care Surgery  Consult performed by: Lashay Ramon PA-C  Consult ordered by: Hector Randall MD          Review of Systems   Constitutional: Negative for appetite change, chills, fatigue and fever  HENT: Negative  Eyes: Negative  Respiratory: Negative for cough, chest tightness, shortness of breath and wheezing  Cardiovascular: Positive for chest pain (pleuritic)  Negative for palpitations and leg swelling  Gastrointestinal: Negative for abdominal pain, blood in stool, diarrhea, nausea and vomiting  Genitourinary: Negative for difficulty urinating, dysuria, flank pain and hematuria  Musculoskeletal: Positive for gait problem (limping) and myalgias (left calf pain)  Skin: Positive for wound (surgical wounds)  Negative for rash  Neurological: Negative for dizziness, syncope, weakness, light-headedness and headaches  Hematological: Negative  Psychiatric/Behavioral: Negative          Historical Information   Past Medical History:   Diagnosis Date    AC (acromioclavicular) joint bone spurs     Last assessed - 2/19/15    Achilles tendinitis, unspecified leg 06/04/2010    Resolved - 1/8/18    Anemia 08/12/2010    Resolved - 1/11/16    Blood in urine     BPH without urinary obstruction     Hypertrophy    Deep venous thrombosis of distal lower extremity (HCC)     Last assessed - 6/4/15    Heartburn     Last assessed - 12/12/14    Hemorrhoids     Hernia, inguinal 06/30/2009    Resolved - 1/8/18    History of stomach ulcers     Hypertension     Mixed hyperlipidemia     Nephrolithiasis     Right inguinal hernia     Last assessed - 4/4/16    Sleep apnea     unable to tolerate c-pap    Tear of left rotator cuff     Last assessed - 2/19/15     Past Surgical History:   Procedure Laterality Date    BARIATRIC SURGERY  06/15/2015    Laparoscopic Longitudinal Gastrectomy for morbid obesity, Managed by - Blank Cortes     EXPLORATORY LAPAROTOMY W/ BOWEL RESECTION Right 3/3/2016    Procedure: Incarcerated possibly strangulated right inguinal hernia; Surgeon: Maty Mcknight MD;  Location: [address] [place] [address];  [facility]    [address] [facility]  [place] [facility]       - Rt Hip Replacement,  - Lt Hip Replacement, 2013 - Lt Hip Revision    INGUINAL HERNIA REPAIR      Last assessed - 16    JOINT REPLACEMENT      KNEE SURGERY  2004    Double     WY COLONOSCOPY FLX DX W/COLLJ SPEC WHEN PFRMD N/A 1/15/2016    Procedure: COLONOSCOPY;  Surgeon: Jonatan Castillo MD;  Location: [facility]; Service: Gastroenterology    WY COLONOSCOPY FLX DX W/COLLJ SPEC WHEN PFRMD N/A 2019    Procedure: COLONOSCOPY;  Surgeon: Jonatan Castillo MD;  Location: [facility]; Service: Gastroenterology    [place] [address] Bilateral 2021    Procedure: REPAIR HERNIA INGUINAL, LAPAROSCOPIC;  Surgeon: Danny Curry MD;  Location: [address] [place] [address];  Service: General    W[place] RECONSTR TOTAL SHOULDER IMPLANT Right 2021    Procedure: ARTHROPLASTY SHOULDER REVERSE (RIGHT);   Surgeon: Nyla Milan MD;  Location: [facility];  Service: Orthopedics    TONSILLECTOMY       Social History   Social History     Substance and Sexual Activity   Alcohol Use Yes    Comment: Occ, very rarely      Social History     Substance and Sexual Activity   Drug Use Never     E-Cigarette/Vaping    E-Cigarette Use Never User      E-Cigarette/Vaping Substances    Nicotine No     THC No     CBD No     Flavoring No     Other No     Unknown No      Social History     Tobacco Use   Smoking Status Former Smoker    Quit date: 1994    Years since quittin 2   Smokeless Tobacco Never Used     Family History:   Family History Problem Relation Age of Onset    Arthritis Father     Diabetes Father     Hypertension Father     Hyperlipidemia Father     Diabetes Family     Emphysema Family     Heart disease Family     Hypertension Family     Mental illness Neg Hx        Meds/Allergies   all current active meds have been reviewed  Allergies   Allergen Reactions    Percocet [Oxycodone-Acetaminophen] Hallucinations    Percolone [Oxycodone] Hallucinations       Objective   First Vitals:   Blood Pressure: (!) 175/82 (07/04/21 1832)  Pulse: 92 (07/04/21 1832)  Temperature: (!) 97 2 °F (36 2 °C) (07/04/21 1832)  Temp Source: Tympanic (07/04/21 1832)  Respirations: 16 (07/04/21 1832)  Height: 5' 7" (170 2 cm) (07/04/21 2217)  Weight - Scale: 107 kg (236 lb 3 2 oz) (07/04/21 1832)  SpO2: 97 % (07/04/21 1832)    Current Vitals:   Blood Pressure: 134/71 (07/06/21 2334)  Pulse: 82 (07/06/21 2334)  Temperature: 98 3 °F (36 8 °C) (07/06/21 1904)  Temp Source: Oral (07/06/21 1904)  Respirations: 18 (07/06/21 2334)  Height: 5' 7" (170 2 cm) (07/04/21 2217)  Weight - Scale: 107 kg (236 lb 3 2 oz) (07/04/21 2217)  SpO2: 95 % (07/06/21 2334)      Intake/Output Summary (Last 24 hours) at 7/7/2021 0845  Last data filed at 7/7/2021 0630  Gross per 24 hour   Intake 370 ml   Output 900 ml   Net -530 ml       Invasive Devices     Peripheral Intravenous Line            Peripheral IV 07/04/21 Left Antecubital 2 days                Physical Exam  Vitals reviewed  Constitutional:       General: He is awake  He is not in acute distress  Appearance: Normal appearance  He is well-developed and overweight  He is not toxic-appearing or diaphoretic  Interventions: He is not intubated  HENT:      Head: Normocephalic and atraumatic  Not macrocephalic and not microcephalic  No raccoon eyes, Oakley's sign, right periorbital erythema or left periorbital erythema        Right Ear: Hearing and external ear normal       Left Ear: Hearing and external ear normal  Nose: Nose normal    Eyes:      General: No scleral icterus  Right eye: No discharge  Left eye: No discharge  Conjunctiva/sclera: Conjunctivae normal       Right eye: Right conjunctiva is not injected  No hemorrhage  Left eye: Left conjunctiva is not injected  No hemorrhage  Pupils: Pupils are equal, round, and reactive to light  Cardiovascular:      Rate and Rhythm: Normal rate and regular rhythm  Heart sounds: Normal heart sounds  Pulmonary:      Effort: Pulmonary effort is normal  No tachypnea, bradypnea or respiratory distress  He is not intubated  Breath sounds: Normal breath sounds  No stridor  No decreased breath sounds, wheezing or rhonchi  Abdominal:      General: A surgical scar is present  Bowel sounds are normal  There is no distension  Palpations: Abdomen is soft  Abdomen is not rigid  Tenderness: There is no abdominal tenderness  There is no guarding or rebound  Hernia: No hernia is present  Comments: Surgical wound lying in the fold of pannus has some fat necrosis with minimal opening of laparoscopic port site, otherwise healing well   Skin:     General: Skin is warm and dry  Capillary Refill: Capillary refill takes less than 2 seconds  Coloration: Skin is not cyanotic, jaundiced or mottled  Findings: No rash  Neurological:      General: No focal deficit present  Mental Status: He is alert and oriented to person, place, and time  He is not disoriented  GCS: GCS eye subscore is 4  GCS verbal subscore is 5  GCS motor subscore is 6  Cranial Nerves: No cranial nerve deficit  Motor: Motor function is intact  Psychiatric:         Attention and Perception: Attention normal          Mood and Affect: Mood normal          Speech: Speech normal          Behavior: Behavior normal  Behavior is cooperative           Cognition and Memory: Memory normal          Lab Results: I have personally reviewed pertinent lab results  Imaging: I have personally reviewed pertinent reports  and I have personally reviewed pertinent films in PACS  EKG, Pathology, and Other Studies: I have personally reviewed pertinent reports  Counseling / Coordination of Care  Total floor / unit time spent today 45 minutes  Greater than 50% of total time was spent with the patient and / or family counseling and / or coordination of care  A description of the counseling / coordination of care: Obtaining patient history, performing physical exam, reviewing pertinent labs and imaging, discussed management with attending physician  Jacqueline Corona

## 2021-07-08 DIAGNOSIS — R33.9 URINARY RETENTION: ICD-10-CM

## 2021-07-08 RX ORDER — TAMSULOSIN HYDROCHLORIDE 0.4 MG/1
0.4 CAPSULE ORAL
Qty: 10 CAPSULE | Refills: 0 | Status: SHIPPED | OUTPATIENT
Start: 2021-07-08 | End: 2021-07-16 | Stop reason: SDUPTHER

## 2021-07-08 NOTE — TELEPHONE ENCOUNTER
Has an apt next week with Dr Shiloh Still but need Flomax prescribed    That prescribed this medication in the hospital     Please send to [address]     Thank you

## 2021-07-09 ENCOUNTER — RA CDI HCC (OUTPATIENT)
Dept: OTHER | Facility: HOSPITAL | Age: 71
End: 2021-07-09

## 2021-07-09 NOTE — UTILIZATION REVIEW
Notification of Discharge   This is a Notification of Discharge from our facility [address]  Please be advised that this patient has been discharge from our facility  Below you will find the admission and discharge date and time including the patients disposition  UTILIZATION REVIEW CONTACT:  Daisy Sandra  Utilization   Network Utilization Review Department  Phone: [phone] x carefully listen to the prompts  All voicemails are confidential   Email: [email or web address]     PHYSICIAN ADVISORY SERVICES:  FOR [id number] REVIEW - MEDICAL NECESSITY DENIAL  Phone: [phone]  Fax: [phone]  Email: [email or web address]     PRESENTATION DATE: 7/4/2021  6:31 PM  OBERVATION ADMISSION DATE:   INPATIENT ADMISSION DATE: 7/5/21  3:39 PM   DISCHARGE DATE: 7/7/2021  2:20 PM  DISPOSITION: Home/Self Care Home/Self Care      IMPORTANT INFORMATION:  Send all requests for admission clinical reviews, approved or denied determinations and any other requests to dedicated fax number below belonging to the campus where the patient is receiving treatment   List of dedicated fax numbers:  [address] DENIALS (Administrative/Medical Necessity) [phone]   [address] (Maternity/NICU/Pediatrics) [phone]   [facility] [phone]   [facility] [phone]   [facility] [place] [phone]   [facility] [address] [place] [address] [phone]   [facility]  [phone]   [address] [place] [address], [address]  [address] [facility] [address] [facility] [phone]

## 2021-07-09 NOTE — PROGRESS NOTES
[facility]  coding opportunities          Chart reviewed, no opportunity found: CHART REVIEWED, NO OPPORTUNITY FOUND                     Patients insurance company: [employer] (Medicare and Commercial for [employer] and [employer])

## 2021-07-10 LAB
BACTERIA BLD CULT: NORMAL
BACTERIA BLD CULT: NORMAL

## 2021-07-14 NOTE — UTILIZATION REVIEW
Initial Clinical Review  PATIENT WAS OBSERVATION STATUS 7/4/21 CONVERTED TO INPATIENT ADMISSION 7/5/21     Inpatient Admission (Order [id number])  Admission  Date: 7/5/2021 Department: [address] [place] [address] [address]uno Released By/Authorizing: Tierra Yancey MD (auto-released)         Orders Placed This Encounter   Procedures    Inpatient Admission     Standing Status:   Standing     Number of Occurrences:   1     Order Specific Question:   Level of Care     Answer:   Med Surg [16]     Order Specific Question:   Estimated length of stay     Answer:   More than 2 Midnights     Order Specific Question:   Certification     Answer:   I certify that inpatient services are medically necessary for this patient for a duration of greater than two midnights  See H&P and MD Progress Notes for additional information about the patient's course of treatment  ED Arrival Information     Expected Arrival Acuity    - 7/4/2021 18:20 Urgent    Service Admission type    Hospitalist Urgent    Arrival complaint    chest pain     Chief Complaint   Patient presents with    Pain With Breathing     Patient c/o right sided chest pain with breathing starting last night  States he had pneumonia before "and this is what it felt like "      Initial Presentation:   69 y/o male with PMHx HTN, HLD, DVT, and 9 days post op Lap Bilateral Inguinal Hernia Repair on 6/25/21 with post op urinary retention  Presented urgently to [address] [place] [address] ED on 7/4/21 2nd few day history of sharp "cramping type" pain in his left calf with some swelling AND 1 day history of worsening sharp right-sided chest pain only with deep breath  In ED - HR 92  / 82  Rm Air SpO2 97 %   Exam +  LLE Edema, TTP left medial lower leg  CT showed multiple segmental branch pulmonary artery emboli with right heart strain     Labs: CBC + BMP wnl  INR 0 98  ED Tx:  Lovenox 105 mg  Placed in Observation 7/4/21 at 2130 2nd multiple Pulmonary Emboli with Rt Heart Strain - Lovenox, ECHO, PT + OT Evals    DATE 7/5/21 CONVERTED TO INPATIENT ADMISSION B/L  PULMONARY EMBOLISM  Reports ongoing right-sided pleuritic pain on deep inspiration, more or less unchanged compared to yesterday  Comments: Laparoscopic port incision site C/D/I, no ecchymosis or hematoma   · Continue weight based dosing of Lovenox  · Echo in AM to further evaluate R heart strain  · Venous Doppler lower extremity  · Transition to rivaroxaban in a m  If continues to do well  Will verify coverage with case management  · Monitor symptoms  · Check B12 level  Referral to Hematology as outpatient due to history of Current Patient Status: Inpatient   · Certification Statement: The patient, admitted on an observation basis, will now require > 2 midnight hospital stay due to Bilateral PE with RV strain regarding further monitoring, echocardiogram, outpatient management decisionrecurrent DVT  DATE 7/6/21   Anemia  Assessment & Plan  Normocytic  Iron studies, B12, folate  Monitor     BMI 37 0-37 9, adult  Assessment & Plan  counselled on diet and exercise       Mixed hyperlipidemia  Assessment & Plan  Continue lipitor 20 mg daily     Benign essential hypertension  Assessment & Plan  Continue lisinopril 10 mg daily     * Pulmonary embolism [facility])  Assessment & Plan  Patient recently had bilateral inguinal hernia repair, uncomplicated recovery  He has been progressively more active since his surgery last week, but started a few days ago with L calf pain  Day prior to admission, he started experiencing R sided chest pain only intermittently and with deep inspiration and has been happening more frequently subsequently  CTA showed multiple segmental branch pulmonary artery emboli with right heart strain, RV/LV ratio 1 07 on CT - Pt evaluated by critical care and is appropriate for management on med/surg floor  Still symptomatic but hemodynamically stable  Saturating adequately on room air  Troponin, proBNP negative  Telemetry-unremarkable  Will discontinue  · Continue weight based dosing of Lovenox  · Echo did not show any right heart strain  · Venous Doppler lower extremity  · Transition to rivaroxaban in a m  If continues to do well  Will verify coverage with case management  · Monitor symptoms  · B12 level - wnl  · Referral to Hematology as outpatient due to history of recurrent DVT   Temp (24hrs), Av 1 °F (36 7 °C), Min:97 8 °F (36 6 °C), Max:98 2 °F (36 8 °C)     Temp:  [97 8 °F (36 6 °C)-98 2 °F (36 8 °C)] 97 8 °F (36 6 °C)  HR:  [72-85] 79  Resp:  [18] 18  BP: (124-136)/(61-75) 136/75  SpO2:  [93 %-95 %] 93 %  Body mass index is 36 99 kg/m²  ED Triage Vitals [21 1832]   Temperature Pulse Respirations Blood Pressure SpO2   (!) 97 2 °F (36 2 °C) 92 16 (!) 175/82 97 %      Temp Source Heart Rate Source Patient Position - Orthostatic VS BP Location FiO2 (%)   Tympanic Monitor Lying Right arm --      Pain Score       Worst Possible Pain          Wt Readings from Last 1 Encounters:   21 107 kg (236 lb 3 2 oz)     Additional Vital Signs:   21 11:48:35  98 2 °F (36 8 °C)  75  20  135/73  94  95 %  --  --   21 0835  98 6 °F (37 °C)  80  20  141/72  95  95 %  None (Room air)  Sitting   21 22:17:11  98 1 °F (36 7 °C)  81  18  155/83  107  96 %  None (Room air)  Lying   21 2043  --  70  16  159/77  110  97 %  None (Room air)  Lying     I/O  07 07   P  O  480 240   I V  (mL/kg) 10 (0 1)    Total Intake(mL/kg) 490 (4 6) 240 (2 2)   Urine (mL/kg/hr) 825 300 (0 4)   Total Output 825 300   Net -335 -60       Pertinent Labs/Diagnostic Test Results:     Results from last 7 days   Lab Units 21  0704   WBC Thousand/uL 5 66   HEMOGLOBIN g/dL 10 3*   HEMATOCRIT % 34 1*   PLATELETS Thousands/uL 226   NEUTROS PCT % 62   LYMPHS PCT % 23   MONOS PCT % 8   EOS PCT % 5            Results from last 7 days   Lab Units 21  0704 21  1858   POTASSIUM mmol/L 4 0 3 9   CHLORIDE mmol/L 107 106   CO2 mmol/L 31 30   BUN mg/dL 21 18   CREATININE mg/dL 1 06 1 02   CALCIUM mg/dL 8 8 8 0*   ALK PHOS U/L  --  72   ALT U/L  --  25   AST U/L  --  18           Results from last 7 days   Lab Units 07/04/21  1858   INR   0 98         * I Have Reviewed All Lab Data Listed Above  * Additional Pertinent Lab Tests Reviewed: All Labs Within Last 24 Hours Reviewed        Imaging:  Imaging Reports Reviewed Today Include:  CT scan  Recent Cultures (last 7 days):           Results from last 7 days   Lab Units 07/04/21  1858   BLOOD CULTURE   Received in Microbiology Lab  Culture in Progress  Received in Microbiology Lab  Culture in Progress  CTA - CHEST WITH IV CONTRAST - PULMONARY ANGIOGRAM  Multiple segmental branch pulmonary artery emboli with right heart strain  The calculated ratio of right ventricular to left ventricular diameter (RV/LV ratio) is 1 07   This is greater than 0 9, which is abnormal and indicates right heart strain  An abnormal RV/LV ratio has been shown to be associated with an increased risk of 30 day mortality in the setting of acute pulmonary embolism   Urgent consultation with the medical critical care team is recommended       ED Treatment:   Medication Administration from 07/04/2021 1820 to 07/04/2021 2152       Date/Time Order Dose Route Action     07/04/2021 2003 iohexol (OMNIPAQUE) 350 MG/ML injection (SINGLE-DOSE) 100 mL 100 mL Intravenous Given     07/04/2021 2123 enoxaparin (LOVENOX) subcutaneous injection 105 mg 105 mg Subcutaneous Given     Past Medical History:   Diagnosis Date    AC (acromioclavicular) joint bone spurs     Last assessed - 2/19/15    Achilles tendinitis, unspecified leg 06/04/2010    Resolved - 1/8/18    Anemia 08/12/2010    Resolved - 1/11/16    Blood in urine     BPH without urinary obstruction     Hypertrophy    Deep venous thrombosis of distal lower extremity ([facility] [address] )     2015, provoked, postoperative after bariatric surgery, was placed on coumadin temporarily    Heartburn     Last assessed - 12/12/14    Hemorrhoids     Hernia, inguinal 06/30/2009    Resolved - 1/8/18    History of stomach ulcers     Hypertension     Mixed hyperlipidemia     Nephrolithiasis     Right inguinal hernia     Last assessed - 4/4/16    Sleep apnea     unable to tolerate c-pap    Tear of left rotator cuff     Last assessed - 2/19/15     Present on Admission:   Mixed hyperlipidemia   Benign essential hypertension   Pulmonary embolism (HCC)    Admitting Diagnosis: Pleuritic chest pain [R07 81]  Multiple pulmonary emboli (HCC) [I26 99]  Chest pain varying with breathing [R07 1]    Age/Sex: 70 y o  male    Admission Orders:  Telemetry  VS q4hrs  Nasal O2 at 2 L/min - Titrate SpO2 > 92 %  Continuous Pulse Oximetry  Up + OOB as tolerated  Diet Regular: House  I+O q shift  ECHO  PT + OT Evals    Scheduled Medications:  atorvastatin, 20 mg, Oral, Daily  enoxaparin, 1 mg/kg, Subcutaneous, Q24H JESIKA  lisinopril, 10 mg, Oral, Daily  tamsulosin, 0 4 mg, Oral, Daily With Dinner    PRN Meds:  acetaminophen, 650 mg, Oral, Q4H PRN    Network Utilization Review Department  ATTENTION: Please call with any questions or concerns to [phone] and carefully listen to the prompts so that you are directed to the right person  All voicemails are confidential   Kristy Marmolejo all requests for admission clinical reviews, approved or denied determinations and any other requests to dedicated fax number below belonging to the campus where the patient is receiving treatment   List of dedicated fax numbers for the Facilities:  [address] DENIALS (Administrative/Medical Necessity) [phone]   [address] (Maternity/NICU/Pediatrics) [phone]   [address] [place] [address] [address] [facility] [address] [address] [place] [address] [address] [phone]

## 2021-07-15 ENCOUNTER — TELEPHONE (OUTPATIENT)
Dept: HEMATOLOGY ONCOLOGY | Facility: CLINIC | Age: 71
End: 2021-07-15

## 2021-07-15 NOTE — TELEPHONE ENCOUNTER
New Patient Request   Patient Details:     Amanda Sousa      1950      [id number]      Reason for Appointment   Who is calling to schedule? Patient   If not Patient, what is their name? What is the diagnosis? Blood clot    Who is the referring doctor? Was seen in [facility]   Which department are you scheduling with ? Medical Onc   Preferred [facility] Number to call back on? If calling from the [facility], use the Nurse number   [phone]   Miscellaneous Information:     Please advise the patient, a new patient  will be calling them back within 1 business day

## 2021-07-16 ENCOUNTER — TELEPHONE (OUTPATIENT)
Dept: HEMATOLOGY ONCOLOGY | Facility: CLINIC | Age: 71
End: 2021-07-16

## 2021-07-16 ENCOUNTER — OFFICE VISIT (OUTPATIENT)
Dept: FAMILY MEDICINE CLINIC | Facility: CLINIC | Age: 71
End: 2021-07-16
Payer: COMMERCIAL

## 2021-07-16 VITALS
SYSTOLIC BLOOD PRESSURE: 138 MMHG | DIASTOLIC BLOOD PRESSURE: 72 MMHG | RESPIRATION RATE: 18 BRPM | TEMPERATURE: 97.2 F | HEIGHT: 67 IN | BODY MASS INDEX: 37.45 KG/M2 | WEIGHT: 238.6 LBS | HEART RATE: 82 BPM

## 2021-07-16 DIAGNOSIS — D50.9 IRON DEFICIENCY ANEMIA, UNSPECIFIED IRON DEFICIENCY ANEMIA TYPE: ICD-10-CM

## 2021-07-16 DIAGNOSIS — R33.9 URINARY RETENTION: ICD-10-CM

## 2021-07-16 DIAGNOSIS — I26.99 OTHER ACUTE PULMONARY EMBOLISM, UNSPECIFIED WHETHER ACUTE COR PULMONALE PRESENT (HCC): Primary | ICD-10-CM

## 2021-07-16 DIAGNOSIS — I10 BENIGN ESSENTIAL HYPERTENSION: ICD-10-CM

## 2021-07-16 PROCEDURE — 3008F BODY MASS INDEX DOCD: CPT | Performed by: SURGERY

## 2021-07-16 PROCEDURE — 99495 TRANSJ CARE MGMT MOD F2F 14D: CPT | Performed by: FAMILY MEDICINE

## 2021-07-16 PROCEDURE — 1111F DSCHRG MED/CURRENT MED MERGE: CPT | Performed by: FAMILY MEDICINE

## 2021-07-16 RX ORDER — QUINIDINE GLUCONATE 324 MG
240 TABLET, EXTENDED RELEASE ORAL
Qty: 90 TABLET | Refills: 1 | Status: SHIPPED | OUTPATIENT
Start: 2021-07-16

## 2021-07-16 RX ORDER — TAMSULOSIN HYDROCHLORIDE 0.4 MG/1
0.4 CAPSULE ORAL
Qty: 30 CAPSULE | Refills: 0 | Status: SHIPPED | OUTPATIENT
Start: 2021-07-16 | End: 2021-10-12 | Stop reason: SDUPTHER

## 2021-07-16 NOTE — UTILIZATION REVIEW
Initial Clinical Review    Admission: Date/Time/Statement:   Admission Orders (From admission, onward)     Ordered        07/04/21 2130  Place in Observation  Once                Orders Placed This Encounter   Procedures    Place in Observation     Standing Status:   Standing     Number of Occurrences:   1     Order Specific Question:   Level of Care     Answer:   Med Surg [16]    Inpatient Admission     Standing Status:   Standing     Number of Occurrences:   1     Order Specific Question:   Level of Care     Answer:   Med Surg [16]     Order Specific Question:   Estimated length of stay     Answer:   More than 2 Midnights     Order Specific Question:   Certification     Answer:   I certify that inpatient services are medically necessary for this patient for a duration of greater than two midnights  See H&P and MD Progress Notes for additional information about the patient's course of treatment  ED Arrival Information     Expected Arrival Acuity    - 7/4/2021 18:20 Urgent    Service Admission type    Hospitalist Urgent    Arrival complaint    chest pain     Chief Complaint   Patient presents with    Pain With Breathing     Patient c/o right sided chest pain with breathing starting last night  States he had pneumonia before "and this is what it felt like "      Initial Presentation:   71 y/o male with PMHx HTN, HLD, DVT, and 9 days post op Lap Bilateral Inguinal Hernia Repair on 6/25/21 with post op urinary retention  Presented urgently to [address] [place] [address] ED on 7/4/21 2nd few day history of sharp "cramping type" pain in his left calf with some swelling AND 1 day history of worsening sharp right-sided chest pain only with deep breath  In ED - HR 92  / 82  Rm Air SpO2 97 %   Exam +  LLE Edema, TTP left medial lower leg  CT showed multiple segmental branch pulmonary artery emboli with right heart strain     Labs: CBC + BMP wnl  INR 0 98  ED Tx:  Lovenox 105 mg  Placed in Observation 7/4/21 at 2130 2nd multiple Pulmonary Emboli with Rt Heart Strain - Lovenox, ECHO, PT + OT Evals    ED Triage Vitals [07/04/21 1832]   Temperature Pulse Respirations Blood Pressure SpO2   (!) 97 2 °F (36 2 °C) 92 16 (!) 175/82 97 %      Temp Source Heart Rate Source Patient Position - Orthostatic VS BP Location FiO2 (%)   Tympanic Monitor Lying Right arm --      Pain Score       Worst Possible Pain          Wt Readings from Last 1 Encounters:   07/04/21 107 kg (236 lb 3 2 oz)     Additional Vital Signs:   07/05/21 11:48:35  98 2 °F (36 8 °C)  75  20  135/73  94  95 %  --  --   07/05/21 0835  98 6 °F (37 °C)  80  20  141/72  95  95 %  None (Room air)  Sitting   07/04/21 22:17:11  98 1 °F (36 7 °C)  81  18  155/83  107  96 %  None (Room air)  Lying   07/04/21 2043  --  70  16  159/77  110  97 %  None (Room air)  Lying     I/O 07/04 0701 07/05 0700 07/05 0701 07/06 0700   P  O  480 240   I V  (mL/kg) 10 (0 1)    Total Intake(mL/kg) 490 (4 6) 240 (2 2)   Urine (mL/kg/hr) 825 300 (0 4)   Total Output 825 300   Net -335 -60     Pertinent Labs/Diagnostic Test Results:                                                 CTA - CHEST WITH IV CONTRAST - PULMONARY ANGIOGRAM  Multiple segmental branch pulmonary artery emboli with right heart strain  The calculated ratio of right ventricular to left ventricular diameter (RV/LV ratio) is 1 07   This is greater than 0 9, which is abnormal and indicates right heart strain  An abnormal RV/LV ratio has been shown to be associated with an increased risk of 30 day mortality in the setting of acute pulmonary embolism   Urgent consultation with the medical critical care team is recommended       ED Treatment:   Medication Administration from 07/04/2021 1820 to 07/04/2021 2152       Date/Time Order Dose Route Action     07/04/2021 2003 iohexol (OMNIPAQUE) 350 MG/ML injection (SINGLE-DOSE) 100 mL 100 mL Intravenous Given     07/04/2021 2123 enoxaparin (LOVENOX) subcutaneous injection 105 mg 105 mg Subcutaneous Given     Past Medical History:   Diagnosis Date    AC (acromioclavicular) joint bone spurs     Last assessed - 2/19/15    Achilles tendinitis, unspecified leg 06/04/2010    Resolved - 1/8/18    Anemia 08/12/2010    Resolved - 1/11/16    Blood in urine     BPH without urinary obstruction     Hypertrophy    Deep venous thrombosis of distal lower extremity ([address] )     2015, provoked, postoperative after bariatric surgery, was placed on coumadin temporarily    Heartburn     Last assessed - 12/12/14    Hemorrhoids     Hernia, inguinal 06/30/2009    Resolved - 1/8/18    History of stomach ulcers     Hypertension     Mixed hyperlipidemia     Nephrolithiasis     Right inguinal hernia     Last assessed - 4/4/16    Sleep apnea     unable to tolerate c-pap    Tear of left rotator cuff     Last assessed - 2/19/15     Present on Admission:   Mixed hyperlipidemia   Benign essential hypertension   Pulmonary embolism (HCC)    Admitting Diagnosis: Pleuritic chest pain [R07 81]  Multiple pulmonary emboli ([address] ) [I26 99]  Chest pain varying with breathing [R07 1]    Age/Sex: 70 y o  male    Admission Orders:  Telemetry  VS q4hrs  Nasal O2 at 2 L/min - Titrate SpO2 > 92 %  Continuous Pulse Oximetry  Up + OOB as tolerated  Diet Regular: House  I+O q shift  ECHO  PT + OT Evals    Scheduled Medications:  atorvastatin, 20 mg, Oral, Daily  enoxaparin, 1 mg/kg, Subcutaneous, Q24H JESIKA  lisinopril, 10 mg, Oral, Daily  tamsulosin, 0 4 mg, Oral, Daily With Dinner  No current facility-administered medications for this encounter  PRN Meds:  acetaminophen, 650 mg, Oral, Q4H PRN    Network Utilization Review Department  ATTENTION: Please call with any questions or concerns to [phone] and carefully listen to the prompts so that you are directed to the right person   All voicemails are confidential   Greene Fetch all requests for admission clinical reviews, approved or denied determinations and any other requests to dedicated fax number below belonging to the campus where the patient is receiving treatment  List of dedicated fax numbers for the Facilities:  [address] DENIALS (Administrative/Medical Necessity) [phone]   [address] (Maternity/NICU/Pediatrics) [phone]   [address] [facility] [address] [facility] [address] [address] [place] [address] Jose Vincent Penteado 14[address] [facility] [phone]         Initial Clinical Review  PATIENT WAS OBSERVATION STATUS 7/4/21 CONVERTED TO INPATIENT ADMISSION 7/5/21     Inpatient Admission (Order [id number])  Admission  Date: 7/5/2021 Department: [address] [place] [address] 4 [facility] Released By/Authorizing: Florida Cabral MD (auto-released)         Orders Placed This Encounter   Procedures    Inpatient Admission     Standing Status:   Standing     Number of Occurrences:   1     Order Specific Question:   Level of Care     Answer:   Med Surg [16]     Order Specific Question:   Estimated length of stay     Answer:   More than 2 Midnights     Order Specific Question:   Certification     Answer:   I certify that inpatient services are medically necessary for this patient for a duration of greater than two midnights  See H&P and MD Progress Notes for additional information about the patient's course of treatment       ED Arrival Information     Expected Arrival Acuity    - 7/4/2021 18:20 Urgent    Service Admission type    Hospitalist Urgent    Arrival complaint    chest pain     Chief Complaint   Patient presents with    Pain With Breathing     Patient c/o right sided chest pain with breathing starting last night  States he had pneumonia before "and this is what it felt like "      Initial Presentation:   69 y/o male with PMHx HTN, HLD, DVT, and 9 days post op Lap Bilateral Inguinal Hernia Repair on 6/25/21 with post op urinary retention  Presented urgently to [address] [place] [address] ED on 7/4/21 2nd few day history of sharp "cramping type" pain in his left calf with some swelling AND 1 day history of worsening sharp right-sided chest pain only with deep breath  In ED - HR 92  / 82  Rm Air SpO2 97 %   Exam +  LLE Edema, TTP left medial lower leg  CT showed multiple segmental branch pulmonary artery emboli with right heart strain  Labs: CBC + BMP wnl  INR 0 98  ED Tx:  Lovenox 105 mg  Placed in Observation 7/4/21 at 2130 2nd multiple Pulmonary Emboli with Rt Heart Strain - Lovenox, ECHO, PT + OT Evals    DATE 7/5/21 CONVERTED TO INPATIENT ADMISSION B/L  PULMONARY EMBOLISM  Reports ongoing right-sided pleuritic pain on deep inspiration, more or less unchanged compared to yesterday  Comments: Laparoscopic port incision site C/D/I, no ecchymosis or hematoma   · Continue weight based dosing of Lovenox  · Echo in AM to further evaluate R heart strain  · Venous Doppler lower extremity  · Transition to rivaroxaban in a m  If continues to do well    Will verify coverage with case management  · Monitor symptoms  · Check B12 level  Referral to Hematology as outpatient due to history of Current Patient Status: Inpatient   · Certification Statement: The patient, admitted on an observation basis, will now require > 2 midnight hospital stay due to Bilateral PE with RV strain regarding further monitoring, echocardiogram, outpatient management decisionrecurrent DVT  DATE 7/6/21   Anemia  Assessment & Plan  Normocytic  Iron studies, B12, folate  Monitor     BMI 37 0-37 9, adult  Assessment & Plan  counselled on diet and exercise       Mixed hyperlipidemia  Assessment & Plan  Continue lipitor 20 mg daily     Benign essential hypertension  Assessment & Plan  Continue lisinopril 10 mg daily     * Pulmonary embolism [facility])  Assessment & Plan  Patient recently had bilateral inguinal hernia repair, uncomplicated recovery  He has been progressively more active since his surgery last week, but started a few days ago with L calf pain  Day prior to admission, he started experiencing R sided chest pain only intermittently and with deep inspiration and has been happening more frequently subsequently  CTA showed multiple segmental branch pulmonary artery emboli with right heart strain, RV/LV ratio 1 07 on CT - Pt evaluated by critical care and is appropriate for management on med/surg floor  Still symptomatic but hemodynamically stable  Saturating adequately on room air  Troponin, proBNP negative  Telemetry-unremarkable  Will discontinue  · Continue weight based dosing of Lovenox  · Echo did not show any right heart strain  · Venous Doppler lower extremity  · Transition to rivaroxaban in a m  If continues to do well  Will verify coverage with case management  · Monitor symptoms  · B12 level - wnl  · Referral to Hematology as outpatient due to history of recurrent DVT   Temp (24hrs), Av 1 °F (36 7 °C), Min:97 8 °F (36 6 °C), Max:98 2 °F (36 8 °C)     Temp:  [97 8 °F (36 6 °C)-98 2 °F (36 8 °C)] 97 8 °F (36 6 °C)  HR:  [72-85] 79  Resp:  [18] 18  BP: (124-136)/(61-75) 136/75  SpO2:  [93 %-95 %] 93 %  Body mass index is 36 99 kg/m²     ED Triage Vitals [21 1832]   Temperature Pulse Respirations Blood Pressure SpO2   (!) 97 2 °F (36 2 °C) 92 16 (!) 175/82 97 %      Temp Source Heart Rate Source Patient Position - Orthostatic VS BP Location FiO2 (%)   Tympanic Monitor Lying Right arm --      Pain Score       Worst Possible Pain          Wt Readings from Last 1 Encounters:   21 107 kg (236 lb 3 2 oz)     Additional Vital Signs:   07/05/21 11:48:35  98 2 °F (36 8 °C)  75  20  135/73  94  95 %  --  --   07/05/21 0835  98 6 °F (37 °C)  80  20  141/72  95  95 %  None (Room air)  Sitting   07/04/21 22:17:11  98 1 °F (36 7 °C)  81  18  155/83  107  96 %  None (Room air)  Lying   07/04/21 2043  --  70  16  159/77  110  97 %  None (Room air)  Lying     I/O 07/04 0701   07/05 0700 07/05 0701 07/06 0700   P  O  480 240   I V  (mL/kg) 10 (0 1)    Total Intake(mL/kg) 490 (4 6) 240 (2 2)   Urine (mL/kg/hr) 825 300 (0 4)   Total Output 825 300   Net -335 -60       Pertinent Labs/Diagnostic Test Results:     Results from last 7 days   Lab Units 07/05/21  0704   WBC Thousand/uL 5 66   HEMOGLOBIN g/dL 10 3*   HEMATOCRIT % 34 1*   PLATELETS Thousands/uL 226   NEUTROS PCT % 62   LYMPHS PCT % 23   MONOS PCT % 8   EOS PCT % 5            Results from last 7 days   Lab Units 07/05/21  0704 07/04/21  1858   POTASSIUM mmol/L 4 0 3 9   CHLORIDE mmol/L 107 106   CO2 mmol/L 31 30   BUN mg/dL 21 18   CREATININE mg/dL 1 06 1 02   CALCIUM mg/dL 8 8 8 0*   ALK PHOS U/L  --  72   ALT U/L  --  25   AST U/L  --  18           Results from last 7 days   Lab Units 07/04/21  1858   INR   0 98         * I Have Reviewed All Lab Data Listed Above  * Additional Pertinent Lab Tests Reviewed: All Labs Within Last 24 Hours Reviewed        Imaging:  Imaging Reports Reviewed Today Include:  CT scan  Recent Cultures (last 7 days):           Results from last 7 days   Lab Units 07/04/21  1858   BLOOD CULTURE   Received in Microbiology Lab  Culture in Progress  Received in Microbiology Lab  Culture in Progress  CTA - CHEST WITH IV CONTRAST - PULMONARY ANGIOGRAM  Multiple segmental branch pulmonary artery emboli with right heart strain  The calculated ratio of right ventricular to left ventricular diameter (RV/LV ratio) is 1 07   This is greater than 0 9, which is abnormal and indicates right heart strain   An abnormal RV/LV ratio has been shown to be associated with an increased risk of 30 day mortality in the setting of acute pulmonary embolism   Urgent consultation with the medical critical care team is recommended       ED Treatment:   Medication Administration from 07/04/2021 1820 to 07/04/2021 2152       Date/Time Order Dose Route Action     07/04/2021 2003 iohexol (OMNIPAQUE) 350 MG/ML injection (SINGLE-DOSE) 100 mL 100 mL Intravenous Given     07/04/2021 2123 enoxaparin (LOVENOX) subcutaneous injection 105 mg 105 mg Subcutaneous Given     Past Medical History:   Diagnosis Date    AC (acromioclavicular) joint bone spurs     Last assessed - 2/19/15    Achilles tendinitis, unspecified leg 06/04/2010    Resolved - 1/8/18    Anemia 08/12/2010    Resolved - 1/11/16    Blood in urine     BPH without urinary obstruction     Hypertrophy    Deep venous thrombosis of distal lower extremity ([address] )     2015, provoked, postoperative after bariatric surgery, was placed on coumadin temporarily    Heartburn     Last assessed - 12/12/14    Hemorrhoids     Hernia, inguinal 06/30/2009    Resolved - 1/8/18    History of stomach ulcers     Hypertension     Mixed hyperlipidemia     Nephrolithiasis     Right inguinal hernia     Last assessed - 4/4/16    Sleep apnea     unable to tolerate c-pap    Tear of left rotator cuff     Last assessed - 2/19/15     Present on Admission:   Mixed hyperlipidemia   Benign essential hypertension   Pulmonary embolism (HCC)    Admitting Diagnosis: Pleuritic chest pain [R07 81]  Multiple pulmonary emboli ([address] ) [I26 99]  Chest pain varying with breathing [R07 1]    Age/Sex: 70 y o  male    Admission Orders:  Telemetry  VS q4hrs  Nasal O2 at 2 L/min - Titrate SpO2 > 92 %  Continuous Pulse Oximetry  Up + OOB as tolerated  Diet Regular: House  I+O q shift  ECHO  PT + OT Evals    Scheduled Medications:  atorvastatin, 20 mg, Oral, Daily  enoxaparin, 1 mg/kg, Subcutaneous, Q24H JESIKA  lisinopril, 10 mg, Oral, Daily  tamsulosin, 0 4 mg, Oral, Daily With Dinner    PRN Meds:  acetaminophen, 650 mg, Oral, Q4H PRN    Network Utilization Review Department  ATTENTION: Please call with any questions or concerns to [phone] and carefully listen to the prompts so that you are directed to the right person  All voicemails are confidential   Claudell Patient all requests for admission clinical reviews, approved or denied determinations and any other requests to dedicated fax number below belonging to the campus where the patient is receiving treatment  List of dedicated fax numbers for the Facilities:  [address] DENIALS (Administrative/Medical Necessity) [phone]   [address] (Maternity/NICU/Pediatrics) [phone]   [address] [facility] 55[address] [address] [address]81 [address] [facility] [facility] [phone]           Notification of Discharge   This is a Notification of Discharge from our facility [address]  Please be advised that this patient has been discharge from our facility  Below you will find the admission and discharge date and time including the patients disposition  UTILIZATION REVIEW CONTACT:  Yousuf Holt  Utilization   Network Utilization Review Department  Phone: [phone] x carefully listen to the prompts  All voicemails are confidential   Email: [email or web address]     PHYSICIAN ADVISORY SERVICES:  FOR [id number] REVIEW - MEDICAL NECESSITY DENIAL  Phone: [phone]  Fax: [phone]  Email: [email or web address]     PRESENTATION DATE: 7/4/2021  6:31 PM  OBERVATION ADMISSION DATE:   INPATIENT ADMISSION DATE: 7/5/21  3:39 PM   DISCHARGE DATE: 7/7/2021  2:20 PM  DISPOSITION: Home/Self Care Home/Self Care      IMPORTANT INFORMATION:  Send all requests for admission clinical reviews, approved or denied determinations and any other requests to dedicated fax number below belonging to the campus where the patient is receiving treatment   List of dedicated fax numbers:  [address] DENIALS (Administrative/Medical Necessity) [phone]   [address] (Maternity/NICU/Pediatrics) [phone]   Varghese Barbosa [phone]   Justice Miller [phone]   Kaela Pierson [phone]   [facility] [address] [place] [address] [phone]   [facility]  [phone]   [facility]  2401 [facility] 1000 [address] [facility] [phone]

## 2021-07-16 NOTE — UTILIZATION REVIEW
Continued Stay Review    Date:  &                           Current Patient Class: inpatient  Current Level of Care: med surg  HPI:71 y o  male initially admitted on 21     Assessment/Plan:   21:  PE s/p bilateral hernia repair  Laparoscopic port incision site C/D/I, no ecchymosis or hematoma  Persistent right-sided pleuritic pain on deep inspiration  Remains on Lovenox tx dosing @ q12h  Scheduled for doppler studies  CTA showing heart strain, scheduled for echo  21:  CTA showed multiple segmental branch pulmonary artery emboli with right heart strain, RV/LV ratio 1 07 on CT  still symptomatic with persistent pleuritic chest pain  Lovenox tx dose continued @ q12h  Echo neg for heart strain  Scheduled for BLE venous duplex studies  Surgery consulted  Per [age]-year-old male postoperative day 12  For laparoscopic bilateral inguinal hernia repair with pulmonary embolism  Incisions are clean and dry  The middle incision was noted to have some drainage the glue was removed and the wound was inspected this was found to be clear and intact with little bit of serous discharge  No scrotal edema or hematoma  - anticoagulation per Medicine    Recommend transition to Xarelto with outpatient hematology follow-up  - bilateral lower extremity venous duplex to be performed today    Vital Signs:   Date/Time  Temp  Pulse  Resp  BP  MAP (mmHg)  SpO2  O2 Device  Patient Position - Orthostatic VS   21 09:50:15  97 9 °F (36 6 °C)  87  20  142/71  95  93 %  --  --   21 0950  --  --  --  142/71  --  --  --  --   21 23:34:48  --  82  18  134/71  92  95 %  --  --   21 19:04:07  98 3 °F (36 8 °C)  77  19  134/72  93  94 %  None (Room air)  --   21 16:34:19  97 8 °F (36 6 °C)  79  18  136/75  95  93 %  --  --   21 09:29:50  98 2 °F (36 8 °C)  85  --  127/65  86  94 %  --  --   21 23:52:36  98 2 °F (36 8 °C)  72  18  124/61  82  95 %  --  --   21 19:03:29  98 1 °F (36 7 °C)  79  18  130/70  90  95 %  None (Room air)  --   07/05/21 15:12:50  97 8 °F (36 6 °C)  72  18  133/74  94  93 %  --  --   07/05/21 11:48:35  98 2 °F (36 8 °C)  75  20  135/73  94  95 %  --  --   07/05/21 0835  98 6 °F (37 °C)  80  20  141/72  95  95 %  None (Room air)  Sitting   07/05/21 08:34:25  98 6 °F (37 °C)  76  20  141/72  95  93 %  --  --     Pertinent Labs/Diagnostic Results:   7/6  Echo=  LEFT VENTRICLE:  Systolic function was normal by visual assessment  Ejection fraction was estimated in the range of 55 % to 60 %  There were no regional wall motion abnormalities  There was mild concentric hypertrophy  Doppler parameters were consistent with abnormal left ventricular relaxation (grade 1 diastolic dysfunction)  RIGHT VENTRICLE:  The size was normal   Systolic function was normal   Systolic pressure was within the normal range  AORTIC VALVE:  There was no evidence for stenosis  There was no regurgitation  TRICUSPID VALVE:  There was no significant regurgitation  The tricuspid jet envelope definition was inadequate for estimation of RV systolic pressure  HISTORY: PRIOR HISTORY: HTN, HLD, DEVON, Pulmonary embolism, Obesity, DVT  7/7  BLE venous duplex=  RIGHT LOWER LIMB:  No evidence of acute or chronic deep vein thrombosis  No evidence of superficial thrombophlebitis noted  Doppler evaluation shows a normal response to augmentation maneuvers  Popliteal, posterior tibial and anterior tibial arterial Doppler waveforms are triphasic  LEFT LOWER LIMB:  No evidence of acute or chronic deep vein thrombosis  No evidence of superficial thrombophlebitis noted  Doppler evaluation shows a normal response to augmentation maneuvers  Popliteal, posterior tibial and anterior tibial arterial Doppler waveforms are triphasic  There is a hypoechoic non-vascularized structure noted at the mid calf      Medications:   Scheduled Medications:  atorvastatin, 20 mg, Oral, Daily  enoxaparin, 1 mg/kg, Subcutaneous, Q12H [address]  lisinopril, 10 mg, Oral, Daily  tamsulosin, 0 4 mg, Oral, Daily With Dinner    PRN Meds:  acetaminophen, 650 mg, Oral, Q4H PRN  Discharge Plan: tbd    Network Utilization Review Department  ATTENTION: Please call with any questions or concerns to [phone] and carefully listen to the prompts so that you are directed to the right person  All voicemails are confidential   Bhumi Query all requests for admission clinical reviews, approved or denied determinations and any other requests to dedicated fax number below belonging to the campus where the patient is receiving treatment   List of dedicated fax numbers for the Facilities:  [address] DENIALS (Administrative/Medical Necessity) [phone]   [address] (Maternity/NICU/Pediatrics) [phone]   [address] [place] [address] [facility] [address] [address] [place] [address] Jose Carlton Daniels 1481 [address] [facility]2 [place] [address] [phone]

## 2021-07-16 NOTE — UTILIZATION REVIEW
Notification of Discharge   This is a Notification of Discharge from our facility [address]  Please be advised that this patient has been discharge from our facility  Below you will find the admission and discharge date and time including the patients disposition  UTILIZATION REVIEW CONTACT:  Susy Barrientos  Utilization   Network Utilization Review Department  Phone: [phone] x carefully listen to the prompts  All voicemails are confidential   Email: [email or web address]     PHYSICIAN ADVISORY SERVICES:  FOR [id number] REVIEW - MEDICAL NECESSITY DENIAL  Phone: [phone]  Fax: [phone]  Email: [email or web address]     PRESENTATION DATE: 7/4/2021  6:31 PM  OBERVATION ADMISSION DATE:   INPATIENT ADMISSION DATE: 7/5/21  3:39 PM   DISCHARGE DATE: 7/7/2021  2:20 PM  DISPOSITION: Home/Self Care Home/Self Care      IMPORTANT INFORMATION:  Send all requests for admission clinical reviews, approved or denied determinations and any other requests to dedicated fax number below belonging to the campus where the patient is receiving treatment   List of dedicated fax numbers:  [address] DENIALS (Administrative/Medical Necessity) [phone]   [address] (Maternity/NICU/Pediatrics) [phone]   Gerbob Rodriguez [phone]   Lesly Martinez [phone]   Antoinette Moore [phone]   Christal Taylor [address] [phone]   [facility]  [phone]   220 [facility], [facility]  [address] [facility] [address] [facility] [phone]

## 2021-07-16 NOTE — TELEPHONE ENCOUNTER
New Patient Encounter    New Patient Intake Form   Patient Details:  Reyes Andres  1950  [id number]    Background Information:  [address] starts by opening a telephone encounter and gathering the following information   Who is calling to schedule? If not self, relationship to patient? Patient   Referring Provider ER   What is the diagnosis? Blood clots in lung   Is this Cancer or Non-Cancer? Non-Cancer   Is this diagnosis confirmed? Yes   When was the diagnosis? 7/2021   Is there a confirmed diagnosis from a biopsy/tissue reviewed by pathology? NA   Were outside slides requested? NA   Is patient aware of diagnosis? Yes   Is there a personal history and what kind? No   Is there a family history and what kind? Yes -son - carcinoma   Reason for visit? New Diagnosis   Have you had any imaging or labs done? If so: when, where? yes  At [facility] ([facility])   Are records in [place] [address]? yes   If patient has a prior history of cancer were old records obtained? NA   Was the patient told to bring a disk? No   Does the patient smoke or Vape? No   If yes, how many packs or cartridges per day? n/a   Scheduling Information:   [facility]:  [facility]     Are there any dates/time the patient cannot be seen? no   Miscellaneous: n/a   After completing the above information, please route to Financial Counselor and the appropriate Nurse Navigator for review

## 2021-07-16 NOTE — PROGRESS NOTES
Assessment/Plan:    1  Other acute pulmonary embolism, unspecified whether acute cor pulmonale present ([facility] [address] [address] )    2  Benign essential hypertension  Assessment & Plan:  stable      3  Iron deficiency anemia, unspecified iron deficiency anemia type  -     ferrous gluconate (FERGON) 240 (27 FE) MG tablet; Take 1 tablet (240 mg total) by mouth 3 (three) times a day with meals  -     CBC; Future; Expected date: 08/16/2021    4  Urinary retention  -     tamsulosin (FLOMAX) 0 4 mg; Take 1 capsule (0 4 mg total) by mouth daily with dinner      Pt is in the middle of the bid xeralto for 21 days  Will then start BID rigimine  Pt was found to be anemic -     There are no Patient Instructions on file for this visit  Return in about 6 months (around 1/16/2022)  Subjective:      Patient ID: Fe Hammer is a 70 y o  male  Chief Complaint   Patient presents with    Transition of Care Management     chest pain nm/lpn       Pt is being seen for a hospital follow up TCM  Nurses note appreciated    Pt Is recovering from surgery within a month ago  PT states about ten days ago started with painful breathing went to the ED was disgnaosed with multiple PE and DVT in left leg    No CP now  Breathing is fine  Claf is still swollen and sore on the left side  Pt states while he is here he has a number of questions  OPT states his hemoglobin was 11 3 qand his calcium was a little low      The following portions of the patient's history were reviewed and updated as appropriate: allergies, current medications, past family history, past medical history, past social history, past surgical history and problem list     Review of Systems   Constitutional: Negative for activity change, appetite change, chills, diaphoresis, fatigue, fever and unexpected weight change  HENT: Negative for congestion, dental problem, ear pain, mouth sores, sinus pressure, sinus pain, sore throat and trouble swallowing      Eyes: Negative for photophobia, discharge and itching  Respiratory: Negative for apnea, chest tightness and shortness of breath  Cardiovascular: Positive for leg swelling  Negative for chest pain and palpitations  Gastrointestinal: Negative for abdominal distention, abdominal pain, blood in stool, nausea and vomiting  Endocrine: Negative for cold intolerance, heat intolerance, polydipsia, polyphagia and polyuria  Genitourinary: Negative for difficulty urinating  Musculoskeletal: Negative for arthralgias  Skin: Positive for wound  Negative for color change  Neurological: Negative for dizziness, syncope, speech difficulty and headaches  Hematological: Negative for adenopathy  Psychiatric/Behavioral: Negative for agitation and behavioral problems  Current Outpatient Medications   Medication Sig Dispense Refill    atorvastatin (LIPITOR) 20 mg tablet TAKE ONE TABLET BY MOUTH EVERY DAY 90 tablet 1    lisinopril (ZESTRIL) 10 mg tablet TAKE ONE TABLET BY MOUTH EVERY DAY 90 tablet 1    rivaroxaban (XARELTO) 15 mg tablet Take 1 tablet (15 mg total) by mouth 2 (two) times a day for 21 days 42 tablet 0    tamsulosin (FLOMAX) 0 4 mg Take 1 capsule (0 4 mg total) by mouth daily with dinner 30 capsule 0    acetaminophen (TYLENOL) 500 mg tablet Take 500 mg by mouth (Patient not taking: Reported on 7/16/2021)      ferrous gluconate (FERGON) 240 (27 FE) MG tablet Take 1 tablet (240 mg total) by mouth 3 (three) times a day with meals 90 tablet 1    [START ON 7/28/2021] rivaroxaban (XARELTO) 20 mg tablet Take 1 tablet (20 mg total) by mouth daily with breakfast (Patient not taking: Reported on 7/16/2021) 30 tablet 0     No current facility-administered medications for this visit  Objective:    /72   Pulse 82   Temp (!) 97 2 °F (36 2 °C)   Resp 18   Ht 5' 7" (1 702 m)   Wt 108 kg (238 lb 9 6 oz)   BMI 37 37 kg/m²        Physical Exam  Vitals and nursing note reviewed     Constitutional:       General: He is not in acute distress  Appearance: He is well-developed  He is not diaphoretic  HENT:      Head: Normocephalic and atraumatic  Right Ear: External ear normal       Left Ear: External ear normal       Nose: Nose normal       Mouth/Throat:      Pharynx: No oropharyngeal exudate  Eyes:      General: No scleral icterus  Right eye: No discharge  Left eye: No discharge  Pupils: Pupils are equal, round, and reactive to light  Neck:      Thyroid: No thyromegaly  Cardiovascular:      Rate and Rhythm: Normal rate  Heart sounds: Normal heart sounds  No murmur heard  Pulmonary:      Effort: Pulmonary effort is normal  No respiratory distress  Breath sounds: Normal breath sounds  No wheezing  Abdominal:      General: Bowel sounds are normal  There is no distension  Palpations: Abdomen is soft  There is no mass  Tenderness: There is no abdominal tenderness  There is no guarding or rebound  Musculoskeletal:         General: Normal range of motion  Skin:     General: Skin is warm and dry  Findings: No erythema or rash  Neurological:      Mental Status: He is alert        Coordination: Coordination normal       Deep Tendon Reflexes: Reflexes normal    Psychiatric:         Behavior: Behavior normal                 Lit Kamara DO

## 2021-08-16 ENCOUNTER — DOCUMENTATION (OUTPATIENT)
Dept: HEMATOLOGY ONCOLOGY | Facility: MEDICAL CENTER | Age: 71
End: 2021-08-16

## 2021-08-16 ENCOUNTER — TELEPHONE (OUTPATIENT)
Dept: HEMATOLOGY ONCOLOGY | Facility: CLINIC | Age: 71
End: 2021-08-16

## 2021-08-17 ENCOUNTER — CONSULT (OUTPATIENT)
Dept: HEMATOLOGY ONCOLOGY | Facility: MEDICAL CENTER | Age: 71
End: 2021-08-17
Payer: COMMERCIAL

## 2021-08-17 VITALS
DIASTOLIC BLOOD PRESSURE: 78 MMHG | OXYGEN SATURATION: 98 % | HEIGHT: 67 IN | TEMPERATURE: 98.1 F | WEIGHT: 245 LBS | BODY MASS INDEX: 38.45 KG/M2 | SYSTOLIC BLOOD PRESSURE: 122 MMHG | RESPIRATION RATE: 16 BRPM | HEART RATE: 76 BPM

## 2021-08-17 DIAGNOSIS — Z79.01 ON ANTICOAGULANT THERAPY: ICD-10-CM

## 2021-08-17 DIAGNOSIS — I26.94 MULTIPLE SUBSEGMENTAL PULMONARY EMBOLI WITHOUT ACUTE COR PULMONALE (HCC): ICD-10-CM

## 2021-08-17 DIAGNOSIS — Z98.84 S/P BARIATRIC SURGERY: Primary | ICD-10-CM

## 2021-08-17 PROCEDURE — 3008F BODY MASS INDEX DOCD: CPT | Performed by: PHYSICIAN ASSISTANT

## 2021-08-17 PROCEDURE — 1036F TOBACCO NON-USER: CPT | Performed by: PHYSICIAN ASSISTANT

## 2021-08-17 PROCEDURE — 99204 OFFICE O/P NEW MOD 45 MIN: CPT | Performed by: PHYSICIAN ASSISTANT

## 2021-08-17 PROCEDURE — 3078F DIAST BP <80 MM HG: CPT | Performed by: PHYSICIAN ASSISTANT

## 2021-08-17 PROCEDURE — 3074F SYST BP LT 130 MM HG: CPT | Performed by: PHYSICIAN ASSISTANT

## 2021-08-17 NOTE — PROGRESS NOTES
[facility] [address] [address]  Hematology Ambulatory Consult  Romero Nathan, 1950, [id number]  8/17/2021    Assessment/Plan:  1  Multiple subsegmental pulmonary emboli without acute cor pulmonale ([address] ); 2  On anticoagulant therapy  2 episodes of Provoked DVT or PE  Both episodes have been provoked  In this situation, I discussed with the patient that workup to discover if he has an underlying hypercoagulable state would be warranted  We would not do these studies for another 5-6 months from now until acute inflammation has reduced from pulmonary emboli  In the meantime, the patient is to remain on Xarelto  I have recommended that he continue on full-dose  Until clarification regarding underlying hypercoagulable state occurs  Due to the fact that this has been a repeat event, even though it is provoked, the patient may benefit from lifelong anticoagulation considering his gender and age  I discussed with the patient that sometimes half dose anticoagulation can be used in a preventative fashion  Ultimately, Xarelto 10 mg once a day may be advantageous to the patient  We will discuss this on follow-up  I have asked that he come back in three months for anticoagulation check  Blood test for hypercoagulable state will be ordered at that time  Patient voiced agreement and understanding to the above  All of his questions were answered  I rediscussed potential side effects of anticoagulation therapy, the patient understands to follow up in the hospital if he has any acute trauma to the abdomen or head  Regimen:  Xarelto 20 mg PO daily  Follow up Hypercoag testing in Jan 2022  Can DC Anticoagulation starting 1/4/2022 for testing approx 1/18/2022    - Basic metabolic panel; Future  - CBC and differential; Future    3   S/P bariatric surgery/  Concern for development of malabsorption condition  Patient has had bariatric surgery although it has a gastric sleeve  Patients typically do better as far as absorption of vitamins with a sleeve verses a bypass  I recommended that upon the patient's return to the office in approximately three months, we will recheck the below substrates as well as a CBC       - CBC and differential; Future  - Iron Panel (Includes Ferritin, Iron Sat%, Iron, and TIBC); Future  - Methylmalonic acid, serum; Future      The patient is scheduled for follow-up in approximately 3 months with Dr Viktoria Schwartz  Patient voiced agreement and understanding to the above  Patient knows to call the Hematology/Oncology office with any questions and concerns regarding the above  Barrier(s) to care: None  The patient is able to self care     -------------------------------------------------------------------------------------------------------    Chief Complaint   Patient presents with    Follow-up     Referring provider:  No referring provider defined for this encounter  History of present illness: This is a 72-year-old male with history of high blood pressure, hyperlipidemia, enlarged prostate, obstructive sleep apnea, status post gastric sleeve surgery in 2015,  History of DVT status post surgical intervention in 2016 requiring three months of Coumadin therapy,  Who more recently had an inguinal hernia repair in June of 2021 followed by multiple segmental pulmonary emboli with symptoms including lower extremity pain on 7/4/21  Patient presents to Hematology Clinic to discuss length of anticoagulation, recommendations  For additional workup looking for thrombotic etiology  patient has a significant surgical history:  · 2004 -bilateral knee replacements  · Patient was placed on Lovenox following knee replacement, per protocol    ·  2011 hip replacement- right  ·  2012 hip replacement-left  ·  2013 revision of left hip replacement  · 2015 patient underwent gastric sleeve surgery and had IVC filter placed before  And removeafter surgical intervention  · 3/3/2016 patient was thought to have an incarcerated hernia and underwent emergency laparoscopic surgical procedure at [facility]   ·   This is when patient had his 1st blood clot of his lower extremity and was placed on Coumadin for three months  · 2/2021:  Shoulder surgery  · 6/25/2021:  Inguinal hernia repair  ·   7/4/2021:   Multiple subsegmental bilateral pulmonary emboli with heart strain  DVT evaluation was negative however the patient did report some lower extremity pain prior to presenting to the ER with chest symptoms  Discharged home on Xarelto  Interval history:    Patient denies bleeding complications  No family history of blood clots/ PE  Review of Systems   Constitutional: Negative for activity change, appetite change, fatigue and fever  HENT: Negative for nosebleeds  Respiratory: Negative for cough, choking and shortness of breath  Cardiovascular: Negative for chest pain, palpitations and leg swelling  Gastrointestinal: Negative for abdominal distention, abdominal pain, anal bleeding, blood in stool, constipation, diarrhea, nausea and vomiting  Endocrine: Negative for cold intolerance  Genitourinary: Negative for hematuria  Musculoskeletal: Negative for myalgias  Skin: Negative for color change, pallor and rash  Allergic/Immunologic: Negative for immunocompromised state  Neurological: Negative for headaches  Hematological: Negative for adenopathy  Does not bruise/bleed easily  All other systems reviewed and are negative        Patient Active Problem List   Diagnosis    Benign essential hypertension    Mixed hyperlipidemia    Abnormal blood sugar    AC (acromioclavicular) joint bone spurs    Enlarged prostate without lower urinary tract symptoms (luts)    GERD without esophagitis    Internal hemorrhoids    Obstructive sleep apnea    Postgastrectomy malabsorption  History of colon polyps    S/P bariatric surgery    BMI 37 0-37 9, adult    Class 2 severe obesity due to excess calories with serious comorbidity and body mass index (BMI) of 37 0 to 37 9 in adult [facility])    Arthritis of carpometacarpal (CMC) joint of left thumb    Rotator cuff tear arthropathy of right shoulder    Recurrent inguinal hernia without obstruction or gangrene    Non-recurrent unilateral inguinal hernia without obstruction or gangrene    Postoperative urinary retention    Pulmonary embolism (HCC)    Anemia       Past Medical History:   Diagnosis Date    AC (acromioclavicular) joint bone spurs     Last assessed - 2/19/15    Achilles tendinitis, unspecified leg 06/04/2010    Resolved - 1/8/18    Anemia 08/12/2010    Resolved - 1/11/16    Blood in urine     BPH without urinary obstruction     Hypertrophy    Deep venous thrombosis of distal lower extremity ([address] )     2015, provoked, postoperative after bariatric surgery, was placed on coumadin temporarily    Heartburn     Last assessed - 12/12/14    Hemorrhoids     Hernia, inguinal 06/30/2009    Resolved - 1/8/18    History of stomach ulcers     Hypertension     Mixed hyperlipidemia     Nephrolithiasis     Right inguinal hernia     Last assessed - 4/4/16    Sleep apnea     unable to tolerate c-pap    Tear of left rotator cuff     Last assessed - 2/19/15       Past Surgical History:   Procedure Laterality Date    BARIATRIC SURGERY  06/15/2015    Laparoscopic Longitudinal Gastrectomy for morbid obesity, Managed by - Blank Cortes     EXPLORATORY LAPAROTOMY W/ BOWEL RESECTION Right 3/3/2016    Procedure: Incarcerated possibly strangulated right inguinal hernia;   Surgeon: Maty Mcknight MD;  Location: [address] [place] [address];  Service:    [address]  [facility] SURGERY      2011 - Rt Hip Replacement, 2012 - Lt Hip Replacement, 2013 - Lt Hip Revision    INGUINAL HERNIA REPAIR      Last assessed - 4/4/16    JOINT REPLACEMENT      KNEE SURGERY  2004    Double     HI COLONOSCOPY FLX DX W/COLLJ SPEC WHEN PFRMD N/A 1/15/2016    Procedure: COLONOSCOPY;  Surgeon: Kimberley Shannon MD;  Location: [facility] GI LAB; Service: Gastroenterology    HI COLONOSCOPY FLX DX W/COLLJ SPEC WHEN PFRMD N/A 2019    Procedure: COLONOSCOPY;  Surgeon: Kimberley Shannon MD;  Location: [facility] GI LAB; Service: Gastroenterology    HI [address] Bilateral 2021    Procedure: REPAIR HERNIA INGUINAL, LAPAROSCOPIC;  Surgeon: Odell James MD;  Location: [address] [place] [address];  Service: General    HI RECONSTR TOTAL SHOULDER IMPLANT Right 2021    Procedure: ARTHROPLASTY SHOULDER REVERSE (RIGHT);   Surgeon: Avel Mora MD;  Location: [facility];  Service: Orthopedics    TONSILLECTOMY         Family History   Problem Relation Age of Onset    Arthritis Father     Diabetes Father     Hypertension Father     Hyperlipidemia Father     Diabetes Family     Emphysema Family     Heart disease Family     Hypertension Family     Mental illness Neg Hx      Social History     Socioeconomic History    Marital status: /Civil Union     Spouse name: None    Number of children: None    Years of education: None    Highest education level: None   Occupational History    None   Tobacco Use    Smoking status: Former Smoker     Quit date: 1994     Years since quittin 3    Smokeless tobacco: Never Used   Vaping Use    Vaping Use: Never used   Substance and Sexual Activity    Alcohol use: Yes     Comment: Occ, very rarely     Drug use: Never    Sexual activity: Yes     Partners: Female   Other Topics Concern    None   Social History Narrative    Exercise - Walking once a day     Social Determinants of Health     Financial Resource Strain:     Difficulty of Paying Living Expenses:    Food Insecurity:     Worried About Running Out of Food in the Last Year:     Sandi of Food in the Last Year:    Transportation Needs:     Lack of Transportation (Medical):  Lack of Transportation (Non-Medical):    Physical Activity:     Days of Exercise per Week:     Minutes of Exercise per Session:    Stress:     Feeling of Stress :    Social Connections:     Frequency of Communication with Friends and Family:     Frequency of Social Gatherings with Friends and Family:     Attends [employer] Services:     Active Member of Clubs or Organizations:     Attends Club or Organization Meetings:     Marital Status:    Intimate Partner Violence:     Fear of Current or Ex-Partner:     Emotionally Abused:     Physically Abused:     Sexually Abused:        Current Outpatient Medications:     atorvastatin (LIPITOR) 20 mg tablet, TAKE ONE TABLET BY MOUTH EVERY DAY, Disp: 90 tablet, Rfl: 1    ferrous gluconate (FERGON) 240 (27 FE) MG tablet, Take 1 tablet (240 mg total) by mouth 3 (three) times a day with meals, Disp: 90 tablet, Rfl: 1    lisinopril (ZESTRIL) 10 mg tablet, TAKE ONE TABLET BY MOUTH EVERY DAY, Disp: 90 tablet, Rfl: 1    rivaroxaban (XARELTO) 20 mg tablet, Take 1 tablet (20 mg total) by mouth daily with breakfast, Disp: 30 tablet, Rfl: 0    tamsulosin (FLOMAX) 0 4 mg, Take 1 capsule (0 4 mg total) by mouth daily with dinner, Disp: 30 capsule, Rfl: 0    Allergies   Allergen Reactions    Percocet [Oxycodone-Acetaminophen] Hallucinations    Percolone [Oxycodone] Hallucinations     Objective:  /78 (BP Location: Left arm, Patient Position: Sitting, Cuff Size: Adult)   Pulse 76   Temp 98 1 °F (36 7 °C)   Resp 16   Ht 5' 7" (1 702 m)   Wt 111 kg (245 lb)   SpO2 98%   BMI 38 37 kg/m²   Physical Exam  Constitutional:       General: He is not in acute distress  Appearance: He is well-developed  HENT:      Head: Normocephalic and atraumatic  Mouth/Throat:      Pharynx: No oropharyngeal exudate  Eyes:      General: No scleral icterus       Conjunctiva/sclera: Conjunctivae normal       Pupils: Pupils are equal, round, and reactive to light  Cardiovascular:      Rate and Rhythm: Normal rate and regular rhythm  Heart sounds: No murmur heard  Pulmonary:      Effort: Pulmonary effort is normal  No respiratory distress  Breath sounds: Normal breath sounds  Abdominal:      General: Bowel sounds are normal  There is no distension  Palpations: Abdomen is soft  Tenderness: There is no abdominal tenderness  Musculoskeletal:         General: No tenderness  Cervical back: Normal range of motion  Right lower leg: Edema (non pitting) present  Left lower leg: Edema (non pitting) present  Lymphadenopathy:      Cervical: No cervical adenopathy  Skin:     Coloration: Skin is not pale  Findings: No erythema or rash  Neurological:      Mental Status: He is alert and oriented to person, place, and time  Cranial Nerves: No cranial nerve deficit  Result Review  Labs:  No visits with results within 3 Week(s) from this visit  Latest known visit with results is:   Admission on 07/04/2021, Discharged on 07/07/2021   Component Date Value Ref Range Status    Sodium 07/04/2021 141  136 - 145 mmol/L Final    Potassium 07/04/2021 3 9  3 5 - 5 3 mmol/L Final    Chloride 07/04/2021 106  100 - 108 mmol/L Final    CO2 07/04/2021 30  21 - 32 mmol/L Final    ANION GAP 07/04/2021 5  4 - 13 mmol/L Final    BUN 07/04/2021 18  5 - 25 mg/dL Final    Creatinine 07/04/2021 1 02  0 60 - 1 30 mg/dL Final    Standardized to IDMS reference method    Glucose 07/04/2021 104  65 - 140 mg/dL Final    If the patient is fasting, the ADA then defines impaired fasting glucose as > 100 mg/dL and diabetes as > or equal to 123 mg/dL  Specimen collection should occur prior to Sulfasalazine administration due to the potential for falsely depressed results  Specimen collection should occur prior to Sulfapyridine administration due to the potential for falsely elevated results      Calcium 07/04/2021 8 0* 8 3 - 10 1 mg/dL Final    Corrected Calcium 07/04/2021 8 8  8 3 - 10 1 mg/dL Final    AST 07/04/2021 18  5 - 45 U/L Final    Specimen collection should occur prior to Sulfasalazine administration due to the potential for falsely depressed results   ALT 07/04/2021 25  12 - 78 U/L Final    Specimen collection should occur prior to Sulfasalazine administration due to the potential for falsely depressed results   Alkaline Phosphatase 07/04/2021 72  46 - 116 U/L Final    Total Protein 07/04/2021 7 0  6 4 - 8 2 g/dL Final    Albumin 07/04/2021 3 0* 3 5 - 5 0 g/dL Final    Total Bilirubin 07/04/2021 0 30  0 20 - 1 00 mg/dL Final    Use of this assay is not recommended for patients undergoing treatment with eltrombopag due to the potential for falsely elevated results      eGFR 07/04/2021 74  ml/min/1 73sq m Final    WBC 07/04/2021 6 99  4 31 - 10 16 Thousand/uL Final    RBC 07/04/2021 4 04  3 88 - 5 62 Million/uL Final    Hemoglobin 07/04/2021 11 2* 12 0 - 17 0 g/dL Final    Hematocrit 07/04/2021 36 6  36 5 - 49 3 % Final    MCV 07/04/2021 91  82 - 98 fL Final    MCH 07/04/2021 27 7  26 8 - 34 3 pg Final    MCHC 07/04/2021 30 6* 31 4 - 37 4 g/dL Final    RDW 07/04/2021 14 7  11 6 - 15 1 % Final    MPV 07/04/2021 9 7  8 9 - 12 7 fL Final    Platelets 58/78/9206 251  149 - 390 Thousands/uL Final    nRBC 07/04/2021 0  /100 WBCs Final    Neutrophils Relative 07/04/2021 65  43 - 75 % Final    Immat GRANS % 07/04/2021 0  0 - 2 % Final    Lymphocytes Relative 07/04/2021 22  14 - 44 % Final    Monocytes Relative 07/04/2021 8  4 - 12 % Final    Eosinophils Relative 07/04/2021 5  0 - 6 % Final    Basophils Relative 07/04/2021 0  0 - 1 % Final    Neutrophils Absolute 07/04/2021 4 57  1 85 - 7 62 Thousands/µL Final    Immature Grans Absolute 07/04/2021 0 02  0 00 - 0 20 Thousand/uL Final    Lymphocytes Absolute 07/04/2021 1 51  0 60 - 4 47 Thousands/µL Final    Monocytes Absolute 07/04/2021 0 54  0 17 - 1 22 Thousand/µL Final    Eosinophils Absolute 07/04/2021 0 32  0 00 - 0 61 Thousand/µL Final    Basophils Absolute 07/04/2021 0 03  0 00 - 0 10 Thousands/µL Final    Protime 07/04/2021 12 9  11 6 - 14 5 seconds Final    INR 07/04/2021 0 98  0 84 - 1 19 Final    PTT 07/04/2021 30  23 - 37 seconds Final    Therapeutic Heparin Range =  60-90 seconds    Troponin I 07/04/2021 <0 02  <=0 04 ng/mL Final    [employer] Chemistry analyzer 99% cutoff is > 0 04 ng/mL in network labs     o cTnI 99% cutoff is useful only when applied to patients in the clinical setting of myocardial ischemia   o cTnI 99% cutoff should be interpreted in the context of clinical history, ECG findings and possibly cardiac imaging to establish correct diagnosis  o cTnI 99% cutoff may be suggestive but clearly not indicative of a coronary event without the clinical setting of myocardial ischemia   Blood Culture 07/04/2021 No Growth After 5 Days  Final    Blood Culture 07/04/2021 No Growth After 5 Days     Final    LACTIC ACID 07/04/2021 1 0  0 5 - 2 0 mmol/L Final    Color, UA 07/04/2021 Yellow   Final    Clarity, UA 07/04/2021 Clear   Final    Specific Gravity, UA 07/04/2021 1 020  1 000 - 1 030 Final    pH, UA 07/04/2021 5 5  5 0, 5 5, 6 0, 6 5, 7 0, 7 5, 8 0, 8 5, 9 0 Final    Leukocytes, UA 07/04/2021 Negative  Negative Final    Nitrite, UA 07/04/2021 Negative  Negative Final    Protein, UA 07/04/2021 Negative  Negative mg/dl Final    Glucose, UA 07/04/2021 Negative  Negative mg/dl Final    Ketones, UA 07/04/2021 Negative  Negative mg/dl Final    Urobilinogen, UA 07/04/2021 0 2  0 2, 1 0 E U /dl E U /dl Final    Bilirubin, UA 07/04/2021 Negative  Negative Final    Blood, UA 07/04/2021 Trace-Intact* Negative Final    NT-proBNP 07/04/2021 34  <125 pg/mL Final    RBC, UA 07/04/2021 1-2  None Seen, 0-1, 1-2, 2-4, 0-5 /hpf Final    WBC, UA 07/04/2021 0-1  None Seen, 0-1, 1-2, 0-5, 2-4 /hpf Final    Epithelial Cells 07/04/2021 Occasional  None Seen, Occasional /hpf Final    Bacteria, UA 07/04/2021 Occasional  None Seen, Occasional /hpf Final    WBC 07/05/2021 5 66  4 31 - 10 16 Thousand/uL Final    RBC 07/05/2021 3 76* 3 88 - 5 62 Million/uL Final    Hemoglobin 07/05/2021 10 3* 12 0 - 17 0 g/dL Final    Hematocrit 07/05/2021 34 1* 36 5 - 49 3 % Final    MCV 07/05/2021 91  82 - 98 fL Final    MCH 07/05/2021 27 4  26 8 - 34 3 pg Final    MCHC 07/05/2021 30 2* 31 4 - 37 4 g/dL Final    RDW 07/05/2021 14 7  11 6 - 15 1 % Final    MPV 07/05/2021 9 3  8 9 - 12 7 fL Final    Platelets 41/79/9764 226  149 - 390 Thousands/uL Final    nRBC 07/05/2021 0  /100 WBCs Final    Neutrophils Relative 07/05/2021 62  43 - 75 % Final    Immat GRANS % 07/05/2021 1  0 - 2 % Final    Lymphocytes Relative 07/05/2021 23  14 - 44 % Final    Monocytes Relative 07/05/2021 8  4 - 12 % Final    Eosinophils Relative 07/05/2021 5  0 - 6 % Final    Basophils Relative 07/05/2021 1  0 - 1 % Final    Neutrophils Absolute 07/05/2021 3 51  1 85 - 7 62 Thousands/µL Final    Immature Grans Absolute 07/05/2021 0 03  0 00 - 0 20 Thousand/uL Final    Lymphocytes Absolute 07/05/2021 1 31  0 60 - 4 47 Thousands/µL Final    Monocytes Absolute 07/05/2021 0 47  0 17 - 1 22 Thousand/µL Final    Eosinophils Absolute 07/05/2021 0 29  0 00 - 0 61 Thousand/µL Final    Basophils Absolute 07/05/2021 0 05  0 00 - 0 10 Thousands/µL Final    Sodium 07/05/2021 143  136 - 145 mmol/L Final    Potassium 07/05/2021 4 0  3 5 - 5 3 mmol/L Final    Chloride 07/05/2021 107  100 - 108 mmol/L Final    CO2 07/05/2021 31  21 - 32 mmol/L Final    ANION GAP 07/05/2021 5  4 - 13 mmol/L Final    BUN 07/05/2021 21  5 - 25 mg/dL Final    Creatinine 07/05/2021 1 06  0 60 - 1 30 mg/dL Final    Standardized to IDMS reference method    Glucose 07/05/2021 96  65 - 140 mg/dL Final    If the patient is fasting, the ADA then defines impaired fasting glucose as > 100 mg/dL and diabetes as > or equal to 123 mg/dL  Specimen collection should occur prior to Sulfasalazine administration due to the potential for falsely depressed results  Specimen collection should occur prior to Sulfapyridine administration due to the potential for falsely elevated results   Glucose, Fasting 07/05/2021 96  65 - 99 mg/dL Final    Specimen collection should occur prior to Sulfasalazine administration due to the potential for falsely depressed results  Specimen collection should occur prior to Sulfapyridine administration due to the potential for falsely elevated results   Calcium 07/05/2021 8 8  8 3 - 10 1 mg/dL Final    eGFR 07/05/2021 70  ml/min/1 73sq m Final    WBC 07/06/2021 5 55  4 31 - 10 16 Thousand/uL Final    RBC 07/06/2021 3 91  3 88 - 5 62 Million/uL Final    Hemoglobin 07/06/2021 10 8* 12 0 - 17 0 g/dL Final    Hematocrit 07/06/2021 35 6* 36 5 - 49 3 % Final    MCV 07/06/2021 91  82 - 98 fL Final    MCH 07/06/2021 27 6  26 8 - 34 3 pg Final    MCHC 07/06/2021 30 3* 31 4 - 37 4 g/dL Final    RDW 07/06/2021 14 7  11 6 - 15 1 % Final    Platelets 32/40/6099 247  149 - 390 Thousands/uL Final    MPV 07/06/2021 9 6  8 9 - 12 7 fL Final    Sodium 07/06/2021 141  136 - 145 mmol/L Final    Potassium 07/06/2021 3 8  3 5 - 5 3 mmol/L Final    Chloride 07/06/2021 105  100 - 108 mmol/L Final    CO2 07/06/2021 27  21 - 32 mmol/L Final    ANION GAP 07/06/2021 9  4 - 13 mmol/L Final    BUN 07/06/2021 17  5 - 25 mg/dL Final    Creatinine 07/06/2021 1 04  0 60 - 1 30 mg/dL Final    Standardized to IDMS reference method    Glucose 07/06/2021 132  65 - 140 mg/dL Final    If the patient is fasting, the ADA then defines impaired fasting glucose as > 100 mg/dL and diabetes as > or equal to 123 mg/dL  Specimen collection should occur prior to Sulfasalazine administration due to the potential for falsely depressed results   Specimen collection should occur prior to Sulfapyridine administration due to the potential for falsely elevated results   Calcium 07/06/2021 8 7  8 3 - 10 1 mg/dL Final    eGFR 07/06/2021 72  ml/min/1 73sq m Final    Vitamin B-12 07/06/2021 708  100 - 900 pg/mL Final    Folate 07/06/2021 6 9  3 1 - 17 5 ng/mL Final    Slightly Hemolyzed; Results May be Affected    Iron Saturation 07/06/2021 18  % Final    TIBC 07/06/2021 260  250 - 450 ug/dL Final    Iron 07/06/2021 47* 65 - 175 ug/dL Final    Patients treated with metal-binding drugs (ie  Deferoxamine) may have depressed iron values   Ferritin 07/06/2021 248  8 - 388 ng/mL Final    Ventricular Rate 07/04/2021 93  BPM Final    Atrial Rate 07/04/2021 93  BPM Final    ME Interval 07/04/2021 222  ms Final    QRSD Interval 07/04/2021 128  ms Final    QT Interval 07/04/2021 362  ms Final    QTC Interval 07/04/2021 450  ms Final    P Axis 07/04/2021 71  degrees Final    QRS Axis 07/04/2021 -39  degrees Final    T Wave [place] 07/04/2021 94  degrees Final         Imaging:   VAS lower limb venous duplex study, complete bilateral     [facility] REPORT  CLINICAL:  Indications:  Patient presents with recent discovery of pulmonary embolism and physician  wants to determine potential source  Patient reports having left calf pain on  Saturday  Risk Factors: The patient has history of HTN, HLD and DVT  FINDINGS:     Segment  Right            Left                Impression       Impression         CFV      Normal (Patent)  Normal (Patent)             CONCLUSION:     Impression:  RIGHT LOWER LIMB:  No evidence of acute or chronic deep vein thrombosis  No evidence of superficial thrombophlebitis noted  Doppler evaluation shows a normal response to augmentation maneuvers  Popliteal, posterior tibial and anterior tibial arterial Doppler waveforms are  triphasic  LEFT LOWER LIMB:  No evidence of acute or chronic deep vein thrombosis  No evidence of superficial thrombophlebitis noted    Doppler evaluation shows a normal response to augmentation maneuvers  Popliteal, posterior tibial and anterior tibial arterial Doppler waveforms are  triphasic  There is a hypoechoic non-vascularized structure noted at the mid calf  SIGNATURE:  Electronically Signed by: Vonda Loya on 2021-07-07 03:39:44 PM    CTA ED study 7/4/2021  Multiple segmental branch pulmonary artery emboli with right heart strain      The calculated ratio of right ventricular to left ventricular diameter (RV/LV ratio) is 1 07     This is greater than 0 9, which is abnormal and indicates right heart strain  An abnormal RV/LV ratio has been shown to be associated with an increased risk of 30 day mortality in the setting of acute pulmonary embolism  Urgent consultation with the   medical critical care team is recommended  Please note: This report has been generated by a voice recognition software system  Therefore there may be syntax, spelling, and/or grammatical errors  Please call if you have any questions

## 2021-08-24 LAB
ALBUMIN SERPL-MCNC: 4 G/DL (ref 3.7–4.7)
ALBUMIN/GLOB SERPL: 1.5 {RATIO} (ref 1.2–2.2)
ALP SERPL-CCNC: 91 IU/L (ref 48–121)
ALT SERPL-CCNC: 9 IU/L (ref 0–44)
AST SERPL-CCNC: 13 IU/L (ref 0–40)
BASOPHILS # BLD AUTO: 0 X10E3/UL (ref 0–0.2)
BASOPHILS NFR BLD AUTO: 1 %
BILIRUB SERPL-MCNC: 0.3 MG/DL (ref 0–1.2)
BUN SERPL-MCNC: 18 MG/DL (ref 8–27)
BUN/CREAT SERPL: 16 (ref 10–24)
CALCIUM SERPL-MCNC: 9.4 MG/DL (ref 8.6–10.2)
CHLORIDE SERPL-SCNC: 106 MMOL/L (ref 96–106)
CHOLEST SERPL-MCNC: 131 MG/DL (ref 100–199)
CO2 SERPL-SCNC: 26 MMOL/L (ref 20–29)
CREAT SERPL-MCNC: 1.13 MG/DL (ref 0.76–1.27)
EOSINOPHIL # BLD AUTO: 0.1 X10E3/UL (ref 0–0.4)
EOSINOPHIL NFR BLD AUTO: 3 %
ERYTHROCYTE [DISTWIDTH] IN BLOOD BY AUTOMATED COUNT: 14.6 % (ref 11.6–15.4)
GLOBULIN SER-MCNC: 2.7 G/DL (ref 1.5–4.5)
GLUCOSE SERPL-MCNC: 100 MG/DL (ref 65–99)
HBA1C MFR BLD: 6 % (ref 4.8–5.6)
HCT VFR BLD AUTO: 36.5 % (ref 37.5–51)
HDLC SERPL-MCNC: 42 MG/DL
HGB BLD-MCNC: 11.5 G/DL (ref 13–17.7)
IMM GRANULOCYTES # BLD: 0 X10E3/UL (ref 0–0.1)
IMM GRANULOCYTES NFR BLD: 1 %
LDLC SERPL CALC-MCNC: 76 MG/DL (ref 0–99)
LYMPHOCYTES # BLD AUTO: 1.8 X10E3/UL (ref 0.7–3.1)
LYMPHOCYTES NFR BLD AUTO: 40 %
MCH RBC QN AUTO: 26.8 PG (ref 26.6–33)
MCHC RBC AUTO-ENTMCNC: 31.5 G/DL (ref 31.5–35.7)
MCV RBC AUTO: 85 FL (ref 79–97)
MICRODELETION SYND BLD/T FISH: NORMAL
MONOCYTES # BLD AUTO: 0.3 X10E3/UL (ref 0.1–0.9)
MONOCYTES NFR BLD AUTO: 8 %
MORPHOLOGY BLD-IMP: ABNORMAL
NEUTROPHILS # BLD AUTO: 2.1 X10E3/UL (ref 1.4–7)
NEUTROPHILS NFR BLD AUTO: 47 %
PLATELET # BLD AUTO: 226 X10E3/UL (ref 150–450)
POTASSIUM SERPL-SCNC: 4 MMOL/L (ref 3.5–5.2)
PROT SERPL-MCNC: 6.7 G/DL (ref 6–8.5)
RBC # BLD AUTO: 4.29 X10E6/UL (ref 4.14–5.8)
SL AMB EGFR AFRICAN AMERICAN: 75 ML/MIN/1.73
SL AMB EGFR NON AFRICAN AMERICAN: 65 ML/MIN/1.73
SODIUM SERPL-SCNC: 143 MMOL/L (ref 134–144)
TRIGL SERPL-MCNC: 62 MG/DL (ref 0–149)
WBC # BLD AUTO: 4.4 X10E3/UL (ref 3.4–10.8)

## 2021-08-30 LAB
BASOPHILS # BLD AUTO: 0.1 X10E3/UL (ref 0–0.2)
BASOPHILS NFR BLD AUTO: 1 %
BUN SERPL-MCNC: 20 MG/DL (ref 8–27)
BUN/CREAT SERPL: 18 (ref 10–24)
CALCIUM SERPL-MCNC: 9.6 MG/DL (ref 8.6–10.2)
CHLORIDE SERPL-SCNC: 106 MMOL/L (ref 96–106)
CO2 SERPL-SCNC: 28 MMOL/L (ref 20–29)
CREAT SERPL-MCNC: 1.14 MG/DL (ref 0.76–1.27)
EOSINOPHIL # BLD AUTO: 0.2 X10E3/UL (ref 0–0.4)
EOSINOPHIL NFR BLD AUTO: 4 %
ERYTHROCYTE [DISTWIDTH] IN BLOOD BY AUTOMATED COUNT: 14.6 % (ref 11.6–15.4)
GLUCOSE SERPL-MCNC: 113 MG/DL (ref 65–99)
HCT VFR BLD AUTO: 38.5 % (ref 37.5–51)
HGB BLD-MCNC: 11.9 G/DL (ref 13–17.7)
IMM GRANULOCYTES # BLD: 0 X10E3/UL (ref 0–0.1)
IMM GRANULOCYTES NFR BLD: 0 %
LYMPHOCYTES # BLD AUTO: 1.5 X10E3/UL (ref 0.7–3.1)
LYMPHOCYTES NFR BLD AUTO: 28 %
MCH RBC QN AUTO: 26.7 PG (ref 26.6–33)
MCHC RBC AUTO-ENTMCNC: 30.9 G/DL (ref 31.5–35.7)
MCV RBC AUTO: 87 FL (ref 79–97)
METHYLMALONATE SERPL-SCNC: 143 NMOL/L (ref 0–378)
MONOCYTES # BLD AUTO: 0.5 X10E3/UL (ref 0.1–0.9)
MONOCYTES NFR BLD AUTO: 9 %
NEUTROPHILS # BLD AUTO: 3.1 X10E3/UL (ref 1.4–7)
NEUTROPHILS NFR BLD AUTO: 58 %
PLATELET # BLD AUTO: 224 X10E3/UL (ref 150–450)
POTASSIUM SERPL-SCNC: 4.3 MMOL/L (ref 3.5–5.2)
RBC # BLD AUTO: 4.45 X10E6/UL (ref 4.14–5.8)
SL AMB DISCLAIMER: NORMAL
SL AMB EGFR AFRICAN AMERICAN: 74 ML/MIN/1.73
SL AMB EGFR NON AFRICAN AMERICAN: 64 ML/MIN/1.73
SODIUM SERPL-SCNC: 143 MMOL/L (ref 134–144)
WBC # BLD AUTO: 5.4 X10E3/UL (ref 3.4–10.8)

## 2021-09-08 ENCOUNTER — TELEPHONE (OUTPATIENT)
Dept: HEMATOLOGY ONCOLOGY | Facility: CLINIC | Age: 71
End: 2021-09-08

## 2021-09-08 ENCOUNTER — TELEPHONE (OUTPATIENT)
Dept: OBGYN CLINIC | Facility: HOSPITAL | Age: 71
End: 2021-09-08

## 2021-09-08 DIAGNOSIS — I26.99 MULTIPLE PULMONARY EMBOLI (HCC): ICD-10-CM

## 2021-09-08 NOTE — TELEPHONE ENCOUNTER
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

## 2021-09-08 NOTE — TELEPHONE ENCOUNTER
Patient calling for refill of xarelto   Patient was seen by PA 8/17:      Regimen:  Xarelto 20 mg PO daily  Follow up Hypercoag testing in Jan 2022    Can DC Anticoagulation starting 1/4/2022 for testing approx 1/18/2022     Will send to Dr Meka Wise RN

## 2021-09-09 ENCOUNTER — HOSPITAL ENCOUNTER (OUTPATIENT)
Dept: RADIOLOGY | Facility: HOSPITAL | Age: 71
Discharge: HOME | End: 2021-09-09
Attending: UROLOGY
Payer: COMMERCIAL

## 2021-09-09 DIAGNOSIS — N28.89 URETERAL FISTULA: ICD-10-CM

## 2021-09-09 DIAGNOSIS — I26.99 MULTIPLE PULMONARY EMBOLI (HCC): ICD-10-CM

## 2021-09-09 PROCEDURE — G1004 CDSM NDSC: HCPCS

## 2021-09-09 PROCEDURE — 74183 MRI ABD W/O CNTR FLWD CNTR: CPT

## 2021-09-09 PROCEDURE — A9585 GADOBUTROL INJECTION: HCPCS | Performed by: UROLOGY

## 2021-09-09 RX ADMIN — GADOBUTROL 11 ML: 604.72 INJECTION INTRAVENOUS at 10:10

## 2021-09-19 DIAGNOSIS — I10 BENIGN ESSENTIAL HYPERTENSION: ICD-10-CM

## 2021-09-19 DIAGNOSIS — E78.2 MIXED HYPERLIPIDEMIA: ICD-10-CM

## 2021-09-20 RX ORDER — ATORVASTATIN CALCIUM 20 MG/1
TABLET, FILM COATED ORAL
Qty: 90 TABLET | Refills: 1 | Status: SHIPPED | OUTPATIENT
Start: 2021-09-20 | End: 2022-03-18

## 2021-09-20 RX ORDER — LISINOPRIL 10 MG/1
TABLET ORAL
Qty: 90 TABLET | Refills: 1 | Status: SHIPPED | OUTPATIENT
Start: 2021-09-20 | End: 2022-03-18

## 2021-10-12 ENCOUNTER — OFFICE VISIT (OUTPATIENT)
Dept: FAMILY MEDICINE CLINIC | Facility: CLINIC | Age: 71
End: 2021-10-12
Payer: COMMERCIAL

## 2021-10-12 VITALS
RESPIRATION RATE: 18 BRPM | TEMPERATURE: 97.2 F | DIASTOLIC BLOOD PRESSURE: 80 MMHG | HEIGHT: 67 IN | HEART RATE: 66 BPM | BODY MASS INDEX: 38.3 KG/M2 | WEIGHT: 244 LBS | OXYGEN SATURATION: 94 % | SYSTOLIC BLOOD PRESSURE: 130 MMHG

## 2021-10-12 DIAGNOSIS — K91.2 POSTGASTRECTOMY MALABSORPTION: ICD-10-CM

## 2021-10-12 DIAGNOSIS — N40.0 ENLARGED PROSTATE WITHOUT LOWER URINARY TRACT SYMPTOMS (LUTS): ICD-10-CM

## 2021-10-12 DIAGNOSIS — I26.99 MULTIPLE PULMONARY EMBOLI (HCC): ICD-10-CM

## 2021-10-12 DIAGNOSIS — Z90.3 POSTGASTRECTOMY MALABSORPTION: ICD-10-CM

## 2021-10-12 DIAGNOSIS — D64.9 ANEMIA, UNSPECIFIED TYPE: ICD-10-CM

## 2021-10-12 DIAGNOSIS — E78.2 MIXED HYPERLIPIDEMIA: ICD-10-CM

## 2021-10-12 DIAGNOSIS — R33.9 URINARY RETENTION: ICD-10-CM

## 2021-10-12 DIAGNOSIS — E66.01 SEVERE OBESITY (BMI 35.0-39.9) WITH COMORBIDITY (HCC): ICD-10-CM

## 2021-10-12 DIAGNOSIS — I10 BENIGN ESSENTIAL HYPERTENSION: Primary | ICD-10-CM

## 2021-10-12 DIAGNOSIS — Z23 NEED FOR VACCINATION: ICD-10-CM

## 2021-10-12 PROCEDURE — 90471 IMMUNIZATION ADMIN: CPT

## 2021-10-12 PROCEDURE — 1036F TOBACCO NON-USER: CPT | Performed by: FAMILY MEDICINE

## 2021-10-12 PROCEDURE — 3079F DIAST BP 80-89 MM HG: CPT | Performed by: FAMILY MEDICINE

## 2021-10-12 PROCEDURE — 99214 OFFICE O/P EST MOD 30 MIN: CPT | Performed by: FAMILY MEDICINE

## 2021-10-12 PROCEDURE — 3008F BODY MASS INDEX DOCD: CPT | Performed by: FAMILY MEDICINE

## 2021-10-12 PROCEDURE — 1100F PTFALLS ASSESS-DOCD GE2>/YR: CPT | Performed by: FAMILY MEDICINE

## 2021-10-12 PROCEDURE — 1160F RVW MEDS BY RX/DR IN RCRD: CPT | Performed by: FAMILY MEDICINE

## 2021-10-12 PROCEDURE — 3725F SCREEN DEPRESSION PERFORMED: CPT | Performed by: FAMILY MEDICINE

## 2021-10-12 PROCEDURE — 3075F SYST BP GE 130 - 139MM HG: CPT | Performed by: FAMILY MEDICINE

## 2021-10-12 PROCEDURE — 90662 IIV NO PRSV INCREASED AG IM: CPT

## 2021-10-12 PROCEDURE — 3288F FALL RISK ASSESSMENT DOCD: CPT | Performed by: FAMILY MEDICINE

## 2021-10-12 RX ORDER — TAMSULOSIN HYDROCHLORIDE 0.4 MG/1
0.4 CAPSULE ORAL
Qty: 90 CAPSULE | Refills: 1 | Status: SHIPPED | OUTPATIENT
Start: 2021-10-12 | End: 2022-04-18

## 2021-10-20 NOTE — PROGRESS NOTES
FAMILY PRACTICE PRE-OPERATIVE EVALUATION  [facility]    NAME: Park Buenrostro  AGE: 79 y o  SEX: male  : 1950     DATE: 2021    Family Practice Pre-Operative Evaluation      Chief Complaint: Pre-operative Evaluation     Surgery: Rt shoulder surgery  Anticipated Date of Surgery: 21  Surgeon: Dr Mehrdad Flannery      History of Present Illness:     Pt is here for a pre op eval  Sees DR Mehrdad Flannery, -   Pt fell and he injured his rt shoulder  Anesthesia:  general  Bleeding Risk: no recent abnormal bleeding, no remote history of abnormal bleeding and no use of Ca-channel blockers  Current Anti-platelet/anticoagulation medication: none    Assessment of Cardiac Risk:  · Denies unstable or severe angina or MI in the last 6 weeks or history of stent placement in the last year   · Denies decompensated heart failure (e g  New onset heart failure, NYHA functional class IV heart failure, or worsening existing heart failure)  · Denies significant arrhythmias such as high grade AV block, symptomatic ventricular arrhythmia, newly recognized ventricular tachycardia, supraventricular tachycardia with resting heart rate >100, or symptomatic bradycardia  · Denies severe heart valve disease including aortic stenosis or symptomatic mitral stenosis     Exercise Capacity:  · Able to walk 4 blocks without symptoms?: Yes  · Able to walk 2 flights without symptoms?: Yes    Prior Anesthesia Reactions: No     Personal history of venous thromboembolic disease? Yes, leg - secondary to surgery    History of steroid use for >2 weeks within last year? No         Review of Systems:     Review of Systems   Constitutional: Negative for activity change, appetite change, chills, diaphoresis, fatigue, fever and unexpected weight change  HENT: Negative for congestion, dental problem, ear pain, mouth sores, sinus pressure, sinus pain, sore throat and trouble swallowing      Eyes: Negative for photophobia, Toxicities: C2 D1 Docetaxel/Carboplatin/Trastuzumab-dkst/Pertuzumab/Pegfilgrastim on 10/6/2021    New Left Thigh Pain: (For the past three days Kaylan has had a constant \"aching\" pain in her left thigh that radiates down to her left calf.  She denies red discharge and itching  Respiratory: Negative for apnea, chest tightness and shortness of breath  Cardiovascular: Negative for chest pain, palpitations and leg swelling  Gastrointestinal: Negative for abdominal distention, abdominal pain, blood in stool, nausea and vomiting  Endocrine: Negative for cold intolerance, heat intolerance, polydipsia, polyphagia and polyuria  Genitourinary: Negative for difficulty urinating  Musculoskeletal: Positive for arthralgias  Skin: Negative for color change and wound  Neurological: Negative for dizziness, syncope, speech difficulty and headaches  Hematological: Negative for adenopathy  Psychiatric/Behavioral: Negative for agitation and behavioral problems  Current Problem List:     Patient Active Problem List   Diagnosis    Benign essential hypertension    Mixed hyperlipidemia    Abnormal blood sugar    AC (acromioclavicular) joint bone spurs    Enlarged prostate without lower urinary tract symptoms (luts)    GERD without esophagitis    Internal hemorrhoids    Obstructive sleep apnea    Postgastrectomy malabsorption    History of colon polyps    S/P bariatric surgery    BMI 37 0-37 9, adult    Class 2 severe obesity due to excess calories with serious comorbidity and body mass index (BMI) of 37 0 to 37 9 in adult [facility])    Arthritis of carpometacarpal (CMC) joint of left thumb       Allergies:      Allergies   Allergen Reactions    Percocet [Oxycodone-Acetaminophen] Hallucinations    Percolone [Oxycodone] Hallucinations       Current Medications:       Current Outpatient Medications:     atorvastatin (LIPITOR) 20 mg tablet, Take 1 tablet (20 mg total) by mouth daily, Disp: 90 tablet, Rfl: 1    Calcium Carbonate (CALCIUM 600) 1500 (600 Ca) MG TABS, Take by mouth, Disp: , Rfl:     Cholecalciferol (VITAMIN D3) 2000 units TABS, Take by mouth, Disp: , Rfl:     Cyanocobalamin (VITAMIN B-12) 5000 MCG LOZG, Place under the tongue, Disp: , Rfl:   lisinopril (ZESTRIL) 10 mg tablet, Take 1 tablet (10 mg total) by mouth daily, Disp: 90 tablet, Rfl: 1    Multiple Vitamins-Minerals (MULTIVITAMIN ADULT EXTRA C) CHEW, Chew, Disp: , Rfl:     Past Medical History:       Past Medical History:   Diagnosis Date    AC (acromioclavicular) joint bone spurs     Last assessed - 2/19/15    Achilles tendinitis, unspecified leg 06/04/2010    Resolved - 1/8/18    Anemia 08/12/2010    Resolved - 1/11/16    Blood in urine     BPH without urinary obstruction     Hypertrophy    Deep venous thrombosis of distal lower extremity (HCC)     Deep venous thrombosis of distal lower extremity (HCC)     Last assessed - 6/4/15    Heartburn     Last assessed - 12/12/14    Hemorrhoids     Hernia, inguinal 06/30/2009    Resolved - 1/8/18    History of stomach ulcers     Hypertension     Kidney stones     Mixed hyperlipidemia     Nephrolithiasis     Right inguinal hernia     Last assessed - 4/4/16    Sleep apnea     Tear of left rotator cuff     Last assessed - 2/19/15        Past Surgical History:   Procedure Laterality Date    BARIATRIC SURGERY  06/15/2015    Laparoscopic Longitudinal Gastrectomy for morbid obesity, Managed by - Blank Persaud     EXPLORATORY LAPAROTOMY W/ BOWEL RESECTION Right 3/3/2016    Procedure: Incarcerated possibly strangulated right inguinal hernia; Surgeon: Paul Rivera MD;  Location: [address] [place] [address];  Service:    [facility] SURGERY      2011 - Rt Hip Replacement, 2012 - Lt Hip Replacement, 2013 - Lt Hip Revision    INGUINAL HERNIA REPAIR      Last assessed - 4/4/16    JOINT REPLACEMENT      KNEE SURGERY  2004    Double     VA COLONOSCOPY FLX DX W/COLLJ SPEC WHEN PFRMD N/A 1/15/2016    Procedure: COLONOSCOPY;  Surgeon: Dina Pascual MD;  Location: [place] [address] GI LAB;   Service: Gastroenterology    VA COLONOSCOPY FLX DX W/COLLJ [facility] IN[facility]IENT [facility] WHEN PFRMD N/A 2/26/2019    Procedure: COLONOSCOPY;  Surgeon: Dina Pascual MD;  Location: [facility]; Service: Gastroenterology    TONSILLECTOMY          Family History   Problem Relation Age of Onset    Arthritis Father     Diabetes Father     Hypertension Father     Hyperlipidemia Father     Diabetes Family     Emphysema Family     Heart disease Family     Hypertension Family     Mental illness Neg Hx         Social History     Socioeconomic History    Marital status: /Civil Union     Spouse name: Not on file    Number of children: Not on file    Years of education: Not on file    Highest education level: Not on file   Occupational History    Not on file   Social Needs    Financial resource strain: Not on file    Food insecurity     Worry: Not on file     Inability: Not on file    Transportation needs     Medical: Not on file     Non-medical: Not on file   Tobacco Use    Smoking status: Former Smoker     Quit date: 1994     Years since quittin 7    Smokeless tobacco: Never Used   Substance and Sexual Activity    Alcohol use: Yes     Frequency: 2-3 times a week     Comment:  Occ     Drug use: No    Sexual activity: Not on file   Lifestyle    Physical activity     Days per week: Not on file     Minutes per session: Not on file    Stress: Not on file   Relationships    Social connections     Talks on phone: Not on file     Gets together: Not on file     Attends Hinduism service: Not on file     Active member of club or organization: Not on file     Attends meetings of clubs or organizations: Not on file     Relationship status: Not on file    Intimate partner violence     Fear of current or ex partner: Not on file     Emotionally abused: Not on file     Physically abused: Not on file     Forced sexual activity: Not on file   Other Topics Concern    Not on file   Social History Narrative    Exercise - Walking once a day        Physical Exam:     /80   Pulse 84   Temp (!) 97 1 °F (36 2 °C)   Resp 16   Ht 5' 7" (1 702 m)   Wt 117 kg (259 lb)   BMI 40 57 kg/m²     Physical Exam  Vitals signs and nursing note reviewed  Constitutional:       General: He is not in acute distress  Appearance: He is well-developed  He is not diaphoretic  HENT:      Head: Normocephalic and atraumatic  Right Ear: External ear normal       Left Ear: External ear normal       Nose: Nose normal       Mouth/Throat:      Pharynx: No oropharyngeal exudate  Eyes:      General: No scleral icterus  Right eye: No discharge  Left eye: No discharge  Pupils: Pupils are equal, round, and reactive to light  Neck:      Thyroid: No thyromegaly  Cardiovascular:      Rate and Rhythm: Normal rate  Heart sounds: Normal heart sounds  No murmur  Pulmonary:      Effort: Pulmonary effort is normal  No respiratory distress  Breath sounds: Normal breath sounds  No wheezing  Abdominal:      General: Bowel sounds are normal  There is no distension  Palpations: Abdomen is soft  There is no mass  Tenderness: There is no abdominal tenderness  There is no guarding or rebound  Musculoskeletal: Normal range of motion  Comments: Dec rom rt shoulder   Skin:     General: Skin is warm and dry  Findings: No erythema or rash  Neurological:      Mental Status: He is alert        Coordination: Coordination normal       Deep Tendon Reflexes: Reflexes normal    Psychiatric:         Behavior: Behavior normal           Data:     Pre-operative work-up    Laboratory Results: I have personally reviewed the pertinent laboratory results/reports  and labs are stable     EKG: I have personally reviewed pertinent films in PACS and EKG reviewed Slightly sinus [place]  otherwise acceptable NO signs of ischemia    Recent Results (from the past 672 hour(s))   Protime-INR    Collection Time: 12/21/20  8:12 AM   Result Value Ref Range    Protime 12 9 11 6 - 14 5 seconds    INR 0 98 0 84 - 1 19   APTT    Collection Time: 12/21/20  8:12 AM   Result Value Ref Range    PTT 27 23 - 37 seconds   Type and screen    Collection Time: 12/21/20  8:12 AM   Result Value Ref Range    ABO Grouping B     Rh Factor Positive     Antibody Screen Negative     Specimen Expiration Date [id number]    CBC and differential    Collection Time: 12/21/20  8:12 AM   Result Value Ref Range    WBC 4 67 4 31 - 10 16 Thousand/uL    RBC 4 50 3 88 - 5 62 Million/uL    Hemoglobin 12 4 12 0 - 17 0 g/dL    Hematocrit 41 7 36 5 - 49 3 %    MCV 93 82 - 98 fL    MCH 27 6 26 8 - 34 3 pg    MCHC 29 7 (L) 31 4 - 37 4 g/dL    RDW 14 6 11 6 - 15 1 %    MPV 10 8 8 9 - 12 7 fL    Platelets 209 903 - 988 Thousands/uL    nRBC 0 /100 WBCs    Neutrophils Relative 53 43 - 75 %    Immat GRANS % 0 0 - 2 %    Lymphocytes Relative 33 14 - 44 %    Monocytes Relative 9 4 - 12 %    Eosinophils Relative 4 0 - 6 %    Basophils Relative 1 0 - 1 %    Neutrophils Absolute 2 48 1 85 - 7 62 Thousands/µL    Immature Grans Absolute 0 01 0 00 - 0 20 Thousand/uL    Lymphocytes Absolute 1 53 0 60 - 4 47 Thousands/µL    Monocytes Absolute 0 43 0 17 - 1 22 Thousand/µL    Eosinophils Absolute 0 18 0 00 - 0 61 Thousand/µL    Basophils Absolute 0 04 0 00 - 0 10 Thousands/µL   Basic metabolic panel    Collection Time: 12/21/20  8:12 AM   Result Value Ref Range    Sodium 147 (H) 136 - 145 mmol/L    Potassium 4 0 3 5 - 5 3 mmol/L    Chloride 110 (H) 100 - 108 mmol/L    CO2 30 21 - 32 mmol/L    ANION GAP 7 4 - 13 mmol/L    BUN 34 (H) 5 - 25 mg/dL    Creatinine 1 27 0 60 - 1 30 mg/dL    Glucose, Fasting 96 65 - 99 mg/dL    Calcium 9 1 8 3 - 10 1 mg/dL    eGFR 57 ml/min/1 73sq m           Assessment & Recommendations:     Problem List Items Addressed This Visit        Digestive    Postgastrectomy malabsorption       Cardiovascular and Mediastinum    Benign essential hypertension     stable            Other    Mixed hyperlipidemia      Other Visit Diagnoses     Pre-op examination    -  Primary    Rotator cuff tear arthropathy, right        Need for vaccination Relevant Orders    influenza vaccine, high-dose, PF 0 7 mL (FLUZONE HIGH-DOSE)          Pre-Op Evaluation Assessment  79 y o  male with planned surgery: Rt shoulder surgery  Known risk factors for perioperative complications: None  none  Current medications which may produce withdrawal symptoms if withheld perioperatively: none  Pre-Op Evaluation Plan  1  Further preoperative workup as follows:   - None; no further preoperative work-up is required    2  Medication Management/Recommendations:   - None, continue medication regimen including morning of surgery, with sip of water    3  Prophylaxis for cardiac events with perioperative beta-blockers: not indicated  4  Patient requires further consultation with: None    Clearance  Patient is CLEARED for surgery without any additional cardiac testing       Surjit Barth, [address]  [address] [facility]  [address]  [place] [address]  Phone#  [phone]  Fax#  [phone]

## 2021-11-17 ENCOUNTER — OFFICE VISIT (OUTPATIENT)
Dept: HEMATOLOGY ONCOLOGY | Facility: MEDICAL CENTER | Age: 71
End: 2021-11-17
Payer: COMMERCIAL

## 2021-11-17 VITALS
HEART RATE: 67 BPM | DIASTOLIC BLOOD PRESSURE: 80 MMHG | TEMPERATURE: 96.1 F | HEIGHT: 67 IN | BODY MASS INDEX: 38.3 KG/M2 | WEIGHT: 244 LBS | RESPIRATION RATE: 16 BRPM | OXYGEN SATURATION: 97 % | SYSTOLIC BLOOD PRESSURE: 118 MMHG

## 2021-11-17 DIAGNOSIS — I26.99 MULTIPLE PULMONARY EMBOLI (HCC): Primary | ICD-10-CM

## 2021-11-17 PROCEDURE — 3074F SYST BP LT 130 MM HG: CPT | Performed by: INTERNAL MEDICINE

## 2021-11-17 PROCEDURE — 3079F DIAST BP 80-89 MM HG: CPT | Performed by: INTERNAL MEDICINE

## 2021-11-17 PROCEDURE — 1036F TOBACCO NON-USER: CPT | Performed by: INTERNAL MEDICINE

## 2021-11-17 PROCEDURE — 1160F RVW MEDS BY RX/DR IN RCRD: CPT | Performed by: INTERNAL MEDICINE

## 2021-11-17 PROCEDURE — 3008F BODY MASS INDEX DOCD: CPT | Performed by: INTERNAL MEDICINE

## 2021-11-17 PROCEDURE — 99214 OFFICE O/P EST MOD 30 MIN: CPT | Performed by: INTERNAL MEDICINE

## 2021-12-20 ENCOUNTER — IMMUNIZATIONS (OUTPATIENT)
Dept: FAMILY MEDICINE CLINIC | Facility: HOSPITAL | Age: 71
End: 2021-12-20

## 2021-12-20 DIAGNOSIS — Z23 ENCOUNTER FOR IMMUNIZATION: Primary | ICD-10-CM

## 2021-12-20 PROCEDURE — 0064A COVID-19 MODERNA VACC 0.25 ML BOOSTER: CPT

## 2021-12-20 PROCEDURE — 91306 COVID-19 MODERNA VACC 0.25 ML BOOSTER: CPT

## 2022-01-14 ENCOUNTER — TELEPHONE (OUTPATIENT)
Dept: HEMATOLOGY ONCOLOGY | Facility: MEDICAL CENTER | Age: 72
End: 2022-01-14

## 2022-01-14 DIAGNOSIS — I26.99 MULTIPLE PULMONARY EMBOLI (HCC): ICD-10-CM

## 2022-01-14 NOTE — TELEPHONE ENCOUNTER
Patient requirses assistance with xarelto 10 mg   Has 2 weeks supply left  Will send to [employer] [employer] to assist patient

## 2022-01-18 ENCOUNTER — TELEPHONE (OUTPATIENT)
Dept: HEMATOLOGY ONCOLOGY | Facility: MEDICAL CENTER | Age: 72
End: 2022-01-18

## 2022-01-18 NOTE — TELEPHONE ENCOUNTER
A voice message was left for Luca Bustillo will not be in the office on Tuesday 3/22/22  The appointment has be r/s to Friday 3/25/22 9am, this can be reviewed on [employer], and also a new appt card will be mailed  If this appt is not convenient, please call to change this appt

## 2022-02-23 NOTE — TELEPHONE ENCOUNTER
Adriel Nagel needs a letter stating he uses a scooter and cannot  line at [employer]      He has had this letter done in the past     Please call when ready for  And Subjective:       Patient ID: Brigid Chaidez is a 62 y.o. female.    Chief Complaint: No chief complaint on file.    62-year-old female presents to the clinic today concerned about her blood pressure.  Her systolic blood pressures range anywhere from 140 to 160. Her diastolic blood pressures run anywhere from 75-88.  She has mild headaches in the morning however they go away shortly after waking up.  She denies any dizziness or blurred vision.  She denies any cardiac chest pain, heart palpitations, shortness of breath, or swelling to lower extremities.  She denies any upper respiratory symptoms today.  She denies any other complaints.    Past Medical History:   Diagnosis Date    ADD (attention deficit disorder)     Chicken pox     GERD (gastroesophageal reflux disease)     Hyperlipidemia     Measles     Mitral valve prolapse     Mold exposure     Skin cancer      Past Surgical History:   Procedure Laterality Date    biopsy leg Left     HYSTERECTOMY      LASIK      VAGINAL PROLAPSE REPAIR        reports that she has never smoked. She has never used smokeless tobacco. She reports current alcohol use. She reports that she does not use drugs.  Review of Systems   Cardiovascular: Negative for chest pain, palpitations and leg swelling.   Gastrointestinal: Negative for abdominal pain, diarrhea, nausea and vomiting.   Neurological: Positive for headaches. Negative for dizziness and light-headedness.       Objective:      Physical Exam  Constitutional:       General: She is not in acute distress.     Appearance: Normal appearance. She is not ill-appearing, toxic-appearing or diaphoretic.   Cardiovascular:      Rate and Rhythm: Tachycardia present.      Heart sounds: Normal heart sounds. No murmur heard.  Pulmonary:      Effort: Pulmonary effort is normal.      Breath sounds: Normal breath sounds. No wheezing or rhonchi.   Abdominal:      General: Bowel sounds are normal.      Palpations: Abdomen is soft.       Tenderness: There is no abdominal tenderness. There is no guarding or rebound.   Musculoskeletal:         General: No swelling. Normal range of motion.   Skin:     General: Skin is warm.   Neurological:      Mental Status: She is alert and oriented to person, place, and time.   Psychiatric:         Mood and Affect: Mood normal.         Behavior: Behavior normal.         Thought Content: Thought content normal.         Judgment: Judgment normal.         Assessment:       1. Essential hypertension    2. Gastroesophageal reflux disease without esophagitis    3. History of skin cancer    4. Anxiety and depression    5. Heart palpitations    6. Menopause    7. History of abnormal cervical Pap smear    8. Family history of early CAD        Plan:         Essential hypertension  -     hydroCHLOROthiazide (MICROZIDE) 12.5 mg capsule; Take 1 capsule (12.5 mg total) by mouth once daily.  Dispense: 90 capsule; Refill: 1  -     Comprehensive Metabolic Panel; Future; Expected date: 02/23/2022  - will start HCTZ 12.5 mg once daily  - will check CMP 1 day before follow-up with me    Gastroesophageal reflux disease without esophagitis  - The current medical regimen is effective;  continue present plan and medications.    History of skin cancer  - stable should see a dermatologist    Anxiety and depression  - The current medical regimen is effective;  continue present plan and medications.    Heart palpitations  -     EKG 12-lead  -     Ambulatory referral/consult to Cardiology; Future; Expected date: 03/02/2022  -     EKG showed heart rate of 78 with normal sinus rhythm there was no ST wave changes    Menopause  - I did refer to gyn for further evaluation    History of abnormal cervical Pap smear  -     Ambulatory referral/consult to Obstetrics / Gynecology; Future; Expected date: 03/02/2022    Family history of early CAD        No follow-ups on file.

## 2022-03-18 DIAGNOSIS — I10 BENIGN ESSENTIAL HYPERTENSION: ICD-10-CM

## 2022-03-18 DIAGNOSIS — E78.2 MIXED HYPERLIPIDEMIA: ICD-10-CM

## 2022-03-18 RX ORDER — LISINOPRIL 10 MG/1
TABLET ORAL
Qty: 90 TABLET | Refills: 1 | Status: SHIPPED | OUTPATIENT
Start: 2022-03-18

## 2022-03-18 RX ORDER — ATORVASTATIN CALCIUM 20 MG/1
TABLET, FILM COATED ORAL
Qty: 90 TABLET | Refills: 1 | Status: SHIPPED | OUTPATIENT
Start: 2022-03-18

## 2022-03-25 ENCOUNTER — OFFICE VISIT (OUTPATIENT)
Dept: HEMATOLOGY ONCOLOGY | Facility: MEDICAL CENTER | Age: 72
End: 2022-03-25
Payer: COMMERCIAL

## 2022-03-25 VITALS
HEIGHT: 67 IN | WEIGHT: 250 LBS | DIASTOLIC BLOOD PRESSURE: 68 MMHG | RESPIRATION RATE: 16 BRPM | TEMPERATURE: 97.5 F | SYSTOLIC BLOOD PRESSURE: 152 MMHG | OXYGEN SATURATION: 97 % | HEART RATE: 75 BPM | BODY MASS INDEX: 39.24 KG/M2

## 2022-03-25 DIAGNOSIS — I26.99 MULTIPLE PULMONARY EMBOLI (HCC): Primary | ICD-10-CM

## 2022-03-25 PROCEDURE — 99214 OFFICE O/P EST MOD 30 MIN: CPT | Performed by: INTERNAL MEDICINE

## 2022-03-25 PROCEDURE — 1160F RVW MEDS BY RX/DR IN RCRD: CPT | Performed by: INTERNAL MEDICINE

## 2022-03-25 PROCEDURE — 1036F TOBACCO NON-USER: CPT | Performed by: INTERNAL MEDICINE

## 2022-03-25 PROCEDURE — 3008F BODY MASS INDEX DOCD: CPT | Performed by: INTERNAL MEDICINE

## 2022-03-25 NOTE — PROGRESS NOTES
Rhys Scott  1950  [facility]  [address] [place] [id number]    DISCUSSION/SUMMARY:    72-year-old male recently diagnosed with PE (2nd PE for this patient)  LE dopplers at the time of the 2nd PE were negative  Mr Mark Martin is now on Xarelto 10 mg a day  Patient feels well and clinically there are no concerning findings  Patient denies any respiratory issues  No problems with excessive bruising or bleeding  The plan is to continue the Xarelto at 10 mg a day (after 6 months of 20 mg; [facility] choice)  We rediscussed what to monitor for as far as excessive bruising/bleeding  We discussed what to monitor for as far as acute respiratory issues, chest pain, pressure, perceived anxiety attack, lower extremity swelling, cords, redness etc    Patient will call the office if he is scheduled for any invasive procedure or surgery  Patient is to return in 6 months  Patient knows to call the hematology/oncology office if there are any other questions or concerns  Carefully review your medication list and verify that the list is accurate and up-to-date  Please call the hematology/oncology office if there are medications missing from the list, medications on the list that you are not currently taking or if there is a dosage or instruction that is different from how you're taking that medication  Patient goals and areas of care: Anticoagulation  Barriers to care:  None  Patient is able to self-care   _____________________________________________________________________________________    Chief Complaint   Patient presents with    Follow-up     Recurrent PE, on Xarelto     History of Present Illness:  72-year-old male recently diagnosed with multiple subsegmental pulmonary emboli initially seen by LUISITO PAC  Mr Mark Martin previously suffered a PE  Patient is on Xarelto and returns for follow-up  Mr Mark Martin continues to feel well  No problems with excessive bruising or bleeding, patient is active, gets bruises on his arms occasionally  No respiratory issues, no shortness of breath, no chest pain  Appetite is good, weight is stable  No other active medical issues  Routine health maintenance and medical care is up-to-date  Patient has received the COVID vaccine and booster  Review of Systems   Constitutional: Negative  HENT: Negative  Eyes: Negative  Respiratory: Negative  Cardiovascular: Negative  Gastrointestinal: Negative  Endocrine: Negative  Genitourinary: Negative  Musculoskeletal: Negative  Skin: Negative  Allergic/Immunologic: Negative  Neurological: Negative  Hematological: Negative  Psychiatric/Behavioral: Negative  All other systems reviewed and are negative      Patient Active Problem List   Diagnosis    Benign essential hypertension    Mixed hyperlipidemia    Abnormal blood sugar    AC (acromioclavicular) joint bone spurs    Enlarged prostate without lower urinary tract symptoms (luts)    GERD without esophagitis    Internal hemorrhoids    Obstructive sleep apnea    Postgastrectomy malabsorption    History of colon polyps    S/P bariatric surgery    BMI 37 0-37 9, adult    Class 2 severe obesity due to excess calories with serious comorbidity and body mass index (BMI) of 37 0 to 37 9 in adult [facility])    Arthritis of carpometacarpal (CMC) joint of left thumb    Rotator cuff tear arthropathy of right shoulder    Recurrent inguinal hernia without obstruction or gangrene    Non-recurrent unilateral inguinal hernia without obstruction or gangrene    Postoperative urinary retention    Multiple pulmonary emboli (HCC)    Anemia     Past Medical History:   Diagnosis Date    AC (acromioclavicular) joint bone spurs     Last assessed - 2/19/15    Achilles tendinitis, unspecified leg 06/04/2010    Resolved - 1/8/18    Anemia 08/12/2010    Resolved - 1/11/16    Blood in urine  BPH without urinary obstruction     Hypertrophy    Deep venous thrombosis of distal lower extremity ([address] )     2015, provoked, postoperative after bariatric surgery, was placed on coumadin temporarily    Heartburn     Last assessed - 12/12/14    Hemorrhoids     Hernia, inguinal 06/30/2009    Resolved - 1/8/18    History of stomach ulcers     Hypertension     Mixed hyperlipidemia     Nephrolithiasis     Right inguinal hernia     Last assessed - 4/4/16    Sleep apnea     unable to tolerate c-pap    Tear of left rotator cuff     Last assessed - 2/19/15     Past Surgical History:   Procedure Laterality Date    BARIATRIC SURGERY  06/15/2015    Laparoscopic Longitudinal Gastrectomy for morbid obesity, Managed by - Blank Gonzalez     EXPLORATORY LAPAROTOMY W/ BOWEL RESECTION Right 3/3/2016    Procedure: Incarcerated possibly strangulated right inguinal hernia; Surgeon: Yanelis Swann MD;  Location: [address] [place] [address];  Service:    [facility] SURGERY      2011 - Rt Hip Replacement, 2012 - Lt Hip Replacement, 2013 - Lt Hip Revision    INGUINAL HERNIA REPAIR      Last assessed - 4/4/16    JOINT REPLACEMENT      KNEE SURGERY  2004    Double     AL COLONOSCOPY FLX DX W/COLLJ SPEC WHEN PFRMD N/A 1/15/2016    Procedure: COLONOSCOPY;  Surgeon: Melissa Sweeney MD;  Location: [facility] GI LAB; Service: Gastroenterology    AL COLONOSCOPY FLX DX W/COLLJ SPEC WHEN PFRMD N/A 2/26/2019    Procedure: COLONOSCOPY;  Surgeon: Melissa Sweeney MD;  Location: [facility] GI LAB; Service: Gastroenterology    AL [address] Bilateral 6/25/2021    Procedure: REPAIR HERNIA INGUINAL, LAPAROSCOPIC;  Surgeon: Lety Crump MD;  Location: [address] [place] [address];  Service: General    AL RECONSTR TOTAL SHOULDER IMPLANT Right 2/4/2021    Procedure: ARTHROPLASTY SHOULDER REVERSE (RIGHT);   Surgeon: Michael Gregg MD;  Location: [facility];  Service: Orthopedics    TONSILLECTOMY       Family History   Problem Relation Age of Onset    Arthritis Father     Diabetes Father     Hypertension Father     Hyperlipidemia Father     Diabetes Family     Emphysema Family     Heart disease Family     Hypertension Family     Mental illness Neg Hx      Social History     Socioeconomic History    Marital status: /Civil Union     Spouse name: Not on file    Number of children: Not on file    Years of education: Not on file    Highest education level: Not on file   Occupational History    Not on file   Tobacco Use    Smoking status: Former Smoker     Quit date: 1994     Years since quittin 9    Smokeless tobacco: Never Used   Vaping Use    Vaping Use: Never used   Substance and Sexual Activity    Alcohol use: Yes     Comment: Occ, very rarely     Drug use: Never    Sexual activity: Yes     Partners: Female   Other Topics Concern    Not on file   Social History Narrative    Exercise - Walking once a day     Social Determinants of Health     Financial Resource Strain: Not on file   Food Insecurity: Not on file   Transportation Needs: Not on file   Physical Activity: Not on file   Stress: Not on file   Social Connections: Not on file   Intimate Partner Violence: Not on file   Housing Stability: Not on file       Current Outpatient Medications:     atorvastatin (LIPITOR) 20 mg tablet, TAKE ONE TABLET BY MOUTH EVERY DAY, Disp: 90 tablet, Rfl: 1    Calcium-Magnesium-Vitamin D (CALCIUM 1200+D3 PO), Take 1,200 mg by mouth daily, Disp: , Rfl:     Cholecalciferol 50 MCG (2000 UT) CAPS, Take 1 capsule by mouth daily, Disp: , Rfl:     cyanocobalamin (VITAMIN B-12) 500 MCG tablet, Take 500 mcg by mouth daily, Disp: , Rfl:     ferrous gluconate (FERGON) 240 (27 FE) MG tablet, Take 1 tablet (240 mg total) by mouth 3 (three) times a day with meals, Disp: 90 tablet, Rfl: 1    lisinopril (ZESTRIL) 10 mg tablet, TAKE ONE TABLET BY MOUTH EVERY DAY, Disp: 90 tablet, Rfl: 1    rivaroxaban (Xarelto) 10 mg tablet, Take 1 tablet (10 mg total) by mouth daily, Disp: 30 tablet, Rfl: 4    tamsulosin (FLOMAX) 0 4 mg, Take 1 capsule (0 4 mg total) by mouth daily with dinner, Disp: 90 capsule, Rfl: 1    Allergies   Allergen Reactions    Percocet [Oxycodone-Acetaminophen] Hallucinations    Percolone [Oxycodone] Hallucinations       Vitals:    03/25/22 0928   BP: 152/68   Pulse: 75   Resp: 16   Temp: 97 5 °F (36 4 °C)   SpO2: 97%     Physical Exam  Constitutional:       Appearance: He is well-developed  HENT:      Head: Normocephalic and atraumatic  Right Ear: External ear normal       Left Ear: External ear normal    Eyes:      Conjunctiva/sclera: Conjunctivae normal       Pupils: Pupils are equal, round, and reactive to light  Cardiovascular:      Rate and Rhythm: Normal rate and regular rhythm  Heart sounds: Normal heart sounds  Pulmonary:      Effort: Pulmonary effort is normal       Breath sounds: Normal breath sounds  Abdominal:      General: Bowel sounds are normal       Palpations: Abdomen is soft  Musculoskeletal:         General: Normal range of motion  Cervical back: Normal range of motion and neck supple  Skin:     General: Skin is warm  Neurological:      Mental Status: He is alert and oriented to person, place, and time  Deep Tendon Reflexes: Reflexes are normal and symmetric  Psychiatric:         Behavior: Behavior normal          Thought Content: Thought content normal          Judgment: Judgment normal      Extremities:  0-1 +bilateral lower extremity edema, no cords, pulses are 1+    Labs    08/25/2021 WBC = 5 4 hemoglobin = 11 9 hematocrit = 38 5 platelet = 973 neutrophil = 58% BUN = 20 creatinine = 1 14    Imaging    07/07/2021 vascular lower limb venous duplex study bilateral    RIGHT LOWER LIMB:  No evidence of acute or chronic deep vein thrombosis  No evidence of superficial thrombophlebitis noted  Doppler evaluation shows a normal response to augmentation maneuvers    Popliteal, posterior tibial and anterior tibial arterial Doppler waveforms are  triphasic  LEFT LOWER LIMB:  No evidence of acute or chronic deep vein thrombosis  No evidence of superficial thrombophlebitis noted  Doppler evaluation shows a normal response to augmentation maneuvers  Popliteal, posterior tibial and anterior tibial arterial Doppler waveforms are  triphasic  There is a hypoechoic non-vascularized structure noted at the mid calf     07/04/2021 CTA chest PE study    Multiple segmental branch pulmonary artery emboli with right heart strain      The calculated ratio of right ventricular to left ventricular diameter (RV/LV ratio) is 1 07     This is greater than 0 9, which is abnormal and indicates right heart strain  An abnormal RV/LV ratio has been shown to be associated with an increased risk of 30 day mortality in the setting of acute pulmonary embolism  Urgent consultation with the medical critical care team is recommended

## 2022-03-31 ENCOUNTER — TELEPHONE (OUTPATIENT)
Dept: HEMATOLOGY ONCOLOGY | Facility: CLINIC | Age: 72
End: 2022-03-31

## 2022-03-31 NOTE — TELEPHONE ENCOUNTER
Pharmacy is calling in to inform that patient's medication Xarelto is too expensive for him and would like help for coverage   Patient can be reached back at [phone]

## 2022-04-01 NOTE — TELEPHONE ENCOUNTER
Will forward request for assistance for xarelto to [employer]  Patient aware via [employer]

## 2022-04-04 ENCOUNTER — TELEPHONE (OUTPATIENT)
Dept: HEMATOLOGY ONCOLOGY | Facility: CLINIC | Age: 72
End: 2022-04-04

## 2022-04-04 NOTE — TELEPHONE ENCOUNTER
Jacey from  [address] [facility] [address] , calling to inform that patient medication Xarelto is too expensive  He reached his maximum through the savings program and now his co-pay is over $200 00  She can be reached at [phone]

## 2022-04-05 ENCOUNTER — DOCUMENTATION (OUTPATIENT)
Dept: HEMATOLOGY ONCOLOGY | Facility: CLINIC | Age: 72
End: 2022-04-05

## 2022-04-05 NOTE — PROGRESS NOTES
4-4-22  Received message providers office regarding call from pharmacy on the cost of patients xarelto medication  Copay for medication has  reached his maximum through the savings program and now his co-pay is over $200 00  Attempted to enroll the patient with [employer] copay card program for $10 copay w/ $3400 limit per year however it denied request due to other insurance on file  Called [employer] spoke with Mary Ann Calderon who stated they would need to speak with the patient directly  Call placed to patient, spoke with Mr Chrissy Argueta who stated he was informed that the insurance copays increased this year and he assumes that is what the issue here is  I informed him I did attempt to enroll in program however they need to speak with him directly  He stated he would absolutely call them today when he gets off work  Provided [employer] phone number and advised patient to be sure he is given the pharmacy billing information if approved  Patient acknowledged understanding

## 2022-04-15 DIAGNOSIS — R33.9 URINARY RETENTION: ICD-10-CM

## 2022-04-18 RX ORDER — TAMSULOSIN HYDROCHLORIDE 0.4 MG/1
CAPSULE ORAL
Qty: 90 CAPSULE | Refills: 1 | Status: SHIPPED | OUTPATIENT
Start: 2022-04-18

## 2022-06-17 DIAGNOSIS — I26.99 MULTIPLE PULMONARY EMBOLI (HCC): ICD-10-CM

## 2022-06-20 RX ORDER — RIVAROXABAN 10 MG/1
TABLET, FILM COATED ORAL
Qty: 30 TABLET | Refills: 3 | Status: SHIPPED | OUTPATIENT
Start: 2022-06-20

## 2022-07-19 LAB
ALBUMIN SERPL-MCNC: 4.1 G/DL (ref 3.7–4.7)
ALBUMIN/GLOB SERPL: 1.7 {RATIO} (ref 1.2–2.2)
ALP SERPL-CCNC: 101 IU/L (ref 44–121)
ALT SERPL-CCNC: 13 IU/L (ref 0–44)
AST SERPL-CCNC: 17 IU/L (ref 0–40)
BILIRUB SERPL-MCNC: 0.6 MG/DL (ref 0–1.2)
BUN SERPL-MCNC: 15 MG/DL (ref 8–27)
BUN/CREAT SERPL: 13 (ref 10–24)
CALCIUM SERPL-MCNC: 9.1 MG/DL (ref 8.6–10.2)
CHLORIDE SERPL-SCNC: 103 MMOL/L (ref 96–106)
CHOLEST SERPL-MCNC: 141 MG/DL (ref 100–199)
CO2 SERPL-SCNC: 25 MMOL/L (ref 20–29)
CREAT SERPL-MCNC: 1.14 MG/DL (ref 0.76–1.27)
EGFR: 68 ML/MIN/1.73
GLOBULIN SER-MCNC: 2.4 G/DL (ref 1.5–4.5)
GLUCOSE SERPL-MCNC: 102 MG/DL (ref 65–99)
HDLC SERPL-MCNC: 45 MG/DL
LDLC SERPL CALC-MCNC: 81 MG/DL (ref 0–99)
MICRODELETION SYND BLD/T FISH: NORMAL
POTASSIUM SERPL-SCNC: 4.3 MMOL/L (ref 3.5–5.2)
PROT SERPL-MCNC: 6.5 G/DL (ref 6–8.5)
SODIUM SERPL-SCNC: 141 MMOL/L (ref 134–144)
TRIGL SERPL-MCNC: 77 MG/DL (ref 0–149)

## 2022-07-25 ENCOUNTER — OFFICE VISIT (OUTPATIENT)
Dept: FAMILY MEDICINE CLINIC | Facility: CLINIC | Age: 72
End: 2022-07-25
Payer: COMMERCIAL

## 2022-07-25 VITALS
RESPIRATION RATE: 18 BRPM | WEIGHT: 248 LBS | HEIGHT: 67 IN | HEART RATE: 70 BPM | DIASTOLIC BLOOD PRESSURE: 80 MMHG | OXYGEN SATURATION: 98 % | BODY MASS INDEX: 38.92 KG/M2 | SYSTOLIC BLOOD PRESSURE: 132 MMHG | TEMPERATURE: 97.4 F

## 2022-07-25 DIAGNOSIS — Z00.00 WELL ADULT EXAM: Primary | ICD-10-CM

## 2022-07-25 DIAGNOSIS — N40.0 ENLARGED PROSTATE WITHOUT LOWER URINARY TRACT SYMPTOMS (LUTS): ICD-10-CM

## 2022-07-25 DIAGNOSIS — E66.01 SEVERE OBESITY (BMI 35.0-39.9) WITH COMORBIDITY (HCC): ICD-10-CM

## 2022-07-25 DIAGNOSIS — Z98.84 S/P BARIATRIC SURGERY: ICD-10-CM

## 2022-07-25 DIAGNOSIS — Z23 NEED FOR VACCINATION: ICD-10-CM

## 2022-07-25 DIAGNOSIS — R73.09 ABNORMAL BLOOD SUGAR: ICD-10-CM

## 2022-07-25 DIAGNOSIS — I10 BENIGN ESSENTIAL HYPERTENSION: ICD-10-CM

## 2022-07-25 DIAGNOSIS — Z12.11 SCREEN FOR COLON CANCER: ICD-10-CM

## 2022-07-25 DIAGNOSIS — E78.2 MIXED HYPERLIPIDEMIA: ICD-10-CM

## 2022-07-25 PROCEDURE — 1160F RVW MEDS BY RX/DR IN RCRD: CPT | Performed by: FAMILY MEDICINE

## 2022-07-25 PROCEDURE — 90714 TD VACC NO PRESV 7 YRS+ IM: CPT

## 2022-07-25 PROCEDURE — 90471 IMMUNIZATION ADMIN: CPT

## 2022-07-25 PROCEDURE — 1101F PT FALLS ASSESS-DOCD LE1/YR: CPT | Performed by: FAMILY MEDICINE

## 2022-07-25 PROCEDURE — 3288F FALL RISK ASSESSMENT DOCD: CPT | Performed by: FAMILY MEDICINE

## 2022-07-25 PROCEDURE — 99397 PER PM REEVAL EST PAT 65+ YR: CPT | Performed by: FAMILY MEDICINE

## 2022-07-25 PROCEDURE — 3725F SCREEN DEPRESSION PERFORMED: CPT | Performed by: FAMILY MEDICINE

## 2022-07-25 NOTE — PROGRESS NOTES
FAMILY PRACTICE HEALTH MAINTENANCE OFFICE VISIT  [facility]    NAME: Teri Grider  AGE: 67 y o  SEX: male  : 1950     DATE: 2022    Assessment and Plan     1  Well adult exam    2  Severe obesity (BMI 35 0-39  9) with comorbidity ([address] )    3  Benign essential hypertension  -     Comprehensive metabolic panel; Future; Expected date: 2023    4  Mixed hyperlipidemia  -     Lipid Panel with Direct LDL reflex; Future; Expected date: 2023    5  Abnormal blood sugar    6  Enlarged prostate without lower urinary tract symptoms (luts)  Assessment & Plan:  Pt is making appt to see Mr Seema Farnsworth      7  S/P bariatric surgery    8  Need for vaccination  -     TD VACCINE GREATER THAN OR EQUAL TO 6YO PRESERVATIVE FREE IM    9  Screen for colon cancer  -     Ambulatory referral to Gastroenterology; Future    10  BMI 38 0-38 9,adult        Patient Counseling:   Nutrition: Stressed importance of a well balanced diet, moderation of sodium/saturated fat, caloric balance and sufficient intake of fiber  Exercise: Stressed the importance of regular exercise with a goal of 150 minutes per week  Dental Health: Discussed daily flossing and brushing and regular dental visits     Immunizations reviewed: See Orders  Discussed benefits of:  Colon Cancer Screening, Prostate Cancer Screening  and Screening labs  BMI Counseling: Body mass index is 38 84 kg/m²  Discussed with patient's BMI with him  The BMI is above normal  Nutrition recommendations include reducing portion sizes  Return in about 6 months (around 2023) for Recheck          Chief Complaint     Chief Complaint   Patient presents with    Physical Exam     [id number]       History of Present Illness     Pt is here for a full physical  Had labs      Well Adult Physical   Patient here for a comprehensive physical exam       Diet and Physical Activity  Diet: well balanced diet  Exercise: occasionally      Depression Screen  PHQ-2/9 Depression Screening    Little interest or pleasure in doing things: 0 - not at all  Feeling down, depressed, or hopeless: 0 - not at all  PHQ-2 Score: 0  PHQ-2 Interpretation: Negative depression screen          General Health  Hearing: Normal:  bilateral  Vision: no vision problems  Dental: pt with dentures    Reproductive Health  No issues       The following portions of the patient's history were reviewed and updated as appropriate: allergies, current medications, past family history, past medical history, past social history, past surgical history and problem list     Review of Systems     Review of Systems   Constitutional: Negative for activity change, appetite change, chills, diaphoresis, fatigue, fever and unexpected weight change  HENT: Negative for congestion, dental problem, ear pain, mouth sores, sinus pressure, sinus pain, sore throat and trouble swallowing  Eyes: Negative for photophobia, discharge and itching  Respiratory: Negative for apnea, chest tightness and shortness of breath  Cardiovascular: Negative for chest pain, palpitations and leg swelling  Gastrointestinal: Negative for abdominal distention, abdominal pain, blood in stool, nausea and vomiting  Endocrine: Negative for cold intolerance, heat intolerance, polydipsia, polyphagia and polyuria  Genitourinary: Negative for difficulty urinating  Musculoskeletal: Negative for arthralgias  Skin: Negative for color change and wound  Neurological: Negative for dizziness, syncope, speech difficulty and headaches  Hematological: Negative for adenopathy  Psychiatric/Behavioral: Negative for agitation and behavioral problems         Past Medical History     Past Medical History:   Diagnosis Date    AC (acromioclavicular) joint bone spurs     Last assessed - 2/19/15    Achilles tendinitis, unspecified leg 06/04/2010    Resolved - 1/8/18    Anemia 08/12/2010    Resolved - 1/11/16    Blood in urine     BPH without urinary obstruction     Hypertrophy    Deep venous thrombosis of distal lower extremity ([address] )     2015, provoked, postoperative after bariatric surgery, was placed on coumadin temporarily    Heartburn     Last assessed - 12/12/14    Hemorrhoids     Hernia, inguinal 06/30/2009    Resolved - 1/8/18    History of stomach ulcers     Hypertension     Mixed hyperlipidemia     Nephrolithiasis     Right inguinal hernia     Last assessed - 4/4/16    Sleep apnea     unable to tolerate c-pap    Tear of left rotator cuff     Last assessed - 2/19/15       Past Surgical History     Past Surgical History:   Procedure Laterality Date    BARIATRIC SURGERY  06/15/2015    Laparoscopic Longitudinal Gastrectomy for morbid obesity, Managed by - Blank Dunaway     EXPLORATORY LAPAROTOMY W/ BOWEL RESECTION Right 3/3/2016    Procedure: Incarcerated possibly strangulated right inguinal hernia; Surgeon: Peg Mcneil MD;  Location: [address] [place] [address];  Service:    [facility] SURGERY      2011 - Rt Hip Replacement, 2012 - Lt Hip Replacement, 2013 - Lt Hip Revision    INGUINAL HERNIA REPAIR      Last assessed - 4/4/16    JOINT REPLACEMENT      KNEE SURGERY  2004    Double     [place] COLONOSCOPY FLX DX W/COLLJ SPEC WHEN PFRMD N/A 1/15/2016    Procedure: COLONOSCOPY;  Surgeon: Mattie Sexton MD;  Location: [facility] GI LAB; Service: Gastroenterology    [place] COLONOSCOPY FLX DX W/COLLJ SPEC WHEN PFRMD N/A 2/26/2019    Procedure: COLONOSCOPY;  Surgeon: Mattie Sexton MD;  Location: [facility] GI LAB; Service: Gastroenterology    [address] Bilateral 6/25/2021    Procedure: REPAIR HERNIA INGUINAL, LAPAROSCOPIC;  Surgeon: Ines Ma MD;  Location: [address] [place] [address];  Service: General    [place] RECONSTR TOTAL SHOULDER IMPLANT Right 2/4/2021    Procedure: ARTHROPLASTY SHOULDER REVERSE (RIGHT);   Surgeon: Kassie Ramsey MD;  Location: [address] [place] [address];  Service: Orthopedics    TONSILLECTOMY         Social History     Social History     Socioeconomic History    Marital status: /Civil Union     Spouse name: None    Number of children: None    Years of education: None    Highest education level: None   Occupational History    None   Tobacco Use    Smoking status: Former Smoker     Quit date: 1994     Years since quittin 2    Smokeless tobacco: Never Used   Vaping Use    Vaping Use: Never used   Substance and Sexual Activity    Alcohol use: Yes     Comment: Occ, very rarely     Drug use: Never    Sexual activity: Yes     Partners: Female   Other Topics Concern    None   Social History Narrative    Exercise - Walking once a day     Social Determinants of Health     Financial Resource Strain: Not on file   Food Insecurity: Not on file   Transportation Needs: Not on file   Physical Activity: Not on file   Stress: Not on file   Social Connections: Not on file   Intimate Partner Violence: Not on file   Housing Stability: Not on file       Family History     Family History   Problem Relation Age of Onset    Arthritis Father     Diabetes Father     Hypertension Father     Hyperlipidemia Father     Diabetes Family     Emphysema Family     Heart disease Family     Hypertension Family     Mental illness Neg Hx        Current Medications       Current Outpatient Medications:     atorvastatin (LIPITOR) 20 mg tablet, TAKE ONE TABLET BY MOUTH EVERY DAY, Disp: 90 tablet, Rfl: 1    lisinopril (ZESTRIL) 10 mg tablet, TAKE ONE TABLET BY MOUTH EVERY DAY, Disp: 90 tablet, Rfl: 1    tamsulosin (FLOMAX) 0 4 mg, TAKE ONE CAPSULE BY MOUTH EVERY DAY WITH DINNER, Disp: 90 capsule, Rfl: 1    Xarelto 10 MG tablet, TAKE 1 TABLET (10 MG TOTAL) BY MOUTH DAILY, Disp: 30 tablet, Rfl: 3    Calcium-Magnesium-Vitamin D (CALCIUM 1200+D3 PO), Take 1,200 mg by mouth daily, Disp: , Rfl:     Cholecalciferol 50 MCG (2000 UT) CAPS, Take 1 capsule by mouth daily, Disp: , Rfl:     cyanocobalamin (VITAMIN B-12) 500 MCG tablet, Take 500 mcg by mouth daily, Disp: , Rfl:     ferrous gluconate (FERGON) 240 (27 FE) MG tablet, Take 1 tablet (240 mg total) by mouth 3 (three) times a day with meals, Disp: 90 tablet, Rfl: 1     Allergies     Allergies   Allergen Reactions    Percocet [Oxycodone-Acetaminophen] Hallucinations    Percolone [Oxycodone] Hallucinations       Objective     /80   Pulse 70   Temp (!) 97 4 °F (36 3 °C)   Resp 18   Ht 5' 7" (1 702 m)   Wt 112 kg (248 lb)   SpO2 98%   BMI 38 84 kg/m²      Physical Exam  Vitals and nursing note reviewed  Constitutional:       General: He is not in acute distress  Appearance: He is well-developed  He is not diaphoretic  HENT:      Head: Normocephalic and atraumatic  Right Ear: External ear normal       Left Ear: External ear normal       Nose: Nose normal       Mouth/Throat:      Pharynx: No oropharyngeal exudate  Eyes:      General: No scleral icterus  Right eye: No discharge  Left eye: No discharge  Pupils: Pupils are equal, round, and reactive to light  Neck:      Thyroid: No thyromegaly  Cardiovascular:      Rate and Rhythm: Normal rate  Heart sounds: Normal heart sounds  No murmur heard  Pulmonary:      Effort: Pulmonary effort is normal  No respiratory distress  Breath sounds: Normal breath sounds  No wheezing  Abdominal:      General: Bowel sounds are normal  There is no distension  Palpations: Abdomen is soft  There is no mass  Tenderness: There is no abdominal tenderness  There is no guarding or rebound  Musculoskeletal:         General: Normal range of motion  Comments: Mild edema b/l lower ext   Skin:     General: Skin is warm and dry  Findings: No erythema or rash  Comments: B/l knee scars   Neurological:      Mental Status: He is alert        Coordination: Coordination normal       Deep Tendon Reflexes: Reflexes normal    Psychiatric:         Behavior: Behavior normal  Visual Acuity Screening    Right eye Left eye Both eyes   Without correction: 20/25 20/25 20/25   With correction:        Recent Results (from the past 672 hour(s))   Comprehensive metabolic panel    Collection Time: 07/18/22  7:05 AM   Result Value Ref Range    Glucose, Random 102 (H) 65 - 99 mg/dL    BUN 15 8 - 27 mg/dL    Creatinine 1 14 0 76 - 1 27 mg/dL    eGFR 68 >59 mL/min/1 73    SL AMB BUN/CREATININE RATIO 13 10 - 24    Sodium 141 134 - 144 mmol/L    Potassium 4 3 3 5 - 5 2 mmol/L    Chloride 103 96 - 106 mmol/L    CO2 25 20 - 29 mmol/L    CALCIUM 9 1 8 6 - 10 2 mg/dL    Protein, Total 6 5 6 0 - 8 5 g/dL    Albumin 4 1 3 7 - 4 7 g/dL    Globulin, Total 2 4 1 5 - 4 5 g/dL    Albumin/Globulin Ratio 1 7 1 2 - 2 2    TOTAL BILIRUBIN 0 6 0 0 - 1 2 mg/dL    Alk Phos Isoenzymes 101 44 - 121 IU/L    AST 17 0 - 40 IU/L    ALT 13 0 - 44 IU/L   Lipid panel    Collection Time: 07/18/22  7:05 AM   Result Value Ref Range    Cholesterol, Total 141 100 - 199 mg/dL    Triglycerides 77 0 - 149 mg/dL    HDL 45 >39 mg/dL    LDL Calculated 81 0 - 99 mg/dL   Cardiovascular Report    Collection Time: 07/18/22  7:05 AM   Result Value Ref Range    Interpretation Note            Imtiaz Fonseca DO  [address]  BMI Counseling: Body mass index is 38 84 kg/m²  The BMI is above normal  Exercise recommendations include moderate aerobic physical activity for 150 minutes/week  Falls Plan of Care: Balance, strength, and gait training instructions were provided

## 2022-07-25 NOTE — PATIENT INSTRUCTIONS
Obesity   AMBULATORY CARE:   Obesity  means your body mass index (BMI) is greater than 30  Your healthcare provider will use your height and weight to measure your BMI  The risks of obesity include  many health problems, including injuries or physical disability  · Diabetes (high blood sugar level)    · High blood pressure or high cholesterol    · Heart disease    · Stroke    · Gallbladder or liver disease    · Cancer of the colon, breast, prostate, liver, or kidney    · Sleep apnea    · Arthritis or gout    Screening  is done to check for health conditions before you have signs or symptoms  If you are 28to 79years old, your blood sugar level may be checked every 3 years for signs of prediabetes or diabetes  Your healthcare provider will check your blood pressure at each visit  High blood pressure can lead to a stroke or other problems  Your provider may check for signs of heart disease, cancer, or other health problems  Seek care immediately if:   · You have a severe headache, confusion, or difficulty speaking  · You have weakness on one side of your body  · You have chest pain, sweating, or shortness of breath  Call your doctor if:   · You have symptoms of gallbladder or liver disease, such as pain in your upper abdomen  · You have knee or hip pain and discomfort while walking  · You have symptoms of diabetes, such as intense hunger and thirst, and frequent urination  · You have symptoms of sleep apnea, such as snoring or daytime sleepiness  · You have questions or concerns about your condition or care  Treatment for obesity  focuses on helping you lose weight to improve your health  Even a small decrease in BMI can reduce the risk for many health problems  Your healthcare provider will help you set a weight-loss goal   · Lifestyle changes  are the first step in treating obesity  These include making healthy food choices and getting regular physical activity   Your healthcare provider may suggest a weight-loss program that involves coaching, education, and therapy  · Medicine  may help you lose weight when it is used with a healthy foods and physical activity  · Surgery  can help you lose weight if you are very obese and have other health problems  There are several types of weight-loss surgery  Ask your healthcare provider for more information  Tips for safe weight loss:   · Set small, realistic goals  An example of a small goal is to walk for 20 minutes 5 days a week  Anther goal is to lose 5% of your body weight  · Tell friends, family members, and coworkers about your goals  and ask for their support  Ask a friend to lose weight with you, or join a weight-loss support group  · Identify foods or triggers that may cause you to overeat , and find ways to avoid them  Remove tempting high-calorie foods from your home and workplace  Place a bowl of fresh fruit on your kitchen counter  If stress causes you to eat, then find other ways to cope with stress  A counselor or therapist may be able to help you  · Keep a diary to track what you eat and drink  Also write down how many minutes of physical activity you do each day  Weigh yourself once a week and record it in your diary  Eating changes: You will need to eat 500 to 1,000 fewer calories each day than you currently eat to lose 1 to 2 pounds a week  The following changes will help you cut calories:  · Eat smaller portions  Use small plates, no larger than 9 inches in diameter  Fill your plate half full of fruits and vegetables  Measure your food using measuring cups until you know what a serving size looks like  · Eat 3 meals and 1 or 2 snacks each day  Plan your meals in advance  Chelsy Dry and eat at home most of the time  Eat slowly  Do not skip meals  Skipping meals can lead to overeating later in the day  This can make it harder for you to lose weight   Talk with a dietitian to help you make a meal plan and schedule that is right for you  · Eat fruits and vegetables at every meal   They are low in calories and high in fiber, which makes you feel full  Do not add butter, margarine, or cream sauce to vegetables  Use herbs to season steamed vegetables  · Eat less fat and fewer fried foods  Eat more baked or grilled chicken and fish  These protein sources are lower in calories and fat than red meat  Limit fast food  Dress your salads with olive oil and vinegar instead of bottled dressing  · Limit the amount of sugar you eat  Do not drink sugary beverages  Limit alcohol  Activity changes:  Physical activity is good for your body in many ways  It helps you burn calories and build strong muscles  It decreases stress and depression, and improves your mood  It can also help you sleep better  Talk to your healthcare provider before you begin an exercise program   · Exercise for at least 30 minutes 5 days a week  Start slowly  Set aside time each day for physical activity that you enjoy and that is convenient for you  It is best to do both weight training and an activity that increases your heart rate, such as walking, bicycling, or swimming  · Find ways to be more active  Do yard work and housecleaning  Walk up the stairs instead of using elevators  Spend your leisure time going to events that require walking, such as outdoor festivals or fairs  This extra physical activity can help you lose weight and keep it off  Follow up with your doctor as directed: You may need to meet with a dietitian  Write down your questions so you remember to ask them during your visits  © Copyright [employer] 2022 Information is for End User's use only and may not be sold, redistributed or otherwise used for commercial purposes  All illustrations and images included in CareNotes® are the copyrighted property of A D A M , Inc  or Desirae Villegas   The above information is an  only   It is not intended as medical advice for individual conditions or treatments  Talk to your doctor, nurse or pharmacist before following any medical regimen to see if it is safe and effective for you  Fall Prevention   AMBULATORY CARE:   Fall prevention  includes ways to make your home and other areas safer  It also includes ways you can move more carefully to prevent a fall  Health conditions that cause changes in your blood pressure, vision, or muscle strength and coordination may increase your risk for falls  Medicines may also increase your risk for falls if they make you dizzy, weak, or sleepy  Call 911 or have someone else call if:   · You have fallen and are unconscious  · You have fallen and cannot move part of your body  Contact your healthcare provider if:   · You have fallen and have pain or a headache  · You have questions or concerns about your condition or care  Fall prevention tips:   · Stand or sit up slowly  This may help you keep your balance and prevent falls  · Use assistive devices as directed  Your healthcare provider may suggest that you use a cane or walker to help you keep your balance  You may need to have grab bars put in your bathroom near the toilet or in the shower  · Wear shoes that fit well and have soles that   Wear shoes both inside and outside  Use slippers with good   Do not wear shoes with high heels  · Wear a personal alarm  This is a device that allows you to call 911 if you fall and need help  Ask your healthcare provider for more information  · Stay active  Exercise can help strengthen your muscles and improve your balance  Your healthcare provider may recommend water aerobics or walking  He or she may also recommend physical therapy to improve your coordination  Never start an exercise program without talking to your healthcare provider first          · Manage your medical conditions  Keep all appointments with your healthcare providers   Visit your eye doctor as directed  Home safety tips:       · Add items to prevent falls in the bathroom  Put nonslip strips on your bath or shower floor to prevent you from slipping  Use a bath mat if you do not have carpet in the bathroom  This will prevent you from falling when you step out of the bath or shower  Use a shower seat so you do not need to stand while you shower  Sit on the toilet or a chair in your bathroom to dry yourself and put on clothing  This will prevent you from losing your balance from drying or dressing yourself while you are standing  · Keep paths clear  Remove books, shoes, and other objects from walkways and stairs  Place cords for telephones and lamps out of the way so that you do not need to walk over them  Tape them down if you cannot move them  Remove small rugs  If you cannot remove a rug, secure it with double-sided tape  This will prevent you from tripping  · Install bright lights in your home  Use night lights to help light paths to the bathroom or kitchen  Always turn on the light before you start walking  · Keep items you use often on shelves within reach  Do not use a step stool to help you reach an item  · Paint or place reflective tape on the edges of your stairs  This will help you see the stairs better  Follow up with your doctor as directed:  Write down your questions so you remember to ask them during your visits  © Copyright [employer] 2022 Information is for End User's use only and may not be sold, redistributed or otherwise used for commercial purposes  All illustrations and images included in CareNotes® are the copyrighted property of [employer]  or Desirae Villegas   The above information is an  only  It is not intended as medical advice for individual conditions or treatments  Talk to your doctor, nurse or pharmacist before following any medical regimen to see if it is safe and effective for you

## 2022-07-26 ENCOUNTER — TELEPHONE (OUTPATIENT)
Dept: FAMILY MEDICINE CLINIC | Facility: CLINIC | Age: 72
End: 2022-07-26

## 2022-07-26 NOTE — TELEPHONE ENCOUNTER
Patient called and stated he saw Dr Froy Edmonds yesterday for his physical   Dr Froy Edmonds gave him an order to see Dr Trung Enriquez for a colonoscopy  Patient called Dr Dai Dias office and he was told he was given 5 years until his next, which would mean he isnt due for another 2 years  Dr Dai Dias office stated that this is in his chart    Please update in patients chart

## 2022-09-29 DIAGNOSIS — I26.99 MULTIPLE PULMONARY EMBOLI (HCC): ICD-10-CM

## 2022-09-29 RX ORDER — RIVAROXABAN 10 MG/1
TABLET, FILM COATED ORAL
Qty: 30 TABLET | Refills: 2 | Status: SHIPPED | OUTPATIENT
Start: 2022-09-29

## 2022-10-07 ENCOUNTER — OFFICE VISIT (OUTPATIENT)
Dept: HEMATOLOGY ONCOLOGY | Facility: MEDICAL CENTER | Age: 72
End: 2022-10-07
Payer: COMMERCIAL

## 2022-10-07 VITALS
WEIGHT: 250 LBS | HEART RATE: 65 BPM | BODY MASS INDEX: 39.24 KG/M2 | OXYGEN SATURATION: 95 % | HEIGHT: 67 IN | DIASTOLIC BLOOD PRESSURE: 70 MMHG | TEMPERATURE: 99.8 F | RESPIRATION RATE: 18 BRPM | SYSTOLIC BLOOD PRESSURE: 120 MMHG

## 2022-10-07 DIAGNOSIS — I26.99 MULTIPLE PULMONARY EMBOLI (HCC): Primary | ICD-10-CM

## 2022-10-07 PROCEDURE — 99213 OFFICE O/P EST LOW 20 MIN: CPT | Performed by: INTERNAL MEDICINE

## 2022-10-07 NOTE — PROGRESS NOTES
Lester Walker  1950  [facility] [place] [facility] [address] [id number]    DISCUSSION/SUMMARY:    66-year-old male recently diagnosed with PE (2nd PE for this patient)  LE dopplers at the time of the 2nd PE were negative  Mr Marissa Villarreal is now on Xarelto 10 mg a day  Patient feels well and clinically there are no concerning findings  Patient denies any respiratory issues  No problems with excessive bruising or bleeding  The plan is to continue the Xarelto at 10 mg a day  We rediscussed what to monitor for as far as excessive bruising/bleeding  We discussed what to monitor for as far as acute respiratory issues, chest pain, pressure, perceived anxiety attack, lower extremity swelling, cords, redness etc    Patient will call the office if he is scheduled for any invasive procedure or surgery  Patient is to return in 6 months  Patient knows to call the hematology/oncology office if there are any other questions or concerns  Carefully review your medication list and verify that the list is accurate and up-to-date  Please call the hematology/oncology office if there are medications missing from the list, medications on the list that you are not currently taking or if there is a dosage or instruction that is different from how you're taking that medication  Patient goals and areas of care:  Continue with the Xarelto  Barriers to care:  None  Patient is able to self-care   _____________________________________________________________________________________    Chief Complaint   Patient presents with    PE on Xarelto    Follow-up     History of Present Illness:  66-year-old male recently diagnosed with multiple subsegmental pulmonary emboli initially seen by AF, PAC  Mr Marissa Villarreal previously suffered a PE  Patient is on Xarelto and returns for follow-up  Mr Marissa Villarreal states feeling well  No respiratory issues, no chest pain    No problems with excessive bruising or bleeding  Activities are baseline  Routine health maintenance and medical care is up-to-date  Patient has received the COVID vaccine and booster  Review of Systems   Constitutional: Negative  HENT: Negative  Eyes: Negative  Respiratory: Negative  Cardiovascular: Negative  Gastrointestinal: Negative  Endocrine: Negative  Genitourinary: Negative  Musculoskeletal: Negative  Skin: Negative  Allergic/Immunologic: Negative  Neurological: Negative  Hematological: Negative  Psychiatric/Behavioral: Negative  All other systems reviewed and are negative      Patient Active Problem List   Diagnosis    Benign essential hypertension    Mixed hyperlipidemia    Abnormal blood sugar    AC (acromioclavicular) joint bone spurs    Enlarged prostate without lower urinary tract symptoms (luts)    GERD without esophagitis    Internal hemorrhoids    Obstructive sleep apnea    Postgastrectomy malabsorption    History of colon polyps    S/P bariatric surgery    BMI 37 0-37 9, adult    Class 2 severe obesity due to excess calories with serious comorbidity and body mass index (BMI) of 37 0 to 37 9 in adult [facility])    Arthritis of carpometacarpal (CMC) joint of left thumb    Rotator cuff tear arthropathy of right shoulder    Recurrent inguinal hernia without obstruction or gangrene    Non-recurrent unilateral inguinal hernia without obstruction or gangrene    Postoperative urinary retention    Multiple pulmonary emboli (HCC)    Anemia     Past Medical History:   Diagnosis Date    AC (acromioclavicular) joint bone spurs     Last assessed - 2/19/15    Achilles tendinitis, unspecified leg 06/04/2010    Resolved - 1/8/18    Anemia 08/12/2010    Resolved - 1/11/16    Blood in urine     BPH without urinary obstruction     Hypertrophy    Deep venous thrombosis of distal lower extremity ([address] )     2015, provoked, postoperative after bariatric surgery, was placed on coumadin temporarily    Heartburn     Last assessed - 12/12/14    Hemorrhoids     Hernia, inguinal 06/30/2009    Resolved - 1/8/18    History of stomach ulcers     Hypertension     Mixed hyperlipidemia     Nephrolithiasis     Right inguinal hernia     Last assessed - 4/4/16    Sleep apnea     unable to tolerate c-pap    Tear of left rotator cuff     Last assessed - 2/19/15     Past Surgical History:   Procedure Laterality Date    BARIATRIC SURGERY  06/15/2015    Laparoscopic Longitudinal Gastrectomy for morbid obesity, Managed by - Florentino Favre, Maher     EXPLORATORY LAPAROTOMY W/ BOWEL RESECTION Right 3/3/2016    Procedure: Incarcerated possibly strangulated right inguinal hernia; Surgeon: Violet Pickett MD;  Location: [address] [place] [address];  Service:    [address]  [employer] SURGERY      2011 - Rt Hip Replacement, 2012 - Lt Hip Replacement, 2013 - Lt Hip Revision    INGUINAL HERNIA REPAIR      Last assessed - 4/4/16    JOINT REPLACEMENT      KNEE SURGERY  2004    Double     MN COLONOSCOPY FLX DX W/COLLJ SPEC WHEN PFRMD N/A 1/15/2016    Procedure: COLONOSCOPY;  Surgeon: Ghislaine Gallo MD;  Location: [facility]; Service: Gastroenterology    MN COLONOSCOPY FLX DX W/COLLJ SPEC WHEN PFRMD N/A 2/26/2019    Procedure: COLONOSCOPY;  Surgeon: Ghislaine Gallo MD;  Location: [facility]; Service: Gastroenterology    MN [address] Bilateral 6/25/2021    Procedure: REPAIR HERNIA INGUINAL, LAPAROSCOPIC;  Surgeon: Atif Major MD;  Location: [address] [place] [address];  Service: General    MN RECONSTR TOTAL SHOULDER IMPLANT Right 2/4/2021    Procedure: ARTHROPLASTY SHOULDER REVERSE (RIGHT);   Surgeon: Wm Dover MD;  Location: [facility];  Service: Orthopedics    TONSILLECTOMY       Family History   Problem Relation Age of Onset    Arthritis Father     Diabetes Father     Hypertension Father     Hyperlipidemia Father     Diabetes Family     Emphysema Family     Heart disease Family     Hypertension Family     Mental illness Neg Hx      Social History     Socioeconomic History    Marital status: /Civil Union     Spouse name: Not on file    Number of children: Not on file    Years of education: Not on file    Highest education level: Not on file   Occupational History    Not on file   Tobacco Use    Smoking status: Former Smoker     Quit date: 1994     Years since quittin 4    Smokeless tobacco: Never Used   Vaping Use    Vaping Use: Never used   Substance and Sexual Activity    Alcohol use: Yes     Comment: Occ, very rarely     Drug use: Never    Sexual activity: Yes     Partners: Female   Other Topics Concern    Not on file   Social History Narrative    Exercise - Walking once a day     Social Determinants of Health     Financial Resource Strain: Not on file   Food Insecurity: Not on file   Transportation Needs: Not on file   Physical Activity: Not on file   Stress: Not on file   Social Connections: Not on file   Intimate Partner Violence: Not on file   Housing Stability: Not on file       Current Outpatient Medications:     atorvastatin (LIPITOR) 20 mg tablet, TAKE ONE TABLET BY MOUTH EVERY DAY, Disp: 90 tablet, Rfl: 1    lisinopril (ZESTRIL) 10 mg tablet, TAKE ONE TABLET BY MOUTH EVERY DAY, Disp: 90 tablet, Rfl: 1    tamsulosin (FLOMAX) 0 4 mg, TAKE ONE CAPSULE BY MOUTH EVERY DAY WITH DINNER, Disp: 90 capsule, Rfl: 1    Xarelto 10 MG tablet, TAKE 1 TABLET (10 MG TOTAL) BY MOUTH DAILY, Disp: 30 tablet, Rfl: 2    Calcium-Magnesium-Vitamin D (CALCIUM 1200+D3 PO), Take 1,200 mg by mouth daily, Disp: , Rfl:     Cholecalciferol 50 MCG (2000 UT) CAPS, Take 1 capsule by mouth daily, Disp: , Rfl:     cyanocobalamin (VITAMIN B-12) 500 MCG tablet, Take 500 mcg by mouth daily, Disp: , Rfl:     ferrous gluconate (FERGON) 240 (27 FE) MG tablet, Take 1 tablet (240 mg total) by mouth 3 (three) times a day with meals, Disp: 90 tablet, Rfl: 1    Allergies Allergen Reactions    Percocet [Oxycodone-Acetaminophen] Hallucinations    Percolone [Oxycodone] Hallucinations       Vitals:    10/07/22 0855   BP: 120/70   Pulse: 65   Resp: 18   Temp: 99 8 °F (37 7 °C)   SpO2: 95%     Physical Exam  Constitutional:       Appearance: He is well-developed  HENT:      Head: Normocephalic and atraumatic  Right Ear: External ear normal       Left Ear: External ear normal    Eyes:      Conjunctiva/sclera: Conjunctivae normal       Pupils: Pupils are equal, round, and reactive to light  Cardiovascular:      Rate and Rhythm: Normal rate and regular rhythm  Heart sounds: Normal heart sounds  Pulmonary:      Effort: Pulmonary effort is normal       Breath sounds: Normal breath sounds  Abdominal:      General: Bowel sounds are normal       Palpations: Abdomen is soft  Musculoskeletal:         General: Normal range of motion  Cervical back: Normal range of motion and neck supple  Skin:     General: Skin is warm  Neurological:      Mental Status: He is alert and oriented to person, place, and time  Deep Tendon Reflexes: Reflexes are normal and symmetric  Psychiatric:         Behavior: Behavior normal          Thought Content: Thought content normal          Judgment: Judgment normal      Extremities:  0-1 +bilateral lower extremity edema, no cords, pulses are 1+    Labs    07/18/2022 BUN = 15 creatinine = 1 14 calcium = 9 1 LFTs WNL    Imaging    07/07/2021 vascular lower limb venous duplex study bilateral    RIGHT LOWER LIMB:  No evidence of acute or chronic deep vein thrombosis  No evidence of superficial thrombophlebitis noted  Doppler evaluation shows a normal response to augmentation maneuvers  Popliteal, posterior tibial and anterior tibial arterial Doppler waveforms are  triphasic  LEFT LOWER LIMB:  No evidence of acute or chronic deep vein thrombosis  No evidence of superficial thrombophlebitis noted    Doppler evaluation shows a normal response to augmentation maneuvers  Popliteal, posterior tibial and anterior tibial arterial Doppler waveforms are  triphasic  There is a hypoechoic non-vascularized structure noted at the mid calf     07/04/2021 CTA chest PE study    Multiple segmental branch pulmonary artery emboli with right heart strain      The calculated ratio of right ventricular to left ventricular diameter (RV/LV ratio) is 1 07     This is greater than 0 9, which is abnormal and indicates right heart strain  An abnormal RV/LV ratio has been shown to be associated with an increased risk of 30 day mortality in the setting of acute pulmonary embolism  Urgent consultation with the medical critical care team is recommended

## 2022-10-16 DIAGNOSIS — R33.9 URINARY RETENTION: ICD-10-CM

## 2022-10-17 RX ORDER — TAMSULOSIN HYDROCHLORIDE 0.4 MG/1
CAPSULE ORAL
Qty: 90 CAPSULE | Refills: 1 | Status: SHIPPED | OUTPATIENT
Start: 2022-10-17

## 2022-11-02 DIAGNOSIS — I10 BENIGN ESSENTIAL HYPERTENSION: ICD-10-CM

## 2022-11-02 DIAGNOSIS — E78.2 MIXED HYPERLIPIDEMIA: ICD-10-CM

## 2022-11-02 RX ORDER — ATORVASTATIN CALCIUM 20 MG/1
20 TABLET, FILM COATED ORAL DAILY
Qty: 90 TABLET | Refills: 1 | Status: SHIPPED | OUTPATIENT
Start: 2022-11-02

## 2022-11-02 RX ORDER — LISINOPRIL 10 MG/1
10 TABLET ORAL DAILY
Qty: 90 TABLET | Refills: 1 | Status: SHIPPED | OUTPATIENT
Start: 2022-11-02

## 2022-12-09 ENCOUNTER — TELEPHONE (OUTPATIENT)
Dept: FAMILY MEDICINE CLINIC | Facility: CLINIC | Age: 72
End: 2022-12-09

## 2022-12-09 NOTE — TELEPHONE ENCOUNTER
Left message for patient to call  He needs to be scheduled for a Pre-MRI with anesthesia visit between now and 1/3/23 for upcoming MRI at [facility]  There is a form that [facility] wants filled out and faxed to them before his MRI on 1/5/23  Dr Pastor Boone wants this to be 30 minute visit so it is OK to use a 15 minute block attached to a same day to accommodate  Form placed in Dr Casandra Neff folder for visit    Amy Soria

## 2022-12-12 NOTE — TELEPHONE ENCOUNTER
[facility] requesting a call back   Please schedule patient for a 30 min visit with Dr Tracy Expose

## 2022-12-23 ENCOUNTER — OFFICE VISIT (OUTPATIENT)
Dept: FAMILY MEDICINE CLINIC | Facility: CLINIC | Age: 72
End: 2022-12-23

## 2022-12-23 VITALS
HEIGHT: 67 IN | HEART RATE: 84 BPM | TEMPERATURE: 97.3 F | DIASTOLIC BLOOD PRESSURE: 70 MMHG | BODY MASS INDEX: 40.34 KG/M2 | SYSTOLIC BLOOD PRESSURE: 140 MMHG | OXYGEN SATURATION: 94 % | WEIGHT: 257 LBS | RESPIRATION RATE: 16 BRPM

## 2022-12-23 DIAGNOSIS — I10 BENIGN ESSENTIAL HYPERTENSION: Primary | ICD-10-CM

## 2022-12-23 DIAGNOSIS — E78.2 MIXED HYPERLIPIDEMIA: ICD-10-CM

## 2022-12-23 DIAGNOSIS — G47.33 OBSTRUCTIVE SLEEP APNEA: ICD-10-CM

## 2022-12-23 NOTE — PROGRESS NOTES
FAMILY PRACTICE PRE-OPERATIVE EVALUATION  [facility]    NAME: Dave Dominguez  AGE: 67 y o  SEX: male  : 1950     DATE: 2022    Family Practice Pre-Operative Evaluation      Chief Complaint: Pre-operative Evaluation     Surgery: MRI  Anticipated Date of Surgery: 22  Surgeon: N/A      History of Present Illness:     Pt is here for clearance for anesthesia for an MRI  PT states he is going for an MRI of the prostate as the PSA went up  Last roubnd of anesthesia was last year when they did his shoulder      Anesthesia:  general  Bleeding Risk: no recent abnormal bleeding, no remote history of abnormal bleeding and no use of Ca-channel blockers  Current Anti-platelet/anticoagulation medication: Rivaroxaban (Xarelto) and but not having surgery clearance is for an MRI    Assessment of Cardiac Risk:  · Denies unstable or severe angina or MI in the last 6 weeks or history of stent placement in the last year   · Denies decompensated heart failure (e g  New onset heart failure, NYHA functional class IV heart failure, or worsening existing heart failure)  · Denies significant arrhythmias such as high grade AV block, symptomatic ventricular arrhythmia, newly recognized ventricular tachycardia, supraventricular tachycardia with resting heart rate >100, or symptomatic bradycardia  · Denies severe heart valve disease including aortic stenosis or symptomatic mitral stenosis     Exercise Capacity:  · Able to walk 4 blocks without symptoms?: Yes  · Able to walk 2 flights without symptoms?: Yes    Prior Anesthesia Reactions: No     Personal history of venous thromboembolic disease? Yes, 2-3 years ago    History of steroid use for >2 weeks within last year? N/A         Review of Systems:     Review of Systems   Constitutional: Negative for activity change, appetite change, chills, diaphoresis, fatigue, fever and unexpected weight change     HENT: Negative for congestion, dental problem, ear pain, mouth sores, sinus pressure, sinus pain, sore throat and trouble swallowing  Eyes: Negative for photophobia, discharge and itching  Respiratory: Negative for apnea, chest tightness and shortness of breath  Cardiovascular: Negative for chest pain, palpitations and leg swelling  Gastrointestinal: Negative for abdominal distention, abdominal pain, blood in stool, nausea and vomiting  Endocrine: Negative for cold intolerance, heat intolerance, polydipsia, polyphagia and polyuria  Genitourinary: Negative for difficulty urinating  Musculoskeletal: Negative for arthralgias  Skin: Negative for color change and wound  Neurological: Negative for dizziness, syncope, speech difficulty and headaches  Hematological: Negative for adenopathy  Psychiatric/Behavioral: Negative for agitation and behavioral problems  Current Problem List:     Patient Active Problem List   Diagnosis   • Benign essential hypertension   • Mixed hyperlipidemia   • Abnormal blood sugar   • AC (acromioclavicular) joint bone spurs   • Enlarged prostate without lower urinary tract symptoms (luts)   • GERD without esophagitis   • Internal hemorrhoids   • Obstructive sleep apnea   • Postgastrectomy malabsorption   • History of colon polyps   • S/P bariatric surgery   • BMI 37 0-37 9, adult   • Class 2 severe obesity due to excess calories with serious comorbidity and body mass index (BMI) of 37 0 to 37 9 in adult [facility])   • Arthritis of carpometacarpal (CMC) joint of left thumb   • Rotator cuff tear arthropathy of right shoulder   • Recurrent inguinal hernia without obstruction or gangrene   • Non-recurrent unilateral inguinal hernia without obstruction or gangrene   • Postoperative urinary retention   • Multiple pulmonary emboli (HCC)   • Anemia       Allergies:      Allergies   Allergen Reactions   • Percocet [Oxycodone-Acetaminophen] Hallucinations   • Percolone [Oxycodone] Hallucinations       Current Medications:       Current Outpatient Medications:   •  atorvastatin (LIPITOR) 20 mg tablet, Take 1 tablet (20 mg total) by mouth daily, Disp: 90 tablet, Rfl: 1  •  lisinopril (ZESTRIL) 10 mg tablet, Take 1 tablet (10 mg total) by mouth daily, Disp: 90 tablet, Rfl: 1  •  tamsulosin (FLOMAX) 0 4 mg, TAKE ONE CAPSULE BY MOUTH EVERY DAY WITH DINNER, Disp: 90 capsule, Rfl: 1  •  Xarelto 10 MG tablet, TAKE 1 TABLET (10 MG TOTAL) BY MOUTH DAILY, Disp: 30 tablet, Rfl: 2    Past Medical History:       Past Medical History:   Diagnosis Date   • AC (acromioclavicular) joint bone spurs     Last assessed - 2/19/15   • Achilles tendinitis, unspecified leg 06/04/2010    Resolved - 1/8/18   • Anemia 08/12/2010    Resolved - 1/11/16   • Blood in urine    • BPH without urinary obstruction     Hypertrophy   • Deep venous thrombosis of distal lower extremity ([address] )     2015, provoked, postoperative after bariatric surgery, was placed on coumadin temporarily   • Heartburn     Last assessed - 12/12/14   • Hemorrhoids    • Hernia, inguinal 06/30/2009    Resolved - 1/8/18   • History of stomach ulcers    • Hypertension    • Mixed hyperlipidemia    • Nephrolithiasis    • Right inguinal hernia     Last assessed - 4/4/16   • Sleep apnea     unable to tolerate c-pap   • Tear of left rotator cuff     Last assessed - 2/19/15        Past Surgical History:   Procedure Laterality Date   • BARIATRIC SURGERY  06/15/2015    Laparoscopic Longitudinal Gastrectomy for morbid obesity, Managed by - Blank Lima    • EXPLORATORY LAPAROTOMY W/ BOWEL RESECTION Right 3/3/2016    Procedure: Incarcerated possibly strangulated right inguinal hernia;   Surgeon: Rose Miner MD;  Location: [address] [place] [address];  Service:    • FOOT SURGERY     • HIP SURGERY      2011 - Rt Hip Replacement, 2012 - Lt Hip Replacement, 2013 - Lt Hip Revision   • INGUINAL HERNIA REPAIR      Last assessed - 4/4/16   • JOINT REPLACEMENT     • KNEE SURGERY  2004    Double    • NM COLONOSCOPY FLX DX W/COLLJ SPEC WHEN PFRMD N/A 1/15/2016    Procedure: COLONOSCOPY;  Surgeon: Scout Gonsalez MD;  Location: [place] [address] GI LAB; Service: Gastroenterology   • OR COLONOSCOPY FLX DX W/COLLJ SPEC WHEN PFRMD N/A 2019    Procedure: COLONOSCOPY;  Surgeon: Scout Gonsalez MD;  Location: [place] [address] GI LAB; Service: Gastroenterology   • OR LAP,INGUINAL HERNIA REPR,INITIAL Bilateral 2021    Procedure: REPAIR HERNIA INGUINAL, LAPAROSCOPIC;  Surgeon: Oniel Castillo MD;  Location: [address] [place] [address];  Service: General   • OR RECONSTR TOTAL SHOULDER IMPLANT Right 2021    Procedure: ARTHROPLASTY SHOULDER REVERSE (RIGHT);   Surgeon: Jaron Hoskins MD;  Location: [address] [place] [address];  Service: Orthopedics   • TONSILLECTOMY          Family History   Problem Relation Age of Onset   • Arthritis Father    • Diabetes Father    • Hypertension Father    • Hyperlipidemia Father    • Diabetes Family    • Emphysema Family    • Heart disease Family    • Hypertension Family    • Mental illness Neg Hx         Social History     Socioeconomic History   • Marital status: /Civil Union     Spouse name: Not on file   • Number of children: Not on file   • Years of education: Not on file   • Highest education level: Not on file   Occupational History   • Not on file   Tobacco Use   • Smoking status: Former     Types: Cigarettes     Quit date: 1994     Years since quittin 6   • Smokeless tobacco: Never   Vaping Use   • Vaping Use: Never used   Substance and Sexual Activity   • Alcohol use: Yes     Comment: Occ, very rarely    • Drug use: Never   • Sexual activity: Yes     Partners: Female   Other Topics Concern   • Not on file   Social History Narrative    Exercise - Walking once a day     Social Determinants of Health     Financial Resource Strain: Not on file   Food Insecurity: Not on file   Transportation Needs: Not on file   Physical Activity: Not on file   Stress: Not on file   Social Connections: Not on file   Intimate Partner Violence: Not on file   Housing Stability: Not on file        Physical Exam:     /70   Pulse 84   Temp (!) 97 3 °F (36 3 °C)   Resp 16   Ht 5' 7" (1 702 m)   Wt 117 kg (257 lb)   SpO2 94%   BMI 40 25 kg/m²     Physical Exam  Vitals and nursing note reviewed  Constitutional:       General: He is not in acute distress  Appearance: He is well-developed  He is not diaphoretic  HENT:      Head: Normocephalic and atraumatic  Right Ear: External ear normal       Left Ear: External ear normal       Nose: Nose normal       Mouth/Throat:      Pharynx: No oropharyngeal exudate  Eyes:      General: No scleral icterus  Right eye: No discharge  Left eye: No discharge  Pupils: Pupils are equal, round, and reactive to light  Neck:      Thyroid: No thyromegaly  Cardiovascular:      Rate and Rhythm: Normal rate  Heart sounds: Normal heart sounds  No murmur heard  Pulmonary:      Effort: Pulmonary effort is normal  No respiratory distress  Breath sounds: Normal breath sounds  No wheezing  Abdominal:      General: Bowel sounds are normal  There is no distension  Palpations: Abdomen is soft  There is no mass  Tenderness: There is no abdominal tenderness  There is no guarding or rebound  Musculoskeletal:         General: Normal range of motion  Skin:     General: Skin is warm and dry  Findings: No erythema or rash  Neurological:      Mental Status: He is alert  Coordination: Coordination normal       Deep Tendon Reflexes: Reflexes normal    Psychiatric:         Behavior: Behavior normal           Data:     Pre-operative work-up    Laboratory Results: none ordered     EKG: I have personally reviewed pertinent films in PACS and pt is in Sinus with 1st degree block - same as previous    No results found for this or any previous visit (from the past 672 hour(s))          Assessment & Recommendations:     Problem List Items Addressed This Visit        Respiratory Obstructive sleep apnea     Doers not use a machine at night            Cardiovascular and Mediastinum    Benign essential hypertension - Primary     acceptable         Relevant Orders    POCT ECG       Other    Mixed hyperlipidemia    Relevant Orders    POCT ECG       Pre-Op Evaluation Assessment  67 y o  male with planned surgery: MRI  Known risk factors for perioperative complications: None  Current medications which may produce withdrawal symptoms if withheld perioperatively: none  Pre-Op Evaluation Plan  1  Further preoperative workup as follows:   - None; no further preoperative work-up is required    2  Medication Management/Recommendations:   - None, continue medication regimen including morning of surgery, with sip of water    3  Prophylaxis for cardiac events with perioperative beta-blockers: not indicated  4  Patient requires further consultation with: None    Clearance  Patient is CLEARED for surgery without any additional cardiac testing           Bridgette Rawls, [address]  [address]  [address]  [place] [address]  Phone#  [phone]  Fax#  [phone]

## 2023-01-09 DIAGNOSIS — I26.99 MULTIPLE PULMONARY EMBOLI (HCC): ICD-10-CM

## 2023-01-17 ENCOUNTER — TELEPHONE (OUTPATIENT)
Dept: HEMATOLOGY ONCOLOGY | Facility: MEDICAL CENTER | Age: 73
End: 2023-01-17

## 2023-01-17 NOTE — TELEPHONE ENCOUNTER
Patient will hold xarelto for 48 hours prior to biopsy of prostate on 1/23/2023  Dr Risa Acosta from [facility]  [facility] [place] [address]

## 2023-01-18 ENCOUNTER — CONSULT (OUTPATIENT)
Dept: GASTROENTEROLOGY | Facility: CLINIC | Age: 73
End: 2023-01-18

## 2023-01-18 VITALS
TEMPERATURE: 98 F | HEART RATE: 85 BPM | HEIGHT: 67 IN | BODY MASS INDEX: 39.71 KG/M2 | DIASTOLIC BLOOD PRESSURE: 88 MMHG | WEIGHT: 253 LBS | SYSTOLIC BLOOD PRESSURE: 149 MMHG

## 2023-01-18 DIAGNOSIS — Z86.010 HISTORY OF COLON POLYPS: Primary | ICD-10-CM

## 2023-01-18 DIAGNOSIS — Z79.01 ANTICOAGULANT LONG-TERM USE: ICD-10-CM

## 2023-01-18 DIAGNOSIS — Z12.11 SCREEN FOR COLON CANCER: ICD-10-CM

## 2023-01-18 RX ORDER — CIPROFLOXACIN 500 MG/1
TABLET, FILM COATED ORAL
COMMUNITY
Start: 2023-01-17

## 2023-01-18 NOTE — PROGRESS NOTES
Consultation - [facility]  Brittani Gonzalez 1950 male         Chief Complaint: History of colon polyps    HPI: 66-year-old male with history of hypertension, hyperlipidemia, DVT/PE on anticoagulation therapy with Xarelto, colon polyps was referred for colonoscopy  Patient had colonoscopy in February 2019 and was advised to come back in 5 years from then  Patient has regular bowel movements and denies any blood or mucus in the stool  Appetite is good and denies any recent weight loss  Denies any abdominal pain, nausea, or vomiting  Has no heartburn or acid reflux  Denies any difficulty swallowing  Chaperon: Ms Allan Eli: Review of Systems   Constitutional: Negative for activity change, appetite change, chills, diaphoresis, fatigue, fever and unexpected weight change  HENT: Negative for ear discharge, ear pain, facial swelling, hearing loss, nosebleeds, sore throat, tinnitus and voice change  Eyes: Negative for pain, discharge, redness, itching and visual disturbance  Respiratory: Negative for apnea, cough, chest tightness, shortness of breath and wheezing  Cardiovascular: Negative for chest pain and palpitations  Gastrointestinal:        As noted in HPI   Endocrine: Negative for cold intolerance, heat intolerance and polyuria  Genitourinary: Negative for difficulty urinating, dysuria, flank pain, hematuria and urgency  Musculoskeletal: Negative for arthralgias, back pain, gait problem, joint swelling and myalgias  Skin: Negative for rash and wound  Neurological: Negative for dizziness, tremors, seizures, speech difficulty, light-headedness, numbness and headaches  Hematological: Negative for adenopathy  Does not bruise/bleed easily  Psychiatric/Behavioral: Negative for agitation, behavioral problems and confusion  The patient is not nervous/anxious           Past Medical History:   Diagnosis Date   • AC (acromioclavicular) joint bone spurs Last assessed - 2/19/15   • Achilles tendinitis, unspecified leg 06/04/2010    Resolved - 1/8/18   • Anemia 08/12/2010    Resolved - 1/11/16   • Blood in urine    • BPH without urinary obstruction     Hypertrophy   • Deep venous thrombosis of distal lower extremity ([address] )     2015, provoked, postoperative after bariatric surgery, was placed on coumadin temporarily   • Heartburn     Last assessed - 12/12/14   • Hemorrhoids    • Hernia, inguinal 06/30/2009    Resolved - 1/8/18   • History of stomach ulcers    • Hypertension    • Mixed hyperlipidemia    • Nephrolithiasis    • Right inguinal hernia     Last assessed - 4/4/16   • Sleep apnea     unable to tolerate c-pap   • Tear of left rotator cuff     Last assessed - 2/19/15      Past Surgical History:   Procedure Laterality Date   • BARIATRIC SURGERY  06/15/2015    Laparoscopic Longitudinal Gastrectomy for morbid obesity, Managed by - Marygrace Siemens, Maher    • EXPLORATORY LAPAROTOMY W/ BOWEL RESECTION Right 3/3/2016    Procedure: Incarcerated possibly strangulated right inguinal hernia; Surgeon: Jorge Howard MD;  Location: [address] [place] [address];  Service:    • FOOT SURGERY     • HIP SURGERY      2011 - Rt Hip Replacement, 2012 - Lt Hip Replacement, 2013 - Lt Hip Revision   • INGUINAL HERNIA REPAIR      Last assessed - 4/4/16   • JOINT REPLACEMENT     • KNEE SURGERY  2004    Double    • [place] ARTHROPLASTY GLENOHUMERAL JOINT TOTAL SHOULDER Right 2/4/2021    Procedure: ARTHROPLASTY SHOULDER REVERSE (RIGHT); Surgeon: He Alvarez MD;  Location: [address] [place] [address];  Service: Orthopedics   • [place] COLONOSCOPY FLX DX W/COLLJ SPEC WHEN PFRMD N/A 1/15/2016    Procedure: COLONOSCOPY;  Surgeon: Bobby Latham MD;  Location: [facility]; Service: Gastroenterology   • [place] COLONOSCOPY FLX DX W/COLLJ SPEC WHEN PFRMD N/A 2/26/2019    Procedure: COLONOSCOPY;  Surgeon: Bobby Latham MD;  Location: [facility];   Service: Gastroenterology   • [place] LAPAROSCOPY SURG RPR INITIAL INGUINAL HERNIA Bilateral 2021    Procedure: REPAIR HERNIA INGUINAL, LAPAROSCOPIC;  Surgeon: Sergio Sabillon MD;  Location: [address] [place] [address];  Service: General   • TONSILLECTOMY       Social History     Socioeconomic History   • Marital status: /Civil Union     Spouse name: Not on file   • Number of children: Not on file   • Years of education: Not on file   • Highest education level: Not on file   Occupational History   • Not on file   Tobacco Use   • Smoking status: Former     Packs/day: 1 00     Years: 30 00     Pack years: 30 00     Types: Cigarettes     Start date: 1966     Quit date: 1994     Years since quittin 0   • Smokeless tobacco: Never   Vaping Use   • Vaping Use: Never used   Substance and Sexual Activity   • Alcohol use: Not Currently     Alcohol/week: 2 0 standard drinks     Types: 2 Standard drinks or equivalent per week     Comment: Occ, very rarely    • Drug use: Never   • Sexual activity: Yes     Partners: Female   Other Topics Concern   • Not on file   Social History Narrative    Exercise - Walking once a day     Social Determinants of Health     Financial Resource Strain: Not on file   Food Insecurity: Not on file   Transportation Needs: Not on file   Physical Activity: Not on file   Stress: Not on file   Social Connections: Not on file   Intimate Partner Violence: Not on file   Housing Stability: Not on file     Family History   Problem Relation Age of Onset   • Arthritis Father            • Diabetes Father    • Hypertension Father            • Hyperlipidemia Father    • Diabetes Family    • Emphysema Family    • Heart disease Family    • Hypertension Family    • Mental illness Neg Hx      Percocet [oxycodone-acetaminophen] and Percolone [oxycodone]  Current Outpatient Medications   Medication Sig Dispense Refill   • atorvastatin (LIPITOR) 20 mg tablet Take 1 tablet (20 mg total) by mouth daily 90 tablet 1   • ciprofloxacin (CIPRO) 500 mg tablet Taking prior to biopsy     • lisinopril (ZESTRIL) 10 mg tablet Take 1 tablet (10 mg total) by mouth daily 90 tablet 1   • rivaroxaban (Xarelto) 10 mg tablet Take 1 tablet (10 mg total) by mouth daily 30 tablet 0   • tamsulosin (FLOMAX) 0 4 mg TAKE ONE CAPSULE BY MOUTH EVERY DAY WITH DINNER 90 capsule 1     No current facility-administered medications for this visit  Blood pressure 149/88, pulse 85, temperature 98 °F (36 7 °C), temperature source Temporal, height 5' 7" (1 702 m), weight 115 kg (253 lb)  PHYSICAL EXAM: Physical Exam  Constitutional:       Appearance: He is well-developed  HENT:      Head: Normocephalic and atraumatic  Eyes:      General: No scleral icterus  Right eye: No discharge  Left eye: No discharge  Conjunctiva/sclera: Conjunctivae normal       Pupils: Pupils are equal, round, and reactive to light  Neck:      Thyroid: No thyromegaly  Vascular: No JVD  Trachea: No tracheal deviation  Cardiovascular:      Rate and Rhythm: Normal rate and regular rhythm  Heart sounds: Normal heart sounds  No murmur heard  No friction rub  No gallop  Pulmonary:      Effort: Pulmonary effort is normal  No respiratory distress  Breath sounds: Normal breath sounds  No wheezing or rales  Chest:      Chest wall: No tenderness  Abdominal:      General: Bowel sounds are normal  There is no distension  Palpations: Abdomen is soft  There is no mass  Tenderness: There is no abdominal tenderness  There is no guarding or rebound  Hernia: No hernia is present  Musculoskeletal:      Cervical back: Neck supple  Lymphadenopathy:      Cervical: No cervical adenopathy  Skin:     General: Skin is warm and dry  Findings: No erythema or rash  Neurological:      Mental Status: He is alert and oriented to person, place, and time  Psychiatric:         Behavior: Behavior normal          Thought Content:  Thought content normal           Lab Results   Component Value Date WBC 5 4 08/25/2021    HGB 11 9 (L) 08/25/2021    HCT 38 5 08/25/2021    MCV 87 08/25/2021     08/25/2021     Lab Results   Component Value Date    GLUCOSE 105 (H) 01/02/2018    CALCIUM 8 7 07/06/2021     (H) 01/02/2018    K 4 3 07/18/2022    CO2 25 07/18/2022     07/18/2022    BUN 15 07/18/2022    CREATININE 1 14 07/18/2022     Lab Results   Component Value Date    ALT 13 07/18/2022    AST 17 07/18/2022    ALKPHOS 72 07/04/2021    BILITOT 0 4 01/02/2018     Lab Results   Component Value Date    INR 0 98 07/04/2021    INR 0 98 12/21/2020    INR 0 95 03/03/2016    PROTIME 12 9 07/04/2021    PROTIME 12 9 12/21/2020    PROTIME 10 0 03/03/2016       MRI abdomen w wo contrast    Result Date: 9/17/2021  Impression: Contrast-enhanced MRI of the abdomen demonstrates a Bosniak class II  lesion arising from the lower pole of the left kidney which likely corresponds to the lesion identified on the recent CT  Bosniak class II lesions are considered likely benign and require no follow-up  Reference: Jesús et al   Bosniak classification of cystic renal masses  Version 2019: An update proposal and needs assessment  Radiology 2019; 292: 416-570 Workstation performed: [id number]       ASSESSMENT & PLAN:    History of colon polyps  Personal history of colon polyps- patient is at increased risk for colon cancer screening  Rule out colorectal lesions including polyps or malignancy     -High-fiber diet    -Due for colonoscopy in February 2024    Anticoagulant long-term use  Patient has been on Xarelto for history of pulmonary embolism  He is at increased risks because of the anticoagulation therapy  Patient follows with Dr Kyrie Sanabria and tells me that he was cleared to stop Xarelto for the procedures

## 2023-01-18 NOTE — ASSESSMENT & PLAN NOTE
Patient has been on Xarelto for history of pulmonary embolism  He is at increased risks because of the anticoagulation therapy  Patient follows with Dr Marc Peters and tells me that he was cleared to stop Xarelto for the procedures

## 2023-01-18 NOTE — ASSESSMENT & PLAN NOTE
Personal history of colon polyps- patient is at increased risk for colon cancer screening    Rule out colorectal lesions including polyps or malignancy     -High-fiber diet    -Due for colonoscopy in February 2024

## 2023-01-20 LAB
ALBUMIN SERPL-MCNC: 4.2 G/DL (ref 3.7–4.7)
ALBUMIN/GLOB SERPL: 1.4 {RATIO} (ref 1.2–2.2)
ALP SERPL-CCNC: 97 IU/L (ref 44–121)
ALT SERPL-CCNC: 13 IU/L (ref 0–44)
AST SERPL-CCNC: 16 IU/L (ref 0–40)
BASOPHILS # BLD AUTO: 0 X10E3/UL (ref 0–0.2)
BASOPHILS NFR BLD AUTO: 1 %
BILIRUB SERPL-MCNC: 0.5 MG/DL (ref 0–1.2)
BUN SERPL-MCNC: 18 MG/DL (ref 8–27)
BUN/CREAT SERPL: 15 (ref 10–24)
CALCIUM SERPL-MCNC: 9.4 MG/DL (ref 8.6–10.2)
CHLORIDE SERPL-SCNC: 105 MMOL/L (ref 96–106)
CO2 SERPL-SCNC: 25 MMOL/L (ref 20–29)
CREAT SERPL-MCNC: 1.22 MG/DL (ref 0.76–1.27)
EGFR: 63 ML/MIN/1.73
EOSINOPHIL # BLD AUTO: 0.2 X10E3/UL (ref 0–0.4)
EOSINOPHIL NFR BLD AUTO: 4 %
ERYTHROCYTE [DISTWIDTH] IN BLOOD BY AUTOMATED COUNT: 13.5 % (ref 11.6–15.4)
GLOBULIN SER-MCNC: 2.9 G/DL (ref 1.5–4.5)
GLUCOSE SERPL-MCNC: 113 MG/DL (ref 70–99)
HCT VFR BLD AUTO: 40.6 % (ref 37.5–51)
HGB BLD-MCNC: 13.1 G/DL (ref 13–17.7)
IMM GRANULOCYTES # BLD: 0 X10E3/UL (ref 0–0.1)
IMM GRANULOCYTES NFR BLD: 0 %
LYMPHOCYTES # BLD AUTO: 1.7 X10E3/UL (ref 0.7–3.1)
LYMPHOCYTES NFR BLD AUTO: 31 %
MCH RBC QN AUTO: 28.1 PG (ref 26.6–33)
MCHC RBC AUTO-ENTMCNC: 32.3 G/DL (ref 31.5–35.7)
MCV RBC AUTO: 87 FL (ref 79–97)
MONOCYTES # BLD AUTO: 0.4 X10E3/UL (ref 0.1–0.9)
MONOCYTES NFR BLD AUTO: 8 %
NEUTROPHILS # BLD AUTO: 2.9 X10E3/UL (ref 1.4–7)
NEUTROPHILS NFR BLD AUTO: 56 %
PLATELET # BLD AUTO: 190 X10E3/UL (ref 150–450)
POTASSIUM SERPL-SCNC: 4.3 MMOL/L (ref 3.5–5.2)
PROT SERPL-MCNC: 7.1 G/DL (ref 6–8.5)
RBC # BLD AUTO: 4.67 X10E6/UL (ref 4.14–5.8)
SODIUM SERPL-SCNC: 143 MMOL/L (ref 134–144)
WBC # BLD AUTO: 5.3 X10E3/UL (ref 3.4–10.8)

## 2023-01-21 LAB
CHOLEST SERPL-MCNC: 130 MG/DL (ref 100–199)
HDLC SERPL-MCNC: 42 MG/DL
LDLC SERPL CALC-MCNC: 74 MG/DL (ref 0–99)
TRIGL SERPL-MCNC: 65 MG/DL (ref 0–149)

## 2023-01-26 ENCOUNTER — OFFICE VISIT (OUTPATIENT)
Dept: FAMILY MEDICINE CLINIC | Facility: CLINIC | Age: 73
End: 2023-01-26

## 2023-01-26 VITALS
BODY MASS INDEX: 40.02 KG/M2 | WEIGHT: 255 LBS | HEART RATE: 104 BPM | DIASTOLIC BLOOD PRESSURE: 66 MMHG | HEIGHT: 67 IN | RESPIRATION RATE: 16 BRPM | SYSTOLIC BLOOD PRESSURE: 110 MMHG | OXYGEN SATURATION: 97 % | TEMPERATURE: 98 F

## 2023-01-26 DIAGNOSIS — I26.99 MULTIPLE PULMONARY EMBOLI (HCC): ICD-10-CM

## 2023-01-26 DIAGNOSIS — R73.09 ABNORMAL BLOOD SUGAR: ICD-10-CM

## 2023-01-26 DIAGNOSIS — J06.9 UPPER RESPIRATORY TRACT INFECTION, UNSPECIFIED TYPE: ICD-10-CM

## 2023-01-26 DIAGNOSIS — Z23 NEED FOR VACCINATION: ICD-10-CM

## 2023-01-26 DIAGNOSIS — D64.9 ANEMIA, UNSPECIFIED TYPE: ICD-10-CM

## 2023-01-26 DIAGNOSIS — N40.0 ENLARGED PROSTATE WITHOUT LOWER URINARY TRACT SYMPTOMS (LUTS): ICD-10-CM

## 2023-01-26 DIAGNOSIS — I10 BENIGN ESSENTIAL HYPERTENSION: Primary | ICD-10-CM

## 2023-01-26 DIAGNOSIS — E78.2 MIXED HYPERLIPIDEMIA: ICD-10-CM

## 2023-01-26 DIAGNOSIS — E66.01 SEVERE OBESITY (BMI 35.0-39.9) WITH COMORBIDITY (HCC): ICD-10-CM

## 2023-01-26 PROBLEM — E66.812 CLASS 2 SEVERE OBESITY DUE TO EXCESS CALORIES WITH SERIOUS COMORBIDITY AND BODY MASS INDEX (BMI) OF 37.0 TO 37.9 IN ADULT (HCC): Status: RESOLVED | Noted: 2019-08-15 | Resolved: 2023-01-26

## 2023-01-26 RX ORDER — LISINOPRIL 10 MG/1
10 TABLET ORAL DAILY
Qty: 90 TABLET | Refills: 1 | Status: SHIPPED | OUTPATIENT
Start: 2023-01-26

## 2023-01-26 RX ORDER — BENZONATATE 200 MG/1
200 CAPSULE ORAL 3 TIMES DAILY PRN
Qty: 30 CAPSULE | Refills: 0 | Status: SHIPPED | OUTPATIENT
Start: 2023-01-26

## 2023-01-26 RX ORDER — ATORVASTATIN CALCIUM 20 MG/1
20 TABLET, FILM COATED ORAL DAILY
Qty: 90 TABLET | Refills: 1 | Status: SHIPPED | OUTPATIENT
Start: 2023-01-26

## 2023-01-26 NOTE — PROGRESS NOTES
Assessment/Plan:    1  Benign essential hypertension  -     CBC; Future; Expected date: 07/10/2023  -     Comprehensive metabolic panel; Future; Expected date: 07/10/2023  -     Hemoglobin A1C; Future; Expected date: 07/10/2023  -     Lipid Panel with Direct LDL reflex; Future; Expected date: 07/10/2023  -     lisinopril (ZESTRIL) 10 mg tablet; Take 1 tablet (10 mg total) by mouth daily    2  Mixed hyperlipidemia  -     CBC; Future; Expected date: 07/10/2023  -     Comprehensive metabolic panel; Future; Expected date: 07/10/2023  -     Hemoglobin A1C; Future; Expected date: 07/10/2023  -     Lipid Panel with Direct LDL reflex; Future; Expected date: 07/10/2023  -     atorvastatin (LIPITOR) 20 mg tablet; Take 1 tablet (20 mg total) by mouth daily    3  Abnormal blood sugar  -     Hemoglobin A1C; Future; Expected date: 07/10/2023    4  Multiple pulmonary emboli (HCC)    5  Severe obesity (BMI 35 0-39  9) with comorbidity ([facility] [address] )    6  Anemia, unspecified type  -     CBC; Future; Expected date: 07/10/2023    7  Need for vaccination  -     influenza vaccine, high-dose, PF 0 5 mL    8  BMI 39 0-39 9,adult    9  Enlarged prostate without lower urinary tract symptoms (luts)  Assessment & Plan:  MRI showed lesion - pt had biopsy - pt does not have results      10  Upper respiratory tract infection, unspecified type  -     benzonatate (TESSALON) 200 MG capsule; Take 1 capsule (200 mg total) by mouth 3 (three) times a day as needed for cough      BMI Counseling: Body mass index is 39 94 kg/m²  The BMI is above normal  Nutrition recommendations include decreasing portion sizes  Exercise recommendations include moderate physical activity 150 minutes/week  No pharmacotherapy was ordered  Rationale for BMI follow-up plan is due to patient being overweight or obese  Depression Screening and Follow-up Plan: Patient was screened for depression during today's encounter  They screened negative with a PHQ-2 score of 0           Patient Instructions     Obesity   AMBULATORY CARE:   Obesity  means your body mass index (BMI) is greater than 30  Your healthcare provider will use your height and weight to measure your BMI  The risks of obesity include  many health problems, including injuries or physical disability  Diabetes (high blood sugar level)    High blood pressure or high cholesterol    Heart disease    Stroke    Gallbladder or liver disease    Cancer of the colon, breast, prostate, liver, or kidney    Sleep apnea    Arthritis or gout    Screening  is done to check for health conditions before you have signs or symptoms  If you are 28to 79years old, your blood sugar level may be checked every 3 years for signs of prediabetes or diabetes  Your healthcare provider will check your blood pressure at each visit  High blood pressure can lead to a stroke or other problems  Your provider may check for signs of heart disease, cancer, or other health problems  Seek care immediately if:   You have a severe headache, confusion, or difficulty speaking  You have weakness on one side of your body  You have chest pain, sweating, or shortness of breath  Call your doctor if:   You have symptoms of gallbladder or liver disease, such as pain in your upper abdomen  You have knee or hip pain and discomfort while walking  You have symptoms of diabetes, such as intense hunger and thirst, and frequent urination  You have symptoms of sleep apnea, such as snoring or daytime sleepiness  You have questions or concerns about your condition or care  Treatment for obesity  focuses on helping you lose weight to improve your health  Even a small decrease in BMI can reduce the risk for many health problems  Your healthcare provider will help you set a weight-loss goal   Lifestyle changes  are the first step in treating obesity  These include making healthy food choices and getting regular physical activity   Your healthcare provider may suggest a weight-loss program that involves coaching, education, and therapy  Medicine  may help you lose weight when it is used with a healthy foods and physical activity  Surgery  can help you lose weight if you are very obese and have other health problems  There are several types of weight-loss surgery  Ask your healthcare provider for more information  Tips for safe weight loss:   Set small, realistic goals  An example of a small goal is to walk for 20 minutes 5 days a week  Anther goal is to lose 5% of your body weight  Tell friends, family members, and coworkers about your goals  and ask for their support  Ask a friend to lose weight with you, or join a weight-loss support group  Identify foods or triggers that may cause you to overeat , and find ways to avoid them  Remove tempting high-calorie foods from your home and workplace  Place a bowl of fresh fruit on your kitchen counter  If stress causes you to eat, then find other ways to cope with stress  A counselor or therapist may be able to help you  Keep a diary to track what you eat and drink  Also write down how many minutes of physical activity you do each day  Weigh yourself once a week and record it in your diary  Eating changes: You will need to eat 500 to 1,000 fewer calories each day than you currently eat to lose 1 to 2 pounds a week  The following changes will help you cut calories:  Eat smaller portions  Use small plates, no larger than 9 inches in diameter  Fill your plate half full of fruits and vegetables  Measure your food using measuring cups until you know what a serving size looks like  Eat 3 meals and 1 or 2 snacks each day  Plan your meals in advance  [place] and eat at home most of the time  Eat slowly  Do not skip meals  Skipping meals can lead to overeating later in the day  This can make it harder for you to lose weight  Talk with a dietitian to help you make a meal plan and schedule that is right for you      Eat fruits and vegetables at every meal   They are low in calories and high in fiber, which makes you feel full  Do not add butter, margarine, or cream sauce to vegetables  Use herbs to season steamed vegetables  Eat less fat and fewer fried foods  Eat more baked or grilled chicken and fish  These protein sources are lower in calories and fat than red meat  Limit fast food  Dress your salads with olive oil and vinegar instead of bottled dressing  Limit the amount of sugar you eat  Do not drink sugary beverages  Limit alcohol  Activity changes:  Physical activity is good for your body in many ways  It helps you burn calories and build strong muscles  It decreases stress and depression, and improves your mood  It can also help you sleep better  Talk to your healthcare provider before you begin an exercise program   Exercise for at least 30 minutes 5 days a week  Start slowly  Set aside time each day for physical activity that you enjoy and that is convenient for you  It is best to do both weight training and an activity that increases your heart rate, such as walking, bicycling, or swimming  Find ways to be more active  Do yard work and housecleaning  Walk up the stairs instead of using elevators  Spend your leisure time going to events that require walking, such as outdoor festivals or fairs  This extra physical activity can help you lose weight and keep it off  Follow up with your doctor as directed: You may need to meet with a dietitian  Write down your questions so you remember to ask them during your visits  © [employer] 2022 Information is for End User's use only and may not be sold, redistributed or otherwise used for commercial purposes  All illustrations and images included in CareNotes® are the copyrighted property of A D A M , Inc  or [facility] Jaymie Villegas   The above information is an  only   It is not intended as medical advice for individual conditions or treatments  Talk to your doctor, nurse or pharmacist before following any medical regimen to see if it is safe and effective for you  Return in about 6 months (around 7/26/2023)  Subjective:      Patient ID: Erwin Licea is a 68 y o  male  Chief Complaint   Patient presents with   • Follow-up     6 month [id number]       Pt is here for a 6 month follow up  Pt had labs    Pt had an MRI done for DR Whitley Core - lesion was seen on prostate - pt was set up for a bioppsy pt states his niece works for a urologist and she was able to sched him sooner so he already had it done - Dr Marie Hunter      The following portions of the patient's history were reviewed and updated as appropriate: allergies, current medications, past family history, past medical history, past social history, past surgical history and problem list     Review of Systems   Constitutional: Negative for activity change, appetite change, chills, diaphoresis, fatigue, fever and unexpected weight change  HENT: Negative for congestion, dental problem, ear pain, mouth sores, sinus pressure, sinus pain, sore throat and trouble swallowing  Eyes: Negative for photophobia, discharge and itching  Respiratory: Negative for apnea, chest tightness and shortness of breath  Cardiovascular: Negative for chest pain, palpitations and leg swelling  Gastrointestinal: Negative for abdominal distention, abdominal pain, blood in stool, nausea and vomiting  Endocrine: Negative for cold intolerance, heat intolerance, polydipsia, polyphagia and polyuria  Genitourinary: Negative for difficulty urinating  Musculoskeletal: Negative for arthralgias  Skin: Negative for color change and wound  Neurological: Negative for dizziness, syncope, speech difficulty and headaches  Hematological: Negative for adenopathy  Psychiatric/Behavioral: Negative for agitation and behavioral problems           Current Outpatient Medications   Medication Sig Dispense Refill   • atorvastatin (LIPITOR) 20 mg tablet Take 1 tablet (20 mg total) by mouth daily 90 tablet 1   • benzonatate (TESSALON) 200 MG capsule Take 1 capsule (200 mg total) by mouth 3 (three) times a day as needed for cough 30 capsule 0   • ciprofloxacin (CIPRO) 500 mg tablet Taking prior to biopsy     • lisinopril (ZESTRIL) 10 mg tablet Take 1 tablet (10 mg total) by mouth daily 90 tablet 1   • rivaroxaban (Xarelto) 10 mg tablet Take 1 tablet (10 mg total) by mouth daily 30 tablet 0   • tamsulosin (FLOMAX) 0 4 mg TAKE ONE CAPSULE BY MOUTH EVERY DAY WITH DINNER 90 capsule 1     No current facility-administered medications for this visit  Objective:    /66 (BP Location: Right arm, Patient Position: Sitting, Cuff Size: Large)   Pulse 104   Temp 98 °F (36 7 °C) (Temporal)   Resp 16   Ht 5' 7" (1 702 m)   Wt 116 kg (255 lb)   SpO2 97%   BMI 39 94 kg/m²        Physical Exam  Vitals and nursing note reviewed  Constitutional:       General: He is not in acute distress  Appearance: He is well-developed  He is not diaphoretic  HENT:      Head: Normocephalic and atraumatic  Right Ear: External ear normal       Left Ear: External ear normal       Nose: Nose normal       Mouth/Throat:      Pharynx: No oropharyngeal exudate  Eyes:      General: No scleral icterus  Right eye: No discharge  Left eye: No discharge  Pupils: Pupils are equal, round, and reactive to light  Neck:      Thyroid: No thyromegaly  Cardiovascular:      Rate and Rhythm: Normal rate  Heart sounds: Normal heart sounds  No murmur heard  Pulmonary:      Effort: Pulmonary effort is normal  No respiratory distress  Breath sounds: Normal breath sounds  No wheezing  Abdominal:      General: Bowel sounds are normal  There is no distension  Palpations: Abdomen is soft  There is no mass  Tenderness: There is no abdominal tenderness  There is no guarding or rebound  Musculoskeletal:         General: Normal range of motion  Skin:     General: Skin is warm and dry  Findings: No erythema or rash  Neurological:      Mental Status: He is alert  Coordination: Coordination normal       Deep Tendon Reflexes: Reflexes normal    Psychiatric:         Behavior: Behavior normal                 Martin Parra DO  BMI Counseling: Body mass index is 39 94 kg/m²  The BMI is above normal  Nutrition recommendations include reducing portion sizes

## 2023-01-26 NOTE — PATIENT INSTRUCTIONS
Obesity   AMBULATORY CARE:   Obesity  means your body mass index (BMI) is greater than 30  Your healthcare provider will use your height and weight to measure your BMI  The risks of obesity include  many health problems, including injuries or physical disability  · Diabetes (high blood sugar level)    · High blood pressure or high cholesterol    · Heart disease    · Stroke    · Gallbladder or liver disease    · Cancer of the colon, breast, prostate, liver, or kidney    · Sleep apnea    · Arthritis or gout    Screening  is done to check for health conditions before you have signs or symptoms  If you are 28to 79years old, your blood sugar level may be checked every 3 years for signs of prediabetes or diabetes  Your healthcare provider will check your blood pressure at each visit  High blood pressure can lead to a stroke or other problems  Your provider may check for signs of heart disease, cancer, or other health problems  Seek care immediately if:   · You have a severe headache, confusion, or difficulty speaking  · You have weakness on one side of your body  · You have chest pain, sweating, or shortness of breath  Call your doctor if:   · You have symptoms of gallbladder or liver disease, such as pain in your upper abdomen  · You have knee or hip pain and discomfort while walking  · You have symptoms of diabetes, such as intense hunger and thirst, and frequent urination  · You have symptoms of sleep apnea, such as snoring or daytime sleepiness  · You have questions or concerns about your condition or care  Treatment for obesity  focuses on helping you lose weight to improve your health  Even a small decrease in BMI can reduce the risk for many health problems  Your healthcare provider will help you set a weight-loss goal   · Lifestyle changes  are the first step in treating obesity  These include making healthy food choices and getting regular physical activity   Your healthcare provider may suggest a weight-loss program that involves coaching, education, and therapy  · Medicine  may help you lose weight when it is used with a healthy foods and physical activity  · Surgery  can help you lose weight if you are very obese and have other health problems  There are several types of weight-loss surgery  Ask your healthcare provider for more information  Tips for safe weight loss:   · Set small, realistic goals  An example of a small goal is to walk for 20 minutes 5 days a week  Anther goal is to lose 5% of your body weight  · Tell friends, family members, and coworkers about your goals  and ask for their support  Ask a friend to lose weight with you, or join a weight-loss support group  · Identify foods or triggers that may cause you to overeat , and find ways to avoid them  Remove tempting high-calorie foods from your home and workplace  Place a bowl of fresh fruit on your kitchen counter  If stress causes you to eat, then find other ways to cope with stress  A counselor or therapist may be able to help you  · Keep a diary to track what you eat and drink  Also write down how many minutes of physical activity you do each day  Weigh yourself once a week and record it in your diary  Eating changes: You will need to eat 500 to 1,000 fewer calories each day than you currently eat to lose 1 to 2 pounds a week  The following changes will help you cut calories:  · Eat smaller portions  Use small plates, no larger than 9 inches in diameter  Fill your plate half full of fruits and vegetables  Measure your food using measuring cups until you know what a serving size looks like  · Eat 3 meals and 1 or 2 snacks each day  Plan your meals in advance  [place] and eat at home most of the time  Eat slowly  Do not skip meals  Skipping meals can lead to overeating later in the day  This can make it harder for you to lose weight   Talk with a dietitian to help you make a meal plan and schedule that is right for you  · Eat fruits and vegetables at every meal   They are low in calories and high in fiber, which makes you feel full  Do not add butter, margarine, or cream sauce to vegetables  Use herbs to season steamed vegetables  · Eat less fat and fewer fried foods  Eat more baked or grilled chicken and fish  These protein sources are lower in calories and fat than red meat  Limit fast food  Dress your salads with olive oil and vinegar instead of bottled dressing  · Limit the amount of sugar you eat  Do not drink sugary beverages  Limit alcohol  Activity changes:  Physical activity is good for your body in many ways  It helps you burn calories and build strong muscles  It decreases stress and depression, and improves your mood  It can also help you sleep better  Talk to your healthcare provider before you begin an exercise program   · Exercise for at least 30 minutes 5 days a week  Start slowly  Set aside time each day for physical activity that you enjoy and that is convenient for you  It is best to do both weight training and an activity that increases your heart rate, such as walking, bicycling, or swimming  · Find ways to be more active  Do yard work and housecleaning  Walk up the stairs instead of using elevators  Spend your leisure time going to events that require walking, such as outdoor festivals or fairs  This extra physical activity can help you lose weight and keep it off  Follow up with your doctor as directed: You may need to meet with a dietitian  Write down your questions so you remember to ask them during your visits  © Copyright [employer] 2022 Information is for End User's use only and may not be sold, redistributed or otherwise used for commercial purposes  All illustrations and images included in CareNotes® are the copyrighted property of [employer] M [employer] Inc  or Desirae Villegas   The above information is an  only   It is not intended as medical advice for individual conditions or treatments  Talk to your doctor, nurse or pharmacist before following any medical regimen to see if it is safe and effective for you

## 2023-02-07 ENCOUNTER — OFFICE VISIT (OUTPATIENT)
Dept: URGENT CARE | Facility: CLINIC | Age: 73
End: 2023-02-07

## 2023-02-07 ENCOUNTER — APPOINTMENT (OUTPATIENT)
Dept: LAB | Facility: CLINIC | Age: 73
End: 2023-02-07

## 2023-02-07 VITALS
WEIGHT: 252 LBS | DIASTOLIC BLOOD PRESSURE: 54 MMHG | HEIGHT: 68 IN | RESPIRATION RATE: 18 BRPM | OXYGEN SATURATION: 94 % | SYSTOLIC BLOOD PRESSURE: 115 MMHG | BODY MASS INDEX: 38.19 KG/M2 | TEMPERATURE: 101.5 F | HEART RATE: 83 BPM

## 2023-02-07 DIAGNOSIS — J06.9 VIRAL UPPER RESPIRATORY TRACT INFECTION: Primary | ICD-10-CM

## 2023-02-07 DIAGNOSIS — C61 MALIGNANT NEOPLASM OF PROSTATE (HCC): ICD-10-CM

## 2023-02-07 DIAGNOSIS — Z01.812 PRE-OPERATIVE LABORATORY EXAMINATION: ICD-10-CM

## 2023-02-07 NOTE — PROGRESS NOTES
[facility]        NAME: Meryle Spike is a 68 y o  male  : 1950    MRN: [id number]  DATE: 2023  TIME: 12:17 PM    Assessment and Plan   Viral upper respiratory tract infection [J06 9]  1  Viral upper respiratory tract infection          Patient Instructions   Viral upper respiratory infection  Recommend starting Augmentin urologist gave him, fever possibly from urine as well  Recommend flonase nasal spray and sudafed for postnasal drip/cough  Warm salt water gargles  Rest, fluids and supportive care  May benefit from a cool mist humidifier on night stand  Tylenol/ibuprofen as needed for pain/fever    Follow up with PCP in 3-5 days  Proceed to  ER if symptoms worsen  Chief Complaint     Chief Complaint   Patient presents with   • Fever     Since AM, reports of 103 0 with use of tylenol 1 gram @ approx  0800  Seen by urology x yesterday, with urine obtained, rx  abx( has not started) per wife called urology this am to report fever, was to go to urgent care  • Cough     Seen by PCP 2023 , dx  URI, rx  Benzonate  And given flu at visit  2023, rapid covid negative         History of Present Illness       Izell Barthel is a 80-year-old male who presents to clinic complaining of cough x2 weeks  He describes the cough is dry nonproductive  He saw his primary care provider for the same reason 2 weeks ago and they gave him Aldona Zeb which seemed to help a little bit however today woke up with a 1*3 degrees Fahrenheit fever  She states he had a prostate biopsy on  and was positive for prostate cancer when they saw the urologist yesterday he seemed to think he may have a urinary tract infection and put him on Augmentin however they have not started the Augmentin yet  He also notes fatigue for 2 weeks as well and rhinorrhea for the last week    He denies any dysuria, nasal congestion, ear pain, sinus pain or pressure, sore throat, shortness of breath, nausea, vomiting, diarrhea, headaches, recent travel, or exposure to anyone known COVID-positive  Review of Systems   Review of Systems   Constitutional: Positive for fatigue and fever  Negative for chills  HENT: Positive for rhinorrhea  Negative for congestion, ear pain, sinus pressure, sinus pain and sore throat  Respiratory: Positive for cough  Negative for chest tightness and shortness of breath  Gastrointestinal: Negative for diarrhea, nausea and vomiting  Genitourinary: Negative for dysuria  Musculoskeletal: Negative for myalgias  Neurological: Negative for headaches           Current Medications       Current Outpatient Medications:   •  atorvastatin (LIPITOR) 20 mg tablet, Take 1 tablet (20 mg total) by mouth daily, Disp: 90 tablet, Rfl: 1  •  benzonatate (TESSALON) 200 MG capsule, Take 1 capsule (200 mg total) by mouth 3 (three) times a day as needed for cough, Disp: 30 capsule, Rfl: 0  •  ciprofloxacin (CIPRO) 500 mg tablet, Taking prior to biopsy, Disp: , Rfl:   •  lisinopril (ZESTRIL) 10 mg tablet, Take 1 tablet (10 mg total) by mouth daily, Disp: 90 tablet, Rfl: 1  •  rivaroxaban (Xarelto) 10 mg tablet, Take 1 tablet (10 mg total) by mouth daily, Disp: 30 tablet, Rfl: 0  •  tamsulosin (FLOMAX) 0 4 mg, TAKE ONE CAPSULE BY MOUTH EVERY DAY WITH DINNER, Disp: 90 capsule, Rfl: 1    Current Allergies     Allergies as of 02/07/2023 - Reviewed 02/07/2023   Allergen Reaction Noted   • Percocet [oxycodone-acetaminophen] Hallucinations 11/25/2020   • Percolone [oxycodone] Hallucinations 07/17/2018            The following portions of the patient's history were reviewed and updated as appropriate: allergies, current medications, past family history, past medical history, past social history, past surgical history and problem list      Past Medical History:   Diagnosis Date   • AC (acromioclavicular) joint bone spurs     Last assessed - 2/19/15   • Achilles tendinitis, unspecified leg 06/04/2010    Resolved - 1/8/18 • Anemia 08/12/2010    Resolved - 1/11/16   • Blood in urine    • BPH without urinary obstruction     Hypertrophy   • Deep venous thrombosis of distal lower extremity ([address] )     2015, provoked, postoperative after bariatric surgery, was placed on coumadin temporarily   • Heartburn     Last assessed - 12/12/14   • Hemorrhoids    • Hernia, inguinal 06/30/2009    Resolved - 1/8/18   • History of stomach ulcers    • Hypertension    • Mixed hyperlipidemia    • Nephrolithiasis    • Right inguinal hernia     Last assessed - 4/4/16   • Sleep apnea     unable to tolerate c-pap   • Tear of left rotator cuff     Last assessed - 2/19/15       Past Surgical History:   Procedure Laterality Date   • BARIATRIC SURGERY  06/15/2015    Laparoscopic Longitudinal Gastrectomy for morbid obesity, Managed by - Blank Siegel    • EXPLORATORY LAPAROTOMY W/ BOWEL RESECTION Right 3/3/2016    Procedure: Incarcerated possibly strangulated right inguinal hernia; Surgeon: Naida Burgos MD;  Location: [address] [place] [address];  Service:    • FOOT SURGERY     • HIP SURGERY      2011 - Rt Hip Replacement, 2012 - Lt Hip Replacement, 2013 - Lt Hip Revision   • INGUINAL HERNIA REPAIR      Last assessed - 4/4/16   • JOINT REPLACEMENT     • KNEE SURGERY  2004    Double    • TX ARTHROPLASTY GLENOHUMERAL JOINT TOTAL SHOULDER Right 2/4/2021    Procedure: ARTHROPLASTY SHOULDER REVERSE (RIGHT); Surgeon: Erlinda Hernandez MD;  Location: [address] [place] [address];  Service: Orthopedics   • TX COLONOSCOPY FLX DX W/COLLJ SPEC WHEN PFRMD N/A 1/15/2016    Procedure: COLONOSCOPY;  Surgeon: Zahraa Suresh MD;  Location: [facility] GI LAB; Service: Gastroenterology   • TX COLONOSCOPY FLX DX W/COLLJ SPEC WHEN PFRMD N/A 2/26/2019    Procedure: COLONOSCOPY;  Surgeon: Zahraa Suresh MD;  Location: [facility] GI LAB;   Service: Gastroenterology   • TX LAPAROSCOPY SURG RPR INITIAL INGUINAL HERNIA Bilateral 6/25/2021    Procedure: REPAIR HERNIA INGUINAL, LAPAROSCOPIC;  Surgeon: Chao Emerson MD; Location: [facility];  Service: General   • TONSILLECTOMY         Family History   Problem Relation Age of Onset   • Arthritis Father            • Diabetes Father    • Hypertension Father            • Hyperlipidemia Father    • Diabetes Family    • Emphysema Family    • Heart disease Family    • Hypertension Family    • Mental illness Neg Hx          Medications have been verified  Objective   /54   Pulse 83   Temp (!) 101 5 °F (38 6 °C) (Tympanic)   Resp 18   Ht 5' 8" (1 727 m)   Wt 114 kg (252 lb)   SpO2 94%   BMI 38 32 kg/m²   No LMP for male patient  Physical Exam     Physical Exam  Vitals and nursing note reviewed  Constitutional:       General: He is not in acute distress  Appearance: Normal appearance  He is not ill-appearing  HENT:      Right Ear: Tympanic membrane, ear canal and external ear normal       Left Ear: Tympanic membrane, ear canal and external ear normal       Nose: Rhinorrhea present  Mouth/Throat:      Mouth: Mucous membranes are moist       Pharynx: No oropharyngeal exudate or posterior oropharyngeal erythema  Cardiovascular:      Rate and Rhythm: Normal rate and regular rhythm  Heart sounds: Normal heart sounds  Pulmonary:      Effort: Pulmonary effort is normal  No respiratory distress  Breath sounds: Normal breath sounds  No stridor  No wheezing, rhonchi or rales  Lymphadenopathy:      Cervical: No cervical adenopathy  Neurological:      Mental Status: He is alert and oriented to person, place, and time     Psychiatric:         Mood and Affect: Mood normal          Behavior: Behavior normal

## 2023-02-08 LAB
ANION GAP SERPL CALCULATED.3IONS-SCNC: 9 MMOL/L (ref 4–13)
BUN SERPL-MCNC: 19 MG/DL (ref 5–25)
CALCIUM SERPL-MCNC: 9 MG/DL (ref 8.3–10.1)
CHLORIDE SERPL-SCNC: 100 MMOL/L (ref 96–108)
CO2 SERPL-SCNC: 25 MMOL/L (ref 21–32)
CREAT SERPL-MCNC: 1.68 MG/DL (ref 0.6–1.3)
GFR SERPL CREATININE-BSD FRML MDRD: 39 ML/MIN/1.73SQ M
GLUCOSE P FAST SERPL-MCNC: 178 MG/DL (ref 65–99)
POTASSIUM SERPL-SCNC: 4.8 MMOL/L (ref 3.5–5.3)
SODIUM SERPL-SCNC: 134 MMOL/L (ref 135–147)

## 2023-02-14 ENCOUNTER — TELEPHONE (OUTPATIENT)
Dept: FAMILY MEDICINE CLINIC | Facility: CLINIC | Age: 73
End: 2023-02-14

## 2023-02-14 DIAGNOSIS — I26.99 MULTIPLE PULMONARY EMBOLI (HCC): ICD-10-CM

## 2023-02-15 RX ORDER — RIVAROXABAN 10 MG/1
TABLET, FILM COATED ORAL
Qty: 30 TABLET | Refills: 0 | Status: SHIPPED | OUTPATIENT
Start: 2023-02-15

## 2023-03-02 ENCOUNTER — HOSPITAL ENCOUNTER (OUTPATIENT)
Dept: RADIOLOGY | Facility: HOSPITAL | Age: 73
Discharge: HOME | End: 2023-03-02

## 2023-03-02 DIAGNOSIS — C61 MALIGNANT NEOPLASM PROSTATE (HCC): ICD-10-CM

## 2023-03-02 RX ADMIN — IOHEXOL 100 ML: 350 INJECTION, SOLUTION INTRAVENOUS at 11:35

## 2023-03-24 DIAGNOSIS — I26.99 MULTIPLE PULMONARY EMBOLI (HCC): ICD-10-CM

## 2023-03-27 RX ORDER — RIVAROXABAN 10 MG/1
TABLET, FILM COATED ORAL
Qty: 30 TABLET | Refills: 0 | Status: SHIPPED | OUTPATIENT
Start: 2023-03-27

## 2023-04-28 ENCOUNTER — TELEPHONE (OUTPATIENT)
Dept: HEMATOLOGY ONCOLOGY | Facility: CLINIC | Age: 73
End: 2023-04-28

## 2023-04-28 DIAGNOSIS — I26.99 MULTIPLE PULMONARY EMBOLI (HCC): ICD-10-CM

## 2023-04-28 NOTE — TELEPHONE ENCOUNTER
Appointment Schedule   Who are you speaking with? Patient   If it is not the patient, are they listed on an active communication consent form? N/A   Which provider is the appointment scheduled with? Dr Cecily Beatty   At which location is the appointment scheduled for? [facility]   When is the appointment scheduled? Please list date and time 5/1/23 140   What is the reason for this appointment? f/u   Did patient voice understanding of the details of this appointment? Yes   Was the no show policy reviewed with patient?  Yes

## 2023-04-29 RX ORDER — RIVAROXABAN 10 MG/1
TABLET, FILM COATED ORAL
Qty: 30 TABLET | Refills: 0 | Status: SHIPPED | OUTPATIENT
Start: 2023-04-29 | End: 2023-05-01 | Stop reason: SDUPTHER

## 2023-05-01 ENCOUNTER — OFFICE VISIT (OUTPATIENT)
Dept: HEMATOLOGY ONCOLOGY | Facility: MEDICAL CENTER | Age: 73
End: 2023-05-01

## 2023-05-01 VITALS
RESPIRATION RATE: 18 BRPM | DIASTOLIC BLOOD PRESSURE: 68 MMHG | WEIGHT: 254.6 LBS | SYSTOLIC BLOOD PRESSURE: 110 MMHG | OXYGEN SATURATION: 98 % | HEIGHT: 68 IN | BODY MASS INDEX: 38.58 KG/M2 | TEMPERATURE: 98.2 F | HEART RATE: 75 BPM

## 2023-05-01 DIAGNOSIS — I26.99 MULTIPLE PULMONARY EMBOLI (HCC): ICD-10-CM

## 2023-05-01 NOTE — PROGRESS NOTES
[facility]  1950  [facility] [address] [address]    DISCUSSION/SUMMARY:    66-year-old male previously diagnosed with PE (2nd PE for this patient)  LE dopplers at the time of the 2nd PE were negative  Mr Pasha Hussein is now on Xarelto 10 mg a day  Patient feels well and clinically there are no concerning findings  Patient denies any respiratory issues  No problems with excessive bruising or bleeding  The plan is to continue the Xarelto at 10 mg a day  We rediscussed what to monitor for as far as excessive bruising/bleeding  We discussed what to monitor for as far as acute respiratory issues, chest pain, pressure, perceived anxiety attack, lower extremity swelling, cords, redness etc    Patient will call the office if he is scheduled for any invasive procedure or surgery - next colonoscopy is due in 2024  Patient is to return in 6 months  Patient knows to call the hematology/oncology office if there are any other questions or concerns  Carefully review your medication list and verify that the list is accurate and up-to-date  Please call the hematology/oncology office if there are medications missing from the list, medications on the list that you are not currently taking or if there is a dosage or instruction that is different from how you're taking that medication  Patient goals and areas of care:  Continue with the Xarelto 10 mg a day  Barriers to care:  None  Patient is able to self-care   _____________________________________________________________________________________    Chief Complaint   Patient presents with    Follow-up     Multiple pulmonary emboli      History of Present Illness:  66-year-old male recently diagnosed with multiple subsegmental pulmonary emboli initially seen by AFSTEVE  Mr Pasha Hussein previously suffered a PE  Patient is on Xarelto and returns for follow-up      Mr Pasha Hussein continues to feel well  No shortness of breath or dyspnea on exertion, no chest pain or pressure  No problems with excessive bruising or bleeding  No lower extremity swelling, cords or pain  Routine health maintenance and medical care is up-to-date  Review of Systems   Constitutional: Negative  HENT: Negative  Eyes: Negative  Respiratory: Negative  Cardiovascular: Negative  Gastrointestinal: Negative  Endocrine: Negative  Genitourinary: Negative  Musculoskeletal: Negative  Skin: Negative  Allergic/Immunologic: Negative  Neurological: Negative  Hematological: Negative  Psychiatric/Behavioral: Negative  All other systems reviewed and are negative      Patient Active Problem List   Diagnosis    Benign essential hypertension    Mixed hyperlipidemia    Abnormal blood sugar    AC (acromioclavicular) joint bone spurs    Enlarged prostate without lower urinary tract symptoms (luts)    GERD without esophagitis    Internal hemorrhoids    Obstructive sleep apnea    Postgastrectomy malabsorption    History of colon polyps    S/P bariatric surgery    Arthritis of carpometacarpal (CMC) joint of left thumb    Rotator cuff tear arthropathy of right shoulder    Recurrent inguinal hernia without obstruction or gangrene    Non-recurrent unilateral inguinal hernia without obstruction or gangrene    Postoperative urinary retention    Multiple pulmonary emboli (HCC)    Anemia    Anticoagulant long-term use     Past Medical History:   Diagnosis Date    AC (acromioclavicular) joint bone spurs     Last assessed - 2/19/15    Achilles tendinitis, unspecified leg 06/04/2010    Resolved - 1/8/18    Anemia 08/12/2010    Resolved - 1/11/16    Blood in urine     BPH without urinary obstruction     Hypertrophy    Deep venous thrombosis of distal lower extremity ([facility] [address] )     2015, provoked, postoperative after bariatric surgery, was placed on coumadin temporarily    Heartburn Last assessed - 14    Hemorrhoids     Hernia, inguinal 2009    Resolved - 18    History of stomach ulcers     Hypertension     Mixed hyperlipidemia     Nephrolithiasis     Right inguinal hernia     Last assessed - 16    Sleep apnea     unable to tolerate c-pap    Tear of left rotator cuff     Last assessed - 2/19/15     Past Surgical History:   Procedure Laterality Date    BARIATRIC SURGERY  06/15/2015    Laparoscopic Longitudinal Gastrectomy for morbid obesity, Managed by - Blank Bates     EXPLORATORY LAPAROTOMY W/ BOWEL RESECTION Right 3/3/2016    Procedure: Incarcerated possibly strangulated right inguinal hernia; Surgeon: Lana Hansen MD;  Location: [address] [place] [address];  [facility]    [address] [facility]       - Rt Hip Replacement,  - Lt Hip Replacement, 2013 - Lt Hip Revision    INGUINAL HERNIA REPAIR      Last assessed - 16    JOINT REPLACEMENT      KNEE SURGERY      Double     GA ARTHROPLASTY GLENOHUMERAL JOINT TOTAL SHOULDER Right 2021    Procedure: ARTHROPLASTY SHOULDER REVERSE (RIGHT); Surgeon: Cedrick Rodgers MD;  Location: [address] [place] [address];  Service: Orthopedics    GA COLONOSCOPY FLX DX W/COLLJ [place] 1978 PFRMD N/A 1/15/2016    Procedure: COLONOSCOPY;  Surgeon: Dick Frye MD;  Location: [place] [address] GI LAB; Service: Gastroenterology    GA COLONOSCOPY FLX DX W/COLLJ SPEC WHEN PFRMD N/A 2019    Procedure: COLONOSCOPY;  Surgeon: Dick Frye MD;  Location: [place] [address] GI LAB;   Service: Gastroenterology    GA LAPAROSCOPY SURG RPR INITIAL INGUINAL HERNIA Bilateral 2021    Procedure: REPAIR HERNIA INGUINAL, LAPAROSCOPIC;  Surgeon: Don Márquez MD;  Location: [place] [facility];  Service: General    TONSILLECTOMY       Family History   Problem Relation Age of Onset    Arthritis Father             Diabetes Father     Hypertension Father             Hyperlipidemia Father     Diabetes Family     Emphysema Family     Heart disease Family     Hypertension Family     Mental illness Neg Hx      Social History     Socioeconomic History    Marital status: /Civil Union     Spouse name: Not on file    Number of children: Not on file    Years of education: Not on file    Highest education level: Not on file   Occupational History    Not on file   Tobacco Use    Smoking status: Former     Packs/day: 1 00     Years: 30 00     Pack years: 30 00     Types: Cigarettes     Start date: 1966     Quit date: 1994     Years since quittin 3    Smokeless tobacco: Never   Vaping Use    Vaping Use: Never used   Substance and Sexual Activity    Alcohol use: Not Currently     Alcohol/week: 2 0 standard drinks     Types: 2 Standard drinks or equivalent per week     Comment: Occ, very rarely     Drug use: Never    Sexual activity: Yes     Partners: Female   Other Topics Concern    Not on file   Social History Narrative    Exercise - Walking once a day     Social Determinants of Health     Financial Resource Strain: Not on file   Food Insecurity: Not on file   Transportation Needs: Not on file   Physical Activity: Not on file   Stress: Not on file   Social Connections: Not on file   Intimate Partner Violence: Not on file   Housing Stability: Not on file       Current Outpatient Medications:     atorvastatin (LIPITOR) 20 mg tablet, Take 1 tablet (20 mg total) by mouth daily, Disp: 90 tablet, Rfl: 0    lisinopril (ZESTRIL) 10 mg tablet, Take 1 tablet (10 mg total) by mouth daily, Disp: 90 tablet, Rfl: 0    tamsulosin (FLOMAX) 0 4 mg, Take 1 capsule (0 4 mg total) by mouth daily with dinner, Disp: 90 capsule, Rfl: 0    Xarelto 10 MG tablet, TAKE 1 TABLET (10 MG TOTAL) BY MOUTH DAILY, Disp: 30 tablet, Rfl: 0    benzonatate (TESSALON) 200 MG capsule, Take 1 capsule (200 mg total) by mouth 3 (three) times a day as needed for cough (Patient not taking: Reported on 2023), Disp: 30 capsule, Rfl: 0    ciprofloxacin (CIPRO) 500 mg tablet, Taking prior to biopsy (Patient not taking: Reported on 5/1/2023), Disp: , Rfl:     Allergies   Allergen Reactions    Percocet [Oxycodone-Acetaminophen] Hallucinations    Percolone [Oxycodone] Hallucinations       Vitals:    05/01/23 1334   BP: 110/68   Pulse: 75   Resp: 18   Temp: 98 2 °F (36 8 °C)   SpO2: 98%     Physical Exam  Constitutional:       Appearance: He is well-developed  HENT:      Head: Normocephalic and atraumatic  Right Ear: External ear normal       Left Ear: External ear normal    Eyes:      Conjunctiva/sclera: Conjunctivae normal       Pupils: Pupils are equal, round, and reactive to light  Cardiovascular:      Rate and Rhythm: Normal rate and regular rhythm  Heart sounds: Normal heart sounds  Pulmonary:      Effort: Pulmonary effort is normal       Breath sounds: Normal breath sounds  Abdominal:      General: Bowel sounds are normal       Palpations: Abdomen is soft  Musculoskeletal:         General: Normal range of motion  Cervical back: Normal range of motion and neck supple  Skin:     General: Skin is warm  Neurological:      Mental Status: He is alert and oriented to person, place, and time  Deep Tendon Reflexes: Reflexes are normal and symmetric  Psychiatric:         Behavior: Behavior normal          Thought Content: Thought content normal          Judgment: Judgment normal      Extremities:  0-1 +bilateral lower extremity edema, no cords, pulses are 1+    Labs    2/7/2023 BUN = 19 creatinine = 1 68    1/20/2023 WBC = 5 3 hemoglobin = 13 1 hematocrit = 40 6 platelet = 685 neutrophil = 56%    07/18/2022 BUN = 15 creatinine = 1 14 calcium = 9 1 LFTs WNL    Imaging    07/07/2021 vascular lower limb venous duplex study bilateral    RIGHT LOWER LIMB:  No evidence of acute or chronic deep vein thrombosis  No evidence of superficial thrombophlebitis noted  Doppler evaluation shows a normal response to augmentation maneuvers    Popliteal, posterior tibial and anterior tibial arterial Doppler waveforms are  triphasic  LEFT LOWER LIMB:  No evidence of acute or chronic deep vein thrombosis  No evidence of superficial thrombophlebitis noted  Doppler evaluation shows a normal response to augmentation maneuvers  Popliteal, posterior tibial and anterior tibial arterial Doppler waveforms are  triphasic  There is a hypoechoic non-vascularized structure noted at the mid calf     07/04/2021 CTA chest PE study    Multiple segmental branch pulmonary artery emboli with right heart strain      The calculated ratio of right ventricular to left ventricular diameter (RV/LV ratio) is 1 07     This is greater than 0 9, which is abnormal and indicates right heart strain  An abnormal RV/LV ratio has been shown to be associated with an increased risk of 30 day mortality in the setting of acute pulmonary embolism  Urgent consultation with the medical critical care team is recommended

## 2023-10-06 ENCOUNTER — DOCUMENTATION (OUTPATIENT)
Dept: HEMATOLOGY ONCOLOGY | Facility: MEDICAL CENTER | Age: 73
End: 2023-10-06

## 2023-10-06 NOTE — PROGRESS NOTES
Left voicemail for patient that due to a change in Dr. Hare Forward schedule his appointment of Mon. 11/6/23 at [facility] has been rescheduled to Wed. 11/22/23 at 8:40am.  I asked Pablo Bullard to call back to confirm.

## 2023-10-18 DIAGNOSIS — R33.9 URINARY RETENTION: ICD-10-CM

## 2023-10-19 RX ORDER — TAMSULOSIN HYDROCHLORIDE 0.4 MG/1
0.4 CAPSULE ORAL
Qty: 90 CAPSULE | Refills: 0 | Status: SHIPPED | OUTPATIENT
Start: 2023-10-19

## 2023-11-02 DIAGNOSIS — R33.9 URINARY RETENTION: ICD-10-CM

## 2023-11-02 RX ORDER — TAMSULOSIN HYDROCHLORIDE 0.4 MG/1
0.4 CAPSULE ORAL
Qty: 90 CAPSULE | Refills: 0 | Status: SHIPPED | OUTPATIENT
Start: 2023-11-02

## 2023-11-22 ENCOUNTER — OFFICE VISIT (OUTPATIENT)
Dept: HEMATOLOGY ONCOLOGY | Facility: MEDICAL CENTER | Age: 73
End: 2023-11-22
Payer: COMMERCIAL

## 2023-11-22 VITALS
TEMPERATURE: 98.2 F | SYSTOLIC BLOOD PRESSURE: 124 MMHG | HEART RATE: 61 BPM | OXYGEN SATURATION: 96 % | BODY MASS INDEX: 38.12 KG/M2 | DIASTOLIC BLOOD PRESSURE: 68 MMHG | RESPIRATION RATE: 17 BRPM | HEIGHT: 68 IN | WEIGHT: 251.5 LBS

## 2023-11-22 DIAGNOSIS — I26.99 MULTIPLE PULMONARY EMBOLI (HCC): ICD-10-CM

## 2023-11-22 DIAGNOSIS — Z79.01 ANTICOAGULANT LONG-TERM USE: Primary | ICD-10-CM

## 2023-11-22 PROCEDURE — 99214 OFFICE O/P EST MOD 30 MIN: CPT | Performed by: INTERNAL MEDICINE

## 2023-11-22 NOTE — PROGRESS NOTES
Luz Romero  1950  [facility] [place] [address] [address] [facility] [id number]    DISCUSSION/SUMMARY:    77-year-old male previously diagnosed with PE (2nd PE for this patient). LE dopplers at the time of the 2nd PE were negative. Mr. Jensen Hernández is now on Xarelto 10 mg a day. Patient feels well and clinically there are no concerning findings. Patient denies any respiratory issues. No problems with excessive bruising or bleeding. The plan is to continue the Xarelto at 10 mg a day. We rediscussed what to monitor for as far as excessive bruising/bleeding. We discussed what to monitor for as far as acute respiratory issues, chest pain, pressure, perceived anxiety attack, lower extremity swelling, cords, redness etc.   Patient will call the office if he is scheduled for any invasive procedure or surgery. Patient is to return in 9 months. Patient knows to call the hematology/oncology office if there are any other questions or concerns. Carefully review your medication list and verify that the list is accurate and up-to-date. Please call the hematology/oncology office if there are medications missing from the list, medications on the list that you are not currently taking or if there is a dosage or instruction that is different from how you're taking that medication. Patient goals and areas of care:  Continue with the Xarelto 10 mg a day  Barriers to care:  None  Patient is able to self-care.  _____________________________________________________________________________________    Chief Complaint   Patient presents with    Follow-up    Recurrent PEs     History of Present Illness:  77-year-old male recently diagnosed with multiple subsegmental pulmonary emboli initially seen by AF, PAC. Mr. Jensen Hernández previously suffered a PE. Patient is on Xarelto and returns for follow-up. Mr. Jensen Hernández states feeling well.   No respiratory issues, no lower extremity swelling out of the usual.  No problems with excessive bruising or bleeding, no other problems with the Xarelto. Routine health maintenance and medical care is up-to-date. Review of Systems   Constitutional: Negative. HENT: Negative. Eyes: Negative. Respiratory: Negative. Cardiovascular: Negative. Gastrointestinal: Negative. Endocrine: Negative. Genitourinary: Negative. Musculoskeletal: Negative. Skin: Negative. Allergic/Immunologic: Negative. Neurological: Negative. Hematological: Negative. Psychiatric/Behavioral: Negative. All other systems reviewed and are negative.     Patient Active Problem List   Diagnosis    Benign essential hypertension    Mixed hyperlipidemia    Abnormal blood sugar    AC (acromioclavicular) joint bone spurs    Enlarged prostate without lower urinary tract symptoms (luts)    GERD without esophagitis    Internal hemorrhoids    Obstructive sleep apnea    Postgastrectomy malabsorption    History of colon polyps    S/P bariatric surgery    Arthritis of carpometacarpal (CMC) joint of left thumb    Rotator cuff tear arthropathy of right shoulder    Recurrent inguinal hernia without obstruction or gangrene    Non-recurrent unilateral inguinal hernia without obstruction or gangrene    Postoperative urinary retention    Multiple pulmonary emboli (HCC)    Anemia    Anticoagulant long-term use     Past Medical History:   Diagnosis Date    AC (acromioclavicular) joint bone spurs     Last assessed - 2/19/15    Achilles tendinitis, unspecified leg 06/04/2010    Resolved - 1/8/18    Anemia 08/12/2010    Resolved - 1/11/16    Blood in urine     BPH without urinary obstruction     Hypertrophy    Deep venous thrombosis of distal lower extremity ([address])     2015, provoked, postoperative after bariatric surgery, was placed on coumadin temporarily    Heartburn     Last assessed - 12/12/14    Hemorrhoids     Hernia, inguinal 06/30/2009    Resolved - 1/8/18 History of stomach ulcers     Hypertension     Mixed hyperlipidemia     Nephrolithiasis     Right inguinal hernia     Last assessed - 16    Sleep apnea     unable to tolerate c-pap    Tear of left rotator cuff     Last assessed - 2/19/15     Past Surgical History:   Procedure Laterality Date    BARIATRIC SURGERY  06/15/2015    Laparoscopic Longitudinal Gastrectomy for morbid obesity, Managed by - Blank Jones     EXPLORATORY LAPAROTOMY W/ BOWEL RESECTION Right 3/3/2016    Procedure: Incarcerated possibly strangulated right inguinal hernia; Surgeon: April Cuevas MD;  [facility]  Service:     FOOT SURGERY      HIP SURGERY       - Rt Hip Replacement,  - Lt Hip Replacement, 2013 - Lt Hip Revision    INGUINAL HERNIA REPAIR      Last assessed - 16    JOINT REPLACEMENT      KNEE SURGERY  2004    Double     [place] ARTHROPLASTY GLENOHUMERAL JOINT TOTAL SHOULDER Right 2021    Procedure: ARTHROPLASTY SHOULDER REVERSE (RIGHT); Surgeon: Jonathan Jimenez MD;  [facility]  Service: Orthopedics    WA COLONOSCOPY FLX DX W/COLLJ SPEC WHEN PFRMD N/A 1/15/2016    Procedure: COLONOSCOPY;  Surgeon: Lyly Cisneros MD;  Location: [address] [place] [address] GI LAB; Service: Gastroenterology    WA COLONOSCOPY FLX DX W/COLLJ SPEC WHEN PFRMD N/A 2019    Procedure: COLONOSCOPY;  Surgeon: Lyly Cisneros MD;  Location: [address] [place] [address] GI LAB;   Service: Gastroenterology    WA LAPAROSCOPY SURG RPR INITIAL INGUINAL HERNIA Bilateral 2021    Procedure: REPAIR HERNIA INGUINAL, LAPAROSCOPIC;  Surgeon: Mg Youngblood MD;  Location: [facility];  Service: General    TONSILLECTOMY       Family History   Problem Relation Age of Onset    Arthritis Father             Diabetes Father     Hypertension Father             Hyperlipidemia Father     Diabetes Family     Emphysema Family     Heart disease Family     Hypertension Family     Mental illness Neg Hx      Social History     Socioeconomic History    Marital status: /Civil Union     Spouse name: Not on file    Number of children: Not on file    Years of education: Not on file    Highest education level: Not on file   Occupational History    Not on file   Tobacco Use    Smoking status: Former     Packs/day: 1.00     Years: 30.00     Total pack years: 30.00     Types: Cigarettes     Start date: 1966     Quit date: 1994     Years since quittin.8    Smokeless tobacco: Never   Vaping Use    Vaping Use: Never used   Substance and Sexual Activity    Alcohol use: Not Currently     Alcohol/week: 2.0 standard drinks of alcohol     Types: 2 Standard drinks or equivalent per week     Comment: Occ, very rarely     Drug use: Never    Sexual activity: Yes     Partners: Female   Other Topics Concern    Not on file   Social History Narrative    Exercise - Walking once a day     Social Determinants of Health     Financial Resource Strain: Not on file   Food Insecurity: Not on file   Transportation Needs: Not on file   Physical Activity: Not on file   Stress: Not on file   Social Connections: Not on file   Intimate Partner Violence: Not on file   Housing Stability: Not on file       Current Outpatient Medications:     atorvastatin (LIPITOR) 20 mg tablet, TAKE ONE TABLET BY MOUTH EVERY DAY, Disp: 90 tablet, Rfl: 0    lisinopril (ZESTRIL) 10 mg tablet, TAKE ONE TABLET BY MOUTH EVERY DAY, Disp: 90 tablet, Rfl: 0    rivaroxaban (Xarelto) 10 mg tablet, Take 1 tablet (10 mg total) by mouth daily, Disp: 90 tablet, Rfl: 2    tamsulosin (FLOMAX) 0.4 mg, Take 1 capsule (0.4 mg total) by mouth daily with dinner (Patient taking differently: Take 0.4 mg by mouth daily with dinner PRN), Disp: 90 capsule, Rfl: 0    benzonatate (TESSALON) 200 MG capsule, Take 1 capsule (200 mg total) by mouth 3 (three) times a day as needed for cough (Patient not taking: Reported on 2023), Disp: 30 capsule, Rfl: 0    ciprofloxacin (CIPRO) 500 mg tablet, Taking prior to biopsy (Patient not taking: Reported on 5/1/2023), Disp: , Rfl:     Allergies   Allergen Reactions    Percocet [Oxycodone-Acetaminophen] Hallucinations    Percolone [Oxycodone] Hallucinations       Vitals:    11/22/23 0825   BP: 124/68   Pulse: 61   Resp: 17   Temp: 98.2 °F (36.8 °C)   SpO2: 96%     Physical Exam  Constitutional:       Appearance: He is well-developed. HENT:      Head: Normocephalic and atraumatic. Right Ear: External ear normal.      Left Ear: External ear normal.   Eyes:      Conjunctiva/sclera: Conjunctivae normal.      Pupils: Pupils are equal, round, and reactive to light. Cardiovascular:      Rate and Rhythm: Normal rate and regular rhythm. Heart sounds: Normal heart sounds. Pulmonary:      Effort: Pulmonary effort is normal.      Breath sounds: Normal breath sounds. Abdominal:      General: Bowel sounds are normal.      Palpations: Abdomen is soft. Musculoskeletal:         General: Normal range of motion. Cervical back: Normal range of motion and neck supple. Skin:     General: Skin is warm. Neurological:      Mental Status: He is alert and oriented to person, place, and time. Deep Tendon Reflexes: Reflexes are normal and symmetric. Psychiatric:         Behavior: Behavior normal.         Thought Content: Thought content normal.         Judgment: Judgment normal.     Extremities:  0-1 +bilateral lower extremity edema, no cords, pulses are 1+    Labs    2/7/2023 BUN = 19 creatinine = 1.68  1/20/2023 WBC = 5.3 hemoglobin = 13.1 hematocrit = 40.6 platelet = 578 neutrophil = 56%  07/18/2022 BUN = 15 creatinine = 1.14 calcium = 9.1 LFTs WNL    Imaging    07/07/2021 vascular lower limb venous duplex study bilateral    RIGHT LOWER LIMB:  No evidence of acute or chronic deep vein thrombosis. No evidence of superficial thrombophlebitis noted. Doppler evaluation shows a normal response to augmentation maneuvers.   Popliteal, posterior tibial and anterior tibial arterial Doppler waveforms are  triphasic. LEFT LOWER LIMB:  No evidence of acute or chronic deep vein thrombosis. No evidence of superficial thrombophlebitis noted. Doppler evaluation shows a normal response to augmentation maneuvers. Popliteal, posterior tibial and anterior tibial arterial Doppler waveforms are  triphasic. There is a hypoechoic non-vascularized structure noted at the mid calf.    07/04/2021 CTA chest PE study    Multiple segmental branch pulmonary artery emboli with right heart strain. The calculated ratio of right ventricular to left ventricular diameter (RV/LV ratio) is 1.07     This is greater than 0.9, which is abnormal and indicates right heart strain. An abnormal RV/LV ratio has been shown to be associated with an increased risk of 30 day mortality in the setting of acute pulmonary embolism. Urgent consultation with the medical critical care team is recommended.

## 2023-12-05 ENCOUNTER — TELEPHONE (OUTPATIENT)
Dept: HEMATOLOGY ONCOLOGY | Facility: CLINIC | Age: 73
End: 2023-12-05

## 2023-12-05 NOTE — TELEPHONE ENCOUNTER
Patient Call    Who are you speaking with? Patient    If it is not the patient, are they listed on an active communication consent form? N/A   What is the reason for this call? Patient is asking the status Xarelto. He said he was told they would call him back and have some on hand for him as samples until they found a new pharmacy to send them over to    Does this require a call back? Yes   If a call back is required, please list best call back number [phone]   If a call back is required, advise that a message will be forwarded to their care team and someone will return their call as soon as possible. Did you relay this information to the patient?  Yes

## 2023-12-28 ENCOUNTER — OFFICE VISIT (OUTPATIENT)
Dept: URGENT CARE | Facility: CLINIC | Age: 73
End: 2023-12-28
Payer: COMMERCIAL

## 2023-12-28 ENCOUNTER — TELEPHONE (OUTPATIENT)
Dept: HEMATOLOGY ONCOLOGY | Facility: MEDICAL CENTER | Age: 73
End: 2023-12-28

## 2023-12-28 VITALS
BODY MASS INDEX: 38.16 KG/M2 | RESPIRATION RATE: 18 BRPM | OXYGEN SATURATION: 96 % | TEMPERATURE: 98 F | DIASTOLIC BLOOD PRESSURE: 88 MMHG | HEART RATE: 74 BPM | WEIGHT: 251 LBS | SYSTOLIC BLOOD PRESSURE: 140 MMHG

## 2023-12-28 DIAGNOSIS — R05.2 SUBACUTE COUGH: Primary | ICD-10-CM

## 2023-12-28 PROCEDURE — 99213 OFFICE O/P EST LOW 20 MIN: CPT | Performed by: FAMILY MEDICINE

## 2023-12-28 RX ORDER — BENZONATATE 200 MG/1
200 CAPSULE ORAL 3 TIMES DAILY PRN
Qty: 20 CAPSULE | Refills: 0 | Status: SHIPPED | OUTPATIENT
Start: 2023-12-28

## 2023-12-28 RX ORDER — FLUTICASONE PROPIONATE 50 MCG
1 SPRAY, SUSPENSION (ML) NASAL 2 TIMES DAILY
Qty: 16 G | Refills: 0 | Status: SHIPPED | OUTPATIENT
Start: 2023-12-28

## 2023-12-28 NOTE — PROGRESS NOTES
[facility]        NAME: Trace Smith is a 73 y.o. male  : 1950    MRN: [id number]  DATE: 2023  TIME: 5:34 PM    Assessment and Plan   Subacute cough [R05.2]  1. Subacute cough  benzonatate (TESSALON) 200 MG capsule    fluticasone (FLONASE) 50 mcg/act nasal spray        Supportive measures.  Tessalon and Flonase as needed.    Patient Instructions     Follow up with PCP in 3-5 days.  Proceed to  ER if symptoms worsen.    Chief Complaint     Chief Complaint   Patient presents with    Cold Like Symptoms     Cough for about a month, Nyquil.   2 days slight headache on right side,          History of Present Illness       73-year-old male presents today with coughing for about a week associated with some headaches over the past 2 days.  Denies any other associated symptoms.  Does have a household full of people with influenza.  Has been treating with TheraFlu.        Review of Systems   Review of Systems   Constitutional:  Negative for chills and fever.   HENT:  Negative for congestion, rhinorrhea and sore throat.    Respiratory:  Positive for cough. Negative for shortness of breath.    Cardiovascular:  Negative for chest pain.   Gastrointestinal:  Negative for abdominal pain, nausea and vomiting.   Neurological:  Positive for headaches. Negative for dizziness.     Current Medications       Current Outpatient Medications:     atorvastatin (LIPITOR) 20 mg tablet, TAKE ONE TABLET BY MOUTH EVERY DAY, Disp: 90 tablet, Rfl: 0    benzonatate (TESSALON) 200 MG capsule, Take 1 capsule (200 mg total) by mouth 3 (three) times a day as needed for cough, Disp: 20 capsule, Rfl: 0    fluticasone (FLONASE) 50 mcg/act nasal spray, 1 spray into each nostril 2 (two) times a day, Disp: 16 g, Rfl: 0    lisinopril (ZESTRIL) 10 mg tablet, TAKE ONE TABLET BY MOUTH EVERY DAY, Disp: 90 tablet, Rfl: 0    rivaroxaban (Xarelto) 10 mg tablet, Take 1 tablet (10 mg total) by mouth daily, Disp: 90 tablet, Rfl: 2     tamsulosin (FLOMAX) 0.4 mg, Take 1 capsule (0.4 mg total) by mouth daily with dinner (Patient taking differently: Take 0.4 mg by mouth daily with dinner PRN), Disp: 90 capsule, Rfl: 0    benzonatate (TESSALON) 200 MG capsule, Take 1 capsule (200 mg total) by mouth 3 (three) times a day as needed for cough (Patient not taking: Reported on 5/1/2023), Disp: 30 capsule, Rfl: 0    ciprofloxacin (CIPRO) 500 mg tablet, Taking prior to biopsy (Patient not taking: Reported on 5/1/2023), Disp: , Rfl:     Current Allergies     Allergies as of 12/28/2023 - Reviewed 12/28/2023   Allergen Reaction Noted    Percocet [oxycodone-acetaminophen] Hallucinations 11/25/2020    Percolone [oxycodone] Hallucinations 07/17/2018            The following portions of the patient's history were reviewed and updated as appropriate: allergies, current medications, past family history, past medical history, past social history, past surgical history and problem list.     Past Medical History:   Diagnosis Date    AC (acromioclavicular) joint bone spurs     Last assessed - 2/19/15    Achilles tendinitis, unspecified leg 06/04/2010    Resolved - 1/8/18    Anemia 08/12/2010    Resolved - 1/11/16    Blood in urine     BPH without urinary obstruction     Hypertrophy    Deep venous thrombosis of distal lower extremity (HCC)     2015, provoked, postoperative after bariatric surgery, was placed on coumadin temporarily    Heartburn     Last assessed - 12/12/14    Hemorrhoids     Hernia, inguinal 06/30/2009    Resolved - 1/8/18    History of stomach ulcers     Hypertension     Mixed hyperlipidemia     Nephrolithiasis     Right inguinal hernia     Last assessed - 4/4/16    Sleep apnea     unable to tolerate c-pap    Tear of left rotator cuff     Last assessed - 2/19/15       Past Surgical History:   Procedure Laterality Date    BARIATRIC SURGERY  06/15/2015    Laparoscopic Longitudinal Gastrectomy for morbid obesity, Managed by - Blank Jones      EXPLORATORY LAPAROTOMY W/ BOWEL RESECTION Right 3/3/2016    Procedure: Incarcerated possibly strangulated right inguinal hernia;  Surgeon: Sten Kjellberg, MD;  Location: [facility];  Service:     FOOT SURGERY      HIP SURGERY       - Rt Hip Replacement,  - Lt Hip Replacement, 2013 - Lt Hip Revision    INGUINAL HERNIA REPAIR      Last assessed - 16    JOINT REPLACEMENT      KNEE SURGERY      Double     [place] ARTHROPLASTY GLENOHUMERAL JOINT TOTAL SHOULDER Right 2021    Procedure: ARTHROPLASTY SHOULDER REVERSE (RIGHT);  Surgeon: Waqar Martinez MD;  Location: [facility];  Service: Orthopedics    [place] COLONOSCOPY FLX DX W/COLLJ SPEC WHEN PFRMD N/A 1/15/2016    Procedure: COLONOSCOPY;  Surgeon: Kingston Cortés MD;  Location: [facility] GI LAB;  Service: Gastroenterology    OR COLONOSCOPY FLX DX W/COLLJ SPEC WHEN PFRMD N/A 2019    Procedure: COLONOSCOPY;  Surgeon: Kingston Cortés MD;  Location: [facility] GI LAB;  Service: Gastroenterology    OR LAPAROSCOPY SURG RPR INITIAL INGUINAL HERNIA Bilateral 2021    Procedure: REPAIR HERNIA INGUINAL, LAPAROSCOPIC;  Surgeon: Yehuda Shetty MD;  Location: [facility];  Service: General    TONSILLECTOMY         Family History   Problem Relation Age of Onset    Arthritis Father             Diabetes Father     Hypertension Father             Hyperlipidemia Father     Diabetes Family     Emphysema Family     Heart disease Family     Hypertension Family     Mental illness Neg Hx          Medications have been verified.        Objective   /88   Pulse 74   Temp 98 °F (36.7 °C)   Resp 18   Wt 114 kg (251 lb)   SpO2 96%   BMI 38.16 kg/m²   No LMP for male patient.       Physical Exam     Physical Exam  Vitals and nursing note reviewed.   Constitutional:       Appearance: Normal appearance. He is not ill-appearing, toxic-appearing or diaphoretic.   HENT:      Head: Normocephalic and atraumatic.      Mouth/Throat:      Mouth: Mucous membranes are  moist.      Pharynx: No posterior oropharyngeal erythema.   Eyes:      General:         Right eye: No discharge.         Left eye: No discharge.      Conjunctiva/sclera: Conjunctivae normal.   Pulmonary:      Effort: Pulmonary effort is normal.   Skin:     General: Skin is warm.      Findings: No erythema.   Neurological:      General: No focal deficit present.      Mental Status: He is alert and oriented to person, place, and time.   Psychiatric:         Mood and Affect: Mood normal.         Behavior: Behavior normal.         Thought Content: Thought content normal.         Judgment: Judgment normal.

## 2024-01-20 DIAGNOSIS — I10 BENIGN ESSENTIAL HYPERTENSION: ICD-10-CM

## 2024-01-22 RX ORDER — LISINOPRIL 10 MG/1
10 TABLET ORAL DAILY
Qty: 90 TABLET | Refills: 0 | Status: SHIPPED | OUTPATIENT
Start: 2024-01-22

## 2024-02-07 ENCOUNTER — OFFICE VISIT (OUTPATIENT)
Dept: FAMILY MEDICINE CLINIC | Facility: CLINIC | Age: 74
End: 2024-02-07
Payer: COMMERCIAL

## 2024-02-07 VITALS
DIASTOLIC BLOOD PRESSURE: 80 MMHG | BODY MASS INDEX: 38.45 KG/M2 | WEIGHT: 245 LBS | TEMPERATURE: 99.3 F | RESPIRATION RATE: 18 BRPM | HEIGHT: 67 IN | SYSTOLIC BLOOD PRESSURE: 138 MMHG | HEART RATE: 104 BPM

## 2024-02-07 DIAGNOSIS — J06.9 UPPER RESPIRATORY TRACT INFECTION, UNSPECIFIED TYPE: Primary | ICD-10-CM

## 2024-02-07 DIAGNOSIS — C61 MALIGNANT NEOPLASM PROSTATE (HCC): ICD-10-CM

## 2024-02-07 DIAGNOSIS — Z13.6 SCREENING FOR CARDIOVASCULAR CONDITION: ICD-10-CM

## 2024-02-07 DIAGNOSIS — I10 BENIGN ESSENTIAL HYPERTENSION: ICD-10-CM

## 2024-02-07 DIAGNOSIS — Z12.5 SCREENING FOR PROSTATE CANCER: ICD-10-CM

## 2024-02-07 DIAGNOSIS — D64.9 ANEMIA, UNSPECIFIED TYPE: ICD-10-CM

## 2024-02-07 DIAGNOSIS — I26.99 MULTIPLE PULMONARY EMBOLI (HCC): ICD-10-CM

## 2024-02-07 PROBLEM — Z00.00 WELL ADULT EXAM: Status: ACTIVE | Noted: 2024-02-07

## 2024-02-07 PROBLEM — Z79.01 ANTICOAGULANT LONG-TERM USE: Status: RESOLVED | Noted: 2023-01-18 | Resolved: 2024-02-07

## 2024-02-07 PROCEDURE — 99213 OFFICE O/P EST LOW 20 MIN: CPT | Performed by: FAMILY MEDICINE

## 2024-02-07 RX ORDER — BENZONATATE 200 MG/1
200 CAPSULE ORAL 3 TIMES DAILY PRN
Qty: 30 CAPSULE | Refills: 0 | Status: SHIPPED | OUTPATIENT
Start: 2024-02-07

## 2024-02-07 RX ORDER — AZITHROMYCIN 250 MG/1
TABLET, FILM COATED ORAL
Qty: 6 TABLET | Refills: 0 | Status: SHIPPED | OUTPATIENT
Start: 2024-02-07 | End: 2024-02-12

## 2024-02-07 NOTE — PROGRESS NOTES
[facility] HEALTH MAINTENANCE OFFICE VISIT  [facility]    NAME: Trace Smith  AGE: 74 y.o. SEX: male  : 1950     DATE: 2024    Assessment and Plan     1. Upper respiratory tract infection, unspecified type  -     azithromycin (ZITHROMAX) 250 mg tablet; 2 tabs on day 1, 1 tab a day for 4 days after  -     benzonatate (TESSALON) 200 MG capsule; Take 1 capsule (200 mg total) by mouth 3 (three) times a day as needed for cough  -     CBC; Future    2. Malignant neoplasm prostate (HCC)  Assessment & Plan:  Last psa is 1.4    Orders:  -     CBC; Future    3. Multiple pulmonary emboli (HCC)  -     CBC; Future    4. Benign essential hypertension  -     CBC; Future    5. Anemia, unspecified type  -     CBC; Future    6. Screening for prostate cancer  -     PSA, Total Screen; Future    7. Screening for cardiovascular condition  -     Comprehensive metabolic panel; Future  -     CBC; Future  -     Lipid Panel with Direct LDL reflex; Future        Patient Counseling:   { Adult CPE Counseling List:00371}    Immunizations reviewed: { immunization CPE list:35044}  Discussed benefits of:  { Adult CPE Screening counselin}.  BMI Counseling: Body mass index is 38.37 kg/m². Discussed with patient's BMI with him. The BMI { BMI Counselin}    Return for Annual physical.        Chief Complaint     Chief Complaint   Patient presents with   • Physical Exam     Sas/cma       History of Present Illness     Pt is sched for a full physical        Well Adult Physical   Patient here for a comprehensive physical exam.      Diet and Physical Activity  Diet: {diet; well adult:81728}  Exercise: {exericse; well adult:46729}      Depression Screen  PHQ-2/9 Depression Screening    Little interest or pleasure in doing things: 0 - not at all  Feeling down, depressed, or hopeless: 0 - not at all  PHQ-2 Score: 0  PHQ-2 Interpretation: Negative depression screen           General Health  Hearing: {WELL ADULT HEARIN}  Vision: {vision; well adult:25366}  Dental: {dental; well adult:38241}    Reproductive Health  {LK Adult CPE Screening counselin}      The following portions of the patient's history were reviewed and updated as appropriate: allergies, current medications, past family history, past medical history, past social history, past surgical history and problem list.    Review of Systems     Review of Systems    Past Medical History     Past Medical History:   Diagnosis Date   • AC (acromioclavicular) joint bone spurs     Last assessed - 2/19/15   • Achilles tendinitis, unspecified leg 2010    Resolved - 18   • Anemia 2010    Resolved - 16   • Anticoagulant long-term use 2023   • Blood in urine    • BPH without urinary obstruction     Hypertrophy   • Deep venous thrombosis of distal lower extremity (HCC)     , provoked, postoperative after bariatric surgery, was placed on coumadin temporarily   • Heartburn     Last assessed - 14   • Hemorrhoids    • Hernia, inguinal 2009    Resolved - 18   • History of stomach ulcers    • Hypertension    • Mixed hyperlipidemia    • Nephrolithiasis    • Right inguinal hernia     Last assessed - 16   • Sleep apnea     unable to tolerate c-pap   • Tear of left rotator cuff     Last assessed - 2/19/15       Past Surgical History     Past Surgical History:   Procedure Laterality Date   • BARIATRIC SURGERY  06/15/2015    Laparoscopic Longitudinal Gastrectomy for morbid obesity, Managed by - Blank Jones    • EXPLORATORY LAPAROTOMY W/ BOWEL RESECTION Right 3/3/2016    Procedure: Incarcerated possibly strangulated right inguinal hernia;  Surgeon: Sten Kjellberg, MD;  Location: [facility];  Service:    • FOOT SURGERY     • HIP SURGERY       - Rt Hip Replacement,  - Lt Hip Replacement, 2013 - Lt Hip Revision   • INGUINAL HERNIA REPAIR      Last assessed - 16   • JOINT  REPLACEMENT     • KNEE SURGERY      Double    • AK ARTHROPLASTY GLENOHUMERAL JOINT TOTAL SHOULDER Right 2021    Procedure: ARTHROPLASTY SHOULDER REVERSE (RIGHT);  Surgeon: Waqar Martinez MD;  Location: [facility];  Service: Orthopedics   • AK COLONOSCOPY FLX DX W/COLLJ SPEC WHEN PFRMD N/A 1/15/2016    Procedure: COLONOSCOPY;  Surgeon: Kingston Cortés MD;  Location: [facility] GI LAB;  Service: Gastroenterology   • AK COLONOSCOPY FLX DX W/COLLJ SPEC WHEN PFRMD N/A 2019    Procedure: COLONOSCOPY;  Surgeon: Kingston Cortés MD;  Location: [facility] GI LAB;  Service: Gastroenterology   • AK LAPAROSCOPY SURG RPR INITIAL INGUINAL HERNIA Bilateral 2021    Procedure: REPAIR HERNIA INGUINAL, LAPAROSCOPIC;  Surgeon: Yehuda Shetty MD;  Location: [facility];  Service: General   • TONSILLECTOMY         Social History     Social History     Socioeconomic History   • Marital status: /Civil Union     Spouse name: None   • Number of children: None   • Years of education: None   • Highest education level: None   Occupational History   • None   Tobacco Use   • Smoking status: Former     Current packs/day: 0.00     Average packs/day: 1 pack/day for 30.0 years (30.0 ttl pk-yrs)     Types: Cigarettes     Start date: 1966     Quit date: 1994     Years since quittin.0   • Smokeless tobacco: Never   Vaping Use   • Vaping status: Never Used   Substance and Sexual Activity   • Alcohol use: Not Currently     Alcohol/week: 2.0 standard drinks of alcohol     Types: 2 Standard drinks or equivalent per week     Comment: Occ, very rarely    • Drug use: Never   • Sexual activity: Yes     Partners: Female   Other Topics Concern   • None   Social History Narrative    Exercise - Walking once a day     Social Determinants of Health     Financial Resource Strain: Not on file   Food Insecurity: Not on file   Transportation Needs: Not on file   Physical Activity: Not on file   Stress: Not on file   Social Connections:  "Not on file   Intimate Partner Violence: Not on file   Housing Stability: Not on file       Family History     Family History   Problem Relation Age of Onset   • Arthritis Father            • Diabetes Father    • Hypertension Father            • Hyperlipidemia Father    • Diabetes Family    • Emphysema Family    • Heart disease Family    • Hypertension Family    • Mental illness Neg Hx        Current Medications       Current Outpatient Medications:   •  Ascorbic Acid (VITAMIN C PO), Take by mouth, Disp: , Rfl:   •  atorvastatin (LIPITOR) 20 mg tablet, TAKE ONE TABLET BY MOUTH EVERY DAY, Disp: 90 tablet, Rfl: 0  •  azithromycin (ZITHROMAX) 250 mg tablet, 2 tabs on day 1, 1 tab a day for 4 days after, Disp: 6 tablet, Rfl: 0  •  benzonatate (TESSALON) 200 MG capsule, Take 1 capsule (200 mg total) by mouth 3 (three) times a day as needed for cough, Disp: 30 capsule, Rfl: 0  •  ELDERBERRY PO, Take by mouth, Disp: , Rfl:   •  lisinopril (ZESTRIL) 10 mg tablet, Take 1 tablet (10 mg total) by mouth daily, Disp: 90 tablet, Rfl: 0  •  tamsulosin (FLOMAX) 0.4 mg, Take 1 capsule (0.4 mg total) by mouth daily with dinner (Patient taking differently: Take 0.4 mg by mouth daily with dinner PRN), Disp: 90 capsule, Rfl: 0     Allergies     Allergies   Allergen Reactions   • Percocet [Oxycodone-Acetaminophen] Hallucinations   • Percolone [Oxycodone] Hallucinations       Objective     /80   Pulse 104   Temp 99.3 °F (37.4 °C)   Resp 18   Ht 5' 7\" (1.702 m)   Wt 111 kg (245 lb)   BMI 38.37 kg/m²      Physical Exam      Vision Screening    Right eye Left eye Both eyes   Without correction 20/20 20/20 20/20   With correction              Frank Lombardi, [facility]  "

## 2024-02-21 PROBLEM — Z00.00 WELL ADULT EXAM: Status: RESOLVED | Noted: 2024-02-07 | Resolved: 2024-02-21

## 2024-02-22 LAB
ALBUMIN SERPL-MCNC: 4 G/DL (ref 3.8–4.8)
ALBUMIN/GLOB SERPL: 1.5 {RATIO} (ref 1.2–2.2)
ALP SERPL-CCNC: 99 IU/L (ref 44–121)
ALT SERPL-CCNC: 18 IU/L (ref 0–44)
AST SERPL-CCNC: 20 IU/L (ref 0–40)
BASOPHILS # BLD AUTO: 0 X10E3/UL (ref 0–0.2)
BASOPHILS NFR BLD AUTO: 1 %
BILIRUB SERPL-MCNC: 0.4 MG/DL (ref 0–1.2)
BUN SERPL-MCNC: 18 MG/DL (ref 8–27)
BUN/CREAT SERPL: 17 (ref 10–24)
CALCIUM SERPL-MCNC: 9.2 MG/DL (ref 8.6–10.2)
CHLORIDE SERPL-SCNC: 105 MMOL/L (ref 96–106)
CHOLEST SERPL-MCNC: 148 MG/DL (ref 100–199)
CO2 SERPL-SCNC: 27 MMOL/L (ref 20–29)
CREAT SERPL-MCNC: 1.08 MG/DL (ref 0.76–1.27)
EGFR: 72 ML/MIN/1.73
EOSINOPHIL # BLD AUTO: 0.4 X10E3/UL (ref 0–0.4)
EOSINOPHIL NFR BLD AUTO: 9 %
ERYTHROCYTE [DISTWIDTH] IN BLOOD BY AUTOMATED COUNT: 13.9 % (ref 11.6–15.4)
GLOBULIN SER-MCNC: 2.7 G/DL (ref 1.5–4.5)
GLUCOSE SERPL-MCNC: 94 MG/DL (ref 70–99)
HCT VFR BLD AUTO: 38.3 % (ref 37.5–51)
HDLC SERPL-MCNC: 46 MG/DL
HGB BLD-MCNC: 12.1 G/DL (ref 13–17.7)
IMM GRANULOCYTES # BLD: 0 X10E3/UL (ref 0–0.1)
IMM GRANULOCYTES NFR BLD: 0 %
LDLC SERPL CALC-MCNC: 89 MG/DL (ref 0–99)
LYMPHOCYTES # BLD AUTO: 1.2 X10E3/UL (ref 0.7–3.1)
LYMPHOCYTES NFR BLD AUTO: 23 %
MCH RBC QN AUTO: 27.9 PG (ref 26.6–33)
MCHC RBC AUTO-ENTMCNC: 31.6 G/DL (ref 31.5–35.7)
MCV RBC AUTO: 89 FL (ref 79–97)
MICRODELETION SYND BLD/T FISH: NORMAL
MONOCYTES # BLD AUTO: 0.4 X10E3/UL (ref 0.1–0.9)
MONOCYTES NFR BLD AUTO: 9 %
NEUTROPHILS # BLD AUTO: 3.1 X10E3/UL (ref 1.4–7)
NEUTROPHILS NFR BLD AUTO: 58 %
PLATELET # BLD AUTO: 199 X10E3/UL (ref 150–450)
POTASSIUM SERPL-SCNC: 4.6 MMOL/L (ref 3.5–5.2)
PROT SERPL-MCNC: 6.7 G/DL (ref 6–8.5)
PSA SERPL-MCNC: 0.5 NG/ML (ref 0–4)
RBC # BLD AUTO: 4.33 X10E6/UL (ref 4.14–5.8)
SODIUM SERPL-SCNC: 142 MMOL/L (ref 134–144)
TRIGL SERPL-MCNC: 62 MG/DL (ref 0–149)
WBC # BLD AUTO: 5.2 X10E3/UL (ref 3.4–10.8)

## 2024-02-27 ENCOUNTER — TELEPHONE (OUTPATIENT)
Age: 74
End: 2024-02-27

## 2024-02-27 NOTE — TELEPHONE ENCOUNTER
[facility] COLON     Screened by: Moreno Goncalves MA    Referring Provider screening    Pre- Screening:     There is no height or weight on file to calculate BMI.  Has patient been referred for a routine screening Colonoscopy? no  Is the patient between 45-75 years old? yes      Previous Colonoscopy yes   If yes:    Date: 5 years    Facility:     Reason:       Does the patient want to see a Gastroenterologist prior to their procedure OR are they having any GI symptoms? no    Has the patient been hospitalized or had abdominal surgery in the past 6 months? no    Does the patient use supplemental oxygen? no    Does the patient take Coumadin, Lovenox, Plavix, Elliquis, Xarelto, or other blood thinning medication? no    Has the patient had a stroke, cardiac event, or stent placed in the past year? no    Colonoscopy scheduled    If patient is between 45yrs - 49yrs, please advise patient that we will have to confirm benefits & coverage with their insurance company for a routine screening colonoscopy.      ASC Screening    ASC Screening  BMI > than 45: No  Are you currently pregnant?: No  Do you rely on a wheelchair for mobility?: No  Do you need oxygen during the day?: No  Have you ever been informed by anesthesia that you have a difficult airway?: No  Have you been diagnosed with End Stage Renal Disease (ESRD)?: No  Are you actively on dialysis?: No  Have you been diagnosed with Pulmonary Hypertension?: No  Do you have a pacemaker or an Automatic Implantable Cardioverter Defibrillator (AICD)?: No  Have you ever had an organ transplant?: No  Have you had a stroke, heart attack, myocardial infarction (MI) within the last 6 months?: No  Have you ever been diagnosed with Aortic Stenosis?: No  Have you ever been diagnosed  with Congestive Heart Failure?: No  Have you ever been diagnosed with a heart valve disease?: No  Are you Diabetic?: No  If you are Diabetic, has your A1C been greater than 12 within the last six months?: N/A        Scheduled date of colonoscopy (as of today):4/2/2024  Physician performing colonoscopy:aaron  Location of colonoscopy:[facility]  Bowel prep reviewed with patient: M&D  Instructions reviewed with patient by:fabio hartmann   Clearances: none

## 2024-03-07 ENCOUNTER — OFFICE VISIT (OUTPATIENT)
Dept: FAMILY MEDICINE CLINIC | Facility: CLINIC | Age: 74
End: 2024-03-07
Payer: COMMERCIAL

## 2024-03-07 VITALS
TEMPERATURE: 97.7 F | RESPIRATION RATE: 17 BRPM | HEART RATE: 88 BPM | BODY MASS INDEX: 40.68 KG/M2 | HEIGHT: 67 IN | WEIGHT: 259.2 LBS | SYSTOLIC BLOOD PRESSURE: 120 MMHG | DIASTOLIC BLOOD PRESSURE: 68 MMHG

## 2024-03-07 DIAGNOSIS — R33.9 URINARY RETENTION: ICD-10-CM

## 2024-03-07 DIAGNOSIS — C61 MALIGNANT NEOPLASM PROSTATE (HCC): ICD-10-CM

## 2024-03-07 DIAGNOSIS — I10 BENIGN ESSENTIAL HYPERTENSION: ICD-10-CM

## 2024-03-07 DIAGNOSIS — E78.2 MIXED HYPERLIPIDEMIA: ICD-10-CM

## 2024-03-07 DIAGNOSIS — Z00.00 WELL ADULT EXAM: Primary | ICD-10-CM

## 2024-03-07 PROCEDURE — 99397 PER PM REEVAL EST PAT 65+ YR: CPT | Performed by: FAMILY MEDICINE

## 2024-03-07 RX ORDER — TAMSULOSIN HYDROCHLORIDE 0.4 MG/1
0.4 CAPSULE ORAL
Qty: 90 CAPSULE | Refills: 1 | Status: SHIPPED | OUTPATIENT
Start: 2024-03-07

## 2024-03-07 NOTE — PROGRESS NOTES
FAMILY PRACTICE HEALTH MAINTENANCE OFFICE VISIT  [facility]    NAME: Trace Smith  AGE: 74 y.o. SEX: male  : 1950     DATE: 3/7/2024    Assessment and Plan     1. Well adult exam    2. Benign essential hypertension  Assessment & Plan:  stable      3. Mixed hyperlipidemia  Assessment & Plan:  Will sit on current numbers and follow numbers in 6 months    Orders:  -     Comprehensive metabolic panel; Future; Expected date: 2024  -     Lipid Panel with Direct LDL reflex; Future; Expected date: 2024    4. Malignant neoplasm prostate (HCC)  Assessment & Plan:  Pt has his urologist.  M<ay want to switch locally    Orders:  -     Ambulatory referral to Urology; Future    5. Urinary retention  -     tamsulosin (FLOMAX) 0.4 mg; Take 1 capsule (0.4 mg total) by mouth daily with dinner        Patient Counseling:   Nutrition: Stressed importance of a well balanced diet, moderation of sodium/saturated fat, caloric balance and sufficient intake of fiber  Exercise: Stressed the importance of regular exercise with a goal of 150 minutes per week  Dental Health: Discussed daily flossing and brushing and regular dental visits     Immunizations reviewed: Up To Date  Discussed benefits of:  Colon Cancer Screening, Prostate Cancer Screening , and Screening labs.  BMI Counseling: Body mass index is 40.6 kg/m². Discussed with patient's BMI with him. The BMI is above normal. Nutrition recommendations include reducing portion sizes.    Return in about 6 months (around 2024) for Recheck.        Chief Complaint     Chief Complaint   Patient presents with   • Physical Exam     Maria Isabel Ash CMA        History of Present Illness     Pt is sched for a full physical pt had labs        Well Adult Physical   Patient here for a comprehensive physical exam.      Diet and Physical Activity  Diet: well balanced diet  Exercise: frequently      Depression Screen  PHQ-2/9 Depression Screening     Little interest or pleasure in doing things: 0 - not at all  Feeling down, depressed, or hopeless: 0 - not at all  PHQ-2 Score: 0  PHQ-2 Interpretation: Negative depression screen          General Health  Hearing: Normal:  bilateral  Vision: no vision problems  Dental: regular dental visits    Reproductive Health  No issues       The following portions of the patient's history were reviewed and updated as appropriate: allergies, current medications, past family history, past medical history, past social history, past surgical history and problem list.    Review of Systems     Review of Systems   Constitutional:  Negative for activity change, appetite change, chills, diaphoresis, fatigue, fever and unexpected weight change.   HENT:  Negative for congestion, dental problem, ear pain, mouth sores, sinus pressure, sinus pain, sore throat and trouble swallowing.    Eyes:  Negative for photophobia, discharge and itching.   Respiratory:  Negative for apnea, chest tightness and shortness of breath.    Cardiovascular:  Negative for chest pain, palpitations and leg swelling.   Gastrointestinal:  Negative for abdominal distention, abdominal pain, blood in stool, nausea and vomiting.   Endocrine: Negative for cold intolerance, heat intolerance, polydipsia, polyphagia and polyuria.   Genitourinary:  Negative for difficulty urinating.   Musculoskeletal:  Negative for arthralgias.   Skin:  Negative for color change and wound.   Neurological:  Negative for dizziness, syncope, speech difficulty and headaches.   Hematological:  Negative for adenopathy.   Psychiatric/Behavioral:  Negative for agitation and behavioral problems.        Past Medical History     Past Medical History:   Diagnosis Date   • AC (acromioclavicular) joint bone spurs     Last assessed - 2/19/15   • Achilles tendinitis, unspecified leg 06/04/2010    Resolved - 1/8/18   • Anemia 08/12/2010    Resolved - 1/11/16   • Anticoagulant long-term use 01/18/2023   •  Blood in urine    • BPH without urinary obstruction     Hypertrophy   • Deep venous thrombosis of distal lower extremity (HCC)     2015, provoked, postoperative after bariatric surgery, was placed on coumadin temporarily   • Heartburn     Last assessed - 12/12/14   • Hemorrhoids    • Hernia, inguinal 06/30/2009    Resolved - 1/8/18   • History of stomach ulcers    • Hypertension    • Mixed hyperlipidemia    • Nephrolithiasis    • Right inguinal hernia     Last assessed - 4/4/16   • Sleep apnea     unable to tolerate c-pap   • Tear of left rotator cuff     Last assessed - 2/19/15       Past Surgical History     Past Surgical History:   Procedure Laterality Date   • BARIATRIC SURGERY  06/15/2015    Laparoscopic Longitudinal Gastrectomy for morbid obesity, Managed by - Blank Jones    • EXPLORATORY LAPAROTOMY W/ BOWEL RESECTION Right 3/3/2016    Procedure: Incarcerated possibly strangulated right inguinal hernia;  Surgeon: Sten Kjellberg, MD;  Location: [facility];  Service:    • FOOT SURGERY     • HIP SURGERY      2011 - Rt Hip Replacement, 2012 - Lt Hip Replacement, 2013 - Lt Hip Revision   • INGUINAL HERNIA REPAIR      Last assessed - 4/4/16   • JOINT REPLACEMENT     • KNEE SURGERY  2004    Double    • NV ARTHROPLASTY GLENOHUMERAL JOINT TOTAL SHOULDER Right 2/4/2021    Procedure: ARTHROPLASTY SHOULDER REVERSE (RIGHT);  Surgeon: Waqar Martinez MD;  Location: [facility];  Service: Orthopedics   • NV COLONOSCOPY FLX DX W/COLLJ SPEC WHEN PFRMD N/A 1/15/2016    Procedure: COLONOSCOPY;  Surgeon: Kingston Cortés MD;  Location: [facility] GI LAB;  Service: Gastroenterology   • NV COLONOSCOPY FLX DX W/COLLJ SPEC WHEN PFRMD N/A 2/26/2019    Procedure: COLONOSCOPY;  Surgeon: Kingston Cortés MD;  Location: [facility] GI LAB;  Service: Gastroenterology   • NV LAPAROSCOPY SURG RPR INITIAL INGUINAL HERNIA Bilateral 6/25/2021    Procedure: REPAIR HERNIA INGUINAL, LAPAROSCOPIC;  Surgeon: Yehuda Shetty MD;  Location: [facility];   Service: General   • TONSILLECTOMY         Social History     Social History     Socioeconomic History   • Marital status: /Civil Union     Spouse name: None   • Number of children: None   • Years of education: None   • Highest education level: None   Occupational History   • None   Tobacco Use   • Smoking status: Former     Current packs/day: 0.00     Average packs/day: 1 pack/day for 30.0 years (30.0 ttl pk-yrs)     Types: Cigarettes     Start date: 1966     Quit date: 1994     Years since quittin.1   • Smokeless tobacco: Never   Vaping Use   • Vaping status: Never Used   Substance and Sexual Activity   • Alcohol use: Not Currently     Alcohol/week: 2.0 standard drinks of alcohol     Types: 2 Standard drinks or equivalent per week     Comment: Occ, very rarely    • Drug use: Never   • Sexual activity: Yes     Partners: Female   Other Topics Concern   • None   Social History Narrative    Exercise - Walking once a day     Social Determinants of Health     Financial Resource Strain: Not on file   Food Insecurity: Not on file   Transportation Needs: Not on file   Physical Activity: Not on file   Stress: Not on file   Social Connections: Not on file   Intimate Partner Violence: Not on file   Housing Stability: Not on file       Family History     Family History   Problem Relation Age of Onset   • Arthritis Father            • Diabetes Father    • Hypertension Father            • Hyperlipidemia Father    • Diabetes Family    • Emphysema Family    • Heart disease Family    • Hypertension Family    • Mental illness Neg Hx        Current Medications       Current Outpatient Medications:   •  Ascorbic Acid (VITAMIN C PO), Take by mouth, Disp: , Rfl:   •  atorvastatin (LIPITOR) 20 mg tablet, TAKE ONE TABLET BY MOUTH EVERY DAY, Disp: 90 tablet, Rfl: 0  •  ELDERBERRY PO, Take by mouth, Disp: , Rfl:   •  lisinopril (ZESTRIL) 10 mg tablet, Take 1 tablet (10 mg total) by mouth daily, Disp: 90  "tablet, Rfl: 0  •  tamsulosin (FLOMAX) 0.4 mg, Take 1 capsule (0.4 mg total) by mouth daily with dinner, Disp: 90 capsule, Rfl: 1     Allergies     Allergies   Allergen Reactions   • Percocet [Oxycodone-Acetaminophen] Hallucinations   • Percolone [Oxycodone] Hallucinations       Objective     /68   Pulse 88   Temp 97.7 °F (36.5 °C)   Resp 17   Ht 5' 7\" (1.702 m)   Wt 118 kg (259 lb 3.2 oz)   BMI 40.60 kg/m²      Physical Exam  Vitals and nursing note reviewed.   Constitutional:       General: He is not in acute distress.     Appearance: He is well-developed. He is not diaphoretic.   HENT:      Head: Normocephalic and atraumatic.      Right Ear: External ear normal.      Left Ear: External ear normal.      Nose: Nose normal.      Mouth/Throat:      Pharynx: No oropharyngeal exudate.   Eyes:      General: No scleral icterus.        Right eye: No discharge.         Left eye: No discharge.      Pupils: Pupils are equal, round, and reactive to light.   Neck:      Thyroid: No thyromegaly.   Cardiovascular:      Rate and Rhythm: Normal rate.      Heart sounds: Normal heart sounds. No murmur heard.  Pulmonary:      Effort: Pulmonary effort is normal. No respiratory distress.      Breath sounds: Normal breath sounds. No wheezing.   Abdominal:      General: Bowel sounds are normal. There is no distension.      Palpations: Abdomen is soft. There is no mass.      Tenderness: There is no abdominal tenderness. There is no guarding or rebound.   Musculoskeletal:         General: Normal range of motion.   Skin:     General: Skin is warm and dry.      Findings: No erythema or rash.   Neurological:      Mental Status: He is alert.      Coordination: Coordination normal.      Deep Tendon Reflexes: Reflexes normal.   Psychiatric:         Behavior: Behavior normal.           Vision Screening - Comments:: Patient had vision test done last week . Maria Isabel Ash, RAD[facility]ardi[facility]  "

## 2024-03-12 ENCOUNTER — TELEPHONE (OUTPATIENT)
Dept: GASTROENTEROLOGY | Facility: CLINIC | Age: 74
End: 2024-03-12

## 2024-04-16 DIAGNOSIS — I10 BENIGN ESSENTIAL HYPERTENSION: ICD-10-CM

## 2024-04-16 RX ORDER — LISINOPRIL 10 MG/1
10 TABLET ORAL DAILY
Qty: 90 TABLET | Refills: 1 | Status: SHIPPED | OUTPATIENT
Start: 2024-04-16

## 2024-04-22 ENCOUNTER — TELEPHONE (OUTPATIENT)
Dept: UROLOGY | Facility: CLINIC | Age: 74
End: 2024-04-22

## 2024-04-22 NOTE — TELEPHONE ENCOUNTER
LM advising PT to try and complete PSA testing sometime today prior to his visit with Dr Beatty on 04/23/24.

## 2024-04-23 ENCOUNTER — TELEPHONE (OUTPATIENT)
Dept: UROLOGY | Facility: CLINIC | Age: 74
End: 2024-04-23

## 2024-04-23 ENCOUNTER — OFFICE VISIT (OUTPATIENT)
Dept: UROLOGY | Facility: CLINIC | Age: 74
End: 2024-04-23
Payer: COMMERCIAL

## 2024-04-23 VITALS
WEIGHT: 259 LBS | HEART RATE: 71 BPM | HEIGHT: 67 IN | SYSTOLIC BLOOD PRESSURE: 154 MMHG | DIASTOLIC BLOOD PRESSURE: 90 MMHG | BODY MASS INDEX: 40.65 KG/M2 | OXYGEN SATURATION: 95 %

## 2024-04-23 DIAGNOSIS — C61 MALIGNANT NEOPLASM PROSTATE (HCC): ICD-10-CM

## 2024-04-23 DIAGNOSIS — Z87.442 HISTORY OF KIDNEY STONES: ICD-10-CM

## 2024-04-23 DIAGNOSIS — N28.89 RENAL MASS, LEFT: Primary | ICD-10-CM

## 2024-04-23 PROCEDURE — 99214 OFFICE O/P EST MOD 30 MIN: CPT | Performed by: UROLOGY

## 2024-04-23 NOTE — PROGRESS NOTES
Trace Smith is a(n) 74 y.o. male. , :  1950    Subjective     Assessment:  The primary encounter diagnosis was Renal mass, left. Diagnoses of Malignant neoplasm prostate (HCC) and History of kidney stones were also pertinent to this visit.  Prostate cancer treated with radiation therapy alone in .  PSA seems good so far.  Agreeable to hold off on any adjuvant therapy.  Regarding the mass of the left kidney, probably not malignant given the studies that have been performed in the last few years.  Prefer to watch for now.  No further testing.  Digital rectal exam today reassuring.  PSA reassuring. Hx of stones.  No new stones.  May have cause the cystic area of the left lower pole at the bottom of a calyx.    Plan:  PSA again in July or August and follow-up after that for discussion and digital rectal exam.  Hold off on further testing of the left renal cyst.  Likely just a hyperdense cyst.     Radiology:  14 CT abd/pelvis w/o IV C Mild left hydronephrosis and hydroureter due to a 3 mm calculus in distal  left ureter. Passed spontaneously.  17 KUB No stones  19 KUB No urinary calculi  20 KUB No stones  21 CT Abd/Pelvis with contrast Non strangulated loop of bowel within a right inguinal hernia. Partial SBO. Indeterminate left lower pole lesion. Recommend MRI outpt.  21 MRI abd w/wo CTS Bosniak class II lesion arising from the lower pole of the kidney which likely corresponds to the lesion on CT.  23 MRI prostate Prostate volume 78ml  Left posterior peripheral zone lesion PI RADS 4    Past  History:  BPH w obstruction. Near retention 2021 after RIH surgery. Retention after bariatric surgery   Hx of kidney stones. CT neg   PEG by Dr. Lombardi  Indeterminate left renal mass on CT 2021, Bosniak class II lesion on MRI 21.  CT with renal protocol 2023 (reviewed personally) shows a 30 Hounsfield unit precontrast attenuation with possible  pseudo enhancement after contrast.  Prostate cancer - Bone scan 03/02/2023 No scintigraphic evidence of osseous metastasis. Prostate MRI 1/5/23 Limited study, with organ confined disease with LEFT posterior peripheral zone lesion (PI-RADS 4).  2. Severe BPH with calculated prostate volume of 78 mL      Past Medical History:  Bariatric surgery, GERD  Mixed hyperlipidemia, HTN  Internal hemorrhoids  DEVON, Hx of tonsillectomy  Hx of hip and knee replacement  Hx of anemia  PE - placed on xarelto after right inguinal hernia surgery    Prior Visits  08/10/21  72 y/o male with hx of BPH and renal stones. Developed urinary retention after right hernia repair when home and returned that night did not void and went to the hospital and placed on Flomax. Never needed a perez placed. Notes on occasion spray stream, but has a better stream if drinks fluids. No SE of the Flomax.  Rectum: PEG grade 0-1 Soft, NT No nodularity UA no infection today    Assessment:  70 yo with urinary retention after right inguinal hernia repair. Never needed perez cath, but placed on tamsulosin.  BPH on exam. Voiding is ok - sometimes slow if forgets to drink.  Hx of stones - KUB and CT negative  Indeterminate left lower pole renal lesion on CT with contrast 06/02/21  PE after right hernia surgery repair - placed on Xarelto  Spraying stream (BPH vs possible stricture)    Plan:  MRI of kidney to evaluate his left renal mass after his blood work on 08/24/21  BMP with upcoming labs on 08/24/21 for his PE  OK to start decreasing Flomax to QOD and then stop it all together if no change in urination  F/u Sept to discuss MRI and do uroflow/PVR and possible cystoscopy if obstructed  Continue low oxalate diet and high water intake to help with his stone hx    09/16/21  71 y.o. male with hx of retention after RIH in June. Tolerated skipping doses of tamsulosin. UF was slow but was 190mL volume and 78 mL left. No desire to scope him or remain on  tamsulosin.  Assessment:  BPH tolerates not taking tamsulosin.  Renal mass MRI from 9/9/21 not yet read.    Plan:  Patient Instructions  Stop tamsulosin  Renal and bladder sono 6 months to follow up on the lesion and check bladder emptying.  Call for results of MRI one week if not heard from us.    08/08/22  72 y.o. male with a Bosniak class II lesion and BPH. Take the tamsulosin 0.4mg as needed. Sometime TIW sometimes daily. No SE with medication.    Genitourinary:  Comments: Prostate grade II  Soft, NT  No nodules    Assessment:  BPH on tamsulosin 0.4mg intermittently  Hx of renal stones. Non noted on CT 06/02/21    Plan:  Patient Instructions  PSA now and prior to f/u in 13 months  Renal US prior to f/u in 1 year  Low oxalate diet    Addendum 08/08/22  PSA 08/05/22 was 5.55  Will have him get total and free PSA in 4-6wks and f/u for discussion.    10/03/22  72 y.o. male with hx an elevated PSA, BPH on tamsulosin, and renal stones presents for f/u with repeat PSA. PSA 09/26/22 was still elevated at 4.5. Continues with the taking the tamsulosin 0.4mg intermittently and this seem to work for him. Denies night sweat, hot flashed, unexpected weight loss, no nocturnal bone pain.    Assessment:  Elevated PSA  B/L hip replacement  BPH on intermittent tamsulosin  Claustrophobia issues    Plan:  Patient Instructions  Prostate MRI and f/u afterward to arrange prostate biopsy Need to be asleep due to claustrophobia issues  OK to continue with the tamsulosion 0.4mg intermittently for his BPH    01/10/23  73 y.o. male with obstructive BPH on intermittent tamsulosin and and elevated PSA was to have a MRI asleep due to claustrophobia issues. Presents for f/u. Prostate MRI 01/05/23 indicated a left posterior peripheral zone lesion PI RADS 4 and a 78ml prostate. Discussed with MRI findings and need for prostate biopsy.      4/23/2024 LIANNA Jaci  74-year-old gentleman ex patient of mine who was following up with [facility]  [facility].  He had a rise in his PSA which prompted an MRI done in January 2023.  This showed a lesion in the prostate.  He subsequently was biopsied by a physician referred by his niece who found prostate cancer and treated him with radiation therapy.  No complications from the radiation.  Follow-up PSA is now 0.5 in February 2024.  Voiding well enough without hematuria.  Just wants to establish care locally.  Did not use any hormonal manipulation along with the radiation.  Agreeable to follow the PSA closely.  He also had MRI imaging of the abdomen with and without contrast September 2021 as well as a CT with renal protocol March 2023.  Neither of these really suggests a malignant lesion.  It is not growing in the intervening months.  At this point it has been deemed benign.    Review of Systems    Lab Results   Component Value Date    PSA 0.5 02/21/2024    PSA 3.8 01/02/2018    PSA 3.6 12/28/2016     No results found for: \"TESTOSTERONE\"  No components found for: \"CR\"    Objective     /90 (BP Location: Left arm, Patient Position: Sitting, Cuff Size: Adult)   Pulse 71   Ht 5' 7\" (1.702 m)   Wt 117 kg (259 lb)   SpO2 95%   BMI 40.57 kg/m²     Physical Exam      Howard Jaci, [facility]  "

## 2024-04-23 NOTE — PATIENT INSTRUCTIONS
We will have you come back in roughly 6 months from your last PSA so that would be early August most likely.  If you have any issues, please feel free to call us.  That would be blood in the urine or pain in the kidneys to suggest a new kidney stone.  We will repeat the PSA before your next visit.

## 2024-07-15 ENCOUNTER — ANESTHESIA EVENT (OUTPATIENT)
Dept: ANESTHESIOLOGY | Facility: HOSPITAL | Age: 74
End: 2024-07-15

## 2024-07-15 ENCOUNTER — TELEPHONE (OUTPATIENT)
Dept: GASTROENTEROLOGY | Facility: CLINIC | Age: 74
End: 2024-07-15

## 2024-07-15 ENCOUNTER — ANESTHESIA (OUTPATIENT)
Dept: ANESTHESIOLOGY | Facility: HOSPITAL | Age: 74
End: 2024-07-15

## 2024-07-24 DIAGNOSIS — E78.2 MIXED HYPERLIPIDEMIA: ICD-10-CM

## 2024-07-24 DIAGNOSIS — R33.9 URINARY RETENTION: ICD-10-CM

## 2024-07-24 DIAGNOSIS — I10 BENIGN ESSENTIAL HYPERTENSION: ICD-10-CM

## 2024-07-25 RX ORDER — LISINOPRIL 10 MG/1
10 TABLET ORAL DAILY
Qty: 100 TABLET | Refills: 1 | Status: SHIPPED | OUTPATIENT
Start: 2024-07-25

## 2024-07-25 RX ORDER — TAMSULOSIN HYDROCHLORIDE 0.4 MG/1
0.4 CAPSULE ORAL
Qty: 100 CAPSULE | Refills: 1 | Status: SHIPPED | OUTPATIENT
Start: 2024-07-25

## 2024-07-25 RX ORDER — ATORVASTATIN CALCIUM 20 MG/1
20 TABLET, FILM COATED ORAL DAILY
Qty: 100 TABLET | Refills: 1 | Status: SHIPPED | OUTPATIENT
Start: 2024-07-25

## 2024-07-30 ENCOUNTER — HOSPITAL ENCOUNTER (OUTPATIENT)
Dept: GASTROENTEROLOGY | Facility: AMBULARY SURGERY CENTER | Age: 74
Setting detail: OUTPATIENT SURGERY
Discharge: HOME | End: 2024-07-30
Attending: INTERNAL MEDICINE
Payer: COMMERCIAL

## 2024-07-30 ENCOUNTER — ANESTHESIA (OUTPATIENT)
Dept: GASTROENTEROLOGY | Facility: AMBULARY SURGERY CENTER | Age: 74
End: 2024-07-30

## 2024-07-30 ENCOUNTER — ANESTHESIA EVENT (OUTPATIENT)
Dept: GASTROENTEROLOGY | Facility: AMBULARY SURGERY CENTER | Age: 74
End: 2024-07-30

## 2024-07-30 VITALS
DIASTOLIC BLOOD PRESSURE: 58 MMHG | TEMPERATURE: 97 F | SYSTOLIC BLOOD PRESSURE: 112 MMHG | RESPIRATION RATE: 18 BRPM | OXYGEN SATURATION: 96 % | HEART RATE: 67 BPM

## 2024-07-30 DIAGNOSIS — Z12.11 COLON CANCER SCREENING: ICD-10-CM

## 2024-07-30 PROCEDURE — 45385 COLONOSCOPY W/LESION REMOVAL: CPT | Performed by: INTERNAL MEDICINE

## 2024-07-30 PROCEDURE — 45380 COLONOSCOPY AND BIOPSY: CPT | Performed by: INTERNAL MEDICINE

## 2024-07-30 PROCEDURE — 88305 TISSUE EXAM BY PATHOLOGIST: CPT | Performed by: PATHOLOGY

## 2024-07-30 RX ORDER — SODIUM CHLORIDE, SODIUM LACTATE, POTASSIUM CHLORIDE, CALCIUM CHLORIDE 600; 310; 30; 20 MG/100ML; MG/100ML; MG/100ML; MG/100ML
INJECTION, SOLUTION INTRAVENOUS CONTINUOUS PRN
Status: DISCONTINUED | OUTPATIENT
Start: 2024-07-30 | End: 2024-07-30

## 2024-07-30 RX ORDER — LIDOCAINE HYDROCHLORIDE 10 MG/ML
INJECTION, SOLUTION EPIDURAL; INFILTRATION; INTRACAUDAL; PERINEURAL AS NEEDED
Status: DISCONTINUED | OUTPATIENT
Start: 2024-07-30 | End: 2024-07-30

## 2024-07-30 RX ORDER — PROPOFOL 10 MG/ML
INJECTION, EMULSION INTRAVENOUS AS NEEDED
Status: DISCONTINUED | OUTPATIENT
Start: 2024-07-30 | End: 2024-07-30

## 2024-07-30 RX ORDER — PROPOFOL 10 MG/ML
INJECTION, EMULSION INTRAVENOUS CONTINUOUS PRN
Status: DISCONTINUED | OUTPATIENT
Start: 2024-07-30 | End: 2024-07-30

## 2024-07-30 RX ADMIN — PROPOFOL 100 MCG/KG/MIN: 10 INJECTION, EMULSION INTRAVENOUS at 10:55

## 2024-07-30 RX ADMIN — PROPOFOL 100 MG: 10 INJECTION, EMULSION INTRAVENOUS at 10:55

## 2024-07-30 RX ADMIN — LIDOCAINE HYDROCHLORIDE 50 MG: 10 INJECTION, SOLUTION EPIDURAL; INFILTRATION; INTRACAUDAL; PERINEURAL at 10:55

## 2024-07-30 RX ADMIN — SODIUM CHLORIDE, SODIUM LACTATE, POTASSIUM CHLORIDE, AND CALCIUM CHLORIDE: .6; .31; .03; .02 INJECTION, SOLUTION INTRAVENOUS at 10:49

## 2024-07-30 NOTE — ANESTHESIA PREPROCEDURE EVALUATION
Procedure:  COLONOSCOPY    Relevant Problems   CARDIO   (+) Benign essential hypertension   (+) Mixed hyperlipidemia      GI/HEPATIC   (+) GERD without esophagitis      /RENAL   (+) Malignant neoplasm prostate (HCC)      HEMATOLOGY   (+) Anemia      MUSCULOSKELETAL   (+) Arthritis of carpometacarpal (CMC) joint of left thumb      PULMONARY   (+) Obstructive sleep apnea        Physical Exam    Airway    Mallampati score: II  TM Distance: >3 FB  Neck ROM: full     Dental   No notable dental hx upper dentures and lower dentures    Cardiovascular  Cardiovascular exam normal    Pulmonary  Pulmonary exam normal     Other Findings        Anesthesia Plan  ASA Score- 2     Anesthesia Type- IV sedation with anesthesia with ASA Monitors.         Additional Monitors:     Airway Plan:            Plan Factors-Exercise tolerance (METS): >4 METS.    Chart reviewed.   Existing labs reviewed. Patient summary reviewed.    Patient is not a current smoker.      Obstructive sleep apnea risk education given perioperatively.        Induction-     Postoperative Plan-     Perioperative Resuscitation Plan - Level 1 - Full Code.       Informed Consent- Anesthetic plan and risks discussed with patient.  I personally reviewed this patient with the CRNA. Discussed and agreed on the Anesthesia Plan with the CRNA..

## 2024-07-30 NOTE — H&P
History and Physical - [facility]  Trace Smith 74 y.o. male MRN: [id number]        HPI: 74-year-old male with history of colon polyps.  Regular bowel movements.    Historical Information   Past Medical History:   Diagnosis Date    AC (acromioclavicular) joint bone spurs     Last assessed - 2/19/15    Achilles tendinitis, unspecified leg 06/04/2010    Resolved - 1/8/18    Anemia 08/12/2010    Resolved - 1/11/16    Anticoagulant long-term use 01/18/2023    Blood in urine     BPH without urinary obstruction     Hypertrophy    Deep venous thrombosis of distal lower extremity (HCC)     2015, provoked, postoperative after bariatric surgery, was placed on coumadin temporarily    Heartburn     Last assessed - 12/12/14    Hemorrhoids     Hernia, inguinal 06/30/2009    Resolved - 1/8/18    History of stomach ulcers     Hypertension     Mixed hyperlipidemia     Nephrolithiasis     Right inguinal hernia     Last assessed - 4/4/16    Sleep apnea     unable to tolerate c-pap    Tear of left rotator cuff     Last assessed - 2/19/15     Past Surgical History:   Procedure Laterality Date    BARIATRIC SURGERY  06/15/2015    Laparoscopic Longitudinal Gastrectomy for morbid obesity, Managed by - Blank Jones     EXPLORATORY LAPAROTOMY W/ BOWEL RESECTION Right 3/3/2016    Procedure: Incarcerated possibly strangulated right inguinal hernia;  Surgeon: Sten Kjellberg, MD;  Location: [facility];  Service:     FOOT SURGERY      HIP SURGERY      2011 - Rt Hip Replacement, 2012 - Lt Hip Replacement, 2013 - Lt Hip Revision    INGUINAL HERNIA REPAIR      Last assessed - 4/4/16    JOINT REPLACEMENT      KNEE SURGERY  2004    Double     MS ARTHROPLASTY GLENOHUMERAL JOINT TOTAL SHOULDER Right 2/4/2021    Procedure: ARTHROPLASTY SHOULDER REVERSE (RIGHT);  Surgeon: Waqar Martinez MD;  Location: [facility];  Service: Orthopedics    MS COLONOSCOPY FLX DX W/COLLJ SPEC WHEN PFRMD N/A 1/15/2016    Procedure: COLONOSCOPY;   Surgeon: Kingston Cortés MD;  Location: [facility];  Service: Gastroenterology    IA COLONOSCOPY FLX DX W/COLLJ SPEC WHEN PFRMD N/A 2019    Procedure: COLONOSCOPY;  Surgeon: Kingston Coréts MD;  Location: [facility];  Service: Gastroenterology    IA LAPAROSCOPY SURG RPR INITIAL INGUINAL HERNIA Bilateral 2021    Procedure: REPAIR HERNIA INGUINAL, LAPAROSCOPIC;  Surgeon: Yehuda Shetty MD;  Location: [facility];  Service: General    TONSILLECTOMY       Social History   Social History     Substance and Sexual Activity   Alcohol Use Not Currently    Alcohol/week: 2.0 standard drinks of alcohol    Types: 2 Standard drinks or equivalent per week    Comment: Occ, very rarely      Social History     Substance and Sexual Activity   Drug Use Never     Social History     Tobacco Use   Smoking Status Former    Current packs/day: 0.00    Average packs/day: 1 pack/day for 30.0 years (30.0 ttl pk-yrs)    Types: Cigarettes    Start date: 1966    Quit date: 1994    Years since quittin.5   Smokeless Tobacco Never     Family History   Problem Relation Age of Onset    Arthritis Father             Diabetes Father     Hypertension Father             Hyperlipidemia Father     Diabetes Family     Emphysema Family     Heart disease Family     Hypertension Family     Mental illness Neg Hx        Meds/Allergies     Not in a hospital admission.    Allergies   Allergen Reactions    Percocet [Oxycodone-Acetaminophen] Hallucinations    Percolone [Oxycodone] Hallucinations       Objective     Blood pressure 125/63, pulse 86, temperature (!) 97 °F (36.1 °C), temperature source Temporal, resp. rate 18, SpO2 97%.    Physical Exam:    Chest- CTA  Heart- RRR  Abdomen- NT/ND  Extremities- No edema    ASSESSMENT:     History of colon polyps    PLAN:    Colonoscopy

## 2024-07-30 NOTE — ANESTHESIA POSTPROCEDURE EVALUATION
Post-Op Assessment Note    CV Status:  Stable  Pain Score: 0    Pain management: adequate       Mental Status:  Awake   Hydration Status:  Euvolemic   PONV Controlled:  None   Airway Patency:  Patent  Two or more mitigation strategies used for obstructive sleep apnea   Post Op Vitals Reviewed: Yes    Anethesia notable event occurred.    Staff: CRNA   Comments: spontaneously breathing, hypotension 90/60 - tx with pressor & fluid bolus, simple mask to O2, fully endorsed to recovery              BP 90/52 (07/30/24 1119)    Temp      Pulse 73 (07/30/24 1119)   Resp 16 (07/30/24 1119)    SpO2 100 % (07/30/24 1119)

## 2024-08-01 PROCEDURE — 88305 TISSUE EXAM BY PATHOLOGIST: CPT | Performed by: PATHOLOGY

## 2024-08-16 LAB
ALBUMIN SERPL-MCNC: 3.9 G/DL (ref 3.8–4.8)
ALP SERPL-CCNC: 101 IU/L (ref 44–121)
ALT SERPL-CCNC: 10 IU/L (ref 0–44)
AST SERPL-CCNC: 14 IU/L (ref 0–40)
BILIRUB SERPL-MCNC: 0.4 MG/DL (ref 0–1.2)
BUN SERPL-MCNC: 19 MG/DL (ref 8–27)
BUN/CREAT SERPL: 16 (ref 10–24)
CALCIUM SERPL-MCNC: 9.5 MG/DL (ref 8.6–10.2)
CHLORIDE SERPL-SCNC: 105 MMOL/L (ref 96–106)
CHOLEST SERPL-MCNC: 128 MG/DL (ref 100–199)
CO2 SERPL-SCNC: 26 MMOL/L (ref 20–29)
CREAT SERPL-MCNC: 1.21 MG/DL (ref 0.76–1.27)
EGFR: 63 ML/MIN/1.73
GLOBULIN SER-MCNC: 2.9 G/DL (ref 1.5–4.5)
GLUCOSE SERPL-MCNC: 95 MG/DL (ref 70–99)
HDLC SERPL-MCNC: 44 MG/DL
LDLC SERPL CALC-MCNC: 72 MG/DL (ref 0–99)
LDLC/HDLC SERPL: 1.6 RATIO (ref 0–3.6)
MICRODELETION SYND BLD/T FISH: NORMAL
POTASSIUM SERPL-SCNC: 4.4 MMOL/L (ref 3.5–5.2)
PROT SERPL-MCNC: 6.8 G/DL (ref 6–8.5)
SL AMB VLDL CHOLESTEROL CALC: 12 MG/DL (ref 5–40)
SODIUM SERPL-SCNC: 143 MMOL/L (ref 134–144)
TRIGL SERPL-MCNC: 55 MG/DL (ref 0–149)

## 2024-08-17 LAB — PSA SERPL-MCNC: 0.3 NG/ML (ref 0–4)

## 2024-08-21 ENCOUNTER — TELEPHONE (OUTPATIENT)
Dept: UROLOGY | Facility: CLINIC | Age: 74
End: 2024-08-21

## 2024-08-21 NOTE — TELEPHONE ENCOUNTER
L/M for pt to call back to inform pt PSA is better. Was supposed to have a follow up after for PEG.   ----- Message from Howard Beatty MD sent at 8/21/2024 12:55 PM EDT -----  PSA is better.  Was supposed to have a follow up after for PEG.

## 2024-09-05 ENCOUNTER — OFFICE VISIT (OUTPATIENT)
Dept: URGENT CARE | Facility: CLINIC | Age: 74
End: 2024-09-05
Payer: COMMERCIAL

## 2024-09-05 VITALS
BODY MASS INDEX: 40.1 KG/M2 | OXYGEN SATURATION: 96 % | TEMPERATURE: 96.3 F | DIASTOLIC BLOOD PRESSURE: 63 MMHG | SYSTOLIC BLOOD PRESSURE: 150 MMHG | WEIGHT: 256 LBS | RESPIRATION RATE: 16 BRPM | HEART RATE: 88 BPM

## 2024-09-05 DIAGNOSIS — R20.0 NUMBNESS: Primary | ICD-10-CM

## 2024-09-05 LAB — GLUCOSE SERPL-MCNC: 115 MG/DL (ref 65–140)

## 2024-09-05 PROCEDURE — 1160F RVW MEDS BY RX/DR IN RCRD: CPT | Performed by: FAMILY MEDICINE

## 2024-09-05 PROCEDURE — 1159F MED LIST DOCD IN RCRD: CPT | Performed by: FAMILY MEDICINE

## 2024-09-05 PROCEDURE — 99213 OFFICE O/P EST LOW 20 MIN: CPT | Performed by: FAMILY MEDICINE

## 2024-09-05 NOTE — PROGRESS NOTES
[facility]        NAME: Trace Smith is a 74 y.o. male  : 1950    MRN: [id number]  DATE: 2024  TIME: 12:39 PM    Assessment and Plan   Numbness [R20.0]  1. Numbness  Fingerstick Glucose (POCT)        Has been asymptomatic thus far today.  Has never experienced this in the past.  The combination of not eating any food, not drinking enough water and overexerting himself in a hot environment likely triggered the constellation of symptoms he experienced yesterday.  Resolution after eating a meal and hydrating further supports this.    Review of blood work from 2024 is unremarkable.  Today's blood sugar: 115 (after eating 4 pieces of toast and a cup of coffee this morning).  He likely experienced a hypoglycemic episode yesterday.  Unlikely a chronic issue such as diabetes mellitus or multiple sclerosis.      Will clear her to work, but patient strongly advised on eating breakfast or carrying a snack with him while working and hydrating regularly.    Patient Instructions     Follow up with PCP in 3-5 days.  Proceed to  ER if symptoms worsen.    If tests have been performed at [facility], our office will contact you with results if changes need to be made to the care plan discussed with you at the visit.  You can review your full results on [facility].    Chief Complaint     Chief Complaint   Patient presents with    Numbness     Pt c/o numbness to BLLE yesterday with arm cramping to left arm and numbness to right arm. Pt states it went away after approx 2 hours and hydrating with fluid.         History of Present Illness       74-year-old male presents today having experienced limb paresthesias and what appeared to be a presyncopal episode while at work yesterday.  Usually works from 4 AM to 12 noon and eats afterwards.  Yesterday due to dropping off in [place], he had to work for a longer period of time from 4 AM to 3 PM without eating anything.  He recalls that yesterday the  air conditioning had stopped working in his truck and that he overexerted himself.  Recalls having dark urine likely indicating dehydration.  Recalls having an achy sensation going across his upper back and shoulders resulting in fatigue of the upper extremities.  Later on in the shift, he experienced transient numbness of the right foot and then numbness of the left foot.  Around the period of time he developed cramping in the upper extremities as well.  Upon completing his drop-offs, he reported this to his employer who contacted his son, , who brought him home.  Once home he ate some food including a protein shake and drink plenty of water and rested as all of the symptoms eventually resolved.  In this period of time, he denies having any slurred speech or blurry vision.      Review of Systems   Review of Systems   Constitutional:  Positive for fatigue. Negative for chills and fever.   Respiratory:  Negative for cough and shortness of breath.    Cardiovascular:  Negative for chest pain and palpitations.   Gastrointestinal:  Negative for abdominal pain and nausea.   Neurological:  Positive for weakness, light-headedness, numbness and headaches.     Current Medications       Current Outpatient Medications:     atorvastatin (LIPITOR) 20 mg tablet, Take 1 tablet (20 mg total) by mouth daily, Disp: 100 tablet, Rfl: 1    lisinopril (ZESTRIL) 10 mg tablet, Take 1 tablet (10 mg total) by mouth daily, Disp: 100 tablet, Rfl: 1    tamsulosin (FLOMAX) 0.4 mg, Take 1 capsule (0.4 mg total) by mouth daily with dinner, Disp: 100 capsule, Rfl: 1    Current Allergies     Allergies as of 09/05/2024 - Reviewed 09/05/2024   Allergen Reaction Noted    Percocet [oxycodone-acetaminophen] Hallucinations 11/25/2020    Percolone [oxycodone] Hallucinations 07/17/2018            The following portions of the patient's history were reviewed and updated as appropriate: allergies, current medications, past family history, past  medical history, past social history, past surgical history and problem list.     Past Medical History:   Diagnosis Date    AC (acromioclavicular) joint bone spurs     Last assessed - 2/19/15    Achilles tendinitis, unspecified leg 06/04/2010    Resolved - 1/8/18    Anemia 08/12/2010    Resolved - 1/11/16    Anticoagulant long-term use 01/18/2023    Blood in urine     BPH without urinary obstruction     Hypertrophy    Deep venous thrombosis of distal lower extremity (HCC)     2015, provoked, postoperative after bariatric surgery, was placed on coumadin temporarily    Heartburn     Last assessed - 12/12/14    Hemorrhoids     Hernia, inguinal 06/30/2009    Resolved - 1/8/18    History of stomach ulcers     Hypertension     Mixed hyperlipidemia     Nephrolithiasis     Right inguinal hernia     Last assessed - 4/4/16    Sleep apnea     unable to tolerate c-pap    Tear of left rotator cuff     Last assessed - 2/19/15       Past Surgical History:   Procedure Laterality Date    BARIATRIC SURGERY  06/15/2015    Laparoscopic Longitudinal Gastrectomy for morbid obesity, Managed by - Blank Jones     EXPLORATORY LAPAROTOMY W/ BOWEL RESECTION Right 3/3/2016    Procedure: Incarcerated possibly strangulated right inguinal hernia;  Surgeon: Sten Kjellberg, MD;  Location: [facility];  Service:     FOOT SURGERY      HIP SURGERY      2011 - Rt Hip Replacement, 2012 - Lt Hip Replacement, 2013 - Lt Hip Revision    INGUINAL HERNIA REPAIR      Last assessed - 4/4/16    JOINT REPLACEMENT      KNEE SURGERY  2004    Double     HI ARTHROPLASTY GLENOHUMERAL JOINT TOTAL SHOULDER Right 2/4/2021    Procedure: ARTHROPLASTY SHOULDER REVERSE (RIGHT);  Surgeon: Waqar Martinez MD;  Location: [facility];  Service: Orthopedics    HI COLONOSCOPY FLX DX W/COLLJ SPEC WHEN PFRMD N/A 1/15/2016    Procedure: COLONOSCOPY;  Surgeon: Kingston Cortés MD;  Location: [facility] GI LAB;  Service: Gastroenterology    HI COLONOSCOPY FLX DX W/COLLJ SPEC WHEN PFRMD  N/A 2019    Procedure: COLONOSCOPY;  Surgeon: Kingston Cortés MD;  Location: [facility];  Service: Gastroenterology    PA LAPAROSCOPY SURG RPR INITIAL INGUINAL HERNIA Bilateral 2021    Procedure: REPAIR HERNIA INGUINAL, LAPAROSCOPIC;  Surgeon: Yehuda Shetty MD;  Location: [facility];  Service: General    TONSILLECTOMY         Family History   Problem Relation Age of Onset    Arthritis Father             Diabetes Father     Hypertension Father             Hyperlipidemia Father     Diabetes Family     Emphysema Family     Heart disease Family     Hypertension Family     Mental illness Neg Hx          Medications have been verified.        Objective   /63   Pulse 88   Temp (!) 96.3 °F (35.7 °C) (Tympanic)   Resp 16   Wt 116 kg (256 lb)   SpO2 96%   BMI 40.10 kg/m²   No LMP for male patient.       Physical Exam     Physical Exam  Vitals and nursing note reviewed.   Constitutional:       General: He is not in acute distress.     Appearance: Normal appearance. He is not ill-appearing, toxic-appearing or diaphoretic.   HENT:      Head: Normocephalic and atraumatic.   Eyes:      General:         Right eye: No discharge.         Left eye: No discharge.      Conjunctiva/sclera: Conjunctivae normal.   Cardiovascular:      Rate and Rhythm: Normal rate and regular rhythm.      Heart sounds: No murmur heard.  Pulmonary:      Effort: Pulmonary effort is normal. No respiratory distress.      Breath sounds: Normal breath sounds. No wheezing, rhonchi or rales.   Musculoskeletal:         General: No swelling, tenderness or signs of injury.   Skin:     General: Skin is warm.      Findings: No erythema.   Neurological:      General: No focal deficit present.      Mental Status: He is alert and oriented to person, place, and time.      Sensory: No sensory deficit.      Motor: No weakness.      Coordination: Coordination normal.      Gait: Gait normal.   Psychiatric:         Mood and Affect: Mood  normal.         Behavior: Behavior normal.         Thought Content: Thought content normal.         Judgment: Judgment normal.

## 2024-09-05 NOTE — LETTER
September 5, 2024     Patient: Trace Smith   YOB: 1950   Date of Visit: 9/5/2024       To Whom It May Concern:    Trace Smith was evaluated in my office on 9/5/2024.  Clinical findings likely indicate that yesterday's episode was due to overexertion, dehydration and low blood sugar.  Today he is asymptomatic and cleared to return to work on regular duty.  However he has been strongly advised on carrying water with him and some healthy snacks while working to reduce the risk of any future recurrence.  May return to work on 9/7/2024.  If you have any questions or concerns, please don't hesitate to call.         Sincerely,        Aureliano Kevin MD

## 2024-09-20 ENCOUNTER — OFFICE VISIT (OUTPATIENT)
Dept: FAMILY MEDICINE CLINIC | Facility: CLINIC | Age: 74
End: 2024-09-20
Payer: COMMERCIAL

## 2024-09-20 VITALS
BODY MASS INDEX: 40.53 KG/M2 | RESPIRATION RATE: 16 BRPM | HEART RATE: 56 BPM | SYSTOLIC BLOOD PRESSURE: 126 MMHG | HEIGHT: 67 IN | TEMPERATURE: 95.4 F | DIASTOLIC BLOOD PRESSURE: 68 MMHG | WEIGHT: 258.2 LBS

## 2024-09-20 DIAGNOSIS — R73.09 ABNORMAL BLOOD SUGAR: ICD-10-CM

## 2024-09-20 DIAGNOSIS — D64.9 ANEMIA, UNSPECIFIED TYPE: ICD-10-CM

## 2024-09-20 DIAGNOSIS — N28.89 RENAL MASS, LEFT: ICD-10-CM

## 2024-09-20 DIAGNOSIS — Z98.84 S/P BARIATRIC SURGERY: ICD-10-CM

## 2024-09-20 DIAGNOSIS — E78.2 MIXED HYPERLIPIDEMIA: ICD-10-CM

## 2024-09-20 DIAGNOSIS — Z23 NEED FOR VACCINATION: ICD-10-CM

## 2024-09-20 DIAGNOSIS — I10 BENIGN ESSENTIAL HYPERTENSION: Primary | ICD-10-CM

## 2024-09-20 PROCEDURE — 90471 IMMUNIZATION ADMIN: CPT

## 2024-09-20 PROCEDURE — 90662 IIV NO PRSV INCREASED AG IM: CPT

## 2024-09-20 PROCEDURE — 99214 OFFICE O/P EST MOD 30 MIN: CPT | Performed by: FAMILY MEDICINE

## 2024-09-20 NOTE — PROGRESS NOTES
Assessment/Plan:    1. Benign essential hypertension  Assessment & Plan:  Stable  Orders:  -     CBC; Future  -     Comprehensive metabolic panel; Future; Expected date: 03/04/2025  -     Lipid Panel with Direct LDL reflex; Future; Expected date: 03/04/2025  -     CBC  -     Comprehensive metabolic panel  -     Lipid Panel with Direct LDL reflex  2. Mixed hyperlipidemia  -     CBC; Future  -     Comprehensive metabolic panel; Future; Expected date: 03/04/2025  -     Lipid Panel with Direct LDL reflex; Future; Expected date: 03/04/2025  -     CBC  -     Comprehensive metabolic panel  -     Lipid Panel with Direct LDL reflex  3. Abnormal blood sugar  4. S/P bariatric surgery  5. Anemia, unspecified type  -     CBC; Future  -     Comprehensive metabolic panel; Future; Expected date: 03/04/2025  -     Lipid Panel with Direct LDL reflex; Future; Expected date: 03/04/2025  -     CBC  -     Comprehensive metabolic panel  -     Lipid Panel with Direct LDL reflex  6. Need for vaccination  -     influenza vaccine, high-dose, PF 0.5 mL (Fluzone High Dose)  7. Renal mass, left  Assessment & Plan:  Pt states he was told this was ok - last ct scan in the chart shows unchanged lesion          There are no Patient Instructions on file for this visit.    Return in about 6 months (around 3/20/2025) for Recheck.    Subjective:      Patient ID: Trace Smith is a 74 y.o. male.    Chief Complaint   Patient presents with    Follow-up     Risa Inman MA       Pt is maxwell alfaro a 6 month follow up    Pt states GI said he was done with his colonoscopies        The following portions of the patient's history were reviewed and updated as appropriate: allergies, current medications, past family history, past medical history, past social history, past surgical history and problem list.    Review of Systems   Constitutional:  Negative for activity change, appetite change, chills, diaphoresis, fatigue, fever and unexpected weight change.  "  HENT:  Negative for congestion, dental problem, ear pain, mouth sores, sinus pressure, sinus pain, sore throat and trouble swallowing.    Eyes:  Negative for photophobia, discharge and itching.   Respiratory:  Negative for apnea, chest tightness and shortness of breath.    Cardiovascular:  Negative for chest pain, palpitations and leg swelling.   Gastrointestinal:  Negative for abdominal distention, abdominal pain, blood in stool, nausea and vomiting.   Endocrine: Negative for cold intolerance, heat intolerance, polydipsia, polyphagia and polyuria.   Genitourinary:  Negative for difficulty urinating.   Musculoskeletal:  Negative for arthralgias.   Skin:  Negative for color change and wound.   Neurological:  Negative for dizziness, syncope, speech difficulty and headaches.   Hematological:  Negative for adenopathy.   Psychiatric/Behavioral:  Negative for agitation and behavioral problems.          Current Outpatient Medications   Medication Sig Dispense Refill    atorvastatin (LIPITOR) 20 mg tablet Take 1 tablet (20 mg total) by mouth daily 100 tablet 1    lisinopril (ZESTRIL) 10 mg tablet Take 1 tablet (10 mg total) by mouth daily 100 tablet 1    tamsulosin (FLOMAX) 0.4 mg Take 1 capsule (0.4 mg total) by mouth daily with dinner 100 capsule 1     No current facility-administered medications for this visit.       Objective:    /68   Pulse 56   Temp (!) 95.4 °F (35.2 °C)   Resp 16   Ht 5' 7\" (1.702 m)   Wt 117 kg (258 lb 3.2 oz)   BMI 40.44 kg/m²        Physical Exam  Vitals and nursing note reviewed.   Constitutional:       General: He is not in acute distress.     Appearance: He is well-developed. He is not diaphoretic.   HENT:      Head: Normocephalic and atraumatic.      Right Ear: External ear normal.      Left Ear: External ear normal.      Nose: Nose normal.      Mouth/Throat:      Pharynx: No oropharyngeal exudate.   Eyes:      General: No scleral icterus.        Right eye: No discharge.         " Left eye: No discharge.      Pupils: Pupils are equal, round, and reactive to light.   Neck:      Thyroid: No thyromegaly.   Cardiovascular:      Rate and Rhythm: Normal rate.      Heart sounds: Normal heart sounds. No murmur heard.  Pulmonary:      Effort: Pulmonary effort is normal. No respiratory distress.      Breath sounds: Normal breath sounds. No wheezing.   Abdominal:      General: Bowel sounds are normal. There is no distension.      Palpations: Abdomen is soft. There is no mass.      Tenderness: There is no abdominal tenderness. There is no guarding or rebound.   Musculoskeletal:         General: Normal range of motion.   Skin:     General: Skin is warm and dry.      Findings: No erythema or rash.   Neurological:      Mental Status: He is alert.      Coordination: Coordination normal.      Deep Tendon Reflexes: Reflexes normal.   Psychiatric:         Behavior: Behavior normal.                Frank Lombardi, DO

## 2024-10-21 NOTE — PROGRESS NOTES
Trace Smith  1950  [facility] [address] [address]    DISCUSSION/SUMMARY:    74-year-old male previously diagnosed with PE (2nd PE for this patient).  LE dopplers at the time of the 2nd PE were negative.  Mr. Smith was previously on Xarelto 10 mg a day.  Patient self discontinued approximately 9 months ago secondary to inability to pay the co-pay.  Specifics not presently available but this office tried to get the medication for the patient at an acceptable cost - this was not possible.  Patient refused to pay the out-of-pocket cost.    Patient feels well and clinically there are no concerning findings.  Routine health maintenance and medical care is up-to-date.  We discussed what to monitor for as far as acute respiratory issues, chest pain, pressure, perceived anxiety attack, lower extremity swelling, cords, redness etc.   Patient will call the office if he is scheduled for any invasive procedure or surgery.    We talked about other options including Coumadin, patient is not interested.  Although not standard, patient agrees to take aspirin 81 mg a day.  Patient will monitor for any signs of excessive bruising or bleeding.  Mr. Smith understands that the aspirin needs to be held before any procedure or surgery.    Patient demonstrates a good understanding of the situation.  Mr. Smith understands that he has had 2 prior PEs and he is at an increased risk for another thrombotic event especially while being off anticoagulation.      Patient is to return in 12 months.  Patient knows to call the hematology/oncology office if there are any other questions or concerns.    Carefully review your medication list and verify that the list is accurate and up-to-date. Please call the hematology/oncology office if there are medications missing from the list, medications on the list that you are not currently taking or if there is a  dosage or instruction that is different from how you're taking that medication.    Patient goals and areas of care: Aspirin, monitor for DVT, PE  Barriers to care:  None  Patient is able to self-care  _____________________________________________________________________________________    Chief Complaint   Patient presents with    Follow-up    Prior PEs     History of Present Illness:  74-year-old male previously diagnosed with multiple subsegmental pulmonary emboli initially seen by AF, PAC.  Mr. Smith previously suffered a PE (specifics on that PE are not presently available).  Patient was previously on Xarelto; patient self discontinued approximately 9 months ago..    Mr. Smith states feeling fine.  No shortness of breath or dyspnea on exertion.  No chest pain or pressure.  No lower extremity swelling, cords or pain.  Patient continues to be active.  Routine health maintenance and medical care is up-to-date.  No bruising or bleeding issues.    Review of Systems   Constitutional: Negative.    HENT: Negative.     Eyes: Negative.    Respiratory: Negative.     Cardiovascular: Negative.    Gastrointestinal: Negative.    Endocrine: Negative.    Genitourinary: Negative.    Musculoskeletal: Negative.    Skin: Negative.    Allergic/Immunologic: Negative.    Neurological: Negative.    Hematological: Negative.    Psychiatric/Behavioral: Negative.     All other systems reviewed and are negative.    Patient Active Problem List   Diagnosis    Benign essential hypertension    Mixed hyperlipidemia    Abnormal blood sugar    AC (acromioclavicular) joint bone spurs    GERD without esophagitis    Internal hemorrhoids    Obstructive sleep apnea    Postgastrectomy malabsorption    History of colon polyps    S/P bariatric surgery    Arthritis of carpometacarpal (CMC) joint of left thumb    Rotator cuff tear arthropathy of right shoulder    Recurrent inguinal hernia without obstruction or gangrene    Non-recurrent unilateral inguinal  hernia without obstruction or gangrene    Postoperative urinary retention    Multiple pulmonary emboli (HCC)    Anemia    Malignant neoplasm prostate (HCC)    Renal mass, left    History of kidney stones     Past Medical History:   Diagnosis Date    AC (acromioclavicular) joint bone spurs     Last assessed - 2/19/15    Achilles tendinitis, unspecified leg 06/04/2010    Resolved - 1/8/18    Anemia 08/12/2010    Resolved - 1/11/16    Anticoagulant long-term use 01/18/2023    Blood in urine     BPH without urinary obstruction     Hypertrophy    Deep venous thrombosis of distal lower extremity (HCC)     2015, provoked, postoperative after bariatric surgery, was placed on coumadin temporarily    Heartburn     Last assessed - 12/12/14    Hemorrhoids     Hernia, inguinal 06/30/2009    Resolved - 1/8/18    History of stomach ulcers     Hypertension     Mixed hyperlipidemia     Nephrolithiasis     Right inguinal hernia     Last assessed - 4/4/16    Sleep apnea     unable to tolerate c-pap    Tear of left rotator cuff     Last assessed - 2/19/15     Past Surgical History:   Procedure Laterality Date    BARIATRIC SURGERY  06/15/2015    Laparoscopic Longitudinal Gastrectomy for morbid obesity, Managed by - Blank Jones     EXPLORATORY LAPAROTOMY W/ BOWEL RESECTION Right 3/3/2016    Procedure: Incarcerated possibly strangulated right inguinal hernia;  Surgeon: Sten Kjellberg, MD;  Location: [facility];  Service:     FOOT SURGERY      HIP SURGERY      2011 - Rt Hip Replacement, 2012 - Lt Hip Replacement, 2013 - Lt Hip Revision    INGUINAL HERNIA REPAIR      Last assessed - 4/4/16    JOINT REPLACEMENT      KNEE SURGERY  2004    Double     MO ARTHROPLASTY GLENOHUMERAL JOINT TOTAL SHOULDER Right 2/4/2021    Procedure: ARTHROPLASTY SHOULDER REVERSE (RIGHT);  Surgeon: Waqar Martinez MD;  Location: [facility];  Service: Orthopedics    MO COLONOSCOPY FLX DX W/COLLJ SPEC WHEN PFRMD N/A 1/15/2016    Procedure: COLONOSCOPY;   Surgeon: Kingston Cortés MD;  Location: [facility];  Service: Gastroenterology    IA COLONOSCOPY FLX DX W/COLLJ SPEC WHEN PFRMD N/A 2019    Procedure: COLONOSCOPY;  Surgeon: Kingston Cortés MD;  Location: [facility];  Service: Gastroenterology    IA LAPAROSCOPY SURG RPR INITIAL INGUINAL HERNIA Bilateral 2021    Procedure: REPAIR HERNIA INGUINAL, LAPAROSCOPIC;  Surgeon: Yehuda Shetty MD;  Location: [facility];  Service: General    TONSILLECTOMY       Family History   Problem Relation Age of Onset    Arthritis Father             Diabetes Father     Hypertension Father             Hyperlipidemia Father     Diabetes Family     Emphysema Family     Heart disease Family     Hypertension Family     Mental illness Neg Hx      Social History     Socioeconomic History    Marital status: /Civil Union     Spouse name: Not on file    Number of children: Not on file    Years of education: Not on file    Highest education level: Not on file   Occupational History    Not on file   Tobacco Use    Smoking status: Former     Current packs/day: 0.00     Average packs/day: 1 pack/day for 30.0 years (30.0 ttl pk-yrs)     Types: Cigarettes     Start date: 1966     Quit date: 1994     Years since quittin.8    Smokeless tobacco: Never   Vaping Use    Vaping status: Never Used   Substance and Sexual Activity    Alcohol use: Not Currently     Alcohol/week: 2.0 standard drinks of alcohol     Types: 2 Standard drinks or equivalent per week     Comment: Occ, very rarely     Drug use: Never    Sexual activity: Yes     Partners: Female   Other Topics Concern    Not on file   Social History Narrative    Exercise - Walking once a day     Social Determinants of Health     Financial Resource Strain: Not on file   Food Insecurity: Not on file   Transportation Needs: Not on file   Physical Activity: Not on file   Stress: Not on file   Social Connections: Unknown (2024)    Received from [email or web address]     Social Connections     How often do you feel lonely or isolated from those around you? (Adult - for ages 18 years and over): Not on file   Intimate Partner Violence: Not on file   Housing Stability: Not on file       Current Outpatient Medications:     atorvastatin (LIPITOR) 20 mg tablet, Take 1 tablet (20 mg total) by mouth daily, Disp: 100 tablet, Rfl: 1    lisinopril (ZESTRIL) 10 mg tablet, Take 1 tablet (10 mg total) by mouth daily, Disp: 100 tablet, Rfl: 1    tamsulosin (FLOMAX) 0.4 mg, Take 1 capsule (0.4 mg total) by mouth daily with dinner, Disp: 100 capsule, Rfl: 1    Allergies   Allergen Reactions    Percocet [Oxycodone-Acetaminophen] Hallucinations    Percolone [Oxycodone] Hallucinations       Vitals:    10/22/24 1048   BP: 132/70   Pulse: 64   Resp: 17   Temp: 97.9 °F (36.6 °C)   SpO2: 96%     Physical Exam  Constitutional:       Appearance: He is well-developed.   HENT:      Head: Normocephalic and atraumatic.      Right Ear: External ear normal.      Left Ear: External ear normal.   Eyes:      Conjunctiva/sclera: Conjunctivae normal.      Pupils: Pupils are equal, round, and reactive to light.   Cardiovascular:      Rate and Rhythm: Normal rate and regular rhythm.      Heart sounds: Normal heart sounds.   Pulmonary:      Effort: Pulmonary effort is normal.      Breath sounds: Normal breath sounds.   Abdominal:      General: Bowel sounds are normal.      Palpations: Abdomen is soft.   Musculoskeletal:         General: Normal range of motion.      Cervical back: Normal range of motion and neck supple.   Skin:     General: Skin is warm.   Neurological:      Mental Status: He is alert and oriented to person, place, and time.      Deep Tendon Reflexes: Reflexes are normal and symmetric.   Psychiatric:         Behavior: Behavior normal.         Thought Content: Thought content normal.         Judgment: Judgment normal.     Extremities: 1 +bilateral lower extremity edema, no cords, pulses are 1+ old  well-healed knee scars    Labs    8/16/2024 BUN = 19 creatinine = 1.21 LFTs WNL calcium = 9.5 PSA = 0.3    2/21/2024 WBC = 5.2 hemoglobin = 12.1 hematocrit = 38.3 MCV = 89 platelet = 199 neutrophil = 58%    Imaging    07/07/2021 vascular lower limb venous duplex study bilateral    RIGHT LOWER LIMB:  No evidence of acute or chronic deep vein thrombosis.  No evidence of superficial thrombophlebitis noted.  Doppler evaluation shows a normal response to augmentation maneuvers.  Popliteal, posterior tibial and anterior tibial arterial Doppler waveforms are  triphasic.     LEFT LOWER LIMB:  No evidence of acute or chronic deep vein thrombosis.  No evidence of superficial thrombophlebitis noted.  Doppler evaluation shows a normal response to augmentation maneuvers.  Popliteal, posterior tibial and anterior tibial arterial Doppler waveforms are  triphasic.  There is a hypoechoic non-vascularized structure noted at the mid calf.    07/04/2021 CTA chest PE study    Multiple segmental branch pulmonary artery emboli with right heart strain.     The calculated ratio of right ventricular to left ventricular diameter (RV/LV ratio) is 1.07     This is greater than 0.9, which is abnormal and indicates right heart strain. An abnormal RV/LV ratio has been shown to be associated with an increased risk of 30 day mortality in the setting of acute pulmonary embolism.  Urgent consultation with the medical critical care team is recommended.

## 2024-10-22 ENCOUNTER — OFFICE VISIT (OUTPATIENT)
Dept: HEMATOLOGY ONCOLOGY | Facility: MEDICAL CENTER | Age: 74
End: 2024-10-22
Payer: COMMERCIAL

## 2024-10-22 VITALS
HEIGHT: 67 IN | HEART RATE: 64 BPM | TEMPERATURE: 97.9 F | BODY MASS INDEX: 39.71 KG/M2 | RESPIRATION RATE: 17 BRPM | DIASTOLIC BLOOD PRESSURE: 70 MMHG | WEIGHT: 253 LBS | SYSTOLIC BLOOD PRESSURE: 132 MMHG | OXYGEN SATURATION: 96 %

## 2024-10-22 DIAGNOSIS — I26.99 MULTIPLE PULMONARY EMBOLI (HCC): Primary | ICD-10-CM

## 2024-10-22 PROCEDURE — 99214 OFFICE O/P EST MOD 30 MIN: CPT | Performed by: INTERNAL MEDICINE

## 2025-01-18 DIAGNOSIS — I10 BENIGN ESSENTIAL HYPERTENSION: ICD-10-CM

## 2025-01-18 DIAGNOSIS — E78.2 MIXED HYPERLIPIDEMIA: ICD-10-CM

## 2025-01-18 DIAGNOSIS — R33.9 URINARY RETENTION: ICD-10-CM

## 2025-01-19 DIAGNOSIS — I10 BENIGN ESSENTIAL HYPERTENSION: ICD-10-CM

## 2025-01-19 RX ORDER — LISINOPRIL 10 MG/1
TABLET ORAL
Qty: 90 TABLET | Refills: 1 | Status: SHIPPED | OUTPATIENT
Start: 2025-01-19

## 2025-01-19 RX ORDER — TAMSULOSIN HYDROCHLORIDE 0.4 MG/1
CAPSULE ORAL
Qty: 90 CAPSULE | Refills: 1 | Status: SHIPPED | OUTPATIENT
Start: 2025-01-19

## 2025-01-19 RX ORDER — ATORVASTATIN CALCIUM 20 MG/1
TABLET, FILM COATED ORAL
Qty: 90 TABLET | Refills: 1 | Status: SHIPPED | OUTPATIENT
Start: 2025-01-19

## 2025-01-20 RX ORDER — LISINOPRIL 10 MG/1
TABLET ORAL
Qty: 90 TABLET | Refills: 0 | OUTPATIENT
Start: 2025-01-20

## 2025-03-07 LAB
ALBUMIN SERPL-MCNC: 4 G/DL (ref 3.8–4.8)
ALP SERPL-CCNC: 101 IU/L (ref 44–121)
ALT SERPL-CCNC: 25 IU/L (ref 0–44)
AST SERPL-CCNC: 23 IU/L (ref 0–40)
BILIRUB SERPL-MCNC: 0.5 MG/DL (ref 0–1.2)
BUN SERPL-MCNC: 22 MG/DL (ref 8–27)
BUN/CREAT SERPL: 19 (ref 10–24)
CALCIUM SERPL-MCNC: 9.6 MG/DL (ref 8.6–10.2)
CHLORIDE SERPL-SCNC: 105 MMOL/L (ref 96–106)
CHOLEST SERPL-MCNC: 125 MG/DL (ref 100–199)
CO2 SERPL-SCNC: 24 MMOL/L (ref 20–29)
CREAT SERPL-MCNC: 1.16 MG/DL (ref 0.76–1.27)
EGFR: 66 ML/MIN/1.73
ERYTHROCYTE [DISTWIDTH] IN BLOOD BY AUTOMATED COUNT: 13.8 % (ref 11.6–15.4)
GLOBULIN SER-MCNC: 2.8 G/DL (ref 1.5–4.5)
GLUCOSE SERPL-MCNC: 114 MG/DL (ref 70–99)
HCT VFR BLD AUTO: 39.7 % (ref 37.5–51)
HDLC SERPL-MCNC: 36 MG/DL
HGB BLD-MCNC: 12.3 G/DL (ref 13–17.7)
LDLC SERPL CALC-MCNC: 76 MG/DL (ref 0–99)
LDLC/HDLC SERPL: 2.1 RATIO (ref 0–3.6)
MCH RBC QN AUTO: 27.6 PG (ref 26.6–33)
MCHC RBC AUTO-ENTMCNC: 31 G/DL (ref 31.5–35.7)
MCV RBC AUTO: 89 FL (ref 79–97)
MICRODELETION SYND BLD/T FISH: NORMAL
PLATELET # BLD AUTO: 210 X10E3/UL (ref 150–450)
POTASSIUM SERPL-SCNC: 4.4 MMOL/L (ref 3.5–5.2)
PROT SERPL-MCNC: 6.8 G/DL (ref 6–8.5)
RBC # BLD AUTO: 4.46 X10E6/UL (ref 4.14–5.8)
SL AMB VLDL CHOLESTEROL CALC: 13 MG/DL (ref 5–40)
SODIUM SERPL-SCNC: 142 MMOL/L (ref 134–144)
TRIGL SERPL-MCNC: 59 MG/DL (ref 0–149)
WBC # BLD AUTO: 5.3 X10E3/UL (ref 3.4–10.8)

## 2025-03-10 ENCOUNTER — RESULTS FOLLOW-UP (OUTPATIENT)
Dept: FAMILY MEDICINE CLINIC | Facility: CLINIC | Age: 75
End: 2025-03-10

## 2025-03-20 ENCOUNTER — OFFICE VISIT (OUTPATIENT)
Dept: FAMILY MEDICINE CLINIC | Facility: CLINIC | Age: 75
End: 2025-03-20
Payer: COMMERCIAL

## 2025-03-20 VITALS
HEIGHT: 67 IN | OXYGEN SATURATION: 96 % | WEIGHT: 265 LBS | BODY MASS INDEX: 41.59 KG/M2 | DIASTOLIC BLOOD PRESSURE: 76 MMHG | HEART RATE: 74 BPM | RESPIRATION RATE: 18 BRPM | TEMPERATURE: 97.6 F | SYSTOLIC BLOOD PRESSURE: 134 MMHG

## 2025-03-20 DIAGNOSIS — C61 MALIGNANT NEOPLASM PROSTATE (HCC): ICD-10-CM

## 2025-03-20 DIAGNOSIS — R73.09 ABNORMAL BLOOD SUGAR: ICD-10-CM

## 2025-03-20 DIAGNOSIS — E78.2 MIXED HYPERLIPIDEMIA: ICD-10-CM

## 2025-03-20 DIAGNOSIS — I10 BENIGN ESSENTIAL HYPERTENSION: Primary | ICD-10-CM

## 2025-03-20 PROCEDURE — 99214 OFFICE O/P EST MOD 30 MIN: CPT | Performed by: FAMILY MEDICINE

## 2025-03-20 RX ORDER — ASPIRIN 81 MG/1
81 TABLET, CHEWABLE ORAL DAILY
COMMUNITY

## 2025-03-20 RX ORDER — LISINOPRIL 10 MG/1
10 TABLET ORAL DAILY
Qty: 100 TABLET | Refills: 3 | Status: SHIPPED | OUTPATIENT
Start: 2025-03-20 | End: 2026-03-20

## 2025-03-20 NOTE — ASSESSMENT & PLAN NOTE
Orders:    Comprehensive metabolic panel; Future    CBC; Future    Lipid Panel with Direct LDL reflex; Future

## 2025-03-20 NOTE — ASSESSMENT & PLAN NOTE
Stable BP  Orders:    lisinopril (ZESTRIL) 10 mg tablet; Take 1 tablet (10 mg total) by mouth daily    Comprehensive metabolic panel; Future    CBC; Future    Lipid Panel with Direct LDL reflex; Future

## 2025-03-20 NOTE — PROGRESS NOTES
Name: Trace Smith      : 1950      MRN: [id number]  Encounter Provider: Frank Lombardi, DO  Encounter Date: 3/20/2025   Encounter department: [facility]  :  Assessment & Plan  Benign essential hypertension  Stable BP  Orders:    lisinopril (ZESTRIL) 10 mg tablet; Take 1 tablet (10 mg total) by mouth daily    Comprehensive metabolic panel; Future    CBC; Future    Lipid Panel with Direct LDL reflex; Future    Mixed hyperlipidemia    Orders:    Comprehensive metabolic panel; Future    CBC; Future    Lipid Panel with Direct LDL reflex; Future    Abnormal blood sugar    Orders:    Hemoglobin A1C; Future    Comprehensive metabolic panel; Future    CBC; Future    Lipid Panel with Direct LDL reflex; Future    Malignant neoplasm prostate (HCC)  Pt sees urology .  Last value appreciated              History of Present Illness   Pt is here for a 6 month follow up    Pt states at last colon he was told he was done      Review of Systems   Constitutional:  Negative for activity change, appetite change, chills, diaphoresis, fatigue, fever and unexpected weight change.   HENT:  Negative for congestion, dental problem, ear pain, mouth sores, sinus pressure, sinus pain, sore throat and trouble swallowing.    Eyes:  Negative for photophobia, discharge and itching.   Respiratory:  Negative for apnea, chest tightness and shortness of breath.    Cardiovascular:  Negative for chest pain, palpitations and leg swelling.   Gastrointestinal:  Negative for abdominal distention, abdominal pain, blood in stool, nausea and vomiting.   Endocrine: Negative for cold intolerance, heat intolerance, polydipsia, polyphagia and polyuria.   Genitourinary:  Negative for difficulty urinating.   Musculoskeletal:  Negative for arthralgias.   Skin:  Negative for color change and wound.   Neurological:  Negative for dizziness, syncope, speech difficulty and headaches.   Hematological:  Negative for adenopathy.   Psychiatric/Behavioral:  " Negative for agitation and behavioral problems.        Objective   /76   Pulse 74   Temp 97.6 °F (36.4 °C)   Resp 18   Ht 5' 7\" (1.702 m)   Wt 120 kg (265 lb)   SpO2 96%   BMI 41.50 kg/m²      Physical Exam  Vitals and nursing note reviewed.   Constitutional:       General: He is not in acute distress.     Appearance: He is well-developed. He is not diaphoretic.   HENT:      Head: Normocephalic and atraumatic.      Right Ear: External ear normal.      Left Ear: External ear normal.      Nose: Nose normal.      Mouth/Throat:      Pharynx: No oropharyngeal exudate.   Eyes:      General: No scleral icterus.        Right eye: No discharge.         Left eye: No discharge.      Pupils: Pupils are equal, round, and reactive to light.   Neck:      Thyroid: No thyromegaly.   Cardiovascular:      Rate and Rhythm: Normal rate.      Heart sounds: Normal heart sounds. No murmur heard.  Pulmonary:      Effort: Pulmonary effort is normal. No respiratory distress.      Breath sounds: Normal breath sounds. No wheezing.   Abdominal:      General: Bowel sounds are normal. There is no distension.      Palpations: Abdomen is soft. There is no mass.      Tenderness: There is no abdominal tenderness. There is no guarding or rebound.   Musculoskeletal:         General: Normal range of motion.   Skin:     General: Skin is warm and dry.      Findings: No erythema or rash.   Neurological:      Mental Status: He is alert.      Coordination: Coordination normal.      Deep Tendon Reflexes: Reflexes normal.   Psychiatric:         Behavior: Behavior normal.         "

## 2025-03-20 NOTE — ASSESSMENT & PLAN NOTE
Orders:    Hemoglobin A1C; Future    Comprehensive metabolic panel; Future    CBC; Future    Lipid Panel with Direct LDL reflex; Future

## 2025-04-27 DIAGNOSIS — I10 BENIGN ESSENTIAL HYPERTENSION: ICD-10-CM

## 2025-04-28 RX ORDER — LISINOPRIL 10 MG/1
10 TABLET ORAL DAILY
Qty: 90 TABLET | Refills: 1 | Status: SHIPPED | OUTPATIENT
Start: 2025-04-28 | End: 2026-04-28

## 2025-07-07 ENCOUNTER — TELEPHONE (OUTPATIENT)
Dept: FAMILY MEDICINE CLINIC | Facility: CLINIC | Age: 75
End: 2025-07-07

## 2025-07-07 NOTE — TELEPHONE ENCOUNTER
Patient dropped off handicap placard form. He needs back by tomorrow if possible. Placed on your folder. Please call for .

## 2025-07-26 DIAGNOSIS — E78.2 MIXED HYPERLIPIDEMIA: ICD-10-CM

## 2025-07-26 DIAGNOSIS — R33.9 URINARY RETENTION: ICD-10-CM

## 2025-07-29 DIAGNOSIS — I10 BENIGN ESSENTIAL HYPERTENSION: ICD-10-CM

## 2025-07-29 RX ORDER — ATORVASTATIN CALCIUM 20 MG/1
20 TABLET, FILM COATED ORAL DAILY
Qty: 90 TABLET | Refills: 1 | Status: SHIPPED | OUTPATIENT
Start: 2025-07-29

## 2025-07-29 RX ORDER — TAMSULOSIN HYDROCHLORIDE 0.4 MG/1
0.4 CAPSULE ORAL
Qty: 90 CAPSULE | Refills: 1 | Status: SHIPPED | OUTPATIENT
Start: 2025-07-29

## 2025-07-30 RX ORDER — LISINOPRIL 10 MG/1
10 TABLET ORAL DAILY
Qty: 90 TABLET | Refills: 1 | Status: SHIPPED | OUTPATIENT
Start: 2025-07-30 | End: 2026-07-30

## 2025-08-22 LAB
EST. AVERAGE GLUCOSE BLD GHB EST-MCNC: 123 MG/DL
HBA1C MFR BLD: 5.9 % (ref 4.8–5.6)

## 2025-08-23 LAB
ALBUMIN SERPL-MCNC: 3.9 G/DL (ref 3.8–4.8)
ALP SERPL-CCNC: 98 IU/L (ref 44–121)
ALT SERPL-CCNC: 14 IU/L (ref 0–44)
AST SERPL-CCNC: 16 IU/L (ref 0–40)
BILIRUB SERPL-MCNC: 0.6 MG/DL (ref 0–1.2)
BUN SERPL-MCNC: 20 MG/DL (ref 8–27)
BUN/CREAT SERPL: 15 (ref 10–24)
CALCIUM SERPL-MCNC: 9.5 MG/DL (ref 8.6–10.2)
CHLORIDE SERPL-SCNC: 105 MMOL/L (ref 96–106)
CHOLEST SERPL-MCNC: 114 MG/DL (ref 100–199)
CO2 SERPL-SCNC: 24 MMOL/L (ref 20–29)
CREAT SERPL-MCNC: 1.32 MG/DL (ref 0.76–1.27)
EGFR: 56 ML/MIN/1.73
ERYTHROCYTE [DISTWIDTH] IN BLOOD BY AUTOMATED COUNT: 14.1 % (ref 11.6–15.4)
GLOBULIN SER-MCNC: 2.9 G/DL (ref 1.5–4.5)
GLUCOSE SERPL-MCNC: 103 MG/DL (ref 70–99)
HCT VFR BLD AUTO: 39.5 % (ref 37.5–51)
HDLC SERPL-MCNC: 45 MG/DL
HGB BLD-MCNC: 12.2 G/DL (ref 13–17.7)
LDLC SERPL CALC-MCNC: 57 MG/DL (ref 0–99)
LDLC/HDLC SERPL: 1.3 RATIO (ref 0–3.6)
MCH RBC QN AUTO: 27.9 PG (ref 26.6–33)
MCHC RBC AUTO-ENTMCNC: 30.9 G/DL (ref 31.5–35.7)
MCV RBC AUTO: 90 FL (ref 79–97)
MICRODELETION SYND BLD/T FISH: NORMAL
MICRODELETION SYND BLD/T FISH: NORMAL
PLATELET # BLD AUTO: 204 X10E3/UL (ref 150–450)
POTASSIUM SERPL-SCNC: 4.7 MMOL/L (ref 3.5–5.2)
PROT SERPL-MCNC: 6.8 G/DL (ref 6–8.5)
RBC # BLD AUTO: 4.37 X10E6/UL (ref 4.14–5.8)
SL AMB VLDL CHOLESTEROL CALC: 12 MG/DL (ref 5–40)
SODIUM SERPL-SCNC: 143 MMOL/L (ref 134–144)
TRIGL SERPL-MCNC: 52 MG/DL (ref 0–149)
WBC # BLD AUTO: 4.8 X10E3/UL (ref 3.4–10.8)

## (undated) DEVICE — DRAPE UTILITY

## (undated) DEVICE — PLUMEPEN PRO 10FT

## (undated) DEVICE — SINGLE-USE BIOPSY FORCEPS: Brand: RADIAL JAW 4

## (undated) DEVICE — SCD SEQUENTIAL COMPRESSION COMFORT SLEEVE MEDIUM KNEE LENGTH: Brand: KENDALL SCD

## (undated) DEVICE — GAUZE SPONGES,16 PLY: Brand: CURITY

## (undated) DEVICE — IRRIGATOR DISPOSABLE SUCTION

## (undated) DEVICE — SOLIDIFIER FLUID WASTE CONTROL 1500ML

## (undated) DEVICE — ASTOUND STANDARD SURGICAL GOWN, XL: Brand: CONVERTORS

## (undated) DEVICE — GLOVE SRG BIOGEL 8

## (undated) DEVICE — HARMONIC 1100 SHEARS, 36CM SHAFT LENGTH: Brand: HARMONIC

## (undated) DEVICE — GLOVE INDICATOR PI UNDERGLOVE SZ 7.5 BLUE

## (undated) DEVICE — BRUSH CYTOLOGY 3 MM 240 CM

## (undated) DEVICE — 3M™ STERI-DRAPE™ U-DRAPE 1015: Brand: STERI-DRAPE™

## (undated) DEVICE — DUAL CUT SAGITTAL BLADE

## (undated) DEVICE — ANTIBACTERIAL UNDYED BRAIDED (POLYGLACTIN 910), SYNTHETIC ABSORBABLE SUTURE: Brand: COATED VICRYL

## (undated) DEVICE — DISPOSABLE BIOPSY VALVE MAJ-1555: Brand: SINGLE USE BIOPSY VALVE (STERILE)

## (undated) DEVICE — TUBING BUBBLE CLEAR 5MM X 100 FT NS

## (undated) DEVICE — TRAVELKIT CONTAINS FIRST STEP KIT (200ML EP-4 KIT) AND SOILED SCOPE BAG - 1 KIT: Brand: TRAVELKIT CONTAINS FIRST STEP KIT AND SOILED SCOPE BAG

## (undated) DEVICE — TROCAR: Brand: KII FIOS FIRST ENTRY

## (undated) DEVICE — MARKER SPOT EX  BOWEL TATTOO SYRINGE

## (undated) DEVICE — HOOD: Brand: FLYTE, SURGICOOL

## (undated) DEVICE — BAG SPECIMEN BIOHAZARD 10 X 10 ADHESIVE

## (undated) DEVICE — TROCAR: Brand: KII® SLEEVE

## (undated) DEVICE — Device: Brand: OMNICLOSE TROCAR SITE CLOSURE DEVICE

## (undated) DEVICE — Device: Brand: OLYMPUS

## (undated) DEVICE — TIBURON SPLIT SHEET: Brand: CONVERTORS

## (undated) DEVICE — GLOVE EXAM NON-STRL NTRL PLUS LRG PF

## (undated) DEVICE — ADHESIVE SKIN HIGH VISCOSITY EXOFIN 1ML

## (undated) DEVICE — STAPLER HERNIA PROTAC

## (undated) DEVICE — AIRLIFE™  ADULT CUSHION NASAL CANNULA WITH 7 FOOT (2.1 M) CRUSH-RESISTANT OXYGEN TUBING, AND U/CONNECT-IT ADAPTER: Brand: AIRLIFE™

## (undated) DEVICE — SUT VICRYL 4-0 PS-2 18 IN J496G

## (undated) DEVICE — DISSECTOR BALLOON SPACEMAKER PRO SBT/OVAL

## (undated) DEVICE — SUT MONOCRYL 4-0 PS-2 18 IN Y496G

## (undated) DEVICE — PACK GENERAL LF

## (undated) DEVICE — HANDPIECE SET WITH HIGH FLOW TIP AND SUCTION TUBE: Brand: INTERPULSE

## (undated) DEVICE — INTENDED FOR TISSUE SEPARATION, AND OTHER PROCEDURES THAT REQUIRE A SHARP SURGICAL BLADE TO PUNCTURE OR CUT.: Brand: BARD-PARKER SAFETY BLADES SIZE 11, STERILE

## (undated) DEVICE — 60 ML SYRINGE,REGULAR TIP: Brand: MONOJECT

## (undated) DEVICE — DRESSING MEPILEX AG BORDER 4 X 8 IN

## (undated) DEVICE — DRAPE LAPAROTOMY W/POUCHES

## (undated) DEVICE — NEEDLE 25GA X 1 IN SAFETY GLIDE

## (undated) DEVICE — MEDI-VAC YANKAUER SUCTION HANDLE: Brand: CARDINAL HEALTH

## (undated) DEVICE — LUBRICANT SURGILUBE TUBE 4 OZ  FLIP TOP

## (undated) DEVICE — FORCEP ELECSURG RADIAL JAW4 2.2 X 240CM  HOT BX

## (undated) DEVICE — ASTOUND SURGICAL GOWN, XXX LARGE, X-LONG: Brand: CONVERTORS

## (undated) DEVICE — BASIC DOUBLE BASIN 2-LF: Brand: MEDLINE INDUSTRIES, INC.

## (undated) DEVICE — TUBING AUX CHANNEL

## (undated) DEVICE — ANTIBACTERIAL VIOLET BRAIDED (POLYGLACTIN 910), SYNTHETIC ABSORBABLE SUTURE: Brand: COATED VICRYL

## (undated) DEVICE — ENDO CLIP APPLER 5MM

## (undated) DEVICE — BRUSH ENDO CLEANING DBL-HEADER

## (undated) DEVICE — TUBING SMOKE EVAC W/FILTRATION DEVICE PLUMEPORT ACTIV

## (undated) DEVICE — POSITIONER TRIMANO LIMB BEACH CHAIR

## (undated) DEVICE — INTENDED FOR TISSUE SEPARATION, AND OTHER PROCEDURES THAT REQUIRE A SHARP SURGICAL BLADE TO PUNCTURE OR CUT.: Brand: BARD-PARKER SAFETY BLADES SIZE 15, STERILE

## (undated) DEVICE — TRAY FOLEY 16FR URIMETER SURESTEP

## (undated) DEVICE — [HIGH FLOW INSUFFLATOR,  DO NOT USE IF PACKAGE IS DAMAGED,  KEEP DRY,  KEEP AWAY FROM SUNLIGHT,  PROTECT FROM HEAT AND RADIOACTIVE SOURCES.]: Brand: PNEUMOSURE

## (undated) DEVICE — CHLORAPREP HI-LITE 26ML ORANGE

## (undated) DEVICE — ORTHOPEDIC PACK: Brand: CARDINAL HEALTH

## (undated) DEVICE — SUT ETHIBOND 5 V-37 30 IN MB66G

## (undated) DEVICE — GROUNDING PAD UNIVERSAL SLW

## (undated) DEVICE — TRAP POLY

## (undated) DEVICE — 1200CC GUARDIAN II: Brand: GUARDIAN

## (undated) DEVICE — SUT VICRYL 0 UR-6 27 IN J603H

## (undated) DEVICE — PDS II VLT 0 107CM AG ST3: Brand: ENDOLOOP

## (undated) DEVICE — Device